# Patient Record
Sex: MALE | Race: WHITE | Employment: OTHER | URBAN - METROPOLITAN AREA
[De-identification: names, ages, dates, MRNs, and addresses within clinical notes are randomized per-mention and may not be internally consistent; named-entity substitution may affect disease eponyms.]

---

## 2019-09-04 ENCOUNTER — HOSPITAL ENCOUNTER (EMERGENCY)
Age: 64
Discharge: HOME | End: 2019-09-04
Payer: COMMERCIAL

## 2019-09-04 VITALS
HEIGHT: 70 IN | HEART RATE: 90 BPM | OXYGEN SATURATION: 97 % | SYSTOLIC BLOOD PRESSURE: 134 MMHG | RESPIRATION RATE: 19 BRPM | WEIGHT: 200 LBS | DIASTOLIC BLOOD PRESSURE: 75 MMHG | TEMPERATURE: 98 F | BODY MASS INDEX: 28.63 KG/M2
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
## 2019-09-04 DIAGNOSIS — S60.569A INSECT BITE HAND, UNSPECIFIED LATERALITY, INITIAL ENCOUNTER: Primary | ICD-10-CM

## 2019-09-04 PROCEDURE — 99282 EMERGENCY DEPT VISIT SF MDM: CPT

## 2019-09-04 PROCEDURE — 6370000000 HC RX 637 (ALT 250 FOR IP): Performed by: PHYSICIAN ASSISTANT

## 2019-09-04 RX ORDER — CEPHALEXIN 250 MG/1
500 CAPSULE ORAL ONCE
Status: COMPLETED | OUTPATIENT
Start: 2019-09-04 | End: 2019-09-04

## 2019-09-04 RX ORDER — CEPHALEXIN 500 MG/1
500 CAPSULE ORAL 4 TIMES DAILY
Qty: 40 CAPSULE | Refills: 0 | Status: SHIPPED | OUTPATIENT
Start: 2019-09-04 | End: 2019-09-14

## 2019-09-04 RX ADMIN — CEPHALEXIN 500 MG: 250 CAPSULE ORAL at 18:30

## 2019-09-04 ASSESSMENT — PAIN DESCRIPTION - LOCATION: LOCATION: HAND

## 2019-09-04 ASSESSMENT — PAIN DESCRIPTION - DESCRIPTORS: DESCRIPTORS: TIGHTNESS

## 2019-09-04 ASSESSMENT — PAIN DESCRIPTION - FREQUENCY: FREQUENCY: CONTINUOUS

## 2019-09-04 ASSESSMENT — PAIN SCALES - WONG BAKER: WONGBAKER_NUMERICALRESPONSE: 2

## 2019-09-04 ASSESSMENT — PAIN DESCRIPTION - PAIN TYPE: TYPE: ACUTE PAIN

## 2019-09-04 ASSESSMENT — PAIN DESCRIPTION - ORIENTATION: ORIENTATION: LEFT

## 2019-09-04 NOTE — ED PROVIDER NOTES
plan.      --------------------------------- IMPRESSION AND DISPOSITION ---------------------------------    IMPRESSION  1. Insect bite hand, unspecified laterality, initial encounter        DISPOSITION  Disposition: Discharge to home  Patient condition is good      NOTE: This report was transcribed using voice recognition software.  Every effort was made to ensure accuracy; however, inadvertent computerized transcription errors may be present     Mellisa Garay  09/04/19 9738

## 2020-03-23 ENCOUNTER — APPOINTMENT (OUTPATIENT)
Dept: GENERAL RADIOLOGY | Age: 65
End: 2020-03-23
Payer: MEDICAID

## 2020-03-23 ENCOUNTER — HOSPITAL ENCOUNTER (EMERGENCY)
Age: 65
Discharge: HOME | End: 2020-03-23
Payer: MEDICAID

## 2020-03-23 VITALS
WEIGHT: 220 LBS | OXYGEN SATURATION: 97 % | SYSTOLIC BLOOD PRESSURE: 140 MMHG | TEMPERATURE: 98.2 F | HEART RATE: 80 BPM | HEIGHT: 70 IN | RESPIRATION RATE: 16 BRPM | BODY MASS INDEX: 31.5 KG/M2 | DIASTOLIC BLOOD PRESSURE: 88 MMHG

## 2020-03-23 PROCEDURE — 99283 EMERGENCY DEPT VISIT LOW MDM: CPT

## 2020-03-23 PROCEDURE — 71046 X-RAY EXAM CHEST 2 VIEWS: CPT

## 2020-03-23 RX ORDER — BENZONATATE 100 MG/1
100 CAPSULE ORAL 3 TIMES DAILY PRN
Qty: 20 CAPSULE | Refills: 0 | Status: SHIPPED | OUTPATIENT
Start: 2020-03-23 | End: 2020-03-30

## 2020-03-23 RX ORDER — AZITHROMYCIN 250 MG/1
TABLET, FILM COATED ORAL
Qty: 1 PACKET | Refills: 0 | Status: SHIPPED | OUTPATIENT
Start: 2020-03-23 | End: 2020-03-27

## 2020-03-23 ASSESSMENT — PAIN SCALES - GENERAL: PAINLEVEL_OUTOF10: 7

## 2020-03-23 ASSESSMENT — PAIN DESCRIPTION - DESCRIPTORS: DESCRIPTORS: ACHING;THROBBING

## 2020-03-23 ASSESSMENT — PAIN DESCRIPTION - LOCATION: LOCATION: BACK;HEAD;NECK

## 2020-03-23 ASSESSMENT — PAIN DESCRIPTION - PAIN TYPE: TYPE: ACUTE PAIN

## 2020-03-23 NOTE — ED PROVIDER NOTES
[facility]    HPI:  3/23/20, Time: 7:59 PM EDT         Emely Almanza is a 60 y.o. male presenting to the ED for cough and congestion, beginning 3 days ago. The complaint has been persistent, moderate in severity, and worsened by nothing. Patient denies sick contacts or recent travel. Patient denies fever at home. He reports that his cough is been productive with green sputum. Patient denies all other symptoms at this time. Review of Systems:   Pertinent positives and negatives are stated within HPI, all other systems reviewed and are negative.          --------------------------------------------- PAST HISTORY ---------------------------------------------  Past Medical History:  has a past medical history of Diabetes mellitus ([facility] [address] Hyperlipidemia, and Hypertension. Past Surgical History:  has a past surgical history that includes hernia repair. Social History:  reports that he quit smoking about 34 years ago. His smoking use included cigarettes. He quit smokeless tobacco use about 34 years ago. He reports current alcohol use. He reports that he does not use drugs. Family History: family history is not on file. The patients home medications have been reviewed. Allergies: Patient has no known allergies. -------------------------------------------------- RESULTS -------------------------------------------------  All laboratory and radiology results have been personally reviewed by myself   LABS:  No results found for this visit on 03/23/20. RADIOLOGY:  Interpreted by Radiologist.  XR CHEST STANDARD (2 VW)   Final Result      No airspace opacities or pleural effusion.                ------------------------- NURSING NOTES AND VITALS REVIEWED ---------------------------   The nursing notes within the ED encounter and vital signs as below have been reviewed.    BP (!) 140/88   Pulse 80   Temp 98.2 °F (36.8 °C) (Temporal)   Resp 16   Ht 5' 10\" (1.778 m)   Wt 220 lb (99.8 kg)   SpO2 ---------------------------------    IMPRESSION  1. Acute bronchitis, unspecified organism        DISPOSITION  Disposition: Discharge to home  Patient condition is stable      NOTE: This report was transcribed using voice recognition software.  Every effort was made to ensure accuracy; however, inadvertent computerized transcription errors may be present        Irvin Cabrera [place]  03/23/20 2038

## 2020-04-03 ENCOUNTER — APPOINTMENT (OUTPATIENT)
Dept: CT IMAGING | Age: 65
End: 2020-04-03
Payer: MEDICAID

## 2020-04-03 ENCOUNTER — HOSPITAL ENCOUNTER (EMERGENCY)
Age: 65
Discharge: HOME | End: 2020-04-03
Attending: EMERGENCY MEDICINE
Payer: MEDICAID

## 2020-04-03 ENCOUNTER — APPOINTMENT (OUTPATIENT)
Dept: GENERAL RADIOLOGY | Age: 65
End: 2020-04-03
Payer: MEDICAID

## 2020-04-03 VITALS
HEART RATE: 67 BPM | OXYGEN SATURATION: 98 % | HEIGHT: 70 IN | WEIGHT: 205 LBS | BODY MASS INDEX: 29.35 KG/M2 | TEMPERATURE: 98 F | SYSTOLIC BLOOD PRESSURE: 129 MMHG | RESPIRATION RATE: 20 BRPM | DIASTOLIC BLOOD PRESSURE: 67 MMHG

## 2020-04-03 LAB
ALBUMIN SERPL-MCNC: 4.1 G/DL (ref 3.5–5.2)
ALP BLD-CCNC: 48 U/L (ref 40–129)
ALT SERPL-CCNC: 26 U/L (ref 0–40)
ANION GAP SERPL CALCULATED.3IONS-SCNC: 11 MMOL/L (ref 7–16)
AST SERPL-CCNC: 20 U/L (ref 0–39)
BASOPHILS ABSOLUTE: 0.05 E9/L (ref 0–0.2)
BASOPHILS RELATIVE PERCENT: 1.1 % (ref 0–2)
BILIRUB SERPL-MCNC: 0.3 MG/DL (ref 0–1.2)
BUN BLDV-MCNC: 26 MG/DL (ref 8–23)
CALCIUM SERPL-MCNC: 9.4 MG/DL (ref 8.6–10.2)
CHLORIDE BLD-SCNC: 104 MMOL/L (ref 98–107)
CO2: 24 MMOL/L (ref 22–29)
CREAT SERPL-MCNC: 1.4 MG/DL (ref 0.7–1.2)
D DIMER: <200 NG/ML DDU
EOSINOPHILS ABSOLUTE: 0.28 E9/L (ref 0.05–0.5)
EOSINOPHILS RELATIVE PERCENT: 6 % (ref 0–6)
GFR AFRICAN AMERICAN: >60
GFR NON-AFRICAN AMERICAN: 51 ML/MIN/1.73
GLUCOSE BLD-MCNC: 283 MG/DL (ref 74–99)
HCT VFR BLD CALC: 39.1 % (ref 37–54)
HEMOGLOBIN: 13.6 G/DL (ref 12.5–16.5)
IMMATURE GRANULOCYTES #: 0.04 E9/L
IMMATURE GRANULOCYTES %: 0.9 % (ref 0–5)
INFLUENZA A BY PCR: NOT DETECTED
INFLUENZA B BY PCR: NOT DETECTED
LACTIC ACID: 1.6 MMOL/L (ref 0.5–2.2)
LYMPHOCYTES ABSOLUTE: 1.44 E9/L (ref 1.5–4)
LYMPHOCYTES RELATIVE PERCENT: 30.7 % (ref 20–42)
MCH RBC QN AUTO: 30.4 PG (ref 26–35)
MCHC RBC AUTO-ENTMCNC: 34.8 % (ref 32–34.5)
MCV RBC AUTO: 87.5 FL (ref 80–99.9)
MONOCYTES ABSOLUTE: 0.42 E9/L (ref 0.1–0.95)
MONOCYTES RELATIVE PERCENT: 9 % (ref 2–12)
NEUTROPHILS ABSOLUTE: 2.46 E9/L (ref 1.8–7.3)
NEUTROPHILS RELATIVE PERCENT: 52.3 % (ref 43–80)
PDW BLD-RTO: 12.6 FL (ref 11.5–15)
PLATELET # BLD: 243 E9/L (ref 130–450)
PMV BLD AUTO: 9.7 FL (ref 7–12)
POTASSIUM SERPL-SCNC: 3.7 MMOL/L (ref 3.5–5)
PRO-BNP: 16 PG/ML (ref 0–125)
RBC # BLD: 4.47 E12/L (ref 3.8–5.8)
SODIUM BLD-SCNC: 139 MMOL/L (ref 132–146)
TOTAL PROTEIN: 6.8 G/DL (ref 6.4–8.3)
TROPONIN: <0.01 NG/ML (ref 0–0.03)
WBC # BLD: 4.7 E9/L (ref 4.5–11.5)

## 2020-04-03 PROCEDURE — 93005 ELECTROCARDIOGRAM TRACING: CPT | Performed by: EMERGENCY MEDICINE

## 2020-04-03 PROCEDURE — 85025 COMPLETE CBC W/AUTO DIFF WBC: CPT

## 2020-04-03 PROCEDURE — 87502 INFLUENZA DNA AMP PROBE: CPT

## 2020-04-03 PROCEDURE — 94760 N-INVAS EAR/PLS OXIMETRY 1: CPT

## 2020-04-03 PROCEDURE — 71045 X-RAY EXAM CHEST 1 VIEW: CPT

## 2020-04-03 PROCEDURE — 2580000003 HC RX 258: Performed by: RADIOLOGY

## 2020-04-03 PROCEDURE — 84484 ASSAY OF TROPONIN QUANT: CPT

## 2020-04-03 PROCEDURE — 80053 COMPREHEN METABOLIC PANEL: CPT

## 2020-04-03 PROCEDURE — 2580000003 HC RX 258: Performed by: EMERGENCY MEDICINE

## 2020-04-03 PROCEDURE — 83605 ASSAY OF LACTIC ACID: CPT

## 2020-04-03 PROCEDURE — 99285 EMERGENCY DEPT VISIT HI MDM: CPT

## 2020-04-03 PROCEDURE — 36415 COLL VENOUS BLD VENIPUNCTURE: CPT

## 2020-04-03 PROCEDURE — 85378 FIBRIN DEGRADE SEMIQUANT: CPT

## 2020-04-03 PROCEDURE — 6360000004 HC RX CONTRAST MEDICATION: Performed by: RADIOLOGY

## 2020-04-03 PROCEDURE — 83880 ASSAY OF NATRIURETIC PEPTIDE: CPT

## 2020-04-03 PROCEDURE — 71275 CT ANGIOGRAPHY CHEST: CPT

## 2020-04-03 RX ORDER — PREDNISONE 10 MG/1
TABLET ORAL
Qty: 21 TABLET | Refills: 0 | Status: SHIPPED | OUTPATIENT
Start: 2020-04-03 | End: 2020-06-01 | Stop reason: ALTCHOICE

## 2020-04-03 RX ORDER — ALBUTEROL SULFATE 90 UG/1
2 AEROSOL, METERED RESPIRATORY (INHALATION) EVERY 4 HOURS PRN
Qty: 1 INHALER | Refills: 3 | Status: SHIPPED | OUTPATIENT
Start: 2020-04-03 | End: 2020-06-01 | Stop reason: ALTCHOICE

## 2020-04-03 RX ORDER — 0.9 % SODIUM CHLORIDE 0.9 %
1000 INTRAVENOUS SOLUTION INTRAVENOUS ONCE
Status: COMPLETED | OUTPATIENT
Start: 2020-04-03 | End: 2020-04-03

## 2020-04-03 RX ORDER — SODIUM CHLORIDE 0.9 % (FLUSH) 0.9 %
10 SYRINGE (ML) INJECTION ONCE
Status: COMPLETED | OUTPATIENT
Start: 2020-04-03 | End: 2020-04-03

## 2020-04-03 RX ORDER — DOXYCYCLINE HYCLATE 100 MG
100 TABLET ORAL 2 TIMES DAILY
Qty: 20 TABLET | Refills: 0 | Status: SHIPPED | OUTPATIENT
Start: 2020-04-03 | End: 2020-04-13

## 2020-04-03 RX ADMIN — Medication 10 ML: at 18:09

## 2020-04-03 RX ADMIN — SODIUM CHLORIDE 1000 ML: 9 INJECTION, SOLUTION INTRAVENOUS at 16:53

## 2020-04-03 RX ADMIN — IOPAMIDOL 90 ML: 755 INJECTION, SOLUTION INTRAVENOUS at 18:09

## 2020-04-03 NOTE — ED PROVIDER NOTES
HPI:  4/3/20, Time: 4:00 PM EDT         Keith Estevez is a 60 y.o. male presenting to the ED for shortness of breath, beginning several days ago. Also c/o chills/sweats a couple nights ago. Has had dry cough for at least 2 weeks. Was seen here 10 days ago and had negative chest xray, given Rx for Zpack and states is no better. In fact, shortness of breath is worse. 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 Denies sick contacts/Covid contacts though. He denies over fever. He denies chest pain, edema. States is no a smoker. The complaint has been persistent, moderate in severity, and worsened by nothing. Patient denies fever, rash, sore throat, congestion, chest pain,  edema, headache, visual disturbances, focal paresthesias, focal weakness, abdominal pain, nausea, vomiting, diarrhea, constipation, dysuria, hematuria, trauma, neck or back pain or other complaints. ROS:   Pertinent positives and negatives are stated within HPI, all other systems reviewed and are negative.      --------------------------------------------- PAST HISTORY ---------------------------------------------  Past Medical History:  has a past medical history of Diabetes mellitus ([facility] [address] Hyperlipidemia, and Hypertension. Past Surgical History:  has a past surgical history that includes hernia repair. Social History:  reports that he quit smoking about 34 years ago. His smoking use included cigarettes. He quit smokeless tobacco use about 34 years ago. He reports current alcohol use. He reports that he does not use drugs. Family History: family history is not on file. The patients home medications have been reviewed. Allergies: Patient has no known allergies.         ---------------------------------------------------PHYSICAL EXAM--------------------------------------    Constitutional:  Well developed, well nourished, no acute distress, non-toxic appearance   Eyes:  PERRL, conjunctiva normal, EOMI  HENT:  Atraumatic, external ears normal, nose normal, oropharynx moist. Neck- normal range of motion, no tenderness, supple, no nuchal rigidity. TMs clear and intact bilaterally   Respiratory:  No respiratory distress, no rhonchi, no rales, no wheezing, diffusely diminished (worse on left)  Cardiovascular:  Normal rate, normal rhythm, no murmurs, no gallops, no rubs. Radial and DP pulses 2+ bilaterally. GI:  Soft, nondistended, normal bowel sounds, nontender, no organomegaly, no mass, no rebound, no guarding   :  No costovertebral angle tenderness   Musculoskeletal:  No edema, no tenderness, no deformities. Back- no tenderness  Integument:  Well hydrated, no rash. Adequate perfusion. Lymphatic:  No cervical lymphadenopathy noted   Neurologic:  Alert & oriented x 3, CN 2-12 normal, no focal deficits noted. Psychiatric:  Speech and behavior appropriate       -------------------------------------------------- RESULTS -------------------------------------------------  I have personally reviewed all laboratory and imaging results for this patient. Results are listed below.      LABS:  Results for orders placed or performed during the hospital encounter of 04/03/20   Rapid influenza A/B antigens   Result Value Ref Range    Influenza A by PCR Not Detected Not Detected    Influenza B by PCR Not Detected Not Detected   Troponin   Result Value Ref Range    Troponin <0.01 0.00 - 0.03 ng/mL   Brain Natriuretic Peptide   Result Value Ref Range    Pro-BNP 16 0 - 125 pg/mL   D-Dimer, Quantitative   Result Value Ref Range    D-Dimer, Quant <200 ng/mL DDU   CBC auto differential   Result Value Ref Range    WBC 4.7 4.5 - 11.5 E9/L    RBC 4.47 3.80 - 5.80 E12/L    Hemoglobin 13.6 12.5 - 16.5 g/dL    Hematocrit 39.1 37.0 - 54.0 %    MCV 87.5 80.0 - 99.9 fL    MCH 30.4 26.0 - 35.0 pg    MCHC 34.8 (H) 32.0 - 34.5 %    RDW 12.6 11.5 - 15.0 fL    Platelets 693 376 - 164 E9/L    MPV 9.7 7.0 - 12.0 fL    Neutrophils % 52.3 43.0 - 80.0 %    Immature change  Clinical Impression: non-specific EKG  Comparison to prior EKG: no previous EKG      ------------------------- NURSING NOTES AND VITALS REVIEWED ---------------------------   The nursing notes within the ED encounter and vital signs as below have been reviewed by myself. /67   Pulse 67   Temp 98 °F (36.7 °C)   Resp 20   Ht 5' 10\" (1.778 m)   Wt 205 lb (93 kg)   SpO2 98%   BMI 29.41 kg/m²   Oxygen Saturation Interpretation: Normal    The patients available past medical records and past encounters were reviewed. ------------------------------ ED COURSE/MEDICAL DECISION MAKING----------------------  Medications   0.9 % sodium chloride bolus (0 mLs Intravenous Stopped 4/3/20 1753)   iopamidol (ISOVUE-370) 76 % injection 90 mL (90 mLs Intravenous Given 4/3/20 1809)   sodium chloride flush 0.9 % injection 10 mL (10 mLs Intravenous Given 4/3/20 1809)           Procedures:  CTa of chest did not demonstrate PE or pneumonia      Medical Decision Making:    I went in and assessed pt - he had an intermittent congested cough but was otherwise comfortable. Exam was non-diagnostic and unremarkable. Mr. Radha Mejia reports relentless cough so will try an inhaler and steroids and a different antibiotic. This patient's ED course included: a personal history and physicial examination and a personal history and physicial eaxmination    This patient has remained hemodynamically stable during their ED course. Re-Evaluations:             Time: 2000  Re-evaluation. Patients symptoms are improving  Repeat physical examination is not changed        Consultations:                 Critical Care:         Counseling: The emergency provider has spoken with the patient and discussed todays results, in addition to providing specific details for the plan of care and counseling regarding the diagnosis and prognosis. Questions are answered at this time and they are agreeable with the plan.   He is to take off work, rest and return to ER if sx worsen. --------------------------------- IMPRESSION AND DISPOSITION ---------------------------------    IMPRESSION  1. Type 2 diabetes mellitus with hyperglycemia, without long-term current use of insulin ([facility]    2.  COPD exacerbation ([facility]        DISPOSITION  Disposition: Discharge to home  Patient condition is stable                 James Jones MD  04/04/20 2639

## 2020-04-04 ENCOUNTER — CARE COORDINATION (OUTPATIENT)
Dept: CARE COORDINATION | Age: 65
End: 2020-04-04

## 2020-04-04 LAB
EKG ATRIAL RATE: 64 BPM
EKG P AXIS: 61 DEGREES
EKG P-R INTERVAL: 200 MS
EKG Q-T INTERVAL: 418 MS
EKG QRS DURATION: 100 MS
EKG QTC CALCULATION (BAZETT): 431 MS
EKG R AXIS: 23 DEGREES
EKG T AXIS: 21 DEGREES
EKG VENTRICULAR RATE: 64 BPM

## 2020-04-04 PROCEDURE — 93010 ELECTROCARDIOGRAM REPORT: CPT | Performed by: INTERNAL MEDICINE

## 2020-04-04 NOTE — CARE COORDINATION
First attempt to reach patient for ED follow up and COVID risk screening. Message left on voicemail with call back number.

## 2020-04-15 ENCOUNTER — APPOINTMENT (OUTPATIENT)
Dept: GENERAL RADIOLOGY | Age: 65
End: 2020-04-15
Payer: MEDICAID

## 2020-04-15 ENCOUNTER — HOSPITAL ENCOUNTER (EMERGENCY)
Age: 65
Discharge: HOME | End: 2020-04-15
Attending: EMERGENCY MEDICINE
Payer: MEDICAID

## 2020-04-15 VITALS
HEIGHT: 71 IN | HEART RATE: 66 BPM | DIASTOLIC BLOOD PRESSURE: 76 MMHG | SYSTOLIC BLOOD PRESSURE: 128 MMHG | WEIGHT: 210 LBS | OXYGEN SATURATION: 96 % | BODY MASS INDEX: 29.4 KG/M2 | RESPIRATION RATE: 18 BRPM | TEMPERATURE: 98.5 F

## 2020-04-15 PROCEDURE — 71046 X-RAY EXAM CHEST 2 VIEWS: CPT

## 2020-04-15 PROCEDURE — 99283 EMERGENCY DEPT VISIT LOW MDM: CPT

## 2020-04-15 RX ORDER — LOSARTAN POTASSIUM 50 MG/1
50 TABLET ORAL DAILY
COMMUNITY
End: 2020-11-05 | Stop reason: SDUPTHER

## 2020-04-15 RX ORDER — HYDROCHLOROTHIAZIDE 12.5 MG/1
12.5 CAPSULE, GELATIN COATED ORAL DAILY
COMMUNITY
End: 2020-11-13 | Stop reason: SDUPTHER

## 2020-04-15 RX ORDER — BENZONATATE 100 MG/1
100 CAPSULE ORAL 3 TIMES DAILY PRN
Qty: 15 CAPSULE | Refills: 0 | Status: SHIPPED | OUTPATIENT
Start: 2020-04-15 | End: 2020-04-20

## 2020-04-15 NOTE — ED PROVIDER NOTES
HPI:  4/15/20,   Time: 11:12 AM EDT         Jeanette Barnes is a 60 y.o. male presenting to the ED for a persistent cough, beginning over 2 weeks ago. The complaint has been persistent, moderate in severity, and worsened by cough. The patient denies fever chills chest pain or shortness of breath. He was seen here 2 weeks ago and treated with prednisone and doxycycline which he finished taking but he is still coughing. He does not smoke    ROS:   Pertinent positives and negatives are stated within HPI, all other systems reviewed and are negative.  --------------------------------------------- PAST HISTORY ---------------------------------------------  Past Medical History:  has a past medical history of Diabetes mellitus ([facility] [address] Hyperlipidemia, and Hypertension. Past Surgical History:  has a past surgical history that includes hernia repair and Neck surgery. Social History:  reports that he quit smoking about 34 years ago. His smoking use included cigarettes. He quit smokeless tobacco use about 34 years ago. He reports current alcohol use. He reports that he does not use drugs. Family History: family history is not on file. The patients home medications have been reviewed. Allergies: Patient has no known allergies. -------------------------------------------------- RESULTS -------------------------------------------------  All laboratory and radiology results have been personally reviewed by myself   LABS:  No results found for this visit on 04/15/20. RADIOLOGY:  Interpreted by Radiologist.  XR CHEST STANDARD (2 VW)   Final Result   Normal chest                   ------------------------- NURSING NOTES AND VITALS REVIEWED ---------------------------   The nursing notes within the ED encounter and vital signs as below have been reviewed.    /76   Pulse 66   Temp 98.5 °F (36.9 °C) (Temporal)   Resp 18   Ht 5' 10.5\" (1.791 m)   Wt 210 lb (95.3 kg)   SpO2 96%   BMI 29.71 kg/m²

## 2020-04-16 ENCOUNTER — CARE COORDINATION (OUTPATIENT)
Dept: CARE COORDINATION | Age: 65
End: 2020-04-16

## 2020-04-16 NOTE — CARE COORDINATION
the road. He is agreeable. Discussed follow up and patient does not have a PCP. He used to follow with a provider in [place]. He would like assistance with finding a PCP near his home in [facility]. Preferred email: [email or web address]  Preferred phone: [phone]

## 2020-05-17 ENCOUNTER — APPOINTMENT (OUTPATIENT)
Dept: GENERAL RADIOLOGY | Age: 65
End: 2020-05-17
Payer: COMMERCIAL

## 2020-05-17 ENCOUNTER — HOSPITAL ENCOUNTER (OUTPATIENT)
Age: 65
Setting detail: OBSERVATION
Discharge: HOME | End: 2020-05-19
Attending: EMERGENCY MEDICINE | Admitting: SURGERY
Payer: COMMERCIAL

## 2020-05-17 ENCOUNTER — APPOINTMENT (OUTPATIENT)
Dept: ULTRASOUND IMAGING | Age: 65
End: 2020-05-17
Payer: COMMERCIAL

## 2020-05-17 LAB
ANION GAP SERPL CALCULATED.3IONS-SCNC: 12 MMOL/L (ref 7–16)
BASOPHILS ABSOLUTE: 0.05 E9/L (ref 0–0.2)
BASOPHILS RELATIVE PERCENT: 0.8 % (ref 0–2)
BETA-HYDROXYBUTYRATE: 0.07 MMOL/L (ref 0.02–0.27)
BUN BLDV-MCNC: 21 MG/DL (ref 8–23)
CALCIUM SERPL-MCNC: 9.8 MG/DL (ref 8.6–10.2)
CHLORIDE BLD-SCNC: 103 MMOL/L (ref 98–107)
CO2: 26 MMOL/L (ref 22–29)
CREAT SERPL-MCNC: 1.3 MG/DL (ref 0.7–1.2)
EOSINOPHILS ABSOLUTE: 0.21 E9/L (ref 0.05–0.5)
EOSINOPHILS RELATIVE PERCENT: 3.5 % (ref 0–6)
GFR AFRICAN AMERICAN: >60
GFR NON-AFRICAN AMERICAN: 56 ML/MIN/1.73
GLUCOSE BLD-MCNC: 231 MG/DL (ref 74–99)
HCT VFR BLD CALC: 38.5 % (ref 37–54)
HEMOGLOBIN: 13.4 G/DL (ref 12.5–16.5)
IMMATURE GRANULOCYTES #: 0.04 E9/L
IMMATURE GRANULOCYTES %: 0.7 % (ref 0–5)
INR BLD: 0.9
LIPASE: 62 U/L (ref 13–60)
LYMPHOCYTES ABSOLUTE: 1.86 E9/L (ref 1.5–4)
LYMPHOCYTES RELATIVE PERCENT: 31.1 % (ref 20–42)
MCH RBC QN AUTO: 30.9 PG (ref 26–35)
MCHC RBC AUTO-ENTMCNC: 34.8 % (ref 32–34.5)
MCV RBC AUTO: 88.9 FL (ref 80–99.9)
MONOCYTES ABSOLUTE: 0.58 E9/L (ref 0.1–0.95)
MONOCYTES RELATIVE PERCENT: 9.7 % (ref 2–12)
NEUTROPHILS ABSOLUTE: 3.25 E9/L (ref 1.8–7.3)
NEUTROPHILS RELATIVE PERCENT: 54.2 % (ref 43–80)
PDW BLD-RTO: 12.8 FL (ref 11.5–15)
PH VENOUS: 7.34 (ref 7.35–7.45)
PLATELET # BLD: 212 E9/L (ref 130–450)
PMV BLD AUTO: 9.4 FL (ref 7–12)
POTASSIUM REFLEX MAGNESIUM: 4.5 MMOL/L (ref 3.5–5)
PROTHROMBIN TIME: 10.7 SEC (ref 9.3–12.4)
RBC # BLD: 4.33 E12/L (ref 3.8–5.8)
SODIUM BLD-SCNC: 141 MMOL/L (ref 132–146)
TROPONIN: <0.01 NG/ML (ref 0–0.03)
WBC # BLD: 6 E9/L (ref 4.5–11.5)

## 2020-05-17 PROCEDURE — 71045 X-RAY EXAM CHEST 1 VIEW: CPT

## 2020-05-17 PROCEDURE — 80076 HEPATIC FUNCTION PANEL: CPT

## 2020-05-17 PROCEDURE — 76705 ECHO EXAM OF ABDOMEN: CPT

## 2020-05-17 PROCEDURE — 85610 PROTHROMBIN TIME: CPT

## 2020-05-17 PROCEDURE — 80048 BASIC METABOLIC PNL TOTAL CA: CPT

## 2020-05-17 PROCEDURE — 6360000002 HC RX W HCPCS: Performed by: EMERGENCY MEDICINE

## 2020-05-17 PROCEDURE — 93005 ELECTROCARDIOGRAM TRACING: CPT | Performed by: EMERGENCY MEDICINE

## 2020-05-17 PROCEDURE — 96375 TX/PRO/DX INJ NEW DRUG ADDON: CPT

## 2020-05-17 PROCEDURE — 83690 ASSAY OF LIPASE: CPT

## 2020-05-17 PROCEDURE — 84484 ASSAY OF TROPONIN QUANT: CPT

## 2020-05-17 PROCEDURE — 2580000003 HC RX 258: Performed by: EMERGENCY MEDICINE

## 2020-05-17 PROCEDURE — 96374 THER/PROPH/DIAG INJ IV PUSH: CPT

## 2020-05-17 PROCEDURE — 94760 N-INVAS EAR/PLS OXIMETRY 1: CPT

## 2020-05-17 PROCEDURE — 85025 COMPLETE CBC W/AUTO DIFF WBC: CPT

## 2020-05-17 PROCEDURE — 82800 BLOOD PH: CPT

## 2020-05-17 PROCEDURE — 99284 EMERGENCY DEPT VISIT MOD MDM: CPT

## 2020-05-17 PROCEDURE — 82010 KETONE BODYS QUAN: CPT

## 2020-05-17 RX ORDER — ONDANSETRON 2 MG/ML
4 INJECTION INTRAMUSCULAR; INTRAVENOUS ONCE
Status: COMPLETED | OUTPATIENT
Start: 2020-05-17 | End: 2020-05-17

## 2020-05-17 RX ORDER — FENTANYL CITRATE 50 UG/ML
50 INJECTION, SOLUTION INTRAMUSCULAR; INTRAVENOUS ONCE
Status: COMPLETED | OUTPATIENT
Start: 2020-05-17 | End: 2020-05-17

## 2020-05-17 RX ORDER — 0.9 % SODIUM CHLORIDE 0.9 %
1000 INTRAVENOUS SOLUTION INTRAVENOUS ONCE
Status: COMPLETED | OUTPATIENT
Start: 2020-05-17 | End: 2020-05-18

## 2020-05-17 RX ADMIN — FENTANYL CITRATE 50 MCG: 50 INJECTION, SOLUTION INTRAMUSCULAR; INTRAVENOUS at 23:31

## 2020-05-17 RX ADMIN — SODIUM CHLORIDE 1000 ML: 9 INJECTION, SOLUTION INTRAVENOUS at 23:31

## 2020-05-17 RX ADMIN — ONDANSETRON 4 MG: 2 INJECTION INTRAMUSCULAR; INTRAVENOUS at 23:32

## 2020-05-17 ASSESSMENT — PAIN SCALES - GENERAL
PAINLEVEL_OUTOF10: 10
PAINLEVEL_OUTOF10: 10

## 2020-05-17 ASSESSMENT — PAIN DESCRIPTION - LOCATION: LOCATION: ABDOMEN

## 2020-05-18 ENCOUNTER — ANESTHESIA (OUTPATIENT)
Dept: OPERATING ROOM | Age: 65
End: 2020-05-18
Payer: COMMERCIAL

## 2020-05-18 ENCOUNTER — APPOINTMENT (OUTPATIENT)
Dept: NUCLEAR MEDICINE | Age: 65
End: 2020-05-18
Payer: COMMERCIAL

## 2020-05-18 ENCOUNTER — APPOINTMENT (OUTPATIENT)
Dept: GENERAL RADIOLOGY | Age: 65
End: 2020-05-18
Payer: COMMERCIAL

## 2020-05-18 ENCOUNTER — ANESTHESIA EVENT (OUTPATIENT)
Dept: OPERATING ROOM | Age: 65
End: 2020-05-18
Payer: COMMERCIAL

## 2020-05-18 VITALS
SYSTOLIC BLOOD PRESSURE: 117 MMHG | OXYGEN SATURATION: 96 % | DIASTOLIC BLOOD PRESSURE: 69 MMHG | RESPIRATION RATE: 22 BRPM | TEMPERATURE: 97.7 F

## 2020-05-18 PROBLEM — K81.9 CHOLECYSTITIS: Status: ACTIVE | Noted: 2020-05-18

## 2020-05-18 PROBLEM — K81.0 ACUTE ACALCULOUS CHOLECYSTITIS: Status: ACTIVE | Noted: 2020-05-18

## 2020-05-18 LAB
ALBUMIN SERPL-MCNC: 4.3 G/DL (ref 3.5–5.2)
ALBUMIN SERPL-MCNC: 4.4 G/DL (ref 3.5–5.2)
ALP BLD-CCNC: 43 U/L (ref 40–129)
ALP BLD-CCNC: 44 U/L (ref 40–129)
ALT SERPL-CCNC: 24 U/L (ref 0–40)
ALT SERPL-CCNC: 27 U/L (ref 0–40)
ANION GAP SERPL CALCULATED.3IONS-SCNC: 13 MMOL/L (ref 7–16)
AST SERPL-CCNC: 17 U/L (ref 0–39)
AST SERPL-CCNC: 21 U/L (ref 0–39)
BACTERIA: ABNORMAL /HPF
BASOPHILS ABSOLUTE: 0.03 E9/L (ref 0–0.2)
BASOPHILS RELATIVE PERCENT: 0.4 % (ref 0–2)
BILIRUB SERPL-MCNC: 0.3 MG/DL (ref 0–1.2)
BILIRUB SERPL-MCNC: 0.4 MG/DL (ref 0–1.2)
BILIRUBIN DIRECT: <0.2 MG/DL (ref 0–0.3)
BILIRUBIN URINE: NEGATIVE
BILIRUBIN, INDIRECT: NORMAL MG/DL (ref 0–1)
BLOOD, URINE: NEGATIVE
BUN BLDV-MCNC: 21 MG/DL (ref 8–23)
CALCIUM SERPL-MCNC: 9.4 MG/DL (ref 8.6–10.2)
CHLORIDE BLD-SCNC: 105 MMOL/L (ref 98–107)
CLARITY: CLEAR
CO2: 23 MMOL/L (ref 22–29)
COLOR: YELLOW
CREAT SERPL-MCNC: 1.2 MG/DL (ref 0.7–1.2)
EOSINOPHILS ABSOLUTE: 0.07 E9/L (ref 0.05–0.5)
EOSINOPHILS RELATIVE PERCENT: 1 % (ref 0–6)
EPITHELIAL CELLS, UA: ABNORMAL /HPF
GFR AFRICAN AMERICAN: >60
GFR NON-AFRICAN AMERICAN: >60 ML/MIN/1.73
GLUCOSE BLD-MCNC: 196 MG/DL (ref 74–99)
GLUCOSE URINE: 500 MG/DL
HCT VFR BLD CALC: 37.9 % (ref 37–54)
HEMOGLOBIN: 13.1 G/DL (ref 12.5–16.5)
IMMATURE GRANULOCYTES #: 0.05 E9/L
IMMATURE GRANULOCYTES %: 0.7 % (ref 0–5)
KETONES, URINE: NEGATIVE MG/DL
LEUKOCYTE ESTERASE, URINE: NEGATIVE
LYMPHOCYTES ABSOLUTE: 1.2 E9/L (ref 1.5–4)
LYMPHOCYTES RELATIVE PERCENT: 17.1 % (ref 20–42)
MCH RBC QN AUTO: 30.8 PG (ref 26–35)
MCHC RBC AUTO-ENTMCNC: 34.6 % (ref 32–34.5)
MCV RBC AUTO: 89 FL (ref 80–99.9)
METER GLUCOSE: 140 MG/DL (ref 74–99)
METER GLUCOSE: 265 MG/DL (ref 74–99)
MONOCYTES ABSOLUTE: 0.53 E9/L (ref 0.1–0.95)
MONOCYTES RELATIVE PERCENT: 7.5 % (ref 2–12)
NEUTROPHILS ABSOLUTE: 5.15 E9/L (ref 1.8–7.3)
NEUTROPHILS RELATIVE PERCENT: 73.3 % (ref 43–80)
NITRITE, URINE: NEGATIVE
PDW BLD-RTO: 12.7 FL (ref 11.5–15)
PH UA: 6 (ref 5–9)
PLATELET # BLD: 193 E9/L (ref 130–450)
PMV BLD AUTO: 9.6 FL (ref 7–12)
POTASSIUM REFLEX MAGNESIUM: 4.4 MMOL/L (ref 3.5–5)
PROTEIN UA: NEGATIVE MG/DL
RBC # BLD: 4.26 E12/L (ref 3.8–5.8)
RBC UA: ABNORMAL /HPF (ref 0–2)
SODIUM BLD-SCNC: 141 MMOL/L (ref 132–146)
SPECIFIC GRAVITY UA: >=1.03 (ref 1–1.03)
TOTAL PROTEIN: 6.5 G/DL (ref 6.4–8.3)
TOTAL PROTEIN: 7.1 G/DL (ref 6.4–8.3)
UROBILINOGEN, URINE: 0.2 E.U./DL
WBC # BLD: 7 E9/L (ref 4.5–11.5)
WBC UA: ABNORMAL /HPF (ref 0–5)

## 2020-05-18 PROCEDURE — 2580000003 HC RX 258: Performed by: NURSE ANESTHETIST, CERTIFIED REGISTERED

## 2020-05-18 PROCEDURE — 2500000003 HC RX 250 WO HCPCS: Performed by: STUDENT IN AN ORGANIZED HEALTH CARE EDUCATION/TRAINING PROGRAM

## 2020-05-18 PROCEDURE — 6370000000 HC RX 637 (ALT 250 FOR IP): Performed by: SURGERY

## 2020-05-18 PROCEDURE — 3600000014 HC SURGERY LEVEL 4 ADDTL 15MIN: Performed by: SURGERY

## 2020-05-18 PROCEDURE — 3430000000 HC RX DIAGNOSTIC RADIOPHARMACEUTICAL: Performed by: RADIOLOGY

## 2020-05-18 PROCEDURE — 6360000002 HC RX W HCPCS: Performed by: EMERGENCY MEDICINE

## 2020-05-18 PROCEDURE — 7100000000 HC PACU RECOVERY - FIRST 15 MIN: Performed by: SURGERY

## 2020-05-18 PROCEDURE — 74300 X-RAY BILE DUCTS/PANCREAS: CPT

## 2020-05-18 PROCEDURE — 6370000000 HC RX 637 (ALT 250 FOR IP): Performed by: STUDENT IN AN ORGANIZED HEALTH CARE EDUCATION/TRAINING PROGRAM

## 2020-05-18 PROCEDURE — 82962 GLUCOSE BLOOD TEST: CPT

## 2020-05-18 PROCEDURE — 6360000002 HC RX W HCPCS: Performed by: INTERNAL MEDICINE

## 2020-05-18 PROCEDURE — 3700000001 HC ADD 15 MINUTES (ANESTHESIA): Performed by: SURGERY

## 2020-05-18 PROCEDURE — 80053 COMPREHEN METABOLIC PANEL: CPT

## 2020-05-18 PROCEDURE — 6360000002 HC RX W HCPCS: Performed by: NURSE ANESTHETIST, CERTIFIED REGISTERED

## 2020-05-18 PROCEDURE — C9113 INJ PANTOPRAZOLE SODIUM, VIA: HCPCS | Performed by: INTERNAL MEDICINE

## 2020-05-18 PROCEDURE — 3700000000 HC ANESTHESIA ATTENDED CARE: Performed by: SURGERY

## 2020-05-18 PROCEDURE — C1894 INTRO/SHEATH, NON-LASER: HCPCS | Performed by: SURGERY

## 2020-05-18 PROCEDURE — 7100000001 HC PACU RECOVERY - ADDTL 15 MIN: Performed by: SURGERY

## 2020-05-18 PROCEDURE — 2580000003 HC RX 258: Performed by: RADIOLOGY

## 2020-05-18 PROCEDURE — 96375 TX/PRO/DX INJ NEW DRUG ADDON: CPT

## 2020-05-18 PROCEDURE — A9537 TC99M MEBROFENIN: HCPCS | Performed by: RADIOLOGY

## 2020-05-18 PROCEDURE — C9113 INJ PANTOPRAZOLE SODIUM, VIA: HCPCS | Performed by: STUDENT IN AN ORGANIZED HEALTH CARE EDUCATION/TRAINING PROGRAM

## 2020-05-18 PROCEDURE — 47563 LAPARO CHOLECYSTECTOMY/GRAPH: CPT | Performed by: SURGERY

## 2020-05-18 PROCEDURE — 6360000002 HC RX W HCPCS: Performed by: STUDENT IN AN ORGANIZED HEALTH CARE EDUCATION/TRAINING PROGRAM

## 2020-05-18 PROCEDURE — 99244 OFF/OP CNSLTJ NEW/EST MOD 40: CPT | Performed by: SURGERY

## 2020-05-18 PROCEDURE — G0378 HOSPITAL OBSERVATION PER HR: HCPCS

## 2020-05-18 PROCEDURE — 81001 URINALYSIS AUTO W/SCOPE: CPT

## 2020-05-18 PROCEDURE — 2500000003 HC RX 250 WO HCPCS: Performed by: SURGERY

## 2020-05-18 PROCEDURE — 2709999900 HC NON-CHARGEABLE SUPPLY: Performed by: SURGERY

## 2020-05-18 PROCEDURE — 88304 TISSUE EXAM BY PATHOLOGIST: CPT

## 2020-05-18 PROCEDURE — 2500000003 HC RX 250 WO HCPCS: Performed by: NURSE ANESTHETIST, CERTIFIED REGISTERED

## 2020-05-18 PROCEDURE — 6370000000 HC RX 637 (ALT 250 FOR IP): Performed by: INTERNAL MEDICINE

## 2020-05-18 PROCEDURE — 6360000002 HC RX W HCPCS: Performed by: RADIOLOGY

## 2020-05-18 PROCEDURE — 6360000002 HC RX W HCPCS: Performed by: SURGERY

## 2020-05-18 PROCEDURE — 3600000004 HC SURGERY LEVEL 4 BASE: Performed by: SURGERY

## 2020-05-18 PROCEDURE — 78227 HEPATOBIL SYST IMAGE W/DRUG: CPT

## 2020-05-18 PROCEDURE — 85025 COMPLETE CBC W/AUTO DIFF WBC: CPT

## 2020-05-18 PROCEDURE — 36415 COLL VENOUS BLD VENIPUNCTURE: CPT

## 2020-05-18 PROCEDURE — 2580000003 HC RX 258: Performed by: EMERGENCY MEDICINE

## 2020-05-18 PROCEDURE — 96376 TX/PRO/DX INJ SAME DRUG ADON: CPT

## 2020-05-18 PROCEDURE — 2580000003 HC RX 258: Performed by: SURGERY

## 2020-05-18 RX ORDER — NEOSTIGMINE METHYLSULFATE 1 MG/ML
INJECTION, SOLUTION INTRAVENOUS PRN
Status: DISCONTINUED | OUTPATIENT
Start: 2020-05-18 | End: 2020-05-18 | Stop reason: SDUPTHER

## 2020-05-18 RX ORDER — LIDOCAINE HYDROCHLORIDE 20 MG/ML
INJECTION, SOLUTION INTRAVENOUS PRN
Status: DISCONTINUED | OUTPATIENT
Start: 2020-05-18 | End: 2020-05-18 | Stop reason: SDUPTHER

## 2020-05-18 RX ORDER — ROCURONIUM BROMIDE 10 MG/ML
INJECTION, SOLUTION INTRAVENOUS PRN
Status: DISCONTINUED | OUTPATIENT
Start: 2020-05-18 | End: 2020-05-18 | Stop reason: SDUPTHER

## 2020-05-18 RX ORDER — NICOTINE POLACRILEX 4 MG
15 LOZENGE BUCCAL PRN
Status: DISCONTINUED | OUTPATIENT
Start: 2020-05-18 | End: 2020-05-19 | Stop reason: HOSPADM

## 2020-05-18 RX ORDER — DEXTROSE MONOHYDRATE 50 MG/ML
100 INJECTION, SOLUTION INTRAVENOUS PRN
Status: DISCONTINUED | OUTPATIENT
Start: 2020-05-18 | End: 2020-05-19 | Stop reason: HOSPADM

## 2020-05-18 RX ORDER — ROPINIROLE 1 MG/1
1 TABLET, FILM COATED ORAL 3 TIMES DAILY
Status: DISCONTINUED | OUTPATIENT
Start: 2020-05-18 | End: 2020-05-19 | Stop reason: HOSPADM

## 2020-05-18 RX ORDER — FENTANYL CITRATE 50 UG/ML
INJECTION, SOLUTION INTRAMUSCULAR; INTRAVENOUS PRN
Status: DISCONTINUED | OUTPATIENT
Start: 2020-05-18 | End: 2020-05-18 | Stop reason: SDUPTHER

## 2020-05-18 RX ORDER — GLIPIZIDE 5 MG/1
5 TABLET ORAL 2 TIMES DAILY
Status: DISCONTINUED | OUTPATIENT
Start: 2020-05-18 | End: 2020-05-19 | Stop reason: HOSPADM

## 2020-05-18 RX ORDER — BUPIVACAINE HYDROCHLORIDE AND EPINEPHRINE 2.5; 5 MG/ML; UG/ML
INJECTION, SOLUTION EPIDURAL; INFILTRATION; INTRACAUDAL; PERINEURAL PRN
Status: DISCONTINUED | OUTPATIENT
Start: 2020-05-18 | End: 2020-05-18 | Stop reason: ALTCHOICE

## 2020-05-18 RX ORDER — KETOROLAC TROMETHAMINE 30 MG/ML
30 INJECTION, SOLUTION INTRAMUSCULAR; INTRAVENOUS ONCE
Status: COMPLETED | OUTPATIENT
Start: 2020-05-18 | End: 2020-05-18

## 2020-05-18 RX ORDER — ONDANSETRON 2 MG/ML
INJECTION INTRAMUSCULAR; INTRAVENOUS PRN
Status: DISCONTINUED | OUTPATIENT
Start: 2020-05-18 | End: 2020-05-18 | Stop reason: SDUPTHER

## 2020-05-18 RX ORDER — PANTOPRAZOLE SODIUM 40 MG/10ML
40 INJECTION, POWDER, LYOPHILIZED, FOR SOLUTION INTRAVENOUS 2 TIMES DAILY
Status: DISCONTINUED | OUTPATIENT
Start: 2020-05-18 | End: 2020-05-19

## 2020-05-18 RX ORDER — OXYCODONE HYDROCHLORIDE 5 MG/1
10 TABLET ORAL EVERY 4 HOURS PRN
Status: DISCONTINUED | OUTPATIENT
Start: 2020-05-18 | End: 2020-05-19 | Stop reason: HOSPADM

## 2020-05-18 RX ORDER — SODIUM CHLORIDE 9 MG/ML
INJECTION, SOLUTION INTRAVENOUS CONTINUOUS PRN
Status: DISCONTINUED | OUTPATIENT
Start: 2020-05-18 | End: 2020-05-18 | Stop reason: SDUPTHER

## 2020-05-18 RX ORDER — PROPOFOL 10 MG/ML
INJECTION, EMULSION INTRAVENOUS PRN
Status: DISCONTINUED | OUTPATIENT
Start: 2020-05-18 | End: 2020-05-18 | Stop reason: SDUPTHER

## 2020-05-18 RX ORDER — OXYCODONE HYDROCHLORIDE 5 MG/1
5 TABLET ORAL EVERY 4 HOURS PRN
Status: DISCONTINUED | OUTPATIENT
Start: 2020-05-18 | End: 2020-05-19 | Stop reason: HOSPADM

## 2020-05-18 RX ORDER — ROPINIROLE 1 MG/1
1 TABLET, FILM COATED ORAL 3 TIMES DAILY
COMMUNITY
End: 2020-11-05 | Stop reason: SDUPTHER

## 2020-05-18 RX ORDER — GLYCOPYRROLATE 1 MG/5 ML
SYRINGE (ML) INTRAVENOUS PRN
Status: DISCONTINUED | OUTPATIENT
Start: 2020-05-18 | End: 2020-05-18 | Stop reason: SDUPTHER

## 2020-05-18 RX ORDER — SODIUM CHLORIDE 0.9 % (FLUSH) 0.9 %
10 SYRINGE (ML) INJECTION EVERY 12 HOURS SCHEDULED
Status: DISCONTINUED | OUTPATIENT
Start: 2020-05-18 | End: 2020-05-19 | Stop reason: HOSPADM

## 2020-05-18 RX ORDER — ONDANSETRON 2 MG/ML
4 INJECTION INTRAMUSCULAR; INTRAVENOUS ONCE
Status: COMPLETED | OUTPATIENT
Start: 2020-05-18 | End: 2020-05-18

## 2020-05-18 RX ORDER — SODIUM CHLORIDE 9 MG/ML
10 INJECTION INTRAVENOUS DAILY
Status: DISCONTINUED | OUTPATIENT
Start: 2020-05-18 | End: 2020-05-18

## 2020-05-18 RX ORDER — ONDANSETRON 2 MG/ML
4 INJECTION INTRAMUSCULAR; INTRAVENOUS EVERY 6 HOURS PRN
Status: DISCONTINUED | OUTPATIENT
Start: 2020-05-18 | End: 2020-05-19 | Stop reason: HOSPADM

## 2020-05-18 RX ORDER — DEXTROSE MONOHYDRATE 25 G/50ML
12.5 INJECTION, SOLUTION INTRAVENOUS PRN
Status: DISCONTINUED | OUTPATIENT
Start: 2020-05-18 | End: 2020-05-19 | Stop reason: HOSPADM

## 2020-05-18 RX ORDER — LOSARTAN POTASSIUM 50 MG/1
50 TABLET ORAL DAILY
Status: DISCONTINUED | OUTPATIENT
Start: 2020-05-18 | End: 2020-05-19 | Stop reason: HOSPADM

## 2020-05-18 RX ORDER — CARVEDILOL 25 MG/1
25 TABLET ORAL 2 TIMES DAILY
Status: DISCONTINUED | OUTPATIENT
Start: 2020-05-18 | End: 2020-05-19 | Stop reason: HOSPADM

## 2020-05-18 RX ORDER — SODIUM CHLORIDE 0.9 % (FLUSH) 0.9 %
10 SYRINGE (ML) INJECTION PRN
Status: DISCONTINUED | OUTPATIENT
Start: 2020-05-18 | End: 2020-05-19 | Stop reason: HOSPADM

## 2020-05-18 RX ORDER — MORPHINE SULFATE 2 MG/ML
2 INJECTION, SOLUTION INTRAMUSCULAR; INTRAVENOUS
Status: DISCONTINUED | OUTPATIENT
Start: 2020-05-18 | End: 2020-05-19

## 2020-05-18 RX ORDER — SODIUM CHLORIDE 9 MG/ML
10 INJECTION INTRAVENOUS DAILY
Status: DISCONTINUED | OUTPATIENT
Start: 2020-05-18 | End: 2020-05-19

## 2020-05-18 RX ORDER — OXYCODONE HYDROCHLORIDE AND ACETAMINOPHEN 5; 325 MG/1; MG/1
1 TABLET ORAL EVERY 6 HOURS PRN
Qty: 18 TABLET | Refills: 0 | Status: SHIPPED | OUTPATIENT
Start: 2020-05-18 | End: 2020-05-23

## 2020-05-18 RX ORDER — PANTOPRAZOLE SODIUM 40 MG/10ML
40 INJECTION, POWDER, LYOPHILIZED, FOR SOLUTION INTRAVENOUS DAILY
Status: DISCONTINUED | OUTPATIENT
Start: 2020-05-18 | End: 2020-05-18

## 2020-05-18 RX ORDER — IBUPROFEN 800 MG/1
800 TABLET ORAL EVERY 6 HOURS PRN
Qty: 90 TABLET | Refills: 1 | Status: SHIPPED
Start: 2020-05-18 | End: 2020-05-19 | Stop reason: HOSPADM

## 2020-05-18 RX ORDER — DEXAMETHASONE SODIUM PHOSPHATE 10 MG/ML
INJECTION, SOLUTION INTRAMUSCULAR; INTRAVENOUS PRN
Status: DISCONTINUED | OUTPATIENT
Start: 2020-05-18 | End: 2020-05-18 | Stop reason: SDUPTHER

## 2020-05-18 RX ORDER — MORPHINE SULFATE 10 MG/ML
8 INJECTION, SOLUTION INTRAMUSCULAR; INTRAVENOUS ONCE
Status: COMPLETED | OUTPATIENT
Start: 2020-05-18 | End: 2020-05-18

## 2020-05-18 RX ORDER — SODIUM CHLORIDE 9 MG/ML
INJECTION, SOLUTION INTRAVENOUS CONTINUOUS
Status: DISCONTINUED | OUTPATIENT
Start: 2020-05-18 | End: 2020-05-18

## 2020-05-18 RX ORDER — HYDROCHLOROTHIAZIDE 12.5 MG/1
12.5 TABLET ORAL DAILY
Status: DISCONTINUED | OUTPATIENT
Start: 2020-05-18 | End: 2020-05-19 | Stop reason: HOSPADM

## 2020-05-18 RX ORDER — MORPHINE SULFATE 4 MG/ML
4 INJECTION, SOLUTION INTRAMUSCULAR; INTRAVENOUS
Status: DISCONTINUED | OUTPATIENT
Start: 2020-05-18 | End: 2020-05-19

## 2020-05-18 RX ORDER — MIDAZOLAM HYDROCHLORIDE 1 MG/ML
INJECTION INTRAMUSCULAR; INTRAVENOUS PRN
Status: DISCONTINUED | OUTPATIENT
Start: 2020-05-18 | End: 2020-05-18 | Stop reason: SDUPTHER

## 2020-05-18 RX ORDER — POTASSIUM CHLORIDE AND SODIUM CHLORIDE 900; 300 MG/100ML; MG/100ML
INJECTION, SOLUTION INTRAVENOUS CONTINUOUS
Status: DISCONTINUED | OUTPATIENT
Start: 2020-05-18 | End: 2020-05-19

## 2020-05-18 RX ADMIN — PANTOPRAZOLE SODIUM 40 MG: 40 INJECTION, POWDER, FOR SOLUTION INTRAVENOUS at 20:42

## 2020-05-18 RX ADMIN — PANTOPRAZOLE SODIUM 40 MG: 40 INJECTION, POWDER, FOR SOLUTION INTRAVENOUS at 13:00

## 2020-05-18 RX ADMIN — ROCURONIUM BROMIDE 5 MG: 10 INJECTION, SOLUTION INTRAVENOUS at 14:22

## 2020-05-18 RX ADMIN — ROCURONIUM BROMIDE 25 MG: 10 INJECTION, SOLUTION INTRAVENOUS at 14:23

## 2020-05-18 RX ADMIN — CARVEDILOL 25 MG: 25 TABLET, FILM COATED ORAL at 02:37

## 2020-05-18 RX ADMIN — METRONIDAZOLE 500 MG: 500 INJECTION, SOLUTION INTRAVENOUS at 13:11

## 2020-05-18 RX ADMIN — SODIUM CHLORIDE, PRESERVATIVE FREE 10 ML: 5 INJECTION INTRAVENOUS at 13:01

## 2020-05-18 RX ADMIN — LOSARTAN POTASSIUM 50 MG: 50 TABLET, FILM COATED ORAL at 12:58

## 2020-05-18 RX ADMIN — PIPERACILLIN AND TAZOBACTAM 4.5 G: 4; .5 INJECTION, POWDER, FOR SOLUTION INTRAVENOUS at 01:31

## 2020-05-18 RX ADMIN — OXYCODONE 10 MG: 5 TABLET ORAL at 18:29

## 2020-05-18 RX ADMIN — SODIUM CHLORIDE: 9 INJECTION, SOLUTION INTRAVENOUS at 02:38

## 2020-05-18 RX ADMIN — ENOXAPARIN SODIUM 40 MG: 40 INJECTION SUBCUTANEOUS at 18:34

## 2020-05-18 RX ADMIN — CARVEDILOL 25 MG: 25 TABLET, FILM COATED ORAL at 12:58

## 2020-05-18 RX ADMIN — SODIUM CHLORIDE 1.99 MCG: 9 INJECTION, SOLUTION INTRAVENOUS at 10:37

## 2020-05-18 RX ADMIN — SODIUM CHLORIDE: 9 INJECTION, SOLUTION INTRAVENOUS at 14:17

## 2020-05-18 RX ADMIN — ONDANSETRON 4 MG: 2 INJECTION INTRAMUSCULAR; INTRAVENOUS at 15:00

## 2020-05-18 RX ADMIN — CARVEDILOL 25 MG: 25 TABLET, FILM COATED ORAL at 20:51

## 2020-05-18 RX ADMIN — KETOROLAC TROMETHAMINE 30 MG: 30 INJECTION, SOLUTION INTRAMUSCULAR at 01:04

## 2020-05-18 RX ADMIN — LIDOCAINE HYDROCHLORIDE 50 MG: 20 INJECTION, SOLUTION INTRAVENOUS at 14:22

## 2020-05-18 RX ADMIN — FENTANYL CITRATE 50 MCG: 50 INJECTION, SOLUTION INTRAMUSCULAR; INTRAVENOUS at 14:37

## 2020-05-18 RX ADMIN — MORPHINE SULFATE 8 MG: 10 INJECTION INTRAVENOUS at 01:34

## 2020-05-18 RX ADMIN — GLIPIZIDE 5 MG: 5 TABLET ORAL at 18:32

## 2020-05-18 RX ADMIN — Medication 0.4 MG: at 15:06

## 2020-05-18 RX ADMIN — ROPINIROLE HYDROCHLORIDE 1 MG: 1 TABLET, FILM COATED ORAL at 20:56

## 2020-05-18 RX ADMIN — METRONIDAZOLE 500 MG: 500 INJECTION, SOLUTION INTRAVENOUS at 18:35

## 2020-05-18 RX ADMIN — DEXAMETHASONE SODIUM PHOSPHATE 10 MG: 10 INJECTION, SOLUTION INTRAMUSCULAR; INTRAVENOUS at 15:00

## 2020-05-18 RX ADMIN — POTASSIUM CHLORIDE AND SODIUM CHLORIDE: 900; 300 INJECTION, SOLUTION INTRAVENOUS at 16:24

## 2020-05-18 RX ADMIN — Medication 6 MILLICURIE: at 09:15

## 2020-05-18 RX ADMIN — HYDROCHLOROTHIAZIDE 12.5 MG: 12.5 TABLET ORAL at 12:58

## 2020-05-18 RX ADMIN — POTASSIUM CHLORIDE AND SODIUM CHLORIDE: 900; 300 INJECTION, SOLUTION INTRAVENOUS at 12:42

## 2020-05-18 RX ADMIN — Medication 2 MG: at 15:06

## 2020-05-18 RX ADMIN — PROPOFOL 120 MG: 10 INJECTION, EMULSION INTRAVENOUS at 14:22

## 2020-05-18 RX ADMIN — MIDAZOLAM 2 MG: 1 INJECTION INTRAMUSCULAR; INTRAVENOUS at 14:17

## 2020-05-18 RX ADMIN — METRONIDAZOLE 500 MG: 500 INJECTION, SOLUTION INTRAVENOUS at 02:40

## 2020-05-18 RX ADMIN — ONDANSETRON 4 MG: 2 INJECTION INTRAMUSCULAR; INTRAVENOUS at 01:27

## 2020-05-18 RX ADMIN — SODIUM CHLORIDE 1 G: 9 INJECTION INTRAMUSCULAR; INTRAVENOUS; SUBCUTANEOUS at 13:00

## 2020-05-18 RX ADMIN — SODIUM CHLORIDE, PRESERVATIVE FREE 10 ML: 5 INJECTION INTRAVENOUS at 02:39

## 2020-05-18 RX ADMIN — FENTANYL CITRATE 100 MCG: 50 INJECTION, SOLUTION INTRAMUSCULAR; INTRAVENOUS at 14:22

## 2020-05-18 ASSESSMENT — PULMONARY FUNCTION TESTS
PIF_VALUE: 19
PIF_VALUE: 24
PIF_VALUE: 19
PIF_VALUE: 26
PIF_VALUE: 17
PIF_VALUE: 17
PIF_VALUE: 33
PIF_VALUE: 0
PIF_VALUE: 24
PIF_VALUE: 26
PIF_VALUE: 19
PIF_VALUE: 27
PIF_VALUE: 25
PIF_VALUE: 24
PIF_VALUE: 31
PIF_VALUE: 0
PIF_VALUE: 26
PIF_VALUE: 19
PIF_VALUE: 25
PIF_VALUE: 24
PIF_VALUE: 20
PIF_VALUE: 26
PIF_VALUE: 23
PIF_VALUE: 17
PIF_VALUE: 19
PIF_VALUE: 26
PIF_VALUE: 17
PIF_VALUE: 26
PIF_VALUE: 4
PIF_VALUE: 16
PIF_VALUE: 17
PIF_VALUE: 26
PIF_VALUE: 1
PIF_VALUE: 1
PIF_VALUE: 26
PIF_VALUE: 27
PIF_VALUE: 17
PIF_VALUE: 17
PIF_VALUE: 19
PIF_VALUE: 25
PIF_VALUE: 23
PIF_VALUE: 26
PIF_VALUE: 1
PIF_VALUE: 4
PIF_VALUE: 26
PIF_VALUE: 0
PIF_VALUE: 26
PIF_VALUE: 20
PIF_VALUE: 4

## 2020-05-18 ASSESSMENT — ENCOUNTER SYMPTOMS
NAUSEA: 1
ABDOMINAL PAIN: 1
COLOR CHANGE: 0
CONSTIPATION: 0
SORE THROAT: 0
DIARRHEA: 0
VOMITING: 1
SHORTNESS OF BREATH: 0

## 2020-05-18 ASSESSMENT — PAIN DESCRIPTION - FREQUENCY
FREQUENCY: OTHER (COMMENT)
FREQUENCY: INTERMITTENT

## 2020-05-18 ASSESSMENT — PAIN SCALES - GENERAL
PAINLEVEL_OUTOF10: 0
PAINLEVEL_OUTOF10: 7
PAINLEVEL_OUTOF10: 4
PAINLEVEL_OUTOF10: 5
PAINLEVEL_OUTOF10: 4
PAINLEVEL_OUTOF10: 8
PAINLEVEL_OUTOF10: 10
PAINLEVEL_OUTOF10: 7
PAINLEVEL_OUTOF10: 0

## 2020-05-18 ASSESSMENT — PAIN - FUNCTIONAL ASSESSMENT
PAIN_FUNCTIONAL_ASSESSMENT: ACTIVITIES ARE NOT PREVENTED
PAIN_FUNCTIONAL_ASSESSMENT: ACTIVITIES ARE NOT PREVENTED

## 2020-05-18 ASSESSMENT — PAIN DESCRIPTION - ORIENTATION: ORIENTATION: RIGHT;LEFT;MID

## 2020-05-18 ASSESSMENT — PAIN DESCRIPTION - LOCATION: LOCATION: ABDOMEN

## 2020-05-18 ASSESSMENT — PAIN DESCRIPTION - PAIN TYPE: TYPE: ACUTE PAIN;SURGICAL PAIN

## 2020-05-18 ASSESSMENT — PAIN DESCRIPTION - PROGRESSION
CLINICAL_PROGRESSION: NOT CHANGED
CLINICAL_PROGRESSION: GRADUALLY IMPROVING

## 2020-05-18 ASSESSMENT — LIFESTYLE VARIABLES: SMOKING_STATUS: 0

## 2020-05-18 ASSESSMENT — PAIN DESCRIPTION - ONSET
ONSET: OTHER (COMMENT)
ONSET: GRADUAL

## 2020-05-18 ASSESSMENT — PAIN DESCRIPTION - DESCRIPTORS: DESCRIPTORS: ACHING;DISCOMFORT;SORE

## 2020-05-18 NOTE — CONSULTS
Department of Internal Medicine  Consultation    Admitting Physician: Dr. Laureen Singer  Consultants: Dr. Jeanette Vivar:  Abdominal pain    HISTORY OF PRESENT ILLNESS:    Phoenix is a very polite and pleasant 29-year-old gentleman who presented to [address] [place] [address] emergency department yesterday for the evaluation of sudden onset epigastric pain that occurred following the ingestion of dinner last evening. Retrospectively, he now can recall several incidents of abdominal pain following the ingestion of meals recently. Work-up in the emergency department revealed sonographic evidence of positive Leos sign with concern for a calculus cholecystitis. Upon my discussion today, he exhibits multiple symptoms that are more compatible with peptic ulcer disease. He takes Aleve on a regular basis and has had increasing nonsteroidal anti-inflammatory ingestion related to headaches associated with poor sleeping pattern since the passing of his mother-in-law. PAST MEDICAL Hx:  Past Medical History:   Diagnosis Date    Diabetes mellitus ([facility] [address]     Hyperlipidemia     Hypertension        PAST SURGICAL Hx:   Past Surgical History:   Procedure Laterality Date    HERNIA REPAIR      NECK SURGERY         FAMILY Hx:  History reviewed. No pertinent family history. HOME MEDICATIONS:  Prior to Admission medications    Medication Sig Start Date End Date Taking?  Authorizing Provider   losartan (COZAAR) 50 MG tablet Take 50 mg by mouth daily    Historical Provider, MD   hydrochlorothiazide (MICROZIDE) 12.5 MG capsule Take 12.5 mg by mouth daily    Historical Provider, MD   dextromethorphan-guaiFENesin (MUCINEX DM)  MG per extended release tablet Take 1 tablet by mouth every 12 hours as needed    Historical Provider, MD   predniSONE (DELTASONE) 10 MG tablet 6 tabs po day 1, then 5 tabs po day 2, then 4 tabs po day 3, then 3 tabs po day 4, then 2 tabs po day 5, then 1 tab po day 6 then d/c 4/3/20   Marisol Singh MD albuterol sulfate HFA (PROVENTIL HFA) 108 (90 Base) MCG/ACT inhaler Inhale 2 puffs into the lungs every 4 hours as needed for Wheezing 4/3/20   Adalid Alvarado MD   GLIPIZIDE PO Take 5 mg by mouth 2 times daily     Historical Provider, MD   CARVEDILOL PO Take 25 mg by mouth 2 times daily     Historical Provider, MD   ATORVASTATIN CALCIUM PO Take 40 mg by mouth nightly     Historical Provider, MD       ALLERGIES:  Patient has no known allergies.     SOCIAL Hx:  Social History     Socioeconomic History    Marital status: Legally      Spouse name: Not on file    Number of children: Not on file    Years of education: Not on file    Highest education level: Not on file   Occupational History    Not on file   Social Needs    Financial resource strain: Not on file    Food insecurity     Worry: Not on file     Inability: Not on file    Transportation needs     Medical: Not on file     Non-medical: Not on file   Tobacco Use    Smoking status: Former Smoker     Types: Cigarettes     Last attempt to quit: 1986     Years since quittin.2    Smokeless tobacco: Former User     Quit date: 1986   Substance and Sexual Activity    Alcohol use: Yes     Comment: beer    Drug use: No    Sexual activity: Not on file   Lifestyle    Physical activity     Days per week: Not on file     Minutes per session: Not on file    Stress: Not on file   Relationships    Social connections     Talks on phone: Not on file     Gets together: Not on file     Attends Yarsanism service: Not on file     Active member of club or organization: Not on file     Attends meetings of clubs or organizations: Not on file     Relationship status: Not on file    Intimate partner violence     Fear of current or ex partner: Not on file     Emotionally abused: Not on file     Physically abused: Not on file     Forced sexual activity: Not on file   Other Topics Concern    Not on file   Social History Narrative    Not on file ROS:  General:   Denies chills, fatigue, fever, malaise, night sweats or weight loss    Psychological:   Denies anxiety, disorientation or hallucinations    ENT:    Denies epistaxis, headaches, vertigo or visual changes    Cardiovascular:   Denies any chest pain, irregular heartbeats, or palpitations. No paroxysmal nocturnal dyspnea. Respiratory:   Denies shortness of breath, coughing, sputum production, hemoptysis, or wheezing. No orthopnea. Gastrointestinal:   Significant improvement in his presenting abdominal pain. He denies overt reflux. He denies diarrhea or constipation. Genito-Urinary:    Denies any urgency, frequency, hematuria. Voiding without difficulty. Musculoskeletal:   Denies joint pain, joint stiffness, joint swelling or muscle pain    Neurology:    Denies any headache or focal neurological deficits. No weakness or paresthesia. Derm:    Denies any rashes, ulcers, or excoriations. Denies bruising. Extremities:   Denies any lower extremity swelling or edema. PHYSICAL EXAM:  VITALS:  Vitals:    05/18/20 0600   BP: (!) 143/78   Pulse: 61   Resp: 20   Temp: 98.2 °F (36.8 °C)   SpO2: 95%         CONSTITUTIONAL:    Awake, alert, cooperative, no apparent distress, and appears stated age    EYES:    PERRL, EOMI, sclera clear, conjunctiva normal    ENT:    Normocephalic, atraumatic, sinuses nontender on palpation. External ears without lesions. Oral pharynx with moist mucus membranes. Tonsils without erythema or exudates. NECK:    Supple, symmetrical, trachea midline, no adenopathy, thyroid symmetric, not enlarged and no tenderness, skin normal, no bruits, no JVD    HEMATOLOGIC/LYMPHATICS:    No cervical lymphadenopathy and no supraclavicular lymphadenopathy    LUNGS:    Symmetric.  No increased work of breathing, good air exchange, clear to auscultation bilaterally, no wheezes, rhonchi, or rales,     CARDIOVASCULAR:    Normal apical impulse, regular rate and rhythm, normal S1 and S2, no S3 or S4, and no murmur noted    ABDOMEN:    No scars, normal bowel sounds, soft, non-distended, non-tender, no masses palpated, no hepatosplenomegaly, no rebound or guarding elicited on palpation     MUSCULOSKELETAL:    There is no redness, warmth, or swelling of the joints. Full range of motion noted. Motor strength is 5 out of 5 all extremities bilaterally. Tone is normal.    NEUROLOGIC:    Awake, alert, oriented to name, place and time. Cranial nerves II-XII are grossly intact. Motor is 5 out of 5 bilaterally. SKIN:    No bruising or bleeding. No redness, warmth, or swelling    EXTREMITIES:    Peripheral pulses present. No edema, cyanosis, or swelling. OSTEOPATHIC:    Examined in seated and supine positions. Normal thoracic kyphosis and lumbar lordosis. No acute somatic dysfunction. LABORATORY DATA:  CBC with Differential:    Lab Results   Component Value Date    WBC 7.0 05/18/2020    RBC 4.26 05/18/2020    HGB 13.1 05/18/2020    HCT 37.9 05/18/2020     05/18/2020    MCV 89.0 05/18/2020    MCH 30.8 05/18/2020    MCHC 34.6 05/18/2020    RDW 12.7 05/18/2020    LYMPHOPCT 17.1 05/18/2020    MONOPCT 7.5 05/18/2020    BASOPCT 0.4 05/18/2020    MONOSABS 0.53 05/18/2020    LYMPHSABS 1.20 05/18/2020    EOSABS 0.07 05/18/2020    BASOSABS 0.03 05/18/2020     CMP:    Lab Results   Component Value Date     05/18/2020    K 4.4 05/18/2020     05/18/2020    CO2 23 05/18/2020    BUN 21 05/18/2020    CREATININE 1.2 05/18/2020    GFRAA >60 05/18/2020    LABGLOM >60 05/18/2020    GLUCOSE 196 05/18/2020    PROT 6.5 05/18/2020    LABALBU 4.3 05/18/2020    LABALBU 4.3 02/24/2011    CALCIUM 9.4 05/18/2020    BILITOT 0.4 05/18/2020    ALKPHOS 43 05/18/2020    AST 21 05/18/2020    ALT 27 05/18/2020       ASSESSMENT:  1. Intractable abdominal pain with considerations for a calculus cholecystitis versus peptic ulcer disease  2. Essential hypertension   3.  Non-insulin-dependent

## 2020-05-18 NOTE — H&P
GENERAL SURGERY  H&P NOTE  5/18/2020    CC: Abdominal pain    HPI  Judah  is a 60 y.o. male who presents for evaluation of RUQ, epigastric pain for 24 hours. Pain started after eating Sloppy Richie' and potato salad at a birthday party . Reported nausea and vomiting. Pain has improved since admission. Denied pain in the past. Denied discomfort to fried or fatty foods. Hx of routine Alieve use for arthritis pain. No EGD hx. On ASA. Social EtOH use. Past Medical History:   Diagnosis Date    Diabetes mellitus ([facility] [address]     Hyperlipidemia     Hypertension        Past Surgical History:   Procedure Laterality Date    HERNIA REPAIR      NECK SURGERY         Medications Prior to Admission:    Prior to Admission medications    Medication Sig Start Date End Date Taking? Authorizing Provider   losartan (COZAAR) 50 MG tablet Take 50 mg by mouth daily    Historical Provider, MD   hydrochlorothiazide (MICROZIDE) 12.5 MG capsule Take 12.5 mg by mouth daily    Historical Provider, MD   dextromethorphan-guaiFENesin (MUCINEX DM)  MG per extended release tablet Take 1 tablet by mouth every 12 hours as needed    Historical Provider, MD   predniSONE (DELTASONE) 10 MG tablet 6 tabs po day 1, then 5 tabs po day 2, then 4 tabs po day 3, then 3 tabs po day 4, then 2 tabs po day 5, then 1 tab po day 6 then d/c 4/3/20   Adama Cazares MD   albuterol sulfate HFA (PROVENTIL HFA) 108 (90 Base) MCG/ACT inhaler Inhale 2 puffs into the lungs every 4 hours as needed for Wheezing 4/3/20   Adama Cazares MD   GLIPIZIDE PO Take 5 mg by mouth 2 times daily     Historical Provider, MD   CARVEDILOL PO Take 25 mg by mouth 2 times daily     Historical Provider, MD   ATORVASTATIN CALCIUM PO Take 40 mg by mouth nightly     Historical Provider, MD       No Known Allergies    History reviewed. No pertinent family history.     Social History     Tobacco Use    Smoking status: Former Smoker     Types: Cigarettes     Last attempt to quit: epigastric  Skin; warm and dry, no cyanosis  Gu: no cva tenderness  Extremities: atraumatic, no focal motor deficits, no open wounds  Psych: No tremor, visual hallucinations        Radiology: I reviewed relevant abdominal imaging from this admission and that available in the EMR including RUQ US from admission    Assessment/Plan:  Jessee Villarreal is a 60 y.o. male RUQ pain, nausea, vomiting, poss cholecystitis  Patient Active Problem List   Diagnosis    Tinea corporis    Cholecystitis    Acute acalculous cholecystitis       Admit  NPO  Check HIDA  If neg for obstruction will proceed with EGD  Lap jie if signs of cholecystitis on HIDA  Discussed risks of injury to liver, common bile duct, hepatic duct, surrounding vascular structures, small bowel, stomach. Risk for further surgery to correct complications. Plan for laparoscopic, possible open cholecystectomy with possible intraoperative cholangiogram. Patient agrees and all questions answered to their and family's satisfaction      I have personally participated in a face-to-face history and physical exam on the date of service. Reviewed chart, vitals, labs and radiologic studies. I also participated in medical decision making with the resident/NP on the date of service and I agree with all of the pertinent clinical information, assessment and treatment plan. I have reviewed and edited the note above based on my findings during my history, exam, and decision making.        Physician Signature: Electronically signed by Dr. Cindy Arzate  [phone] (p)  5/18/2020  12:12 PM

## 2020-05-18 NOTE — PLAN OF CARE
Problem: Pain:  Goal: Pain level will decrease  5/18/2020 1113 by Lei Jackson RN  Outcome: Met This Shift     Problem: Pain:  Goal: Control of acute pain  5/18/2020 1113 by Lei Jackson RN  Outcome: Met This Shift     Problem: Pain:  Goal: Control of chronic pain  Outcome: Met This Shift

## 2020-05-18 NOTE — OP NOTE
decompress the gallbladder. The bile was dark and thick. The gallbladder itself was thickened and chronically and acutely inflamed with edema and signs of infection. We then again retracted the gallbladder cephalad and exposed the infundibulum identifying the infundibulum. We dissected the peritoneum and omentum as well as transverse colon and duodenum off the infundibulum identifying the cystic duct junction. The cystic duct was skeletonized. The cystic artery was also identified ands skeletonized confirming it was leading directly into the gallbladder. The critical view was obtained. The 10mm titanium clip applier was used to place a single clip on the gallbladder side of the cystic duct. 2 clips proximally on the patient side and one distal on the gallbladder side of the cystic artery. A ductotomy was perfomed in the cystic duct and the Mixter catheter was inserted and clamped in place with the schaeffer clamp. The catheter was flushed with saline first. It flushed well. A cholangiogram was performed with full strength dye showing full arborization of the intrahepatic biliary tree, common hepatic duct and cystic duct confluence into the common bile duct. Delayed imaging showed spillage of contrast into the small bowel. Our perceived anatomy was confirmed with the cholangiogram. No obstruction was appreciated. The catheter was removed, two clips were place on the patient side of the cystic duct. Pause was taken again to identify the critical view. The cystic duct was transected with laparoscopic scissors. Two clips were placed on the patient side, 1 distal on the gallbladder side of the cystic duct and it was transected with laparoscopic scissors and electrocautery. Electrocautery was then used to dissect the gallbladder from the gallbladder fossa. Traction-countertraction technique was used to expose the medial and lateral aspects of the gallbladder.  Gallbladder was completely dissected off the gallbladder

## 2020-05-18 NOTE — ED PROVIDER NOTES
multiple episodes of nausea and vomiting here in the ED. Patient was given multiple pain medications and antiemetics for their symptoms with mild improvement. General surgery consulted. Patient requires continued workup and management of their symptoms and will be admitted to the hospital for further evaluation and treatment. Patient agrees with the plan and all questions were answered. ED Course as of May 19 0144   Mon May 18, 2020   0100 Patient reassessed. Patient reports pain is starting to come back. Patient aware of results today including concern for cholecystitis. Patient agrees with admission to the hospital for further evaluation. [MA]   36 Spoke with Dr. Del Mccracken, Gen Surg, will admit the patient.    [SO]      ED Course User Index  [MA] Shira Lobo, DO  [SO] Amita Pack, DO          ----------------------------------------------- PAST HISTORY --------------------------------------------  Past Medical History:  has a past medical history of Diabetes mellitus ([facility] [address] Hyperlipidemia, and Hypertension. Past Surgical History:  has a past surgical history that includes hernia repair and Neck surgery. Social History:  reports that he quit smoking about 34 years ago. His smoking use included cigarettes. He quit smokeless tobacco use about 34 years ago. He reports current alcohol use. He reports that he does not use drugs. Family History: family history is not on file. The patients home medications have been reviewed. Allergies: Patient has no known allergies.     ------------------------------------------------ RESULTS ---------------------------------------------------    LABS:  Results for orders placed or performed during the hospital encounter of 05/17/20   CBC Auto Differential   Result Value Ref Range    WBC 6.0 4.5 - 11.5 E9/L    RBC 4.33 3.80 - 5.80 E12/L    Hemoglobin 13.4 12.5 - 16.5 g/dL    Hematocrit 38.5 37.0 - 54.0 %    MCV 88.9 80.0 - 99.9 fL    MCH 30.9 26.0 - diffusely thickened wall. Positive sonographic Leos sign. Findings   suggest acute acalculous cholecystitis. Follow-up nuclear medicine   hepatobiliary imaging study may be helpful if clinically indicated. XR CHEST PORTABLE   Final Result   No acute cardiopulmonary disease. EKG:  This EKG is signed and interpreted by ED Physician. Time: 2341  Rate: 54  Rhythm: Sinus. Interpretation: Sinus bradycardia, normal axis, no ST elevation, no QTC prolongation  Comparison: stable as compared to patient's most recent EKG.    ---------------------------- NURSING NOTES AND VITALS REVIEWED -------------------------   The nursing notes within the ED encounter and vital signs as below have been reviewed. BP (!) 187/99   Pulse 61   Temp 97.9 °F (36.6 °C) (Oral)   Resp 15   Ht 5' 10\" (1.778 m)   Wt 215 lb (97.5 kg)   SpO2 96%   BMI 30.85 kg/m²   Oxygen Saturation Interpretation: Normal      ------------------------------------------PROGRESS NOTES -------------------------------------------    ED COURSE MEDICATIONS:                Medications   piperacillin-tazobactam (ZOSYN) 4.5 g in dextrose 5 % 100 mL IVPB (mini-bag) (has no administration in time range)   ondansetron (ZOFRAN) injection 4 mg (has no administration in time range)   0.9 % sodium chloride bolus (0 mLs Intravenous Stopped 5/18/20 0105)   fentaNYL (SUBLIMAZE) injection 50 mcg (50 mcg Intravenous Given 5/17/20 2331)   ondansetron (ZOFRAN) injection 4 mg (4 mg Intravenous Given 5/17/20 2332)   ketorolac (TORADOL) injection 30 mg (30 mg Intravenous Given 5/18/20 0104)       CONSULTATIONS:            General Surgery. PROCEDURES:            none.       COUNSELING:   I have spoken with the patient and discussed todays results, in addition to providing specific details for the plan of care and counseling regarding the diagnosis and prognosis.     --------------------------------------- IMPRESSION & DISPOSITION --------------------------------     IMPRESSION(s):  1. Acute acalculous cholecystitis        This patient's ED course included: a personal history and physicial examination, IV medications, cardiac monitoring and continuous pulse oximetry    This patient has improved and been closely monitored during their ED course. DISPOSITION:  Disposition: Admit to med/surg floor. Patient condition is stable. END OF PROVIDER NOTE. ATTENDING PROVIDER ATTESTATION:     Darryl Mcbride presented to the emergency department for evaluation of Abdominal Pain (sharp mid abdominal pain started about 3 hours ago, an hour after dinner)   and was initially evaluated by the Medical Resident. See Original ED Note for H&P and ED course above. I have reviewed and discussed the case, including pertinent history (medical, surgical, family and social) and exam findings with the Medical Resident assigned to Darryl Mcbride. I have personally performed and/or participated in the history, exam, medical decision making, and procedures and agree with all pertinent clinical information. I, Dr. Verónica Garcia, am the primary provider of record    Pt with abdominl pain and tenderness found to hve cholecystitis and will be admitted to [facility] by Gen Surg     I have reviewed my findings and recommendations with the assigned Medical Resident, Darryl Mcbride and members of family present at the time of disposition. My findings/plan: The primary encounter diagnosis was Acute acalculous cholecystitis. Diagnoses of Gallbladder disease and Postoperative pain were also pertinent to this visit. Current Discharge Medication List      START taking these medications    Details   ibuprofen (IBU) 800 MG tablet Take 1 tablet by mouth every 6 hours as needed for Pain  Qty: 90 tablet, Refills: 1      oxyCODONE-acetaminophen (PERCOCET) 5-325 MG per tablet Take 1 tablet by mouth every 6 hours as needed for Pain for up to 5 days.   Qty: 18 tablet, Refills: 0    Comments:

## 2020-05-19 VITALS
HEIGHT: 70 IN | HEART RATE: 60 BPM | OXYGEN SATURATION: 92 % | TEMPERATURE: 97.5 F | BODY MASS INDEX: 31.35 KG/M2 | DIASTOLIC BLOOD PRESSURE: 70 MMHG | RESPIRATION RATE: 16 BRPM | WEIGHT: 219 LBS | SYSTOLIC BLOOD PRESSURE: 158 MMHG

## 2020-05-19 LAB
ALBUMIN SERPL-MCNC: 3.9 G/DL (ref 3.5–5.2)
ALP BLD-CCNC: 44 U/L (ref 40–129)
ALT SERPL-CCNC: 59 U/L (ref 0–40)
ANION GAP SERPL CALCULATED.3IONS-SCNC: 12 MMOL/L (ref 7–16)
AST SERPL-CCNC: 46 U/L (ref 0–39)
BASOPHILS ABSOLUTE: 0.01 E9/L (ref 0–0.2)
BASOPHILS RELATIVE PERCENT: 0.1 % (ref 0–2)
BILIRUB SERPL-MCNC: 0.3 MG/DL (ref 0–1.2)
BUN BLDV-MCNC: 21 MG/DL (ref 8–23)
CALCIUM SERPL-MCNC: 8.8 MG/DL (ref 8.6–10.2)
CHLORIDE BLD-SCNC: 105 MMOL/L (ref 98–107)
CO2: 22 MMOL/L (ref 22–29)
CREAT SERPL-MCNC: 1.3 MG/DL (ref 0.7–1.2)
EKG ATRIAL RATE: 54 BPM
EKG P AXIS: 65 DEGREES
EKG P-R INTERVAL: 196 MS
EKG Q-T INTERVAL: 422 MS
EKG QRS DURATION: 102 MS
EKG QTC CALCULATION (BAZETT): 400 MS
EKG R AXIS: 43 DEGREES
EKG T AXIS: 51 DEGREES
EKG VENTRICULAR RATE: 54 BPM
EOSINOPHILS ABSOLUTE: 0 E9/L (ref 0.05–0.5)
EOSINOPHILS RELATIVE PERCENT: 0 % (ref 0–6)
GFR AFRICAN AMERICAN: >60
GFR NON-AFRICAN AMERICAN: 56 ML/MIN/1.73
GLUCOSE BLD-MCNC: 176 MG/DL (ref 74–99)
HCT VFR BLD CALC: 34.8 % (ref 37–54)
HEMOGLOBIN: 12.1 G/DL (ref 12.5–16.5)
IMMATURE GRANULOCYTES #: 0.05 E9/L
IMMATURE GRANULOCYTES %: 0.6 % (ref 0–5)
LYMPHOCYTES ABSOLUTE: 0.76 E9/L (ref 1.5–4)
LYMPHOCYTES RELATIVE PERCENT: 9.7 % (ref 20–42)
MCH RBC QN AUTO: 30.7 PG (ref 26–35)
MCHC RBC AUTO-ENTMCNC: 34.8 % (ref 32–34.5)
MCV RBC AUTO: 88.3 FL (ref 80–99.9)
METER GLUCOSE: 167 MG/DL (ref 74–99)
MONOCYTES ABSOLUTE: 0.54 E9/L (ref 0.1–0.95)
MONOCYTES RELATIVE PERCENT: 6.9 % (ref 2–12)
NEUTROPHILS ABSOLUTE: 6.5 E9/L (ref 1.8–7.3)
NEUTROPHILS RELATIVE PERCENT: 82.7 % (ref 43–80)
PDW BLD-RTO: 12.7 FL (ref 11.5–15)
PLATELET # BLD: 174 E9/L (ref 130–450)
PMV BLD AUTO: 9.4 FL (ref 7–12)
POTASSIUM REFLEX MAGNESIUM: 4.4 MMOL/L (ref 3.5–5)
RBC # BLD: 3.94 E12/L (ref 3.8–5.8)
SODIUM BLD-SCNC: 139 MMOL/L (ref 132–146)
TOTAL PROTEIN: 6.3 G/DL (ref 6.4–8.3)
WBC # BLD: 7.9 E9/L (ref 4.5–11.5)

## 2020-05-19 PROCEDURE — 6370000000 HC RX 637 (ALT 250 FOR IP): Performed by: STUDENT IN AN ORGANIZED HEALTH CARE EDUCATION/TRAINING PROGRAM

## 2020-05-19 PROCEDURE — 85025 COMPLETE CBC W/AUTO DIFF WBC: CPT

## 2020-05-19 PROCEDURE — 6360000002 HC RX W HCPCS: Performed by: STUDENT IN AN ORGANIZED HEALTH CARE EDUCATION/TRAINING PROGRAM

## 2020-05-19 PROCEDURE — 2500000003 HC RX 250 WO HCPCS: Performed by: STUDENT IN AN ORGANIZED HEALTH CARE EDUCATION/TRAINING PROGRAM

## 2020-05-19 PROCEDURE — 82962 GLUCOSE BLOOD TEST: CPT

## 2020-05-19 PROCEDURE — 36415 COLL VENOUS BLD VENIPUNCTURE: CPT

## 2020-05-19 PROCEDURE — 6370000000 HC RX 637 (ALT 250 FOR IP): Performed by: INTERNAL MEDICINE

## 2020-05-19 PROCEDURE — 80053 COMPREHEN METABOLIC PANEL: CPT

## 2020-05-19 PROCEDURE — 2580000003 HC RX 258: Performed by: STUDENT IN AN ORGANIZED HEALTH CARE EDUCATION/TRAINING PROGRAM

## 2020-05-19 PROCEDURE — G0378 HOSPITAL OBSERVATION PER HR: HCPCS

## 2020-05-19 RX ORDER — PANTOPRAZOLE SODIUM 40 MG/1
40 TABLET, DELAYED RELEASE ORAL
Qty: 30 TABLET | Refills: 3 | Status: ON HOLD | OUTPATIENT
Start: 2020-05-19 | End: 2020-07-15

## 2020-05-19 RX ORDER — DOCUSATE SODIUM 100 MG/1
100 CAPSULE, LIQUID FILLED ORAL DAILY PRN
Qty: 30 CAPSULE | Refills: 0 | Status: ON HOLD | OUTPATIENT
Start: 2020-05-19 | End: 2020-07-15

## 2020-05-19 RX ORDER — PANTOPRAZOLE SODIUM 40 MG/1
40 TABLET, DELAYED RELEASE ORAL
Status: DISCONTINUED | OUTPATIENT
Start: 2020-05-19 | End: 2020-05-19 | Stop reason: HOSPADM

## 2020-05-19 RX ADMIN — HYDROCHLOROTHIAZIDE 12.5 MG: 12.5 TABLET ORAL at 08:48

## 2020-05-19 RX ADMIN — CARVEDILOL 25 MG: 25 TABLET, FILM COATED ORAL at 08:48

## 2020-05-19 RX ADMIN — OXYCODONE 5 MG: 5 TABLET ORAL at 00:01

## 2020-05-19 RX ADMIN — ROPINIROLE HYDROCHLORIDE 1 MG: 1 TABLET, FILM COATED ORAL at 08:48

## 2020-05-19 RX ADMIN — LOSARTAN POTASSIUM 50 MG: 50 TABLET, FILM COATED ORAL at 08:48

## 2020-05-19 RX ADMIN — GLIPIZIDE 5 MG: 5 TABLET ORAL at 08:49

## 2020-05-19 RX ADMIN — SODIUM CHLORIDE, PRESERVATIVE FREE 10 ML: 5 INJECTION INTRAVENOUS at 08:50

## 2020-05-19 RX ADMIN — POTASSIUM CHLORIDE AND SODIUM CHLORIDE: 900; 300 INJECTION, SOLUTION INTRAVENOUS at 02:03

## 2020-05-19 RX ADMIN — ENOXAPARIN SODIUM 40 MG: 40 INJECTION SUBCUTANEOUS at 08:50

## 2020-05-19 RX ADMIN — PANTOPRAZOLE SODIUM 40 MG: 40 TABLET, DELAYED RELEASE ORAL at 08:48

## 2020-05-19 RX ADMIN — METRONIDAZOLE 500 MG: 500 INJECTION, SOLUTION INTRAVENOUS at 02:03

## 2020-05-19 ASSESSMENT — PAIN SCALES - GENERAL
PAINLEVEL_OUTOF10: 0
PAINLEVEL_OUTOF10: 4

## 2020-05-19 NOTE — DISCHARGE SUMMARY
Internal Medicine  Discharge Summary    NAME: Kasie Hansen  :  1955  MRN:  [id number]  PCP:No primary care provider on file. ADMITTED: 2020      DISCHARGED: 20    ADMITTING PHYSICIAN: Lamar Farmer MD    CONSULTANT(S):   IP CONSULT TO GENERAL SURGERY  IP CONSULT TO HOSPITALIST     ADMITTING DIAGNOSIS:   Cholecystitis [K81.9]     DISCHARGE DIAGNOSES:   1. Intractable abdominal pain secondary to acalculus cholecystitis status post laparoscopic cholecystectomy with normal intraoperative cholangiogram  2. Essential hypertension   3. Non-insulin-dependent diabetes mellitus type 2    BRIEF HISTORY OF PRESENT ILLNESS:   Risa French is a very polite and pleasant 76-year-old gentleman who presented to [address] [place] [address] emergency department yesterday for the evaluation of sudden onset epigastric pain that occurred following the ingestion of dinner last evening. Retrospectively, he now can recall several incidents of abdominal pain following the ingestion of meals recently. Work-up in the emergency department revealed sonographic evidence of positive Leos sign with concern for a calculus cholecystitis. Upon my discussion today, he exhibits multiple symptoms that are more compatible with peptic ulcer disease. He takes Aleve on a regular basis and has had increasing nonsteroidal anti-inflammatory ingestion related to headaches associated with poor sleeping pattern since the passing of his mother-in-law.     LABS[de-identified]  Lab Results   Component Value Date    WBC 7.9 2020    HGB 12.1 (L) 2020    HCT 34.8 (L) 2020     2020     2020    K 4.4 2020     2020    CREATININE 1.3 (H) 2020    BUN 21 2020    CO2 22 2020    GLUCOSE 176 (H) 2020    ALT 59 (H) 2020    AST 46 (H) 2020    INR 0.9 2020     Lab Results   Component Value Date    INR 0.9 2020    PROTIME 10.7 2020      No results found for: TSH  No results found for: TRIG  No results found for: HDL  No results found for: LDLCALC  No results found for: LABA1C    IMAGING:  Xr Chest Portable    Result Date: 5/17/2020  EXAMINATION: ONE XRAY VIEW OF THE CHEST 5/17/2020 11:18 pm COMPARISON: 04/15/2020 HISTORY: ORDERING SYSTEM PROVIDED HISTORY: Possible sepsis TECHNOLOGIST PROVIDED HISTORY: Reason for exam:->Possible sepsis FINDINGS: Heart size is upper limits of normal and the lungs are clear. No pneumothorax or pleural fluid. Thoracic aorta is slightly tortuous. No acute bone finding. No acute cardiopulmonary disease. Us Abdomen Limited    Result Date: 5/18/2020  EXAMINATION: RIGHT UPPER QUADRANT ULTRASOUND 5/17/2020 11:48 pm COMPARISON: None. HISTORY: ORDERING SYSTEM PROVIDED HISTORY: RUQ pain, r/o cholecystitis TECHNOLOGIST PROVIDED HISTORY: Reason for exam:->RUQ pain, r/o cholecystitis FINDINGS: LIVER:  The liver demonstrates normal echogenicity without evidence of intrahepatic biliary ductal dilatation. BILIARY SYSTEM:  Gallbladder is moderately dilated. The gallbladder wall is mildly diffusely thickened. There was a positive sonographic Leos sign. No evidence of cholelithiasis. .  Negative sonographic Leos's sign. Common bile duct is within normal limits measuring 4.3 mm. RIGHT KIDNEY: The right kidney is grossly unremarkable without evidence of hydronephrosis. PANCREAS:  Visualized portions of the pancreas are unremarkable. OTHER: No evidence of right upper quadrant ascites. No cholelithiasis. The gallbladder is moderately dilated with a mildly diffusely thickened wall. Positive sonographic Leos sign. Findings suggest acute acalculous cholecystitis. Follow-up nuclear medicine hepatobiliary imaging study may be helpful if clinically indicated. Fl Cholangiogram Or    Result Date: 5/18/2020  EXAMINATION: SPOT IMAGES FROM AN INTRAOPERATIVE CHOLANGIOGRAM 5/18/2020 3:10 pm COMPARISON: None.  HISTORY: ORDERING SYSTEM PROVIDED HISTORY: Gallbladder abdominal pain and was found to be suffering from a calculus cholecystitis. He underwent laparoscopic cholecystectomy without complication and with normal intraoperative cholangiogram.  We also placed the patient on PPI therapy in the setting of reflux symptomatology. Laboratory values and vital signs stabilized. He became ambulatory. He was ultimately deemed stable for discharge home. BRIEF PHYSICAL EXAMINATION AND LABORATORIES ON DAY OF DISCHARGE:  VITALS:  /60   Pulse 57   Temp 97.5 °F (36.4 °C) (Infrared)   Resp 16   Ht 5' 10\" (1.778 m)   Wt 219 lb (99.3 kg)   SpO2 92%   BMI 31.42 kg/m²     HEENT:  PERRLA. EOMI. Sclera clear. Buccal mucosa moist.    Neck:  Supple. Trachea midline. No thyromegaly. No JVD. No bruits. Heart:  Rhythm regular, rate controlled. No murmurs. Lungs:  Symmetrical. Clear to auscultation bilaterally. No wheezes. No rhonchi. No rales. Abdomen: Soft. Non-tender. Non-distended. Bowel sounds positive. No organomegaly or masses. No pain on palpation    Extremities:  Peripheral pulses present. No peripheral edema. No ulcers. Neurologic:  Alert x 3. No focal deficit. Cranial nerves grossly intact. Skin:  No petechia. No hemorrhage. No wounds. DISPOSITION:  The patient's condition is good. At this time the patient is without objective evidence of an acute process requiring continuing hospitalization or inpatient management. They are stable for discharge with outpatient follow-up. I have spoken with the patient and discussed the results of the current hospitalization, in addition to providing specific details for the plan of care and counseling regarding the diagnosis and prognosis. The plan has been discussed in detail and they are aware of the specific conditions for emergent return, as well as the importance of follow-up.   Their questions are answered at this time and they are agreeable with the plan for discharge to home    DISCHARGE

## 2020-05-19 NOTE — PROGRESS NOTES
GENERAL SURGERY  DAILY PROGRESS NOTE  5/19/2020    Subjective:  No acute events  Pain controlled  Tolerating diet  Denied n/v  +F/-BM    Objective:  /60   Pulse 57   Temp 97.5 °F (36.4 °C) (Infrared)   Resp 16   Ht 5' 10\" (1.778 m)   Wt 219 lb (99.3 kg)   SpO2 92%   BMI 31.42 kg/m²     General: NAD, awake and alert. Head: Normocephalic, atraumatic  Eyes: PERRLA, EOMI. Lungs: No increased work of breathing. Cardiovascular: RRR. Abdomen: Soft, ND, NT. Incisions c/d/i. No rebound, guarding or rigidity.   Extremities: Atraumatic, full range of motion  Skin: Warm, dry and intact    Assessment/Plan:  60 y.o. male with chronic cholecystitis s/p Lap Cholecystectomy POD#1    Pain and nausea control PRN  Diet  Discontinue IVF's  OOB/AAT/SMI  DVT ppx  Discharge today    Electronically signed by Cintia Winston MD on 5/19/2020 at 7:21 AM

## 2020-06-01 ENCOUNTER — VIRTUAL VISIT (OUTPATIENT)
Dept: SURGERY | Age: 65
End: 2020-06-01

## 2020-06-01 PROCEDURE — 99024 POSTOP FOLLOW-UP VISIT: CPT | Performed by: SURGERY

## 2020-06-01 RX ORDER — ATORVASTATIN CALCIUM 40 MG/1
TABLET, FILM COATED ORAL
COMMUNITY
Start: 2020-05-20 | End: 2020-11-13 | Stop reason: SDUPTHER

## 2020-06-01 RX ORDER — DOXEPIN HYDROCHLORIDE 10 MG/1
CAPSULE ORAL
COMMUNITY
Start: 2020-05-20 | End: 2020-11-05 | Stop reason: SDUPTHER

## 2020-06-01 RX ORDER — FENOFIBRATE 160 MG/1
TABLET ORAL
COMMUNITY
Start: 2020-05-20 | End: 2020-11-05 | Stop reason: SDUPTHER

## 2020-06-17 ENCOUNTER — OFFICE VISIT (OUTPATIENT)
Dept: ORTHOPEDIC SURGERY | Age: 65
End: 2020-06-17
Payer: COMMERCIAL

## 2020-06-17 VITALS — HEIGHT: 71 IN | BODY MASS INDEX: 30.1 KG/M2 | WEIGHT: 215 LBS

## 2020-06-17 PROCEDURE — 3017F COLORECTAL CA SCREEN DOC REV: CPT | Performed by: ORTHOPAEDIC SURGERY

## 2020-06-17 PROCEDURE — 99203 OFFICE O/P NEW LOW 30 MIN: CPT | Performed by: ORTHOPAEDIC SURGERY

## 2020-06-17 PROCEDURE — G8427 DOCREV CUR MEDS BY ELIG CLIN: HCPCS | Performed by: ORTHOPAEDIC SURGERY

## 2020-06-17 PROCEDURE — 1036F TOBACCO NON-USER: CPT | Performed by: ORTHOPAEDIC SURGERY

## 2020-06-17 PROCEDURE — G8417 CALC BMI ABV UP PARAM F/U: HCPCS | Performed by: ORTHOPAEDIC SURGERY

## 2020-06-17 NOTE — PROGRESS NOTES
Chief Complaint   Patient presents with    Knee Pain     Right knee injury two days ago from stepping down from his truck. HPI:    Patient is 60 y.o. male complaining chronic, atraumatic, insidious onset right knee pain for 2 days. He injured knee stepping down out of truck and the knee gave out on him. He admits to stiffness, deep, aching pain, swelling, difficulty with stairs and ambulating far distances. He admits to gross instability specifically in his right knee. Previous treatments include Knee immobilizer. ROS:    Skin: (-) rash,(-) psoriasis,(-) eczema, (-)skin cancer. Neurologic: (-)numbness, (-)tingling, (-)headaches, (-) LOC. Cardiovascular: (-) Chest pain, (-) swelling in legs/feet, (-) SOB, (-) cramping in legs/feet with walking.     All other review of systems negative except stated above or in HPI      Past Medical History:   Diagnosis Date    Diabetes mellitus ([facility] [address]     Hyperlipidemia     Hypertension      Past Surgical History:   Procedure Laterality Date    CHOLECYSTECTOMY, LAPAROSCOPIC N/A 5/18/2020    CHOLECYSTECTOMY LAPAROSCOPIC WITH IOC performed by Raza Johnson MD at [place] [address]         Current Outpatient Medications:     fenofibrate (TRIGLIDE) 160 MG tablet, TAKE 1 TABLET BY MOUTH ONCE DAILY, Disp: , Rfl:     doxepin (SINEQUAN) 10 MG capsule, TAKE UP TO 5 CAPSULES BY MOUTH AT BEDTIME AS DIRECTED FOR INSOMNIA, Disp: , Rfl:     atorvastatin (LIPITOR) 40 MG tablet, TAKE 1 TABLET BY MOUTH IN THE EVENING FOR CHOLESTEROL, Disp: , Rfl:     OXYCODONE ER PO, Take 5 mg by mouth 4 times daily PRN, Disp: , Rfl:     pantoprazole (PROTONIX) 40 MG tablet, Take 1 tablet by mouth every morning (before breakfast), Disp: 30 tablet, Rfl: 3    docusate sodium (COLACE) 100 MG capsule, Take 1 capsule by mouth daily as needed for Constipation, Disp: 30 capsule, Rfl: 0    rOPINIRole (REQUIP) 1 MG tablet, Take 1 mg by mouth 3 times daily, Disp: , Rfl:   losartan (COZAAR) 50 MG tablet, Take 50 mg by mouth daily, Disp: , Rfl:     hydrochlorothiazide (MICROZIDE) 12.5 MG capsule, Take 12.5 mg by mouth daily, Disp: , Rfl:     GLIPIZIDE PO, Take 5 mg by mouth 2 times daily , Disp: , Rfl:     CARVEDILOL PO, Take 25 mg by mouth 2 times daily , Disp: , Rfl:   No Known Allergies  Social History     Socioeconomic History    Marital status: Legally      Spouse name: Not on file    Number of children: Not on file    Years of education: Not on file    Highest education level: Not on file   Occupational History    Not on file   Social Needs    Financial resource strain: Not on file    Food insecurity     Worry: Not on file     Inability: Not on file    Transportation needs     Medical: Not on file     Non-medical: Not on file   Tobacco Use    Smoking status: Former Smoker     Types: Cigarettes     Last attempt to quit: 1986     Years since quittin.3    Smokeless tobacco: Former User     Quit date: 1986   Substance and Sexual Activity    Alcohol use: Yes     Comment: beer    Drug use: No    Sexual activity: Not on file   Lifestyle    Physical activity     Days per week: Not on file     Minutes per session: Not on file    Stress: Not on file   Relationships    Social connections     Talks on phone: Not on file     Gets together: Not on file     Attends [employer] service: Not on file     Active member of club or organization: Not on file     Attends meetings of clubs or organizations: Not on file     Relationship status: Not on file    Intimate partner violence     Fear of current or ex partner: Not on file     Emotionally abused: Not on file     Physically abused: Not on file     Forced sexual activity: Not on file   Other Topics Concern    Not on file   Social History Narrative    Not on file     No family history on file.         Physical Exam:    Ht 5' 10.5\" (1.791 m)   Wt 215 lb (97.5 kg)   BMI 30.41 kg/m²     GENERAL: alert,

## 2020-06-18 ENCOUNTER — TELEPHONE (OUTPATIENT)
Dept: ORTHOPEDIC SURGERY | Age: 65
End: 2020-06-18

## 2020-06-26 ENCOUNTER — HOSPITAL ENCOUNTER (OUTPATIENT)
Dept: MRI IMAGING | Age: 65
Discharge: HOME | End: 2020-06-28
Payer: COMMERCIAL

## 2020-06-26 PROCEDURE — 73721 MRI JNT OF LWR EXTRE W/O DYE: CPT

## 2020-06-30 ENCOUNTER — OFFICE VISIT (OUTPATIENT)
Dept: ORTHOPEDIC SURGERY | Age: 65
End: 2020-06-30
Payer: COMMERCIAL

## 2020-06-30 VITALS — HEIGHT: 71 IN | WEIGHT: 215 LBS | BODY MASS INDEX: 30.1 KG/M2

## 2020-06-30 PROCEDURE — 3017F COLORECTAL CA SCREEN DOC REV: CPT | Performed by: ORTHOPAEDIC SURGERY

## 2020-06-30 PROCEDURE — 1036F TOBACCO NON-USER: CPT | Performed by: ORTHOPAEDIC SURGERY

## 2020-06-30 PROCEDURE — G8427 DOCREV CUR MEDS BY ELIG CLIN: HCPCS | Performed by: ORTHOPAEDIC SURGERY

## 2020-06-30 PROCEDURE — 99214 OFFICE O/P EST MOD 30 MIN: CPT | Performed by: ORTHOPAEDIC SURGERY

## 2020-06-30 PROCEDURE — G8417 CALC BMI ABV UP PARAM F/U: HCPCS | Performed by: ORTHOPAEDIC SURGERY

## 2020-06-30 RX ORDER — IBUPROFEN 800 MG/1
800 TABLET ORAL EVERY 8 HOURS PRN
Qty: 90 TABLET | Refills: 3 | Status: ON HOLD
Start: 2020-06-30 | End: 2020-07-13 | Stop reason: HOSPADM

## 2020-06-30 NOTE — PROGRESS NOTES
file.        Physical Exam:    Ht 5' 10.5\" (1.791 m)   Wt 215 lb (97.5 kg)   BMI 30.41 kg/m²     GENERAL: alert, appears stated age, cooperative, no acute distress    HEENT: Head is normocephalic, atraumatic. PERRLA. SKIN: Clean, dry, intact. There is not any cellulitis or cutaneous lesions noted in the lower extremities except noted in MSK    PULMONARY: breathing is regular and unlabored, no acute distress    CV: The bilateral upper and lower extremities are warm and well-perfused with brisk capillary refill. 2+ pulses UE and LE bilateral.     PSYCHIATRY: Pleasant mood, appropriate behavior, follows commands    NEURO: Sensation is intact distally with light touch with no alteration. Motor exam of the lower extremities show quadriceps, hamstrings, foot dorsiflexion and plantarflexion grossly intact 5/5. LYMPH: No lymphedema present distally in upper or lower extremity. MUSCULOSKELETAL:    Right Knee Exam:    mild effusion noted. No erythema/induration/fluctuance. Posterior medial joint line TTP. Stable to varus and valgus at 0 and 30 degrees of flexion. Negative Lachman's and posterior drawer. Negative patellar grind test and J sign. Compartments soft and compressible throughout leg. Active range of motion 0-120 with pain. Positive Pauly's positive Apley's, gait is antalgic positive anterior Lachman test        Imaging:  X-rays taken from [facility] show no acute fracture dislocation or other acute osseous abnormality and no arthritis. Mri Knee Right Wo Contrast    Result Date: 6/26/2020  EXAMINATION: MRI OF THE RIGHT KNEE WITHOUT CONTRAST, 6/26/2020 2:13 pm TECHNIQUE: Multiplanar multisequence MRI of the right knee was performed without the administration of intravenous contrast. COMPARISON: None.  HISTORY: ORDERING SYSTEM PROVIDED HISTORY: Tear of meniscus of right knee, unspecified meniscus, unspecified tear type, unspecified whether old or current tear Initial evaluation of acute right knee pain after twisting injury 2 weeks ago stepping out of a truck. FINDINGS: MENISCI: Complex tear is present in the medial meniscus. Vertical longitudinal tear morphology involves the anterior horn and body with extension into the posterior horn. A radial component is suspected in the posterior horn near the meniscal root. There is a flipped fragment into the intercondylar notch. The lateral meniscus is intact. CRUCIATE LIGAMENTS: Acute ACL tear. PCL is intact. EXTENSOR MECHANISM:  Quadriceps and patellar tendons are intact. LATERAL COLLATERAL LIGAMENT COMPLEX: The popliteus tendon, biceps femoris tendon, fibular collateral ligament and iliotibial band are intact. MEDIAL COLLATERAL LIGAMENT COMPLEX: The superficial and deep components of the medial collateral ligament are intact. KNEE JOINT: Cartilage fissuring in the lateral femoral trochlea. Patellofemoral cartilage is otherwise maintained. Medial and lateral compartment articular cartilage is maintained. Moderate-sized joint effusion. Ruptured popliteal cyst with edema in the popliteal fossa and proximal calf. BONE MARROW: Mild bone contusions of the posteromedial tibial plateau and lateral tibial plateau. No fracture or marrow replacing lesion. 1. Acute ACL tear. 2. Extensive medial meniscus tear with flipped fragment into the intercondylar notch. 3. Mild patellofemoral osteoarthritis. 4. Moderate joint effusion with ruptured popliteal cyst. 5. Mild bone contusions of the posterior tibial plateau. Margaux Rudolph was seen today for knee pain. Diagnoses and all orders for this visit:    Rupture of anterior cruciate ligament of right knee, initial encounter  -     ibuprofen (ADVIL;MOTRIN) 800 MG tablet; Take 1 tablet by mouth every 8 hours as needed for Pain    Other tear of medial meniscus of right knee as current injury, initial encounter  -     ibuprofen (ADVIL;MOTRIN) 800 MG tablet;  Take 1 tablet by mouth every 8 hours as needed for

## 2020-06-30 NOTE — PATIENT INSTRUCTIONS
idea to know your test results and keep a list of the medicines you take. How do you prepare for surgery? Surgery can be stressful. This information will help you understand what you can expect. And it will help you safely prepare for surgery. Preparing for surgery  · You will have a chance to talk to your physical therapist. Physical rehabilitation is a big part of your recovery. Your therapist may teach you some exercises that will help prepare your knee for surgery. · Be sure you have someone to take you home. Anesthesia and pain medicine will make it unsafe for you to drive or get home on your own. · Understand exactly what surgery is planned, along with the risks, benefits, and other options. · Tell your doctor ALL the medicines, vitamins, supplements, and herbal remedies you take. Some may increase the risk of problems during your surgery. Your doctor will tell you if you should stop taking any of them before the surgery and how soon to do it. · If you take aspirin or some other blood thinner, ask your doctor if you should stop taking it before your surgery. Make sure that you understand exactly what your doctor wants you to do. These medicines increase the risk of bleeding. · Make sure your doctor and the hospital have a copy of your advance directive. If you don't have one, you may want to prepare one. It lets others know your health care wishes. It's a good thing to have before any type of surgery or procedure. What happens on the day of surgery? · Follow the instructions exactly about when to stop eating and drinking. If you don't, your surgery may be canceled. If your doctor told you to take your medicines on the day of surgery, take them with only a sip of water. · Take a bath or shower before you come in for your surgery. Do not apply lotions, perfumes, deodorants, or nail polish. · Do not shave the surgical site yourself. · Take off all jewelry and piercings.  And take out contact lenses, if you wear them. At the hospital or surgery center    · Bring a picture ID. · The area for surgery is often marked to make sure there are no errors. · You will be kept comfortable and safe by your anesthesia provider. The anesthesia may make you sleep. Or it may just numb the area being worked on. · The surgery will take about 1 to 3 hours. · Your leg may be in a leg brace to limit motion. · You may have a device that applies cold treatment to your knee. · You will have crutches for 1 to 2 weeks. It may help to have a backpack or another way to carry items. When should you call your doctor? · You have questions or concerns. · You don't understand how to prepare for your surgery. · You become ill before the surgery (such as fever, flu, or a cold). · You need to reschedule or have changed your mind about having the surgery. Where can you learn more? Go to https://[email or web address] org and sign in to your [employer] account. Enter K529 in the [employer] box to learn more about \"Anterior Cruciate Ligament Reconstruction: Before Your Surgery. \"     If you do not have an account, please click on the \"Sign Up Now\" link. Current as of: March 2, 2020               Content Version: 12.5  © 1421-3718 Healthwise, Incorporated. Care instructions adapted under license by [facility] ([facility]). If you have questions about a medical condition or this instruction, always ask your healthcare professional. [place] [address] any warranty or liability for your use of this information. Patient Education        Anterior Cruciate Ligament Reconstruction: What to Expect at Home  Your Recovery  Anterior cruciate ligament (ACL) surgery replaces the damaged ligament with a new ligament called a graft. In most cases, the graft is a tendon taken from your own knee or hamstring. In some cases, the graft comes from a donor. You will feel tired for several days. Your knee will be swollen. And you may have numbness around the cut (incision) on your knee. Your ankle and shin may be bruised or swollen. You can put ice on the area to reduce swelling. Most of this will go away in a few days. You should soon start seeing improvement in your knee. You may be able to return to most of your regular activities within a few weeks. But it will be several months before you have complete use of your knee. It may take as long as 6 months to a year before your knee is ready for hard physical work or certain sports. Surgery can help. But even after surgery, your knee may not be as strong as it was before the injury. You will need to build your strength and the motion of your joint with rehabilitation (rehab) exercises. How soon you can return to sports or exercise depends on how well you follow your rehab program and how well your knee heals. Your doctor or physical therapist will give you an idea of when you can return to these activities. Most people can jog in about 4 months and run or cycle in about 4 to 6 months. You may need to wear a knee brace when you play sports. This care sheet gives you a general idea about how long it will take for you to recover. But each person recovers at a different pace. Follow the steps below to get better as quickly as possible. How can you care for yourself at home? Activity  · Rest when you feel tired. Getting enough sleep will help you recover. Sleep with your knee raised, but not bent. Put a pillow under your foot. Keep your leg raised as much as you can for the first few days. · You can use a brace and crutches to move around the house to do daily tasks. Don't put weight on your leg without these until your doctor says it's okay. Your thigh muscles will be weak, so take your time and be safe. You will have the crutches for 1 to 2 weeks. · If you have a brace, leave it on except when you exercise your knee or you shower.  (Not all doctors use braces.) Be careful not to put the brace on too tight. You will probably need to use it for 2 to 6 weeks. · For about 12 weeks, do not do any strenuous activity. This includes not only sports, but also things like mowing lawns, raking leaves, and shoveling snow. · You may shower 24 to 48 hours after surgery, if your doctor okays it. When you shower, keep your bandage and incision dry by taping a sheet of plastic to cover them. It might be best to get a shower stool to sit on. If you have a brace, only take it off if your doctor says it's okay. · If your doctor does not want you to shower or remove your brace, you can take a sponge bath. · Do not take a bath, swim, use a hot tub, or soak your leg for 2 weeks or until your doctor says it's okay. · You can drive when you are no longer using crutches or a knee brace, are no longer taking prescription pain medicine, and have some control over your knee. For most people, this takes 1 to 2 weeks. · How soon you can go back to work depends on your job. If you sit at work, you may be able to go back in 1 to 2 weeks. But if you are on your feet at work, it may take 4 to 6 weeks. If you are very physically active in your job, it may take 4 to 6 months. Diet  · You can eat your normal diet. If your stomach is upset, try bland, low-fat foods like plain rice, broiled chicken, toast, and yogurt. Drink plenty of fluids. · You may notice that your bowel movements are not regular right after your surgery. This is common. Try to avoid constipation and straining with bowel movements. You may want to take a fiber supplement every day. If you have not had a bowel movement after a couple of days, ask your doctor about taking a mild laxative. Medicines  · Your doctor will tell you if and when you can restart your medicines. He or she will also give you instructions about taking any new medicines. · If you take aspirin or some other blood thinner, ask your doctor if and when to start taking it again.  Make sure that you understand exactly what your doctor wants you to do. · Take pain medicines exactly as directed. ? If the doctor gave you a prescription medicine for pain, take it as prescribed. ? If you are not taking a prescription pain medicine, ask your doctor if you can take an over-the-counter medicine. · If your doctor prescribed antibiotics, take them as directed. Do not stop taking them just because you feel better. You need to take the full course of antibiotics. · If you think your pain medicine is making you sick to your stomach:  ? Take your medicine after meals (unless your doctor has told you not to). ? Ask your doctor for a different pain medicine. Incision care  · If you have a bandage over your incision, keep the bandage clean and dry. Follow your doctor's instructions. Your doctor will probably want you to leave the bandage on until you come back to the office. If your doctor allows it, you may be able to remove the bandage 48 to 72 hours after your surgery. · If you have strips of tape on the incision, leave the tape on for a week or until it falls off. Keep the area clean and dry. Exercise  · Do your rehab exercises as instructed. Exercise in a rehab program is an important part of your treatment. It will help you improve your knee's range of motion and regain your muscle strength. · If you are given a continuous passive motion machine, use it as directed. This machine will do some of the exercises for you. You will use it for about 2 weeks. Ice and elevation  · To reduce swelling and pain, put ice or a cold pack on your knee for 10 to 20 minutes at a time. Do this every few hours. Put a thin cloth between the ice and your skin. · For 3 days after surgery, prop up the sore leg on a pillow when you ice it or anytime you sit or lie down. Try to keep it above the level of your heart. This will help reduce swelling.   · If your doctor gave you support stockings, wear them as long as you are told to. These help prevent blood clots. Follow-up care is a key part of your treatment and safety. Be sure to make and go to all appointments, and call your doctor if you are having problems. It's also a good idea to know your test results and keep a list of the medicines you take. When should you call for help? [id number] anytime you think you may need emergency care. For example, call if:  · You passed out (lost consciousness). · You have chest pain, are short of breath, or you cough up blood. Call your doctor now or seek immediate medical care if:  · You have pain that does not get better after you take pain medicine. · You have tingling, weakness, or numbness in your foot or toes. · Your cast or splint feels too tight. · Your foot is cool or pale or changes color. · You are sick to your stomach or cannot drink fluids. · You have loose stitches, or your incision comes open. · You have signs of a blood clot in your leg (called a deep vein thrombosis), such as:  ? Pain in your calf, back of the knee, thigh, or groin. ? Redness or swelling in your leg. · You have signs of infection, such as:  ? Increased pain, swelling, warmth, or redness. ? Red streaks leading from the incision. ? Pus draining from the incision. ? A fever. · You bleed through your bandage. Watch closely for any changes in your health, and be sure to contact your doctor if:  · You have a problem with your cast or splint. · You are not getting better as expected. Where can you learn more? Go to https://[employer]. org and sign in to your [employer] account. Enter Y225 in the [facility] box to learn more about \"Anterior Cruciate Ligament Reconstruction: What to Expect at Home. \"     If you do not have an account, please click on the \"Sign Up Now\" link. Current as of: March 2, 2020               Content Version: 12.5  © 0181-1387 Healthwise, Incorporated.    Care instructions adapted under license by [facility]. If you have questions about a medical condition or this instruction, always ask your healthcare professional. [place] [address] any warranty or liability for your use of this information.

## 2020-07-07 RX ORDER — SODIUM CHLORIDE 9 MG/ML
INJECTION, SOLUTION INTRAVENOUS CONTINUOUS
Status: CANCELLED | OUTPATIENT
Start: 2020-07-13

## 2020-07-08 ENCOUNTER — HOSPITAL ENCOUNTER (OUTPATIENT)
Age: 65
Discharge: HOME | End: 2020-07-10
Payer: COMMERCIAL

## 2020-07-08 ENCOUNTER — HOSPITAL ENCOUNTER (OUTPATIENT)
Dept: PREADMISSION TESTING | Age: 65
Discharge: HOME | End: 2020-07-08
Payer: COMMERCIAL

## 2020-07-08 VITALS
SYSTOLIC BLOOD PRESSURE: 135 MMHG | RESPIRATION RATE: 18 BRPM | HEIGHT: 70 IN | DIASTOLIC BLOOD PRESSURE: 60 MMHG | TEMPERATURE: 97.2 F | OXYGEN SATURATION: 96 % | BODY MASS INDEX: 31.21 KG/M2 | HEART RATE: 58 BPM | WEIGHT: 218 LBS

## 2020-07-08 LAB
ANION GAP SERPL CALCULATED.3IONS-SCNC: 13 MMOL/L (ref 7–16)
BUN BLDV-MCNC: 27 MG/DL (ref 8–23)
CALCIUM SERPL-MCNC: 9.4 MG/DL (ref 8.6–10.2)
CHLORIDE BLD-SCNC: 101 MMOL/L (ref 98–107)
CO2: 27 MMOL/L (ref 22–29)
CREAT SERPL-MCNC: 1.6 MG/DL (ref 0.7–1.2)
GFR AFRICAN AMERICAN: 53
GFR NON-AFRICAN AMERICAN: 44 ML/MIN/1.73
GLUCOSE BLD-MCNC: 161 MG/DL (ref 74–99)
HCT VFR BLD CALC: 39.9 % (ref 37–54)
HEMOGLOBIN: 13.4 G/DL (ref 12.5–16.5)
MCH RBC QN AUTO: 30.2 PG (ref 26–35)
MCHC RBC AUTO-ENTMCNC: 33.6 % (ref 32–34.5)
MCV RBC AUTO: 89.9 FL (ref 80–99.9)
PDW BLD-RTO: 12.7 FL (ref 11.5–15)
PLATELET # BLD: 231 E9/L (ref 130–450)
PMV BLD AUTO: 9.6 FL (ref 7–12)
POTASSIUM REFLEX MAGNESIUM: 4.1 MMOL/L (ref 3.5–5)
RBC # BLD: 4.44 E12/L (ref 3.8–5.8)
SODIUM BLD-SCNC: 141 MMOL/L (ref 132–146)
WBC # BLD: 6 E9/L (ref 4.5–11.5)

## 2020-07-08 PROCEDURE — 85027 COMPLETE CBC AUTOMATED: CPT

## 2020-07-08 PROCEDURE — U0003 INFECTIOUS AGENT DETECTION BY NUCLEIC ACID (DNA OR RNA); SEVERE ACUTE RESPIRATORY SYNDROME CORONAVIRUS 2 (SARS-COV-2) (CORONAVIRUS DISEASE [COVID-19]), AMPLIFIED PROBE TECHNIQUE, MAKING USE OF HIGH THROUGHPUT TECHNOLOGIES AS DESCRIBED BY CMS-2020-01-R: HCPCS

## 2020-07-08 PROCEDURE — 80048 BASIC METABOLIC PNL TOTAL CA: CPT

## 2020-07-08 PROCEDURE — 36415 COLL VENOUS BLD VENIPUNCTURE: CPT

## 2020-07-08 RX ORDER — MULTIVIT WITH MINERALS/LUTEIN
1000 TABLET ORAL DAILY
Status: ON HOLD | COMMUNITY
End: 2020-07-17 | Stop reason: HOSPADM

## 2020-07-08 NOTE — PROGRESS NOTES
[facility]        Date: 7/8/2020    Date of surgery: 7/13/20   Arrival Time: Hospital will call you between 5pm and 7pm with your final arrival time for surgery    1. Do not eat or drink anything after midnight prior to surgery. This includes no water, chewing gum, mints or ice chips. 2. Take the following medications with a small sip of water on the morning of Surgery: carvidilol, pantoprazole, may take pain medicine if needed     3. Diabetics may take evening dose of insulin but none after midnight. If you feel symptomatic or low blood sugar morning of surgery drink 1-2 ounces of apple juice only. 4. Aspirin, Ibuprofen, Advil, Naproxen, Vitamin E and other Anti-inflammatory products should be stopped  before surgery  as directed by your physician. Take Tylenol only unless instructed otherwise by your surgeon. 5. Check with your Doctor regarding stopping Plavix, Coumadin, Lovenox, Eliquis, Effient, or other blood thinners. 6. Do not smoke,use illicit drugs and do not drink any alcoholic beverages 24 hours prior to surgery. 7. You may brush your teeth the morning of surgery. DO NOT SWALLOW WATER    8. You MUST make arrangements for a responsible adult to take you home after your surgery. You will not be allowed to leave alone or drive yourself home. It is strongly suggested someone stay with you the first 24 hrs. Your surgery will be cancelled if you do not have a ride home. 9. PEDIATRIC PATIENTS ONLY:  A parent/legal guardian must accompany a child scheduled for surgery and plan to stay at the hospital until the child is discharged. Please do not bring other children with you.     10. Please wear simple, loose fitting clothing to the hospital.  Do not bring valuables (money, credit cards, checkbooks, etc.) Do not wear any makeup (including no eye makeup) or nail polish on your fingers or toes. 11. DO NOT wear any jewelry or piercings on day of surgery. All body piercing jewelry must be removed. 12. Shower the night before surgery with ___Antibacterial soap     13. If you have a Living Will and Durable Power of  for Healthcare, please bring in a copy. 14. If appropriate bring crutches, inspirex, WALKER, CANE etc...    13. Notify your Surgeon if you develop any illness between now and surgery time, cough, cold, fever, sore throat, nausea, vomiting, etc.  Please notify your surgeon if you experience dizziness, shortness of breath or blurred vision between now & the time of your surgery. 16. If you have ___dentures, they will be removed before going to the OR; we will provide you a container. If you wear ___contact lenses or ___glasses, they will be removed; please bring a case for them. 17. To provide excellent care visitors will be limited to 1 in the room at any given time. 18. During flu season no children under the age of 15 are permitted in the hospital for the safety of all patients. 19. Other                  Please call AMBULATORY CARE if you have any further questions.    [address]     [address]

## 2020-07-09 LAB
SARS-COV-2: NOT DETECTED
SOURCE: NORMAL

## 2020-07-13 ENCOUNTER — ANESTHESIA (OUTPATIENT)
Dept: OPERATING ROOM | Age: 65
End: 2020-07-13
Payer: COMMERCIAL

## 2020-07-13 ENCOUNTER — HOSPITAL ENCOUNTER (OUTPATIENT)
Age: 65
Setting detail: OUTPATIENT SURGERY
Discharge: HOME | End: 2020-07-13
Attending: ORTHOPAEDIC SURGERY | Admitting: ORTHOPAEDIC SURGERY
Payer: COMMERCIAL

## 2020-07-13 ENCOUNTER — ANESTHESIA EVENT (OUTPATIENT)
Dept: OPERATING ROOM | Age: 65
End: 2020-07-13
Payer: COMMERCIAL

## 2020-07-13 VITALS
OXYGEN SATURATION: 94 % | WEIGHT: 218 LBS | RESPIRATION RATE: 18 BRPM | HEART RATE: 60 BPM | DIASTOLIC BLOOD PRESSURE: 88 MMHG | HEIGHT: 70 IN | BODY MASS INDEX: 31.21 KG/M2 | SYSTOLIC BLOOD PRESSURE: 158 MMHG | TEMPERATURE: 97.4 F

## 2020-07-13 VITALS
DIASTOLIC BLOOD PRESSURE: 67 MMHG | OXYGEN SATURATION: 99 % | RESPIRATION RATE: 6 BRPM | TEMPERATURE: 95.4 F | SYSTOLIC BLOOD PRESSURE: 96 MMHG

## 2020-07-13 LAB — METER GLUCOSE: 164 MG/DL (ref 74–99)

## 2020-07-13 PROCEDURE — 6360000002 HC RX W HCPCS: Performed by: NURSE ANESTHETIST, CERTIFIED REGISTERED

## 2020-07-13 PROCEDURE — 6360000002 HC RX W HCPCS: Performed by: ANESTHESIOLOGY

## 2020-07-13 PROCEDURE — 82962 GLUCOSE BLOOD TEST: CPT

## 2020-07-13 PROCEDURE — 6360000002 HC RX W HCPCS: Performed by: ORTHOPAEDIC SURGERY

## 2020-07-13 PROCEDURE — 7100000011 HC PHASE II RECOVERY - ADDTL 15 MIN: Performed by: ORTHOPAEDIC SURGERY

## 2020-07-13 PROCEDURE — 3700000001 HC ADD 15 MINUTES (ANESTHESIA): Performed by: ORTHOPAEDIC SURGERY

## 2020-07-13 PROCEDURE — 2709999900 HC NON-CHARGEABLE SUPPLY: Performed by: ORTHOPAEDIC SURGERY

## 2020-07-13 PROCEDURE — 7100000001 HC PACU RECOVERY - ADDTL 15 MIN: Performed by: ORTHOPAEDIC SURGERY

## 2020-07-13 PROCEDURE — 2500000003 HC RX 250 WO HCPCS: Performed by: NURSE ANESTHETIST, CERTIFIED REGISTERED

## 2020-07-13 PROCEDURE — 3600000013 HC SURGERY LEVEL 3 ADDTL 15MIN: Performed by: ORTHOPAEDIC SURGERY

## 2020-07-13 PROCEDURE — 6370000000 HC RX 637 (ALT 250 FOR IP): Performed by: ORTHOPAEDIC SURGERY

## 2020-07-13 PROCEDURE — 2580000003 HC RX 258: Performed by: ANESTHESIOLOGY

## 2020-07-13 PROCEDURE — 6370000000 HC RX 637 (ALT 250 FOR IP): Performed by: ANESTHESIOLOGY

## 2020-07-13 PROCEDURE — C1776 JOINT DEVICE (IMPLANTABLE): HCPCS | Performed by: ORTHOPAEDIC SURGERY

## 2020-07-13 PROCEDURE — 7100000010 HC PHASE II RECOVERY - FIRST 15 MIN: Performed by: ORTHOPAEDIC SURGERY

## 2020-07-13 PROCEDURE — 2580000003 HC RX 258: Performed by: ORTHOPAEDIC SURGERY

## 2020-07-13 PROCEDURE — 29881 ARTHRS KNE SRG MNISECTMY M/L: CPT | Performed by: ORTHOPAEDIC SURGERY

## 2020-07-13 PROCEDURE — 29888 ARTHRS AID ACL RPR/AGMNTJ: CPT | Performed by: ORTHOPAEDIC SURGERY

## 2020-07-13 PROCEDURE — 6360000002 HC RX W HCPCS

## 2020-07-13 PROCEDURE — 64447 NJX AA&/STRD FEMORAL NRV IMG: CPT | Performed by: ANESTHESIOLOGY

## 2020-07-13 PROCEDURE — C1713 ANCHOR/SCREW BN/BN,TIS/BN: HCPCS | Performed by: ORTHOPAEDIC SURGERY

## 2020-07-13 PROCEDURE — 2580000003 HC RX 258: Performed by: NURSE ANESTHETIST, CERTIFIED REGISTERED

## 2020-07-13 PROCEDURE — 7100000000 HC PACU RECOVERY - FIRST 15 MIN: Performed by: ORTHOPAEDIC SURGERY

## 2020-07-13 PROCEDURE — 2720000010 HC SURG SUPPLY STERILE: Performed by: ORTHOPAEDIC SURGERY

## 2020-07-13 PROCEDURE — 3600000003 HC SURGERY LEVEL 3 BASE: Performed by: ORTHOPAEDIC SURGERY

## 2020-07-13 PROCEDURE — 3700000000 HC ANESTHESIA ATTENDED CARE: Performed by: ORTHOPAEDIC SURGERY

## 2020-07-13 DEVICE — GRAFT HUM TISS L60-80MM DIA7.5-10.5MM FRZN FLEXIGRFT: Type: IMPLANTABLE DEVICE | Site: KNEE | Status: FUNCTIONAL

## 2020-07-13 DEVICE — SYSTEM FIX BNE TEND BNE W/ 10MM FLIPCUTTER II TIGHTROPE: Type: IMPLANTABLE DEVICE | Site: KNEE | Status: FUNCTIONAL

## 2020-07-13 DEVICE — ANCHOR SFT TISS OPN ADJ CORT 4 PNT KNOTLESS FIX SYS: Type: IMPLANTABLE DEVICE | Site: KNEE | Status: FUNCTIONAL

## 2020-07-13 DEVICE — BUTTON FIX W8XL12MM TI ATTCH SYS ALLGRFT CONSTRUCT FOR: Type: IMPLANTABLE DEVICE | Site: KNEE | Status: FUNCTIONAL

## 2020-07-13 RX ORDER — OXYCODONE HYDROCHLORIDE AND ACETAMINOPHEN 5; 325 MG/1; MG/1
1 TABLET ORAL ONCE
Status: COMPLETED | OUTPATIENT
Start: 2020-07-13 | End: 2020-07-13

## 2020-07-13 RX ORDER — DOCUSATE SODIUM 100 MG/1
100 CAPSULE, LIQUID FILLED ORAL 2 TIMES DAILY PRN
Qty: 20 CAPSULE | Refills: 1 | Status: SHIPPED | OUTPATIENT
Start: 2020-07-13 | End: 2021-03-30

## 2020-07-13 RX ORDER — MORPHINE SULFATE 10 MG/ML
INJECTION, SOLUTION INTRAMUSCULAR; INTRAVENOUS PRN
Status: DISCONTINUED | OUTPATIENT
Start: 2020-07-13 | End: 2020-07-13 | Stop reason: SDUPTHER

## 2020-07-13 RX ORDER — ROPIVACAINE HYDROCHLORIDE 5 MG/ML
INJECTION, SOLUTION EPIDURAL; INFILTRATION; PERINEURAL
Status: COMPLETED
Start: 2020-07-13 | End: 2020-07-13

## 2020-07-13 RX ORDER — PROPOFOL 10 MG/ML
INJECTION, EMULSION INTRAVENOUS PRN
Status: DISCONTINUED | OUTPATIENT
Start: 2020-07-13 | End: 2020-07-13 | Stop reason: SDUPTHER

## 2020-07-13 RX ORDER — ROCURONIUM BROMIDE 10 MG/ML
INJECTION, SOLUTION INTRAVENOUS PRN
Status: DISCONTINUED | OUTPATIENT
Start: 2020-07-13 | End: 2020-07-13 | Stop reason: SDUPTHER

## 2020-07-13 RX ORDER — FENTANYL CITRATE 50 UG/ML
50 INJECTION, SOLUTION INTRAMUSCULAR; INTRAVENOUS PRN
Status: COMPLETED | OUTPATIENT
Start: 2020-07-13 | End: 2020-07-13

## 2020-07-13 RX ORDER — ONDANSETRON 2 MG/ML
INJECTION INTRAMUSCULAR; INTRAVENOUS PRN
Status: DISCONTINUED | OUTPATIENT
Start: 2020-07-13 | End: 2020-07-13 | Stop reason: SDUPTHER

## 2020-07-13 RX ORDER — LIDOCAINE HYDROCHLORIDE 20 MG/ML
INJECTION, SOLUTION INFILTRATION; PERINEURAL PRN
Status: DISCONTINUED | OUTPATIENT
Start: 2020-07-13 | End: 2020-07-13 | Stop reason: SDUPTHER

## 2020-07-13 RX ORDER — ROPIVACAINE HYDROCHLORIDE 5 MG/ML
INJECTION, SOLUTION EPIDURAL; INFILTRATION; PERINEURAL
Status: DISCONTINUED
Start: 2020-07-13 | End: 2020-07-13 | Stop reason: HOSPADM

## 2020-07-13 RX ORDER — ROPIVACAINE HYDROCHLORIDE 5 MG/ML
30 INJECTION, SOLUTION EPIDURAL; INFILTRATION; PERINEURAL ONCE
Status: COMPLETED | OUTPATIENT
Start: 2020-07-13 | End: 2020-07-13

## 2020-07-13 RX ORDER — MIDAZOLAM HYDROCHLORIDE 1 MG/ML
INJECTION INTRAMUSCULAR; INTRAVENOUS
Status: COMPLETED
Start: 2020-07-13 | End: 2020-07-13

## 2020-07-13 RX ORDER — ONDANSETRON 4 MG/1
4 TABLET, FILM COATED ORAL EVERY 8 HOURS PRN
Qty: 20 TABLET | Refills: 0 | Status: SHIPPED | OUTPATIENT
Start: 2020-07-13 | End: 2020-11-13 | Stop reason: ALTCHOICE

## 2020-07-13 RX ORDER — NEOSTIGMINE METHYLSULFATE 1 MG/ML
INJECTION, SOLUTION INTRAVENOUS PRN
Status: DISCONTINUED | OUTPATIENT
Start: 2020-07-13 | End: 2020-07-13 | Stop reason: SDUPTHER

## 2020-07-13 RX ORDER — KETOROLAC TROMETHAMINE 10 MG/1
10 TABLET, FILM COATED ORAL EVERY 6 HOURS PRN
Qty: 20 TABLET | Refills: 0 | Status: ON HOLD | OUTPATIENT
Start: 2020-07-13 | End: 2020-07-17 | Stop reason: HOSPADM

## 2020-07-13 RX ORDER — CEFAZOLIN SODIUM 2 G/50ML
2 SOLUTION INTRAVENOUS ONCE
Status: COMPLETED | OUTPATIENT
Start: 2020-07-13 | End: 2020-07-13

## 2020-07-13 RX ORDER — OXYCODONE HYDROCHLORIDE AND ACETAMINOPHEN 5; 325 MG/1; MG/1
1 TABLET ORAL EVERY 6 HOURS PRN
Qty: 28 TABLET | Refills: 0 | Status: SHIPPED | OUTPATIENT
Start: 2020-07-13 | End: 2020-07-20

## 2020-07-13 RX ORDER — FENTANYL CITRATE 50 UG/ML
INJECTION, SOLUTION INTRAMUSCULAR; INTRAVENOUS PRN
Status: DISCONTINUED | OUTPATIENT
Start: 2020-07-13 | End: 2020-07-13 | Stop reason: SDUPTHER

## 2020-07-13 RX ORDER — MIDAZOLAM HYDROCHLORIDE 1 MG/ML
2 INJECTION INTRAMUSCULAR; INTRAVENOUS ONCE
Status: COMPLETED | OUTPATIENT
Start: 2020-07-13 | End: 2020-07-13

## 2020-07-13 RX ORDER — BACITRACIN ZINC AND POLYMYXIN B SULFATE 500; 1000 [USP'U]/G; [USP'U]/G
OINTMENT TOPICAL PRN
Status: DISCONTINUED | OUTPATIENT
Start: 2020-07-13 | End: 2020-07-13 | Stop reason: ALTCHOICE

## 2020-07-13 RX ORDER — MEPERIDINE HYDROCHLORIDE 25 MG/ML
12.5 INJECTION INTRAMUSCULAR; INTRAVENOUS; SUBCUTANEOUS EVERY 5 MIN PRN
Status: DISCONTINUED | OUTPATIENT
Start: 2020-07-13 | End: 2020-07-13 | Stop reason: HOSPADM

## 2020-07-13 RX ORDER — KETOROLAC TROMETHAMINE 30 MG/ML
INJECTION, SOLUTION INTRAMUSCULAR; INTRAVENOUS PRN
Status: DISCONTINUED | OUTPATIENT
Start: 2020-07-13 | End: 2020-07-13 | Stop reason: SDUPTHER

## 2020-07-13 RX ORDER — SODIUM CHLORIDE 9 MG/ML
INJECTION, SOLUTION INTRAVENOUS CONTINUOUS
Status: DISCONTINUED | OUTPATIENT
Start: 2020-07-13 | End: 2020-07-13 | Stop reason: HOSPADM

## 2020-07-13 RX ORDER — ASPIRIN 325 MG
325 TABLET, DELAYED RELEASE (ENTERIC COATED) ORAL DAILY
Qty: 14 TABLET | Refills: 0 | Status: ON HOLD | OUTPATIENT
Start: 2020-07-13 | End: 2020-07-15

## 2020-07-13 RX ORDER — GLYCOPYRROLATE 1 MG/5 ML
SYRINGE (ML) INTRAVENOUS PRN
Status: DISCONTINUED | OUTPATIENT
Start: 2020-07-13 | End: 2020-07-13 | Stop reason: SDUPTHER

## 2020-07-13 RX ORDER — EPHEDRINE SULFATE/0.9% NACL/PF 50 MG/5 ML
SYRINGE (ML) INTRAVENOUS PRN
Status: DISCONTINUED | OUTPATIENT
Start: 2020-07-13 | End: 2020-07-13 | Stop reason: SDUPTHER

## 2020-07-13 RX ORDER — SODIUM CHLORIDE, SODIUM LACTATE, POTASSIUM CHLORIDE, CALCIUM CHLORIDE 600; 310; 30; 20 MG/100ML; MG/100ML; MG/100ML; MG/100ML
INJECTION, SOLUTION INTRAVENOUS CONTINUOUS PRN
Status: DISCONTINUED | OUTPATIENT
Start: 2020-07-13 | End: 2020-07-13 | Stop reason: SDUPTHER

## 2020-07-13 RX ORDER — ONDANSETRON 2 MG/ML
4 INJECTION INTRAMUSCULAR; INTRAVENOUS
Status: DISCONTINUED | OUTPATIENT
Start: 2020-07-13 | End: 2020-07-13 | Stop reason: HOSPADM

## 2020-07-13 RX ORDER — DEXAMETHASONE SODIUM PHOSPHATE 10 MG/ML
INJECTION, SOLUTION INTRAMUSCULAR; INTRAVENOUS PRN
Status: DISCONTINUED | OUTPATIENT
Start: 2020-07-13 | End: 2020-07-13 | Stop reason: SDUPTHER

## 2020-07-13 RX ORDER — FENTANYL CITRATE 50 UG/ML
INJECTION, SOLUTION INTRAMUSCULAR; INTRAVENOUS
Status: COMPLETED
Start: 2020-07-13 | End: 2020-07-13

## 2020-07-13 RX ORDER — BUPIVACAINE HYDROCHLORIDE AND EPINEPHRINE 2.5; 5 MG/ML; UG/ML
INJECTION, SOLUTION EPIDURAL; INFILTRATION; INTRACAUDAL; PERINEURAL PRN
Status: DISCONTINUED | OUTPATIENT
Start: 2020-07-13 | End: 2020-07-13 | Stop reason: ALTCHOICE

## 2020-07-13 RX ADMIN — FENTANYL CITRATE 50 MCG: 50 INJECTION, SOLUTION INTRAMUSCULAR; INTRAVENOUS at 09:30

## 2020-07-13 RX ADMIN — ROPIVACAINE HYDROCHLORIDE 30 ML: 5 INJECTION, SOLUTION EPIDURAL; INFILTRATION; PERINEURAL at 09:35

## 2020-07-13 RX ADMIN — MIDAZOLAM HYDROCHLORIDE 2 MG: 1 INJECTION, SOLUTION INTRAMUSCULAR; INTRAVENOUS at 09:30

## 2020-07-13 RX ADMIN — MORPHINE SULFATE 3 MG: 10 INJECTION, SOLUTION INTRAMUSCULAR; INTRAVENOUS at 12:41

## 2020-07-13 RX ADMIN — FENTANYL CITRATE 50 MCG: 50 INJECTION, SOLUTION INTRAMUSCULAR; INTRAVENOUS at 12:15

## 2020-07-13 RX ADMIN — LIDOCAINE HYDROCHLORIDE 25 MG: 20 INJECTION, SOLUTION INFILTRATION; PERINEURAL at 10:04

## 2020-07-13 RX ADMIN — FENTANYL CITRATE 100 MCG: 50 INJECTION, SOLUTION INTRAMUSCULAR; INTRAVENOUS at 11:09

## 2020-07-13 RX ADMIN — SODIUM CHLORIDE, POTASSIUM CHLORIDE, SODIUM LACTATE AND CALCIUM CHLORIDE: 600; 310; 30; 20 INJECTION, SOLUTION INTRAVENOUS at 10:28

## 2020-07-13 RX ADMIN — CEFAZOLIN SODIUM 2 G: 2 SOLUTION INTRAVENOUS at 10:12

## 2020-07-13 RX ADMIN — MORPHINE SULFATE 3 MG: 10 INJECTION, SOLUTION INTRAMUSCULAR; INTRAVENOUS at 12:35

## 2020-07-13 RX ADMIN — Medication 10 MG: at 12:02

## 2020-07-13 RX ADMIN — Medication 0.6 MG: at 13:10

## 2020-07-13 RX ADMIN — OXYCODONE HYDROCHLORIDE AND ACETAMINOPHEN 1 TABLET: 5; 325 TABLET ORAL at 15:11

## 2020-07-13 RX ADMIN — ROCURONIUM BROMIDE 50 MG: 10 INJECTION, SOLUTION INTRAVENOUS at 10:04

## 2020-07-13 RX ADMIN — Medication 10 MG: at 10:20

## 2020-07-13 RX ADMIN — FENTANYL CITRATE 150 MCG: 50 INJECTION, SOLUTION INTRAMUSCULAR; INTRAVENOUS at 10:04

## 2020-07-13 RX ADMIN — FENTANYL CITRATE 50 MCG: 50 INJECTION, SOLUTION INTRAMUSCULAR; INTRAVENOUS at 11:40

## 2020-07-13 RX ADMIN — SODIUM CHLORIDE, POTASSIUM CHLORIDE, SODIUM LACTATE AND CALCIUM CHLORIDE: 600; 310; 30; 20 INJECTION, SOLUTION INTRAVENOUS at 11:55

## 2020-07-13 RX ADMIN — SODIUM CHLORIDE: 9 INJECTION, SOLUTION INTRAVENOUS at 09:01

## 2020-07-13 RX ADMIN — ONDANSETRON 4 MG: 2 INJECTION INTRAMUSCULAR; INTRAVENOUS at 12:41

## 2020-07-13 RX ADMIN — DEXAMETHASONE SODIUM PHOSPHATE 10 MG: 10 INJECTION, SOLUTION INTRAMUSCULAR; INTRAVENOUS at 10:11

## 2020-07-13 RX ADMIN — MIDAZOLAM HYDROCHLORIDE 2 MG: 1 INJECTION INTRAMUSCULAR; INTRAVENOUS at 09:30

## 2020-07-13 RX ADMIN — MORPHINE SULFATE 4 MG: 10 INJECTION, SOLUTION INTRAMUSCULAR; INTRAVENOUS at 12:54

## 2020-07-13 RX ADMIN — KETOROLAC TROMETHAMINE 30 MG: 30 INJECTION, SOLUTION INTRAMUSCULAR at 12:58

## 2020-07-13 RX ADMIN — PROPOFOL 200 MG: 10 INJECTION, EMULSION INTRAVENOUS at 10:04

## 2020-07-13 RX ADMIN — Medication 3 MG: at 13:10

## 2020-07-13 RX ADMIN — FENTANYL CITRATE 50 MCG: 50 INJECTION, SOLUTION INTRAMUSCULAR; INTRAVENOUS at 09:32

## 2020-07-13 ASSESSMENT — PAIN DESCRIPTION - ORIENTATION
ORIENTATION: RIGHT

## 2020-07-13 ASSESSMENT — PULMONARY FUNCTION TESTS
PIF_VALUE: 14
PIF_VALUE: 13
PIF_VALUE: 16
PIF_VALUE: 18
PIF_VALUE: 13
PIF_VALUE: 12
PIF_VALUE: 12
PIF_VALUE: 13
PIF_VALUE: 13
PIF_VALUE: 18
PIF_VALUE: 5
PIF_VALUE: 14
PIF_VALUE: 13
PIF_VALUE: 14
PIF_VALUE: 12
PIF_VALUE: 13
PIF_VALUE: 12
PIF_VALUE: 17
PIF_VALUE: 15
PIF_VALUE: 12
PIF_VALUE: 16
PIF_VALUE: 12
PIF_VALUE: 13
PIF_VALUE: 12
PIF_VALUE: 15
PIF_VALUE: 15
PIF_VALUE: 13
PIF_VALUE: 12
PIF_VALUE: 12
PIF_VALUE: 13
PIF_VALUE: 18
PIF_VALUE: 15
PIF_VALUE: 12
PIF_VALUE: 12
PIF_VALUE: 15
PIF_VALUE: 14
PIF_VALUE: 13
PIF_VALUE: 13
PIF_VALUE: 12
PIF_VALUE: 13
PIF_VALUE: 12
PIF_VALUE: 14
PIF_VALUE: 12
PIF_VALUE: 12
PIF_VALUE: 18
PIF_VALUE: 16
PIF_VALUE: 14
PIF_VALUE: 13
PIF_VALUE: 17
PIF_VALUE: 16
PIF_VALUE: 13
PIF_VALUE: 19
PIF_VALUE: 18
PIF_VALUE: 5
PIF_VALUE: 14
PIF_VALUE: 13
PIF_VALUE: 12
PIF_VALUE: 12
PIF_VALUE: 13
PIF_VALUE: 12
PIF_VALUE: 16
PIF_VALUE: 13
PIF_VALUE: 14
PIF_VALUE: 14
PIF_VALUE: 12
PIF_VALUE: 15
PIF_VALUE: 13
PIF_VALUE: 16
PIF_VALUE: 13
PIF_VALUE: 17
PIF_VALUE: 14
PIF_VALUE: 12
PIF_VALUE: 11
PIF_VALUE: 12
PIF_VALUE: 19
PIF_VALUE: 13
PIF_VALUE: 13
PIF_VALUE: 15
PIF_VALUE: 13
PIF_VALUE: 13
PIF_VALUE: 12
PIF_VALUE: 13
PIF_VALUE: 15
PIF_VALUE: 13
PIF_VALUE: 15
PIF_VALUE: 11
PIF_VALUE: 5
PIF_VALUE: 19
PIF_VALUE: 13
PIF_VALUE: 6
PIF_VALUE: 12
PIF_VALUE: 12
PIF_VALUE: 15
PIF_VALUE: 12
PIF_VALUE: 14
PIF_VALUE: 12
PIF_VALUE: 12
PIF_VALUE: 13
PIF_VALUE: 12
PIF_VALUE: 12
PIF_VALUE: 13
PIF_VALUE: 15
PIF_VALUE: 12
PIF_VALUE: 12
PIF_VALUE: 23
PIF_VALUE: 12
PIF_VALUE: 27
PIF_VALUE: 16
PIF_VALUE: 18
PIF_VALUE: 18
PIF_VALUE: 12
PIF_VALUE: 17
PIF_VALUE: 13
PIF_VALUE: 13
PIF_VALUE: 12
PIF_VALUE: 12
PIF_VALUE: 15
PIF_VALUE: 13
PIF_VALUE: 13
PIF_VALUE: 21
PIF_VALUE: 12
PIF_VALUE: 15
PIF_VALUE: 17
PIF_VALUE: 15
PIF_VALUE: 15
PIF_VALUE: 13
PIF_VALUE: 14
PIF_VALUE: 12
PIF_VALUE: 13
PIF_VALUE: 12
PIF_VALUE: 15
PIF_VALUE: 12
PIF_VALUE: 13
PIF_VALUE: 5
PIF_VALUE: 15
PIF_VALUE: 14
PIF_VALUE: 20
PIF_VALUE: 13
PIF_VALUE: 15
PIF_VALUE: 16
PIF_VALUE: 12
PIF_VALUE: 12
PIF_VALUE: 13
PIF_VALUE: 12
PIF_VALUE: 21
PIF_VALUE: 13
PIF_VALUE: 13
PIF_VALUE: 2
PIF_VALUE: 13
PIF_VALUE: 13
PIF_VALUE: 12
PIF_VALUE: 13
PIF_VALUE: 15
PIF_VALUE: 12
PIF_VALUE: 13
PIF_VALUE: 11
PIF_VALUE: 12
PIF_VALUE: 13
PIF_VALUE: 12
PIF_VALUE: 17
PIF_VALUE: 16
PIF_VALUE: 15
PIF_VALUE: 12
PIF_VALUE: 15
PIF_VALUE: 13
PIF_VALUE: 13
PIF_VALUE: 4
PIF_VALUE: 13
PIF_VALUE: 12
PIF_VALUE: 13
PIF_VALUE: 5
PIF_VALUE: 12
PIF_VALUE: 0
PIF_VALUE: 13
PIF_VALUE: 3
PIF_VALUE: 12
PIF_VALUE: 14
PIF_VALUE: 15

## 2020-07-13 ASSESSMENT — PAIN DESCRIPTION - LOCATION
LOCATION: KNEE

## 2020-07-13 ASSESSMENT — PAIN SCALES - GENERAL
PAINLEVEL_OUTOF10: 2
PAINLEVEL_OUTOF10: 7
PAINLEVEL_OUTOF10: 7
PAINLEVEL_OUTOF10: 0
PAINLEVEL_OUTOF10: 8
PAINLEVEL_OUTOF10: 8
PAINLEVEL_OUTOF10: 0
PAINLEVEL_OUTOF10: 2
PAINLEVEL_OUTOF10: 0

## 2020-07-13 ASSESSMENT — PAIN DESCRIPTION - DESCRIPTORS
DESCRIPTORS: ACHING;DULL;DISCOMFORT
DESCRIPTORS: ACHING;DULL;DISCOMFORT
DESCRIPTORS: ACHING;NUMBNESS
DESCRIPTORS: ACHING;DISCOMFORT
DESCRIPTORS: SORE;NUMBNESS

## 2020-07-13 ASSESSMENT — PAIN DESCRIPTION - PAIN TYPE
TYPE: SURGICAL PAIN

## 2020-07-13 ASSESSMENT — ENCOUNTER SYMPTOMS: SHORTNESS OF BREATH: 0

## 2020-07-13 ASSESSMENT — LIFESTYLE VARIABLES: SMOKING_STATUS: 0

## 2020-07-13 ASSESSMENT — PAIN - FUNCTIONAL ASSESSMENT: PAIN_FUNCTIONAL_ASSESSMENT: 0-10

## 2020-07-13 NOTE — ANESTHESIA PRE PROCEDURE
Department of Anesthesiology  Preprocedure Note       Name:  Chris Duarte   Age:  60 y.o.  :  1955                                          MRN:  [id number]         Date:  2020      Surgeon: Jud Shields): Mi Oneill DO    Procedure: Procedure(s):  RIGHT KNEE ARTHROSCOPY, (ANTERIOR CRUCIATE LIGAMENT) ACL RECONSTRUCTION, ALLOGRAFT, MEDIAL MENISCECTOMY AND DEBRIDEMENT, (ARTHREX)-right    Medications prior to admission:   Prior to Admission medications    Medication Sig Start Date End Date Taking?  Authorizing Provider   Ascorbic Acid (VITAMIN C) 250 MG tablet Take 250 mg by mouth daily    Historical Provider, MD   Cyanocobalamin (VITAMIN B 12 PO) Take by mouth    Historical Provider, MD   Potassium 99 MG TABS Take by mouth daily    Historical Provider, MD   ibuprofen (ADVIL;MOTRIN) 800 MG tablet Take 1 tablet by mouth every 8 hours as needed for Pain 20  Mi Oneill DO   fenofibrate (TRIGLIDE) 160 MG tablet TAKE 1 TABLET BY MOUTH ONCE DAILY 20   Historical Provider, MD   doxepin (SINEQUAN) 10 MG capsule TAKE UP TO 5 CAPSULES BY MOUTH AT BEDTIME AS DIRECTED FOR INSOMNIA 20   Historical Provider, MD   atorvastatin (LIPITOR) 40 MG tablet TAKE 1 TABLET BY MOUTH IN THE EVENING FOR CHOLESTEROL 20   Historical Provider, MD   OXYCODONE ER PO Take 5 mg by mouth 4 times daily PRN 20   Historical Provider, MD   pantoprazole (PROTONIX) 40 MG tablet Take 1 tablet by mouth every morning (before breakfast) 20   Nasir Mane DO   docusate sodium (COLACE) 100 MG capsule Take 1 capsule by mouth daily as needed for Constipation 20   Geovanny Frausto MD   rOPINIRole (REQUIP) 1 MG tablet Take 1 mg by mouth 3 times daily    Historical Provider, MD   losartan (COZAAR) 50 MG tablet Take 50 mg by mouth daily    Historical Provider, MD   hydrochlorothiazide (MICROZIDE) 12.5 MG capsule Take 12.5 mg by mouth daily    Historical Provider, MD   GLIPIZIDE PO Take 5 mg by mouth 2 times daily Historical Provider, MD   CARVEDILOL PO Take 25 mg by mouth 2 times daily     Historical Provider, MD       Current medications:    No current facility-administered medications for this visit. No current outpatient medications on file. Facility-Administered Medications Ordered in Other Visits   Medication Dose Route Frequency Provider Last Rate Last Dose    fentaNYL (SUBLIMAZE) 100 MCG/2ML injection             midazolam (VERSED) 2 MG/2ML injection             ropivacaine (NAROPIN) 0.5% injection             ropivacaine (NAROPIN) 0.5% injection                Allergies:  No Known Allergies    Problem List:    Patient Active Problem List   Diagnosis Code    Tinea corporis B35.4    Cholecystitis K81.9    Acute acalculous cholecystitis K81.0       Past Medical History:        Diagnosis Date    Arthritis     Diabetes mellitus ([facility] [address]     Hyperlipidemia     Hypertension        Past Surgical History:        Procedure Laterality Date    CHOLECYSTECTOMY, LAPAROSCOPIC N/A 2020    CHOLECYSTECTOMY LAPAROSCOPIC WITH IOC performed by Cindy Chavira MD at [place] [address]         Social History:    Social History     Tobacco Use    Smoking status: Former Smoker     Types: Cigarettes     Last attempt to quit: 1986     Years since quittin.4    Smokeless tobacco: Former User     Quit date: 1986   Substance Use Topics    Alcohol use: Yes     Comment: beer                                Counseling given: Not Answered      Vital Signs (Current): There were no vitals filed for this visit.                                            BP Readings from Last 3 Encounters:   20 135/60   20 (!) 158/70   20 117/69       NPO Status:                                                                                 BMI:   Wt Readings from Last 3 Encounters:   20 218 lb (98.9 kg)   20 215 lb (97.5 kg)   20 215 lb (97.5 kg)     There is no height or weight on file to calculate BMI.    CBC:   Lab Results   Component Value Date    WBC 6.0 07/08/2020    RBC 4.44 07/08/2020    HGB 13.4 07/08/2020    HCT 39.9 07/08/2020    MCV 89.9 07/08/2020    RDW 12.7 07/08/2020     07/08/2020       CMP:   Lab Results   Component Value Date     07/08/2020    K 4.1 07/08/2020     07/08/2020    CO2 27 07/08/2020    BUN 27 07/08/2020    CREATININE 1.6 07/08/2020    GFRAA 53 07/08/2020    LABGLOM 44 07/08/2020    GLUCOSE 161 07/08/2020    PROT 6.3 05/19/2020    CALCIUM 9.4 07/08/2020    BILITOT 0.3 05/19/2020    ALKPHOS 44 05/19/2020    AST 46 05/19/2020    ALT 59 05/19/2020       POC Tests: No results for input(s): POCGLU, POCNA, POCK, POCCL, POCBUN, POCHEMO, POCHCT in the last 72 hours.     Coags:   Lab Results   Component Value Date    PROTIME 10.7 05/17/2020    INR 0.9 05/17/2020       HCG (If Applicable): No results found for: PREGTESTUR, PREGSERUM, HCG, HCGQUANT     ABGs: No results found for: PHART, PO2ART, GBD8CMN, WAR6ASZ, BEART, [id number]     Type & Screen (If Applicable):  No results found for: LABABO, LABRH    Drug/Infectious Status (If Applicable):  No results found for: HIV, HEPCAB    COVID-19 Screening (If Applicable):   Lab Results   Component Value Date    COVID19 Not Detected 07/08/2020         Anesthesia Evaluation  Patient summary reviewed no history of anesthetic complications:   Airway: Mallampati: II  TM distance: >3 FB   Neck ROM: full  Mouth opening: > = 3 FB Dental: normal exam         Pulmonary: breath sounds clear to auscultation      (-) COPD, shortness of breath and not a current smoker                          ROS comment: Former smoker   Cardiovascular:  Exercise tolerance: good (>4 METS),   (+) hypertension: moderate, hyperlipidemia      ECG reviewed  Rhythm: regular  Rate: normal                    Neuro/Psych:   Negative Neuro/Psych ROS              GI/Hepatic/Renal:        (-) liver disease and no renal disease Endo/Other:    (+) DiabetesType II DM, , : arthritis: OA., .                 Abdominal:         (-) obese     Vascular: negative vascular ROS. Anesthesia Plan      general     ASA 3     (Patient agreed to a right adductor canal block)  Induction: intravenous. BIS  MIPS: Postoperative opioids intended and Prophylactic antiemetics administered. Anesthetic plan and risks discussed with patient. Plan discussed with CRNA.                 Bharat Hayward MD   7/13/2020

## 2020-07-13 NOTE — OP NOTE
Patient: Tigre Singh  YOB: 1955  MRN: [id number]     Date of Procedure: 7/13/2020     Pre-Op Diagnosis:   1. RUPTURE OF ANTERIOR CRUCIATE LIGAMENT OF RIGHT KNEE  2.  Medial meniscus tear     Post-Op Diagnosis:   1. RUPTURE OF ANTERIOR CRUCIATE LIGAMENT OF RIGHT KNEE  2.  Medial meniscus tear       Procedure(s):  1. RIGHT KNEE ARTHROSCOPY, (ANTERIOR CRUCIATE LIGAMENT) ACL RECONSTRUCTION. ALLOGRAFT. 2. PARTIAL MEDIAL MENISCECTOMY AND DEBRIDEMENT     Surgeon(s): Dru Patton DO     Assistant:  Resident: Abelardo Romano DO; Hesham Wiggins DO     Anesthesia: General     Estimated Blood Loss (mL): Minimal     Complications: None     Specimens:   * No specimens in log *     Implants:  Implant Name Type Inv. Item Serial No.  Lot No. LRB No. Used Action   TIGHTROPE BTB Fastener TIGHTROPE BTB   [employer] [id number] Right 1 Implanted   IMPL KNEE TIGHTROPE ABS OPEN Fastener IMPL KNEE TIGHTROPE ABS OPEN   [employer] [id number] Right 1 Implanted   FLEXIGRAFT     [id number] [facility]   Right 1 Implanted   IMPL KNEE ACL TIGHTROPE BUTTN ABS 8X12MM Knee IMPL KNEE ACL TIGHTROPE BUTTN ABS 8X12MM   [employer] [id number] Right 1 Implanted         Drains: * No LDAs found *           Indications:  Patient is a 60 y.o. male who sustained an injury to his right knee ACL and medial meniscal tear while stepping off a truck. MRI demonstrated Right knee ACL rupture with the above findings. Physical exam also correlated to ACL rupture. Patient elects to undergo the above-stated procedure after understanding risks and benefits including but not limited to infection, neurovascular injury, postoperative stiffness, possible need for revision, possible rerupture, and persistent instability.      Description of Procedure:  Patient was taken to the operating room and placed supine on the operating table, underwent general anesthesia.  The Right knee demonstrated a 2B Lachman, positive pivot shift, anterior drawer, no laxity with valgus stress at 0 and 30°. The left leg was placed in a well-padded leg pappas. Tourniquet was placed on the upper thigh. The official timeout was called. Initiation of of IV antibiotics was given. The operative knee was prepped and draped in a sterile fashion. Incision was made and scope was inserted through the inferolateral portal, and instrumentation medially. Inspection of the patellofemoral joint demonstrated articular cartilage to be intact. Synovitis noted throughout all of the intercondylar notch, medial and lateral compartment, tricompartmental synovectomy was performed with ArthroCare and mechanical shaver. Synovial tissue was divided and removed. Medial compartment demonstrated a complex bucket handle tear of medial meniscus which was resected back to a stable base using shaving instruments. Lateral compartment demonstrated no tear. The intercondylar notch demonstrated a completely ruptured ACL. The PCL was intact. The allograft was prepped on the back table with 2 Ar[employer]rex tight ropes on either end. Length was 68 mm, diameter on the femoral side was 9.5 mm in diameter on the tibial side was 9.5 mm.      Attention was then turned return to the arthroscopy. Remaining fibers of the torn ACL were debrided and removed. The footprint of the ACL was identified on the femur and the tibia. A single bundle anatomic reonstruction of the ACL was performed. Utilizing a flipcutter, femoral side was drilled. Tibial side was drilled from inside out utilizing a 9.5 millimeter Ar[employer]rex flipcutter to a depth of 30 mm. Graft was pulled through the knee through a medial portal. The button was flipped on the femoral cortex with an optimized graft tunnel interference by toggling the sutures. Graft was then pulled into the knee through the medial portal at the end of the tibial socket. The graft was tensioned on the femoral side in flexion, tibial side in extension. There is no graft impingement.  There was full range of motion. Negation in Lachman test. Joint was thoroughly irrigated and suctioned. Incisions were closed with 3-0 nylon.  Dry sterile dressings were applied consisting of Xeroform, 4 x 4 gauze, ABDs, cast padding, Ace bandage, postoperative knee brace in extension and ice.       Disposition: The patient was awoken from anesthesia in the operating room, having tolerated the procedure well, was transported to the recovery room in stable condition

## 2020-07-13 NOTE — H&P
I have reviewed the history and physical and examined the patient and find no relevant changes. I have reviewed with the patient and/or family the risks, benefits, and alternatives to the procedure. The patient was counseled at length about the risks of kristina Covid-19 during their perioperative period and any recovery window from their procedure. The patient was made aware that kristina Covid-19  may worsen their prognosis for recovering from their procedure  and lend to a higher morbidity and/or mortality risk. All material risks, benefits, and reasonable alternatives including postponing the procedure were discussed. The patient does wish to proceed with the procedure at this time.         Angeline Samuels,   7/13/2020

## 2020-07-13 NOTE — BRIEF OP NOTE
Brief Postoperative Note      Patient: Smiley Foley  YOB: 1955  MRN: [id number]    Date of Procedure: 7/13/2020    Pre-Op Diagnosis: RUPTURE OF ANTERIOR CRUCIATE LIGAMENT OF RIGHT KNEE    Post-Op Diagnosis: Same       Procedure(s):  RIGHT KNEE ARTHROSCOPY, (ANTERIOR CRUCIATE LIGAMENT) ACL RECONSTRUCTION. ALLOGRAFT. MEDIAL MENISCECTOMY AND DEBRIDEMENT. ([address])    Surgeon(s): Randy Price DO    Assistant:  Resident: Lyssa Villarreal DO; Clarence Ryan DO    Anesthesia: General    Estimated Blood Loss (mL): Minimal    Complications: None    Specimens:   * No specimens in log *    Implants:  Implant Name Type Inv.  Item Serial No.  Lot No. LRB No. Used Action   TIGHTROPE BTB Fastener TIGHTROPE BTB  [employer] [id number] Right 1 Implanted   IMPL KNEE TIGHTROPE ABS OPEN Fastener IMPL KNEE TIGHTROPE ABS OPEN  [employer] [id number] Right 1 Implanted   FLEXIGRAFT   [id number] [employer]  Right 1 Implanted   IMPL KNEE ACL TIGHTROPE BUTTN ABS 8X12MM Knee IMPL KNEE ACL TIGHTROPE BUTTN ABS 8X12MM  [employer] [id number] Right 1 Implanted         Drains: * No LDAs found *    Findings: see report    Electronically signed by Randy Price DO on 7/13/2020 at 1:28 PM

## 2020-07-13 NOTE — H&P
CC: Right Knee Instability     HPI:      Patient is 60 y.o. male complaining chronic, atraumatic, insidious onset right knee pain for 12 days. He injured knee stepping down out of truck and the knee gave out on him. He admits to stiffness, deep, aching pain, swelling, difficulty with stairs and ambulating far distances. He admits to gross instability specifically in his right knee. Previous treatments include Knee immobilizer. ROS:    Skin: (-) rash,(-) psoriasis,(-) eczema, (-)skin cancer. Neurologic: (-)numbness, (-)tingling, (-)headaches, (-) LOC. Cardiovascular: (-) Chest pain, (-) swelling in legs/feet, (-) SOB, (-) cramping in legs/feet with walking. All other review of systems negative except stated above or in HPI      Past Medical History:   Diagnosis Date    Arthritis     Diabetes mellitus ([facility] [address]     Hyperlipidemia     Hypertension      Past Surgical History:   Procedure Laterality Date    CHOLECYSTECTOMY, LAPAROSCOPIC N/A 2020    CHOLECYSTECTOMY LAPAROSCOPIC WITH IOC performed by Ramakrishna Morales MD at [place] [address]       No current outpatient medications on file.   No Known Allergies  Social History     Socioeconomic History    Marital status: Legally      Spouse name: Not on file    Number of children: Not on file    Years of education: Not on file    Highest education level: Not on file   Occupational History    Not on file   Social Needs    Financial resource strain: Not on file    Food insecurity     Worry: Not on file     Inability: Not on file    Transportation needs     Medical: Not on file     Non-medical: Not on file   Tobacco Use    Smoking status: Former Smoker     Types: Cigarettes     Last attempt to quit: 1986     Years since quittin.4    Smokeless tobacco: Former User     Quit date: 1986   Substance and Sexual Activity    Alcohol use: Yes     Comment: beer    Drug use: No    Sexual activity: Not on file Lifestyle    Physical activity     Days per week: Not on file     Minutes per session: Not on file    Stress: Not on file   Relationships    Social connections     Talks on phone: Not on file     Gets together: Not on file     Attends Buddhist service: Not on file     Active member of club or organization: Not on file     Attends meetings of clubs or organizations: Not on file     Relationship status: Not on file    Intimate partner violence     Fear of current or ex partner: Not on file     Emotionally abused: Not on file     Physically abused: Not on file     Forced sexual activity: Not on file   Other Topics Concern    Not on file   Social History Narrative    Not on file     History reviewed. No pertinent family history. Physical Exam:    There were no vitals taken for this visit. GENERAL: alert, appears stated age, cooperative, no acute distress    HEENT: Head is normocephalic, atraumatic. PERRLA. SKIN: Clean, dry, intact. There is not any cellulitis or cutaneous lesions noted in the lower extremities except noted in MSK    PULMONARY: breathing is regular and unlabored, no acute distress    CV: The bilateral upper and lower extremities are warm and well-perfused with brisk capillary refill. 2+ pulses UE and LE bilateral.     PSYCHIATRY: Pleasant mood, appropriate behavior, follows commands    NEURO: Sensation is intact distally with light touch with no alteration. Motor exam of the lower extremities show quadriceps, hamstrings, foot dorsiflexion and plantarflexion grossly intact 5/5. LYMPH: No lymphedema present distally in upper or lower extremity. MUSCULOSKELETAL:    Right Knee Exam:    mild effusion noted. No erythema/induration/fluctuance. Posterior medial joint line TTP. Stable to varus and valgus at 0 and 30 degrees of flexion. Negative Lachman's and posterior drawer. Negative patellar grind test and J sign. Compartments soft and compressible throughout leg.   Active range of motion 0-120 with pain. Positive Pauly's positive Apley's, gait is antalgic positive anterior Lachman test        Imaging:  X-rays taken from [facility] show no acute fracture dislocation or other acute osseous abnormality and no arthritis. Mri Knee Right Wo Contrast    Result Date: 6/26/2020  EXAMINATION: MRI OF THE RIGHT KNEE WITHOUT CONTRAST, 6/26/2020 2:13 pm TECHNIQUE: Multiplanar multisequence MRI of the right knee was performed without the administration of intravenous contrast. COMPARISON: None. HISTORY: ORDERING SYSTEM PROVIDED HISTORY: Tear of meniscus of right knee, unspecified meniscus, unspecified tear type, unspecified whether old or current tear Initial evaluation of acute right knee pain after twisting injury 2 weeks ago stepping out of a truck. FINDINGS: MENISCI: Complex tear is present in the medial meniscus. Vertical longitudinal tear morphology involves the anterior horn and body with extension into the posterior horn. A radial component is suspected in the posterior horn near the meniscal root. There is a flipped fragment into the intercondylar notch. The lateral meniscus is intact. CRUCIATE LIGAMENTS: Acute ACL tear. PCL is intact. EXTENSOR MECHANISM:  Quadriceps and patellar tendons are intact. LATERAL COLLATERAL LIGAMENT COMPLEX: The popliteus tendon, biceps femoris tendon, fibular collateral ligament and iliotibial band are intact. MEDIAL COLLATERAL LIGAMENT COMPLEX: The superficial and deep components of the medial collateral ligament are intact. KNEE JOINT: Cartilage fissuring in the lateral femoral trochlea. Patellofemoral cartilage is otherwise maintained. Medial and lateral compartment articular cartilage is maintained. Moderate-sized joint effusion. Ruptured popliteal cyst with edema in the popliteal fossa and proximal calf. BONE MARROW: Mild bone contusions of the posteromedial tibial plateau and lateral tibial plateau. No fracture or marrow replacing lesion. 1. Acute ACL tear. 2. Extensive medial meniscus tear with flipped fragment into the intercondylar notch. 3. Mild patellofemoral osteoarthritis. 4. Moderate joint effusion with ruptured popliteal cyst. 5. Mild bone contusions of the posterior tibial plateau. Sahara Joseph was seen today for knee pain. Diagnoses and all orders for this visit:    Rupture of anterior cruciate ligament of right knee, initial encounter  -     ibuprofen (ADVIL;MOTRIN) 800 MG tablet; Take 1 tablet by mouth every 8 hours as needed for Pain    Other tear of medial meniscus of right knee as current injury, initial encounter  -     ibuprofen (ADVIL;MOTRIN) 800 MG tablet; Take 1 tablet by mouth every 8 hours as needed for Pain        Patient seen and examined. X-rays reviewed. Patient has little to no arthritis noted on x-rays today. However patient's main complaint is instability of symptomatic knee. Exam and history is consistent with possible meniscus tear. MRI recommended for further evaluation management. Patient seen and examined. MRI reviewed with patient in detail. Natural history and course discussed with patient in long discussion  Treatment options discussed with patient in detail including risks and benefits. The risks and benefits of the surgery were discussed with the patient. The risks are including but not limited to: infection, injuries to blood vessels and nerves, non relief of symptoms, arthrofibrosis of knee, need for further operative intervention, blood loss, PE/DVT, MI and death. The risks are understood by the patient and they wish to proceed with a Right knee arthroscopy, possible anterior cruciate ligament reconstruction, partial menisectomy with debridement.           Adonis Saals DO

## 2020-07-13 NOTE — ANESTHESIA PROCEDURE NOTES
Peripheral Block    Patient location during procedure: holding area  Start time: 7/13/2020 9:22 AM  End time: 7/13/2020 9:35 AM  Staffing  Anesthesiologist: Nabil Washburn MD  Other anesthesia staff: Stepan Anaya RN  Performed: anesthesiologist and other anesthesia staff   Preanesthetic Checklist  Completed: patient identified, site marked, surgical consent, pre-op evaluation, timeout performed, IV checked, risks and benefits discussed, monitors and equipment checked, anesthesia consent given, oxygen available and patient being monitored  Peripheral Block  Patient position: supine  Prep: ChloraPrep  Patient monitoring: cardiac monitor, continuous pulse ox, frequent blood pressure checks and IV access  Block type: Femoral  Laterality: right  Injection technique: single-shot  Procedures: ultrasound guided  Local infiltration: ropivacaine  Infiltration strength: 0.5 %  Dose: 30 mL  Adductor canal  Provider prep: mask and sterile gloves  Local infiltration: ropivacaine  Needle  Needle type: combined needle/nerve stimulator   Needle gauge: 20 G  Needle length: 10 cm  Needle localization: ultrasound guidance  Assessment  Injection assessment: negative aspiration for heme, no paresthesia on injection and local visualized surrounding nerve on ultrasound  Paresthesia pain: none  Slow fractionated injection: yes  Hemodynamics: stable  Reason for block: post-op pain management

## 2020-07-14 NOTE — ANESTHESIA POSTPROCEDURE EVALUATION
Department of Anesthesiology  Postprocedure Note    Patient: Kristine Purdy  MRN: [id number]  YOB: 1955  Date of evaluation: 7/14/2020  Time:  11:23 AM     Procedure Summary     Date:  07/13/20 Room / Location:  [address] [place] [address] / [address] [place] [address]    Anesthesia Start:  4892 Anesthesia Stop:  3315    Procedure:  RIGHT KNEE ARTHROSCOPY, (ANTERIOR CRUCIATE LIGAMENT) ACL RECONSTRUCTION. ALLOGRAFT. MEDIAL MENISCECTOMY AND DEBRIDEMENT. ([address]) (Right Knee) Diagnosis:  (RUPTURE OF ANTERIOR CRUCIATE LIGAMENT OF RIGHT KNEE)    Surgeon:  Josesito Luna DO Responsible Provider:  Billy Can MD    Anesthesia Type:  general ASA Status:  3          Anesthesia Type: general    Sri Phase I: Sri Score: 10    Sri Phase II: Sri Score: 10    Last vitals: Reviewed and per EMR flowsheets.        Anesthesia Post Evaluation    Patient location during evaluation: PACU  Patient participation: complete - patient participated  Level of consciousness: awake and alert  Airway patency: patent  Nausea & Vomiting: no vomiting and no nausea  Complications: no  Cardiovascular status: hemodynamically stable  Respiratory status: acceptable  Hydration status: stable

## 2020-07-15 ENCOUNTER — APPOINTMENT (OUTPATIENT)
Dept: GENERAL RADIOLOGY | Age: 65
End: 2020-07-15
Payer: COMMERCIAL

## 2020-07-15 ENCOUNTER — HOSPITAL ENCOUNTER (OUTPATIENT)
Age: 65
Setting detail: OBSERVATION
Discharge: HOME | End: 2020-07-17
Attending: EMERGENCY MEDICINE | Admitting: INTERNAL MEDICINE
Payer: COMMERCIAL

## 2020-07-15 ENCOUNTER — APPOINTMENT (OUTPATIENT)
Dept: CT IMAGING | Age: 65
End: 2020-07-15
Payer: COMMERCIAL

## 2020-07-15 ENCOUNTER — TELEPHONE (OUTPATIENT)
Dept: ORTHOPEDIC SURGERY | Age: 65
End: 2020-07-15

## 2020-07-15 PROBLEM — R06.02 SHORTNESS OF BREATH: Status: ACTIVE | Noted: 2020-07-15

## 2020-07-15 PROBLEM — R06.09 DOE (DYSPNEA ON EXERTION): Status: ACTIVE | Noted: 2020-07-15

## 2020-07-15 PROBLEM — J44.1 COPD WITH ACUTE EXACERBATION (HCC): Status: ACTIVE | Noted: 2020-07-15

## 2020-07-15 LAB
ANION GAP SERPL CALCULATED.3IONS-SCNC: 10 MMOL/L (ref 7–16)
BASOPHILS ABSOLUTE: 0.03 E9/L (ref 0–0.2)
BASOPHILS RELATIVE PERCENT: 0.5 % (ref 0–2)
BUN BLDV-MCNC: 23 MG/DL (ref 8–23)
CALCIUM SERPL-MCNC: 8.8 MG/DL (ref 8.6–10.2)
CHLORIDE BLD-SCNC: 108 MMOL/L (ref 98–107)
CO2: 26 MMOL/L (ref 22–29)
CREAT SERPL-MCNC: 1.3 MG/DL (ref 0.7–1.2)
D DIMER: 435 NG/ML DDU
EOSINOPHILS ABSOLUTE: 0.22 E9/L (ref 0.05–0.5)
EOSINOPHILS RELATIVE PERCENT: 3.8 % (ref 0–6)
GFR AFRICAN AMERICAN: >60
GFR NON-AFRICAN AMERICAN: 55 ML/MIN/1.73
GLUCOSE BLD-MCNC: 106 MG/DL (ref 74–99)
HCT VFR BLD CALC: 34.1 % (ref 37–54)
HEMOGLOBIN: 11.7 G/DL (ref 12.5–16.5)
IMMATURE GRANULOCYTES #: 0.02 E9/L
IMMATURE GRANULOCYTES %: 0.3 % (ref 0–5)
LYMPHOCYTES ABSOLUTE: 1.7 E9/L (ref 1.5–4)
LYMPHOCYTES RELATIVE PERCENT: 29.7 % (ref 20–42)
MAGNESIUM: 1.9 MG/DL (ref 1.6–2.6)
MCH RBC QN AUTO: 31 PG (ref 26–35)
MCHC RBC AUTO-ENTMCNC: 34.3 % (ref 32–34.5)
MCV RBC AUTO: 90.5 FL (ref 80–99.9)
METER GLUCOSE: 230 MG/DL (ref 74–99)
MONOCYTES ABSOLUTE: 0.45 E9/L (ref 0.1–0.95)
MONOCYTES RELATIVE PERCENT: 7.9 % (ref 2–12)
NEUTROPHILS ABSOLUTE: 3.31 E9/L (ref 1.8–7.3)
NEUTROPHILS RELATIVE PERCENT: 57.8 % (ref 43–80)
PDW BLD-RTO: 12.8 FL (ref 11.5–15)
PLATELET # BLD: 170 E9/L (ref 130–450)
PMV BLD AUTO: 9.4 FL (ref 7–12)
POTASSIUM REFLEX MAGNESIUM: 4.2 MMOL/L (ref 3.5–5)
PRO-BNP: 633 PG/ML (ref 0–125)
RBC # BLD: 3.77 E12/L (ref 3.8–5.8)
SODIUM BLD-SCNC: 144 MMOL/L (ref 132–146)
TROPONIN: <0.01 NG/ML (ref 0–0.03)
WBC # BLD: 5.7 E9/L (ref 4.5–11.5)

## 2020-07-15 PROCEDURE — 85378 FIBRIN DEGRADE SEMIQUANT: CPT

## 2020-07-15 PROCEDURE — G0378 HOSPITAL OBSERVATION PER HR: HCPCS

## 2020-07-15 PROCEDURE — 2580000003 HC RX 258: Performed by: INTERNAL MEDICINE

## 2020-07-15 PROCEDURE — 6370000000 HC RX 637 (ALT 250 FOR IP): Performed by: STUDENT IN AN ORGANIZED HEALTH CARE EDUCATION/TRAINING PROGRAM

## 2020-07-15 PROCEDURE — 96376 TX/PRO/DX INJ SAME DRUG ADON: CPT

## 2020-07-15 PROCEDURE — 83735 ASSAY OF MAGNESIUM: CPT

## 2020-07-15 PROCEDURE — 99220 PR INITIAL OBSERVATION CARE/DAY 70 MINUTES: CPT | Performed by: INTERNAL MEDICINE

## 2020-07-15 PROCEDURE — 96372 THER/PROPH/DIAG INJ SC/IM: CPT

## 2020-07-15 PROCEDURE — 93005 ELECTROCARDIOGRAM TRACING: CPT | Performed by: STUDENT IN AN ORGANIZED HEALTH CARE EDUCATION/TRAINING PROGRAM

## 2020-07-15 PROCEDURE — 6360000002 HC RX W HCPCS: Performed by: STUDENT IN AN ORGANIZED HEALTH CARE EDUCATION/TRAINING PROGRAM

## 2020-07-15 PROCEDURE — 36415 COLL VENOUS BLD VENIPUNCTURE: CPT

## 2020-07-15 PROCEDURE — 94640 AIRWAY INHALATION TREATMENT: CPT

## 2020-07-15 PROCEDURE — 99285 EMERGENCY DEPT VISIT HI MDM: CPT

## 2020-07-15 PROCEDURE — 71046 X-RAY EXAM CHEST 2 VIEWS: CPT

## 2020-07-15 PROCEDURE — 94761 N-INVAS EAR/PLS OXIMETRY MLT: CPT

## 2020-07-15 PROCEDURE — 85025 COMPLETE CBC W/AUTO DIFF WBC: CPT

## 2020-07-15 PROCEDURE — 82962 GLUCOSE BLOOD TEST: CPT

## 2020-07-15 PROCEDURE — 83880 ASSAY OF NATRIURETIC PEPTIDE: CPT

## 2020-07-15 PROCEDURE — 84484 ASSAY OF TROPONIN QUANT: CPT

## 2020-07-15 PROCEDURE — 80048 BASIC METABOLIC PNL TOTAL CA: CPT

## 2020-07-15 PROCEDURE — 6360000002 HC RX W HCPCS: Performed by: INTERNAL MEDICINE

## 2020-07-15 PROCEDURE — 6370000000 HC RX 637 (ALT 250 FOR IP): Performed by: INTERNAL MEDICINE

## 2020-07-15 PROCEDURE — 6360000004 HC RX CONTRAST MEDICATION: Performed by: RADIOLOGY

## 2020-07-15 PROCEDURE — 71275 CT ANGIOGRAPHY CHEST: CPT

## 2020-07-15 RX ORDER — ACETAMINOPHEN 325 MG/1
650 TABLET ORAL EVERY 6 HOURS PRN
Status: DISCONTINUED | OUTPATIENT
Start: 2020-07-15 | End: 2020-07-17 | Stop reason: HOSPADM

## 2020-07-15 RX ORDER — DEXAMETHASONE SODIUM PHOSPHATE 10 MG/ML
10 INJECTION INTRAMUSCULAR; INTRAVENOUS ONCE
Status: COMPLETED | OUTPATIENT
Start: 2020-07-15 | End: 2020-07-15

## 2020-07-15 RX ORDER — ROPINIROLE 1 MG/1
1 TABLET, FILM COATED ORAL 3 TIMES DAILY
Status: DISCONTINUED | OUTPATIENT
Start: 2020-07-15 | End: 2020-07-17 | Stop reason: HOSPADM

## 2020-07-15 RX ORDER — GLIPIZIDE 5 MG/1
5 TABLET ORAL 2 TIMES DAILY
Status: DISCONTINUED | OUTPATIENT
Start: 2020-07-15 | End: 2020-07-17 | Stop reason: HOSPADM

## 2020-07-15 RX ORDER — POTASSIUM CHLORIDE 20 MEQ/1
40 TABLET, EXTENDED RELEASE ORAL PRN
Status: DISCONTINUED | OUTPATIENT
Start: 2020-07-15 | End: 2020-07-17 | Stop reason: HOSPADM

## 2020-07-15 RX ORDER — MAGNESIUM SULFATE 1 G/100ML
1 INJECTION INTRAVENOUS PRN
Status: DISCONTINUED | OUTPATIENT
Start: 2020-07-15 | End: 2020-07-17 | Stop reason: HOSPADM

## 2020-07-15 RX ORDER — ASPIRIN 81 MG/1
81 TABLET ORAL 2 TIMES DAILY
Status: DISCONTINUED | OUTPATIENT
Start: 2020-07-15 | End: 2020-07-17 | Stop reason: HOSPADM

## 2020-07-15 RX ORDER — DOXEPIN HYDROCHLORIDE 10 MG/1
10 CAPSULE ORAL NIGHTLY PRN
Status: DISCONTINUED | OUTPATIENT
Start: 2020-07-15 | End: 2020-07-17 | Stop reason: HOSPADM

## 2020-07-15 RX ORDER — DEXTROSE MONOHYDRATE 25 G/50ML
12.5 INJECTION, SOLUTION INTRAVENOUS PRN
Status: DISCONTINUED | OUTPATIENT
Start: 2020-07-15 | End: 2020-07-17 | Stop reason: HOSPADM

## 2020-07-15 RX ORDER — SODIUM CHLORIDE 0.9 % (FLUSH) 0.9 %
10 SYRINGE (ML) INJECTION PRN
Status: DISCONTINUED | OUTPATIENT
Start: 2020-07-15 | End: 2020-07-17 | Stop reason: HOSPADM

## 2020-07-15 RX ORDER — ACETAMINOPHEN 325 MG/1
650 TABLET ORAL EVERY 4 HOURS PRN
Status: DISCONTINUED | OUTPATIENT
Start: 2020-07-15 | End: 2020-07-15 | Stop reason: SDUPTHER

## 2020-07-15 RX ORDER — FENOFIBRATE 160 MG/1
160 TABLET ORAL DAILY
Status: DISCONTINUED | OUTPATIENT
Start: 2020-07-16 | End: 2020-07-17 | Stop reason: HOSPADM

## 2020-07-15 RX ORDER — SODIUM CHLORIDE 0.9 % (FLUSH) 0.9 %
10 SYRINGE (ML) INJECTION EVERY 12 HOURS SCHEDULED
Status: DISCONTINUED | OUTPATIENT
Start: 2020-07-15 | End: 2020-07-15 | Stop reason: SDUPTHER

## 2020-07-15 RX ORDER — PROMETHAZINE HYDROCHLORIDE 25 MG/1
12.5 TABLET ORAL EVERY 6 HOURS PRN
Status: DISCONTINUED | OUTPATIENT
Start: 2020-07-15 | End: 2020-07-17 | Stop reason: HOSPADM

## 2020-07-15 RX ORDER — SODIUM CHLORIDE 0.9 % (FLUSH) 0.9 %
10 SYRINGE (ML) INJECTION EVERY 12 HOURS SCHEDULED
Status: DISCONTINUED | OUTPATIENT
Start: 2020-07-15 | End: 2020-07-17 | Stop reason: HOSPADM

## 2020-07-15 RX ORDER — POTASSIUM CHLORIDE 7.45 MG/ML
10 INJECTION INTRAVENOUS PRN
Status: DISCONTINUED | OUTPATIENT
Start: 2020-07-15 | End: 2020-07-17 | Stop reason: HOSPADM

## 2020-07-15 RX ORDER — HYDROCHLOROTHIAZIDE 12.5 MG/1
12.5 TABLET ORAL DAILY
Status: DISCONTINUED | OUTPATIENT
Start: 2020-07-15 | End: 2020-07-17 | Stop reason: HOSPADM

## 2020-07-15 RX ORDER — LOSARTAN POTASSIUM 50 MG/1
50 TABLET ORAL DAILY
Status: DISCONTINUED | OUTPATIENT
Start: 2020-07-15 | End: 2020-07-17 | Stop reason: HOSPADM

## 2020-07-15 RX ORDER — OXYCODONE HYDROCHLORIDE AND ACETAMINOPHEN 5; 325 MG/1; MG/1
1 TABLET ORAL EVERY 6 HOURS PRN
Status: DISCONTINUED | OUTPATIENT
Start: 2020-07-15 | End: 2020-07-17 | Stop reason: HOSPADM

## 2020-07-15 RX ORDER — ACETAMINOPHEN 650 MG/1
650 SUPPOSITORY RECTAL EVERY 6 HOURS PRN
Status: DISCONTINUED | OUTPATIENT
Start: 2020-07-15 | End: 2020-07-17 | Stop reason: HOSPADM

## 2020-07-15 RX ORDER — CARVEDILOL 25 MG/1
25 TABLET ORAL 2 TIMES DAILY
Status: DISCONTINUED | OUTPATIENT
Start: 2020-07-15 | End: 2020-07-17 | Stop reason: HOSPADM

## 2020-07-15 RX ORDER — SODIUM CHLORIDE 0.9 % (FLUSH) 0.9 %
10 SYRINGE (ML) INJECTION PRN
Status: DISCONTINUED | OUTPATIENT
Start: 2020-07-15 | End: 2020-07-15 | Stop reason: SDUPTHER

## 2020-07-15 RX ORDER — ACETAMINOPHEN 325 MG/1
650 TABLET ORAL ONCE
Status: COMPLETED | OUTPATIENT
Start: 2020-07-15 | End: 2020-07-15

## 2020-07-15 RX ORDER — ONDANSETRON 2 MG/ML
4 INJECTION INTRAMUSCULAR; INTRAVENOUS EVERY 6 HOURS PRN
Status: DISCONTINUED | OUTPATIENT
Start: 2020-07-15 | End: 2020-07-17 | Stop reason: HOSPADM

## 2020-07-15 RX ORDER — IPRATROPIUM BROMIDE AND ALBUTEROL SULFATE 2.5; .5 MG/3ML; MG/3ML
1 SOLUTION RESPIRATORY (INHALATION)
Status: DISCONTINUED | OUTPATIENT
Start: 2020-07-16 | End: 2020-07-17 | Stop reason: HOSPADM

## 2020-07-15 RX ORDER — METHYLPREDNISOLONE SODIUM SUCCINATE 40 MG/ML
40 INJECTION, POWDER, LYOPHILIZED, FOR SOLUTION INTRAMUSCULAR; INTRAVENOUS DAILY
Status: DISCONTINUED | OUTPATIENT
Start: 2020-07-16 | End: 2020-07-16

## 2020-07-15 RX ORDER — ATORVASTATIN CALCIUM 40 MG/1
40 TABLET, FILM COATED ORAL DAILY
Status: DISCONTINUED | OUTPATIENT
Start: 2020-07-15 | End: 2020-07-17 | Stop reason: HOSPADM

## 2020-07-15 RX ORDER — NICOTINE POLACRILEX 4 MG
15 LOZENGE BUCCAL PRN
Status: DISCONTINUED | OUTPATIENT
Start: 2020-07-15 | End: 2020-07-17 | Stop reason: HOSPADM

## 2020-07-15 RX ORDER — IPRATROPIUM BROMIDE AND ALBUTEROL SULFATE 2.5; .5 MG/3ML; MG/3ML
1 SOLUTION RESPIRATORY (INHALATION)
Status: DISCONTINUED | OUTPATIENT
Start: 2020-07-15 | End: 2020-07-15 | Stop reason: SDUPTHER

## 2020-07-15 RX ORDER — DEXTROSE MONOHYDRATE 50 MG/ML
100 INJECTION, SOLUTION INTRAVENOUS PRN
Status: DISCONTINUED | OUTPATIENT
Start: 2020-07-15 | End: 2020-07-17 | Stop reason: HOSPADM

## 2020-07-15 RX ADMIN — ATORVASTATIN CALCIUM 40 MG: 40 TABLET, FILM COATED ORAL at 20:57

## 2020-07-15 RX ADMIN — GLIPIZIDE 5 MG: 5 TABLET ORAL at 20:57

## 2020-07-15 RX ADMIN — IPRATROPIUM BROMIDE AND ALBUTEROL SULFATE 1 AMPULE: .5; 3 SOLUTION RESPIRATORY (INHALATION) at 17:41

## 2020-07-15 RX ADMIN — HYDROCHLOROTHIAZIDE 12.5 MG: 12.5 TABLET ORAL at 20:57

## 2020-07-15 RX ADMIN — ENOXAPARIN SODIUM 40 MG: 40 INJECTION SUBCUTANEOUS at 20:57

## 2020-07-15 RX ADMIN — DEXAMETHASONE SODIUM PHOSPHATE 10 MG: 10 INJECTION INTRAMUSCULAR; INTRAVENOUS at 17:50

## 2020-07-15 RX ADMIN — ACETAMINOPHEN 650 MG: 325 TABLET ORAL at 15:09

## 2020-07-15 RX ADMIN — ROPINIROLE 1 MG: 1 TABLET, FILM COATED ORAL at 20:57

## 2020-07-15 RX ADMIN — OXYCODONE HYDROCHLORIDE AND ACETAMINOPHEN 1 TABLET: 5; 325 TABLET ORAL at 23:42

## 2020-07-15 RX ADMIN — ASPIRIN 81 MG: 81 TABLET, COATED ORAL at 20:57

## 2020-07-15 RX ADMIN — CARVEDILOL 25 MG: 25 TABLET, FILM COATED ORAL at 20:57

## 2020-07-15 RX ADMIN — IOPAMIDOL 75 ML: 755 INJECTION, SOLUTION INTRAVENOUS at 15:52

## 2020-07-15 RX ADMIN — SODIUM CHLORIDE, PRESERVATIVE FREE 10 ML: 5 INJECTION INTRAVENOUS at 20:57

## 2020-07-15 ASSESSMENT — PAIN DESCRIPTION - ORIENTATION
ORIENTATION: RIGHT
ORIENTATION: RIGHT

## 2020-07-15 ASSESSMENT — ENCOUNTER SYMPTOMS
NAUSEA: 0
CONSTIPATION: 0
RHINORRHEA: 0
SHORTNESS OF BREATH: 1
SORE THROAT: 0
BACK PAIN: 0
ABDOMINAL PAIN: 0
DIARRHEA: 0
COUGH: 1
VOMITING: 0

## 2020-07-15 ASSESSMENT — PAIN DESCRIPTION - LOCATION
LOCATION: KNEE

## 2020-07-15 ASSESSMENT — PAIN DESCRIPTION - PAIN TYPE
TYPE: SURGICAL PAIN
TYPE: SURGICAL PAIN
TYPE: ACUTE PAIN

## 2020-07-15 ASSESSMENT — PAIN SCALES - GENERAL
PAINLEVEL_OUTOF10: 10
PAINLEVEL_OUTOF10: 5
PAINLEVEL_OUTOF10: 5
PAINLEVEL_OUTOF10: 7

## 2020-07-15 ASSESSMENT — PAIN DESCRIPTION - DESCRIPTORS: DESCRIPTORS: DISCOMFORT;ACHING;CONSTANT

## 2020-07-15 NOTE — ED NOTES
Dr Adrian Snyder reminded of patient's recent knee surgery in which patient has a right hinged knee brace and can only ambulate with crutches, which he did not bring with him to the hospital. So nursing was unable to ambulate patient with pulse ox at this time     General Ivania RN  07/15/20 3957

## 2020-07-15 NOTE — ED NOTES
Patient ambulated around nurses station with walker, patient c/o dyspnea with exertion with audible wheezing but patient's O2 stat never dropped below 94% on room air before, during of after ambulating, Dr Margi Simon aware.       Rancho Bernardo RN  07/15/20 [id number]

## 2020-07-15 NOTE — ED PROVIDER NOTES
Patient is 78-year-old male with past medical history of hypertension, diabetes who presents to the emergency department with a complaint of shortness of breath. Patient states that he has recent knee surgery (ACL and meniscus repair) on 7/13/2020 and was discharged home. Patient was to be on aspirin as DVT prophylaxis but he has not obtained it and has not been taking any DVT prophylaxis. Patient is only been taking analgesia pills per orthopedic surgery. Patient states he has been doing physical therapy,  his mobile approximately 1 hour/day. Patient does endorse pain to the right knee, denies pain to the right calf or left calf. Patient states that he developed a cough with productive green sputum 2 days ago and then shortness of breath, difficulty catching his breath yesterday. Patient denies any chest pain, fever, chills, nausea, vomiting, diarrhea. Patient denies any recent illness. Patient denies any urinary or GI symptoms. Patient denies any syncope, trauma, falls. Patient does endorse a headache today as well. Review of Systems   Constitutional: Negative for appetite change, chills and fever. HENT: Negative for congestion, rhinorrhea and sore throat. Respiratory: Positive for cough and shortness of breath. Cardiovascular: Negative for chest pain, palpitations and leg swelling. Gastrointestinal: Negative for abdominal pain, constipation, diarrhea, nausea and vomiting. Genitourinary: Negative for dysuria, frequency, hematuria and urgency. Musculoskeletal: Positive for arthralgias. Negative for back pain and myalgias. Neurological: Positive for headaches. Negative for dizziness, syncope, weakness and light-headedness. Physical Exam  Vitals signs and nursing note reviewed. Constitutional:       General: He is not in acute distress. Appearance: He is not ill-appearing or toxic-appearing. HENT:      Head: Normocephalic and atraumatic.       Nose: Nose normal. Mouth/Throat:      Mouth: Mucous membranes are moist.      Pharynx: Oropharynx is clear. Eyes:      Extraocular Movements: Extraocular movements intact. Pupils: Pupils are equal, round, and reactive to light. Neck:      Musculoskeletal: Normal range of motion and neck supple. Cardiovascular:      Rate and Rhythm: Normal rate and regular rhythm. Pulses: Normal pulses. Dorsalis pedis pulses are 2+ on the right side and 2+ on the left side. Heart sounds: Normal heart sounds. Pulmonary:      Effort: Pulmonary effort is normal.      Breath sounds: Normal breath sounds. Abdominal:      General: Bowel sounds are normal. There is no distension. Palpations: Abdomen is soft. Tenderness: There is no abdominal tenderness. Musculoskeletal:         General: Swelling and tenderness present. Comments: Right knee in immobilizer and wrapped in ace bandage. Tender and swelling noted. Not above baseline since surgery per patient. Neurological:      Mental Status: He is alert. MDM  Number of Diagnoses or Management Options  Shortness of breath at rest:   Diagnosis management comments: Patient is a 60-year-old male who presents to the emergency department 3 days status post arthroscopic knee surgery with ligamentous repair. Patient complains of shortness of breath at rest and on exertion. Patient states that he has been generally immobile with only 1 hour of walking per day with physical therapy. Patient has not been taking his aspirin as prescribed for DVT prophylaxis per orthopedic surgery. Patient denies any chest pain, nausea, vomiting, diarrhea. Patient does complain of significant right knee pain and swelling, denies calf pain to bilateral legs. Patient well score of 1.5 for immobility, low risk. Labs were obtained showing troponin negative, no leukocytosis, no other lab abnormalities. Chest x-ray was obtained showing no acute pathology.   Patient reevaluation shows continued shortness of breath at rest with conversational dyspnea. Cannot rule out pulmonary embolus and therefore CTA chest was obtained which is negative for pulmonary embolus, no other chest pathology was noted. D-dimer likely elevated second to postsurgical status. Patient was observed in the emergency department with continued conversational dyspnea. Patient was ambulated with noted no desaturation below 94% however patient with extreme dyspnea speaking in 1-2 word sentences at the end of a short walk. Patient was allowed to rest and return to baseline. Discussion with patient regarding discharge and home and follow-up care. Patient stating that he does not have a primary care physician for follow-up. Patient stating he would not feel comfortable going home with dyspnea at this time. Hospitalist was consulted and patient was discussed in detail. Hospitalist agreed to admit the patient for dyspnea at rest and weakness. All questions were answered with the patient and he is agreeable to plan. Patient was stable at time of admission. ED Course as of Jul 15 2349   Wed Jul 15, 2020   1509   ATTENDING PROVIDER ATTESTATION:     I have personally performed and/or participated in the history, exam, medical decision making, and procedures and agree with all pertinent clinical information unless otherwise noted. I have also reviewed and agree with the past medical, family and social history unless otherwise noted. I have discussed this patient in detail with the resident and provided the instruction and education regarding the evidence-based evaluation and treatment of [unfilled]  History: patient presents with complaint of shortness of breath that began today. He had pre-op COVID testing prior to his knee scope. He states he was coughing 2 days before the procedure. Sputum is slightly green. No fevers or chills. No chest pain. He does have edema of the leg that was scoped.   He is taking the recommended aspirin. No previous PE/DVT. My findings: Larry Welch is a 60 y.o. male whom is in no distress. Physical exam reveals well appealing. Afebrile. Heart RRR, lungs CTA, abdomen is soft and nontender. Minimal edema of the right foot. Distal pulses intact. My plan: Symptomatic and supportive care. Will evaluate with CXR and labs. Electronically signed by Kan Claire DO on 7/15/20 at 3:09 PM EDT          [JS]   1620 Patient ambulated without hypoxia however he was had increased tachypnea and audible expiratory wheezes. CT is negative. Perhaps there is inflammation of the bronchials following surgery. We will provide a breathing treatment and steroids. [JS]   1990 Following breathing treatment, there is minimal improvement in his symptoms. He states he still \"feel short-winded\". He doesn't think he can go home as he has been having difficulty ambulating to the bathroom secondary to his dyspnea. Will admit for observation. [JS]      ED Course User Index  [JS] Kan Claire DO       --------------------------------------------- PAST HISTORY ---------------------------------------------  Past Medical History:  has a past medical history of Arthritis, Diabetes mellitus ([address] Hyperlipidemia, and Hypertension. Past Surgical History:  has a past surgical history that includes hernia repair; Neck surgery; Cholecystectomy, laparoscopic (N/A, 5/18/2020); and Anterior cruciate ligament repair (Right, 7/13/2020). Social History:  reports that he quit smoking about 34 years ago. His smoking use included cigarettes. He quit smokeless tobacco use about 34 years ago. He reports current alcohol use. He reports that he does not use drugs. Family History: family history is not on file. The patients home medications have been reviewed. Allergies: Patient has no known allergies.     -------------------------------------------------- RESULTS -------------------------------------------------    LABS:  Results for orders placed or performed during the hospital encounter of 07/15/20   CBC Auto Differential   Result Value Ref Range    WBC 5.7 4.5 - 11.5 E9/L    RBC 3.77 (L) 3.80 - 5.80 E12/L    Hemoglobin 11.7 (L) 12.5 - 16.5 g/dL    Hematocrit 34.1 (L) 37.0 - 54.0 %    MCV 90.5 80.0 - 99.9 fL    MCH 31.0 26.0 - 35.0 pg    MCHC 34.3 32.0 - 34.5 %    RDW 12.8 11.5 - 15.0 fL    Platelets 007 135 - 161 E9/L    MPV 9.4 7.0 - 12.0 fL    Neutrophils % 57.8 43.0 - 80.0 %    Immature Granulocytes % 0.3 0.0 - 5.0 %    Lymphocytes % 29.7 20.0 - 42.0 %    Monocytes % 7.9 2.0 - 12.0 %    Eosinophils % 3.8 0.0 - 6.0 %    Basophils % 0.5 0.0 - 2.0 %    Neutrophils Absolute 3.31 1.80 - 7.30 E9/L    Immature Granulocytes # 0.02 E9/L    Lymphocytes Absolute 1.70 1.50 - 4.00 E9/L    Monocytes Absolute 0.45 0.10 - 0.95 E9/L    Eosinophils Absolute 0.22 0.05 - 0.50 E9/L    Basophils Absolute 0.03 0.00 - 0.20 B9/X   Basic Metabolic Panel w/ Reflex to MG   Result Value Ref Range    Sodium 144 132 - 146 mmol/L    Potassium reflex Magnesium 4.2 3.5 - 5.0 mmol/L    Chloride 108 (H) 98 - 107 mmol/L    CO2 26 22 - 29 mmol/L    Anion Gap 10 7 - 16 mmol/L    Glucose 106 (H) 74 - 99 mg/dL    BUN 23 8 - 23 mg/dL    CREATININE 1.3 (H) 0.7 - 1.2 mg/dL    GFR Non-African American 55 >=60 mL/min/1.73    GFR African American >60     Calcium 8.8 8.6 - 10.2 mg/dL   Troponin   Result Value Ref Range    Troponin <0.01 0.00 - 0.03 ng/mL   Brain Natriuretic Peptide   Result Value Ref Range    Pro- (H) 0 - 125 pg/mL   Magnesium   Result Value Ref Range    Magnesium 1.9 1.6 - 2.6 mg/dL   D-dimer, quantitative   Result Value Ref Range    D-Dimer, Quant 435 ng/mL DDU   POCT Glucose   Result Value Ref Range    Meter Glucose 230 (H) 74 - 99 mg/dL       RADIOLOGY:  CTA CHEST W CONTRAST   Final Result   No evidence of pulmonary embolism or acute pulmonary abnormality.          XR CHEST STANDARD (2 stable during their ED course. Diagnosis:  1. Shortness of breath at rest      Disposition:  Patient's disposition: Admit to telemetry  Patient's condition is fair. 7/15/20, 7:02 PM EDT.     This note is prepared by Nemesio Brandon MD -PGY-1           Nemesio Brandon MD  Resident  07/15/20 [id number]       Nemesio Brandon MD  Resident  07/15/20 5736

## 2020-07-16 LAB
ALBUMIN SERPL-MCNC: 4.1 G/DL (ref 3.5–5.2)
ALP BLD-CCNC: 40 U/L (ref 40–129)
ALT SERPL-CCNC: 18 U/L (ref 0–40)
ANION GAP SERPL CALCULATED.3IONS-SCNC: 13 MMOL/L (ref 7–16)
AST SERPL-CCNC: 17 U/L (ref 0–39)
BASOPHILS ABSOLUTE: 0.02 E9/L (ref 0–0.2)
BASOPHILS RELATIVE PERCENT: 0.3 % (ref 0–2)
BILIRUB SERPL-MCNC: 0.4 MG/DL (ref 0–1.2)
BILIRUBIN DIRECT: <0.2 MG/DL (ref 0–0.3)
BILIRUBIN, INDIRECT: NORMAL MG/DL (ref 0–1)
BUN BLDV-MCNC: 21 MG/DL (ref 8–23)
CALCIUM SERPL-MCNC: 9.3 MG/DL (ref 8.6–10.2)
CHLORIDE BLD-SCNC: 103 MMOL/L (ref 98–107)
CO2: 25 MMOL/L (ref 22–29)
CREAT SERPL-MCNC: 1.2 MG/DL (ref 0.7–1.2)
EOSINOPHILS ABSOLUTE: 0.01 E9/L (ref 0.05–0.5)
EOSINOPHILS RELATIVE PERCENT: 0.1 % (ref 0–6)
GFR AFRICAN AMERICAN: >60
GFR NON-AFRICAN AMERICAN: >60 ML/MIN/1.73
GLUCOSE BLD-MCNC: 171 MG/DL (ref 74–99)
HCT VFR BLD CALC: 35.4 % (ref 37–54)
HEMOGLOBIN: 11.8 G/DL (ref 12.5–16.5)
IMMATURE GRANULOCYTES #: 0.05 E9/L
IMMATURE GRANULOCYTES %: 0.7 % (ref 0–5)
LYMPHOCYTES ABSOLUTE: 0.86 E9/L (ref 1.5–4)
LYMPHOCYTES RELATIVE PERCENT: 11.6 % (ref 20–42)
MAGNESIUM: 2 MG/DL (ref 1.6–2.6)
MCH RBC QN AUTO: 29.6 PG (ref 26–35)
MCHC RBC AUTO-ENTMCNC: 33.3 % (ref 32–34.5)
MCV RBC AUTO: 88.9 FL (ref 80–99.9)
METER GLUCOSE: 251 MG/DL (ref 74–99)
MONOCYTES ABSOLUTE: 0.45 E9/L (ref 0.1–0.95)
MONOCYTES RELATIVE PERCENT: 6.1 % (ref 2–12)
NEUTROPHILS ABSOLUTE: 6.03 E9/L (ref 1.8–7.3)
NEUTROPHILS RELATIVE PERCENT: 81.2 % (ref 43–80)
PDW BLD-RTO: 12.4 FL (ref 11.5–15)
PLATELET # BLD: 183 E9/L (ref 130–450)
PMV BLD AUTO: 9.9 FL (ref 7–12)
POTASSIUM REFLEX MAGNESIUM: 4.3 MMOL/L (ref 3.5–5)
RBC # BLD: 3.98 E12/L (ref 3.8–5.8)
SODIUM BLD-SCNC: 141 MMOL/L (ref 132–146)
TOTAL PROTEIN: 6.8 G/DL (ref 6.4–8.3)
WBC # BLD: 7.4 E9/L (ref 4.5–11.5)

## 2020-07-16 PROCEDURE — 96376 TX/PRO/DX INJ SAME DRUG ADON: CPT

## 2020-07-16 PROCEDURE — 96372 THER/PROPH/DIAG INJ SC/IM: CPT

## 2020-07-16 PROCEDURE — 80076 HEPATIC FUNCTION PANEL: CPT

## 2020-07-16 PROCEDURE — 80048 BASIC METABOLIC PNL TOTAL CA: CPT

## 2020-07-16 PROCEDURE — 6370000000 HC RX 637 (ALT 250 FOR IP): Performed by: INTERNAL MEDICINE

## 2020-07-16 PROCEDURE — 83735 ASSAY OF MAGNESIUM: CPT

## 2020-07-16 PROCEDURE — 36415 COLL VENOUS BLD VENIPUNCTURE: CPT

## 2020-07-16 PROCEDURE — 99226 PR SBSQ OBSERVATION CARE/DAY 35 MINUTES: CPT | Performed by: INTERNAL MEDICINE

## 2020-07-16 PROCEDURE — G0378 HOSPITAL OBSERVATION PER HR: HCPCS

## 2020-07-16 PROCEDURE — 6360000002 HC RX W HCPCS: Performed by: INTERNAL MEDICINE

## 2020-07-16 PROCEDURE — 97161 PT EVAL LOW COMPLEX 20 MIN: CPT | Performed by: PHYSICAL THERAPIST

## 2020-07-16 PROCEDURE — 9900000074 HC THERAPEUTIC ACTIVITIES PER 15 MIN (SELF-PAY): Performed by: PHYSICAL THERAPIST

## 2020-07-16 PROCEDURE — 82962 GLUCOSE BLOOD TEST: CPT

## 2020-07-16 PROCEDURE — 96375 TX/PRO/DX INJ NEW DRUG ADDON: CPT

## 2020-07-16 PROCEDURE — 94640 AIRWAY INHALATION TREATMENT: CPT

## 2020-07-16 PROCEDURE — 2580000003 HC RX 258: Performed by: INTERNAL MEDICINE

## 2020-07-16 PROCEDURE — 85025 COMPLETE CBC W/AUTO DIFF WBC: CPT

## 2020-07-16 PROCEDURE — 96374 THER/PROPH/DIAG INJ IV PUSH: CPT

## 2020-07-16 RX ORDER — METHYLPREDNISOLONE SODIUM SUCCINATE 40 MG/ML
40 INJECTION, POWDER, LYOPHILIZED, FOR SOLUTION INTRAMUSCULAR; INTRAVENOUS 2 TIMES DAILY
Status: DISCONTINUED | OUTPATIENT
Start: 2020-07-16 | End: 2020-07-17 | Stop reason: HOSPADM

## 2020-07-16 RX ADMIN — METHYLPREDNISOLONE SODIUM SUCCINATE 40 MG: 40 INJECTION, POWDER, FOR SOLUTION INTRAMUSCULAR; INTRAVENOUS at 20:41

## 2020-07-16 RX ADMIN — ASPIRIN 81 MG: 81 TABLET, COATED ORAL at 08:58

## 2020-07-16 RX ADMIN — LOSARTAN POTASSIUM 50 MG: 50 TABLET, FILM COATED ORAL at 08:59

## 2020-07-16 RX ADMIN — CARVEDILOL 25 MG: 25 TABLET, FILM COATED ORAL at 08:58

## 2020-07-16 RX ADMIN — IPRATROPIUM BROMIDE AND ALBUTEROL SULFATE 1 AMPULE: .5; 3 SOLUTION RESPIRATORY (INHALATION) at 06:22

## 2020-07-16 RX ADMIN — ENOXAPARIN SODIUM 40 MG: 40 INJECTION SUBCUTANEOUS at 08:59

## 2020-07-16 RX ADMIN — HYDROCHLOROTHIAZIDE 12.5 MG: 12.5 TABLET ORAL at 08:58

## 2020-07-16 RX ADMIN — ROPINIROLE 1 MG: 1 TABLET, FILM COATED ORAL at 20:41

## 2020-07-16 RX ADMIN — SODIUM CHLORIDE, PRESERVATIVE FREE 10 ML: 5 INJECTION INTRAVENOUS at 20:47

## 2020-07-16 RX ADMIN — GLIPIZIDE 5 MG: 5 TABLET ORAL at 08:59

## 2020-07-16 RX ADMIN — ATORVASTATIN CALCIUM 40 MG: 40 TABLET, FILM COATED ORAL at 08:59

## 2020-07-16 RX ADMIN — OXYCODONE HYDROCHLORIDE AND ACETAMINOPHEN 1 TABLET: 5; 325 TABLET ORAL at 13:31

## 2020-07-16 RX ADMIN — IPRATROPIUM BROMIDE AND ALBUTEROL SULFATE 1 AMPULE: .5; 3 SOLUTION RESPIRATORY (INHALATION) at 09:41

## 2020-07-16 RX ADMIN — ROPINIROLE 1 MG: 1 TABLET, FILM COATED ORAL at 06:48

## 2020-07-16 RX ADMIN — CARVEDILOL 25 MG: 25 TABLET, FILM COATED ORAL at 20:42

## 2020-07-16 RX ADMIN — MAGNESIUM HYDROXIDE 30 ML: 400 SUSPENSION ORAL at 11:30

## 2020-07-16 RX ADMIN — ASPIRIN 81 MG: 81 TABLET, COATED ORAL at 20:41

## 2020-07-16 RX ADMIN — FENOFIBRATE 160 MG: 160 TABLET ORAL at 08:59

## 2020-07-16 RX ADMIN — GLIPIZIDE 5 MG: 5 TABLET ORAL at 20:42

## 2020-07-16 RX ADMIN — IPRATROPIUM BROMIDE AND ALBUTEROL SULFATE 1 AMPULE: .5; 3 SOLUTION RESPIRATORY (INHALATION) at 13:59

## 2020-07-16 RX ADMIN — ROPINIROLE 1 MG: 1 TABLET, FILM COATED ORAL at 13:33

## 2020-07-16 RX ADMIN — OXYCODONE HYDROCHLORIDE AND ACETAMINOPHEN 1 TABLET: 5; 325 TABLET ORAL at 18:48

## 2020-07-16 RX ADMIN — SODIUM CHLORIDE, PRESERVATIVE FREE 10 ML: 5 INJECTION INTRAVENOUS at 09:02

## 2020-07-16 RX ADMIN — IPRATROPIUM BROMIDE AND ALBUTEROL SULFATE 1 AMPULE: .5; 3 SOLUTION RESPIRATORY (INHALATION) at 18:26

## 2020-07-16 RX ADMIN — OXYCODONE HYDROCHLORIDE AND ACETAMINOPHEN 1 TABLET: 5; 325 TABLET ORAL at 06:40

## 2020-07-16 RX ADMIN — ACETAMINOPHEN 650 MG: 325 TABLET, FILM COATED ORAL at 11:30

## 2020-07-16 RX ADMIN — METHYLPREDNISOLONE SODIUM SUCCINATE 40 MG: 40 INJECTION, POWDER, FOR SOLUTION INTRAMUSCULAR; INTRAVENOUS at 09:00

## 2020-07-16 ASSESSMENT — PAIN SCALES - GENERAL
PAINLEVEL_OUTOF10: 3
PAINLEVEL_OUTOF10: 5
PAINLEVEL_OUTOF10: 0
PAINLEVEL_OUTOF10: 7
PAINLEVEL_OUTOF10: 3
PAINLEVEL_OUTOF10: 3
PAINLEVEL_OUTOF10: 0
PAINLEVEL_OUTOF10: 7

## 2020-07-16 NOTE — PLAN OF CARE
Problem: Pain:  Goal: Pain level will decrease  Description: Pain level will decrease  7/16/2020 0411 by Flash Lemus RN  Outcome: Met This Shift     Problem: Pain:  Goal: Control of acute pain  Description: Control of acute pain  7/16/2020 0411 by Flash Lemus RN  Outcome: Met This Shift     Problem: Pain:  Goal: Control of chronic pain  Description: Control of chronic pain  7/16/2020 0411 by Flash Lemus RN  Outcome: Met This Shift     Problem: Falls - Risk of:  Goal: Will remain free from falls  Description: Will remain free from falls  7/16/2020 0411 by Flash Lemus RN  Outcome: Met This Shift     Problem: Falls - Risk of:  Goal: Absence of physical injury  Description: Absence of physical injury  7/16/2020 0411 by Flash Lemus RN  Outcome: Met This Shift

## 2020-07-16 NOTE — PLAN OF CARE
Problem: Pain:  Goal: Control of acute pain  Description: Control of acute pain  7/16/2020 1209 by Mila Stapleton RN  Outcome: Met This Shift     Problem: Falls - Risk of:  Goal: Will remain free from falls  Description: Will remain free from falls  7/16/2020 1209 by Mila Stapleton RN  Outcome: Met This Shift

## 2020-07-16 NOTE — PROGRESS NOTES
Physical Therapy Initial Evaluation    Room #:  0022/2035-15  Patient Name: Darlene Salcedo  YOB: 1955  MRN: [id number]    Referring Provider: Dr. Makeda Ribera    Date of Service: 7/16/2020    Evaluating Physical Therapist:  Debbie Kenny PT, DPT   [id number]     Diagnosis:   CARDONA (dyspnea on exertion) [R06.09]  Shortness of breath [R06.02]      Patient Active Problem List   Diagnosis    Tinea corporis    Cholecystitis    Acute acalculous cholecystitis    CARDONA (dyspnea on exertion)    COPD [facility] exacerbation [facility]    Hypertension    Diabetes mellitus ([facility]    Hyperlipidemia    CKD (chronic kidney disease) stage 2, GFR 60-89 ml/min       Tentative placement recommendation: Home with [facility] PT  Equipment recommendation: [employer]     Prior Level of Function: Patient ambulated with no AD PTA. Rehab Potential: Good for baseline    Past medical history:   Past Medical History:   Diagnosis Date    Arthritis     Diabetes mellitus ([facility]     Hyperlipidemia     Hypertension      Past Surgical History:   Procedure Laterality Date    ANTERIOR CRUCIATE LIGAMENT REPAIR Right 7/13/2020    RIGHT KNEE ARTHROSCOPY, (ANTERIOR CRUCIATE LIGAMENT) ACL RECONSTRUCTION. ALLOGRAFT. MEDIAL MENISCECTOMY AND DEBRIDEMENT. ([address]) performed by Nacho Mchugh DO at [address], LAPAROSCOPIC N/A 5/18/2020    CHOLECYSTECTOMY LAPAROSCOPIC WITH IOC performed by Tito Caruso MD at [place] [address]         Precautions: WBAT on RLE, ACL Reconstruction on R knee 7/13/202, Hinged brace to be work during [employer] activities    SUBJECTIVE:    Social history: Patient lives with girlfriend and 4 grandchildren  in a 1 story house with 6 steps to enter with 1 hand rail(s).     Equipment owned: crutches, standard walker    [address] Mobility   17/24    AM-PAC Mobility Inpatient   How much difficulty turning over in bed?: A Little  How much difficulty sitting down on / standing up from a chair with arms?: A Little  How much difficulty moving from lying on back to sitting on side of bed?: A Little  How much help from another person moving to and from a bed to a chair?: A Little  How much help from another person needed to walk in hospital room?: A Little  How much help from another person for climbing 3-5 steps with a railing?: A Lot  AM-PAC Inpatient Mobility Raw Score : 17  AM-PAC Inpatient T-Scale Score : 42.13  Mobility Inpatient CMS 0-100% Score: 50.57  Mobility Inpatient CMS G-Code Modifier : CK    Nursing cleared patient for PT evaluation. The admitting diagnosis and active problem list as listed above have been reviewed prior to the initiation of this evaluation. OBJECTIVE:   Initial Evaluation  Date: 7/16/2020 Treatment Date:     Short Term/ Long Term   Goals   Was pt agreeable to Eval/treatment? Y  To be met in 1-3 days   Pain level   6/10 in  R knee     Bed Mobility  Rolling: Supervision    Supine to sit: Minimal assist of 1   Sit to supine: Minimal assist of 1   Scooting: Supervision    Rolling: IND  Supine to sit: IND  Sit to supine: IND  Scooting: IND   Transfers Sit to stand: Supervision    Sit to stand: IND   Ambulation   75 feet using  wheeled walker with Minimal assist of 1   >200' with [employer] with IND   Stair negotiation: ascended and descended Not assessed   6 stairs with 1 hand rail(s) with IND. ROM WFL except for R knee  Increase range of motion 10% of affected joints    Strength BUE: Refer to OT Eval  RLE:  4/5 (R ankle only)  LLE:  4+/5  Increase strength in affected mm groups by 1/3-1/2 grade   Balance Sitting EOB: Supervision  Dynamic Standing: Aramis  Sitting EOB: IND  Dynamic Standing: IND     Patient is Alert & Oriented x 4  Sensation:  Patient denies numbness and tingling to extremities.   Edema: R knee  Endurance: Fair+    Patient education  Pt educated on safety awareness, energy conservation, proper hand placement and sequencing for bed mobility and transfers, PLB throughout function to help regulate HR and O2 Saturation. Patient response to education:   Pt verbalized understanding Pt demonstrated skill Pt requires further education in this area   y y y     ASSESSMENT:    Patient exhibits decreased strength, balance, coordination impairing functional mobility. Educated pt on techniques to improve safety and independence with bed mobility, transfers, balance, functional transfers, and functional mobility. Pt sat EOB for 10 minutes with Supervision in order to improve static and dynamic sitting balance and activity tolerance. Pt ambulated with decreased esperanza and stride length 75' x 2 with WW with SBA-Bernardino. Pt demonstrated fair+ understanding of education/techniques requiring additional education/training as well as good safety awareness. At end of session, pt was left in bed with call light in reach and all needs met. Pt would benefit from continued skilled PT services to increase functional independence and overall quality of life. Treatment:    Pt given VC for hand placement and sequencing to address deficits for bed mobility, transfers, ambulation. Pt given VC for safety throughout session to reduce risk of falls. Pt was given instruction for sit >< stand transfers regarding weight shifting, sequencing, and proper foot/hand placement to achieve an effective stand. Pt was given VC to increase stride length and esperanza to normalize gait mechanics and improve stability as well as instruction regarding heel to toe step through gait to improve dynamic standing balance. Adaptive device was adjusted to proper height for the patient, and pt was given VC for [employer] approximation during ambulation. Pt educated regarding WB precaution and performed quad sets, AP, glute squeezes 30 reps x 1 set. Pt's/ family goals   To improve strength, endurance, balance, improve breathing, decrease pain. Patient and or family understand(s) diagnosis, prognosis, and plan of care.     PLAN:    PT care will be provided in accordance with the objectives noted above. Exercises and functional mobility practice will be used as well as appropriate assistive devices or modalities to obtain goals. Patient and family education will also be administered as needed. Frequency of treatments: 1-2x daily for 5-7x per week for 1-3 days.     Time in  1758  Time out  1824    (Evaluation time includes thorough review of current medical information, gathering information on past medical history/social history and prior level of function, completion of standardized testing/informal observation of tasks, assessment of data, and development of POC/Goals.)    CPT codes:   [x] Low Complexity PT evaluation 05543  [] Moderate Complexity PT evaluation 57135  [] High Complexity PT evaluation 60003  [] PT Re-evaluation 32686  [] Gait training 87424   [] Manual therapy 27613   [x] Therapeutic activities 33498       18 minutes  [] Therapeutic exercises 52571       [] Neuromuscular reeducation [address] [facility], PT, DPT   [id number]

## 2020-07-16 NOTE — PROGRESS NOTES
Occupational Therapy  OCCUPATIONAL THERAPY INITIAL EVALUATION      Date:2020  Patient Name: Garth Stafford  MRN: [id number]  : 1955  Room: [address] [place] [address]    Referring Provider:  Hannah Spence Md    Evaluating OT: Connie Mendoza OTR/L [id number]    AM-PAC Daily Activity Raw Score:     Comments:  Eval Only. Patient able to complete ADL activity. No skilled OT needs. Recommend return home with support      Diagnosis: CARDONA   Pertinent Medical History: receb Procedure:  RIGHT KNEE ARTHROSCOPY, (ANTERIOR CRUCIATE LIGAMENT) ACL RECONSTRUCTION. ALLOGRAFT. MEDIAL MENISCECTOMY AND DEBRIDEMENT.  (Right Knee)   2020  Precautions:  Falls, R knee hinged brace, patient reports he was not told of any weightbearing precautions after his surgery. Has been walking around at home putting some weight down using his crutches.    (Recommned to clarify with ortho  R LE wt bearing status- nurse notified )     Home Living: Pt lives with girlfriend (her daughter and 4 young children)    [place] with 6 steps/rail to enter  [facility] [address]    Equipment owned: crutches, walker   Prior Level of Function: Independent  with ADLs , assist as needed with IADLs; ambulated crutches   Driving: yes -prior to recent surgery   Occupation:      Pain Level: no pain this session ;   Cognition:  Ox3, alert and conversing     Judgement/safety:  Fair +               Memory WNL     Functional Assessment:   Initial Eval Status  Date: 20   Feeding Independent    Grooming Independent   Standing at sink brushing teeth, washing hands    UB Dressing Independent    LB Dressing Min A  Threading shorts over R LE , assist with donning brace    Bathing Min A  Sponge bathing    Toileting Independent    Bed Mobility  Independent   Supine <> sit    Functional Transfers Independent   Sit-stand from bed    Functional Mobility Supervision, w/walker  To/from bathroom   With minimal weight bearing on R LE - states its how  he has been doing it  at

## 2020-07-16 NOTE — PROGRESS NOTES
[address] [place], [place] [address]ist   Progress Note    Admitting Date and Time: 7/15/2020  2:27 PM  Admit Dx: CARDONA (dyspnea on exertion) [R06.09]  Shortness of breath [R06.02]    Subjective:    Pt feels better than he did when he came in. Last night had episode where he got SOB and diaphoretic when laying flat in bed.       Per RN: had 5 beat run of SVT earlier today       methylPREDNISolone  40 mg Intravenous BID    atorvastatin  40 mg Oral Daily    carvedilol  25 mg Oral BID    fenofibrate  160 mg Oral Daily    glipiZIDE  5 mg Oral BID    hydrochlorothiazide  12.5 mg Oral Daily    losartan  50 mg Oral Daily    rOPINIRole  1 mg Oral TID    aspirin EC  81 mg Oral BID    ipratropium-albuterol  1 ampule Inhalation Q4H WA    sodium chloride flush  10 mL Intravenous 2 times per day    enoxaparin  40 mg Subcutaneous Daily     perflutren lipid microspheres, 1.5 mL, ONCE PRN  doxepin, 10 mg, Nightly PRN  oxyCODONE-acetaminophen, 1 tablet, Q6H PRN  glucose, 15 g, PRN  dextrose, 12.5 g, PRN  glucagon (rDNA), 1 mg, PRN  dextrose, 100 mL/hr, PRN  sodium chloride flush, 10 mL, PRN  potassium chloride, 40 mEq, PRN    Or  potassium alternative oral replacement, 40 mEq, PRN    Or  potassium chloride, 10 mEq, PRN  magnesium sulfate, 1 g, PRN  acetaminophen, 650 mg, Q6H PRN    Or  acetaminophen, 650 mg, Q6H PRN  magnesium hydroxide, 30 mL, Daily PRN  promethazine, 12.5 mg, Q6H PRN    Or  ondansetron, 4 mg, Q6H PRN         Objective:    /80   Pulse 64   Temp 96.5 °F (35.8 °C) (Oral)   Resp 18   Ht 5' 10\" (1.778 m)   Wt 218 lb (98.9 kg)   SpO2 94%   BMI 31.28 kg/m²   General Appearance: alert and oriented to person, place and time and in no acute distress  Skin: warm and dry  Head: normocephalic and atraumatic  Eyes: pupils equal, round, and reactive to light, extraocular eye movements intact, conjunctivae normal  Neck: neck supple and non tender without mass   Pulmonary/Chest: mild expiratory wheezes. Cardiovascular: normal rate, normal S1 and S2 and no carotid bruits  Abdomen: soft, non-tender, non-distended, normal bowel sounds, no masses or organomegaly  Extremities: right knee with hinged-brace in place. Neurologic: no cranial nerve deficit and speech normal      Recent Labs     07/15/20  1444 07/16/20  0545    141   K 4.2 4.3   * 103   CO2 26 25   BUN 23 21   CREATININE 1.3* 1.2   GLUCOSE 106* 171*   CALCIUM 8.8 9.3       Recent Labs     07/16/20  0545   ALKPHOS 40   PROT 6.8   LABALBU 4.1   BILITOT 0.4   AST 17   ALT 18       Recent Labs     07/15/20  1444 07/16/20  0545   WBC 5.7 7.4   RBC 3.77* 3.98   HGB 11.7* 11.8*   HCT 34.1* 35.4*   MCV 90.5 88.9   MCH 31.0 29.6   MCHC 34.3 33.3   RDW 12.8 12.4    183   MPV 9.4 9.9        D-dimer, quantitative [[id number]]  Collected: 07/15/20 2037       Specimen: Blood  Updated: 07/15/20 2052        D-Dimer, Quant  435  ng/mL DDU        Brain Natriuretic Peptide [[id number]] (Abnormal)  Collected: 07/15/20 1444        Updated: 07/15/20 2045        Pro-BNP  633  pg/mL         Radiology:   CTA CHEST W CONTRAST   Final Result   No evidence of pulmonary embolism or acute pulmonary abnormality. XR CHEST STANDARD (2 VW)   Final Result   No acute process. Assessment:  Principal Problem:    COPD with acute exacerbation (HCC)  Active Problems:    CARDONA (dyspnea on exertion)    Hypertension    Diabetes mellitus (HCC)    Hyperlipidemia    CKD (chronic kidney disease) stage 2, GFR 60-89 ml/min  Resolved Problems:    * No resolved hospital problems. *      Plan:    1. COPD with acute exacerbation--continue Solu-medrol but increase frequency to bid. Hold off on antibiotics as present. Duonebs ordered as well. 2. CARDONA--may be due to above but may have underlying heart failure with BNP of 633. Will check Echo to evaluate LV function. D Dimer elevated (recent knee surgery) but not significantly for his age.  CTA of chest negative for PE. Of note he also had CTA in April which was negative for acute PE.  3. NSVT--check Mg at level came back wnl at 2.0. Will check Echo. If has recurrent episodes will have cardiology evaluate patient. 4. TROY on CKD--unclear baseline. He was 1.6 earlier this month but down to 1.2 today. 5. Recent R knee arthoscopy, ACL reconstruction and medial menisectomy on 7/13--POD#3. Aspirin prophylaxis resumed. Will have PT/OT evaluate as he was having difficulty ambulating at home. He has hinged-brace in place. NOTE: This report was transcribed using voice recognition software. Every effort was made to ensure accuracy; however, inadvertent computerized transcription errors may be present.      Electronically signed by Lm Dietz MD on 7/16/2020 at 10:59 AM

## 2020-07-16 NOTE — H&P
10 Isaias Eliu: had concerns including Post-op Problem (had R knee surgery monday SOB dr sent in for r/o DVT). History of Present Illness:    Informant(s) for H&P:Pt and chart   Capri Ferrer is a 60 y.o. male with PMH (recent R-knee injury s/p repair and arthroscopy, DM not on insulin, HTN) presented to the ER in [facility] with 1 day history of SOB and wheezing,   And dyspnea on exertion. He denied any orthopnea, patient was recently admitted after knee injury was found to have medial meniscus tear and ACL rupture, status post right knee arthroscopy ACL reconstruction, and medial meniscectomy and debridement. Was discharged on aspirin for DVT prophylaxis but he never picked up prescription because he cannot afford to pay for it.  Denied any fever, nausea, vomiting, diarrhea, abdominal pain, blood in stool or black stool.  Denied any chest pain, no recent lightheadedness or syncope     CTA pulmonary was done not remarkable     Pre-Op Diagnosis:   1. RUPTURE OF ANTERIOR CRUCIATE LIGAMENT OF RIGHT KNEE  2.  Medial meniscus tear     Post-Op Diagnosis:   1. RUPTURE OF ANTERIOR CRUCIATE LIGAMENT OF RIGHT KNEE  2.  Medial meniscus tear       Procedure(s):  RIGHT KNEE ARTHROSCOPY, (ANTERIOR CRUCIATE LIGAMENT) ACL RECONSTRUCTION. ALLOGRAFT.  MEDIAL MENISCECTOMY AND DEBRIDEMENT. ([address])     In ER:    Vitals BP (!) 159/74   Pulse 67   Temp 97.5 °F (36.4 °C) (Oral)   Resp 18   Ht 5' 10\" (1.778 m)   Wt 218 lb (98.9 kg)   SpO2 95%   BMI 31.28 kg/m²   WBC, neutrophil %, neutrophil absolute count   Lab Results   Component Value Date    WBC 5.7 07/15/2020    NEUTOPHILPCT 57.8 07/15/2020    NEUTROABS 3.31 07/15/2020     Lactic acid No results found for: LACTSEPSIS  Cr   Lab Results   Component Value Date    CREATININE 1.3 (H) 07/15/2020       REVIEW OF SYSTEMS:  A comprehensive review of systems was negative except for: what is in the HPI    PMH:  Past Medical History:   Diagnosis Date    Arthritis     Diabetes mellitus ([facility] [address]     Hyperlipidemia     Hypertension        Surgical History:  Past Surgical History:   Procedure Laterality Date    ANTERIOR CRUCIATE LIGAMENT REPAIR Right 7/13/2020    RIGHT KNEE ARTHROSCOPY, (ANTERIOR CRUCIATE LIGAMENT) ACL RECONSTRUCTION. ALLOGRAFT. MEDIAL MENISCECTOMY AND DEBRIDEMENT. ([address]) performed by Dru Patton DO at [address], LAPAROSCOPIC N/A 5/18/2020    CHOLECYSTECTOMY LAPAROSCOPIC WITH IOC performed by Donte Greene MD at [place] [address]         Medications Prior to Admission:    Prior to Admission medications    Medication Sig Start Date End Date Taking? Authorizing Provider   docusate sodium (COLACE) 100 MG capsule Take 1 capsule by mouth 2 times daily as needed for Constipation 7/13/20  Yes Dru Patton DO   ketorolac (TORADOL) 10 MG tablet Take 1 tablet by mouth every 6 hours as needed for Pain Patient received prior injection 7/13/20 7/18/20 Yes Dru Patton DO   oxyCODONE-acetaminophen (PERCOCET) 5-325 MG per tablet Take 1 tablet by mouth every 6 hours as needed for Pain for up to 7 days.  7/13/20 7/20/20 Yes Dru Patton DO   Ascorbic Acid (VITAMIN C) 250 MG tablet Take 1,000 mg by mouth daily    Yes Historical Provider, MD   Cyanocobalamin (VITAMIN B 12 PO) Take 5,000 mcg by mouth daily    Yes Historical Provider, MD   Potassium 99 MG TABS Take by mouth daily   Yes Historical Provider, MD   fenofibrate (TRIGLIDE) 160 MG tablet TAKE 1 TABLET BY MOUTH ONCE DAILY 5/20/20  Yes Historical Provider, MD   doxepin (SINEQUAN) 10 MG capsule TAKE UP TO 5 CAPSULES BY MOUTH AT BEDTIME AS DIRECTED FOR INSOMNIA 5/20/20  Yes Historical Provider, MD   atorvastatin (LIPITOR) 40 MG tablet TAKE 1 TABLET BY MOUTH IN THE EVENING FOR CHOLESTEROL 5/20/20  Yes Historical Provider, MD   rOPINIRole (REQUIP) 1 MG tablet Take 1 mg by mouth 3 times daily   Yes Historical Provider, MD   losartan (COZAAR) 50 MG tablet Take 50 mg by mouth daily   Yes Historical Provider, MD   hydrochlorothiazide (MICROZIDE) 12.5 MG capsule Take 12.5 mg by mouth daily   Yes Historical Provider, MD   GLIPIZIDE PO Take 5 mg by mouth 2 times daily    Yes Historical Provider, MD   CARVEDILOL PO Take 25 mg by mouth 2 times daily    Yes Historical Provider, MD   ondansetron (ZOFRAN) 4 MG tablet Take 1 tablet by mouth every 8 hours as needed for Nausea or Vomiting 7/13/20   Mary Daily, DO       Allergies:    Patient has no known allergies. Social History:    reports that he quit smoking about 34 years ago. His smoking use included cigarettes. He quit smokeless tobacco use about 34 years ago. He reports current alcohol use. He reports that he does not use drugs. Family History:   family history is not on file.      PHYSICAL EXAM:  Vitals:  BP (!) 159/74   Pulse 67   Temp 97.5 °F (36.4 °C) (Oral)   Resp 18   Ht 5' 10\" (1.778 m)   Wt 218 lb (98.9 kg)   SpO2 95%   BMI 31.28 kg/m²   General Appearance: AOx3 and NAD  Skin: Warm and dry  Head: Normocephalic and atraumatic, mucous membranes well hydrated   Eyes: Pupils equal, round, and reactive to light  Neck: Supple and non tender without mass   Pulmonary/Chest: scattered wheezes, no crackle, not in respiratory distress  Cardiovascular: Normal rate, normal S1 and S2 and no carotid bruits  Abdomen: Soft, non-tender, non-distended, no rebound, no rigidity, + bowel sounds  Extremities: No cyanosis, no clubbing and no edema, right knee is wrapped in a cast  Neurologic: No neurologic focal deficits, speech is normal, coherent     LABS:  CBC  Recent Labs     07/15/20  1444   WBC 5.7   HGB 11.7*   HCT 34.1*   MCV 90.5        BMP  Recent Labs     07/15/20  1444      K 4.2   *   CO2 26   BUN 23   CREATININE 1.3*   LABGLOM 55   GLUCOSE 106*   CALCIUM 8.8     No results found for: MG, PHOS  LFT  No results for input(s): ALT, AST, ALKPHOS, BILITOT, BILIDIR in the last 72 hours. Albumin  Lab Results   Component Value Date    LABALBU 3.9 05/19/2020    LABALBU 4.3 05/18/2020    LABALBU 4.4 05/17/2020     Lactic acid   No results for input(s): LACTSEPSIS in the last 72 hours. Cardiac  Troponin   Lab Results   Component Value Date    TROPONINI <0.01 07/15/2020    TROPONINI <0.01 05/17/2020    TROPONINI <0.01 04/03/2020     Pro-BNP   Lab Results   Component Value Date    PROBNP 16 04/03/2020     Iron studies  No results found for: FERRITIN, IRON, TIBC     ASSESSMENT and PLAN:      Problem   Copd With Acute Exacerbation (Hcc)   Mulligan (Dyspnea On Exertion)   Hypertension   Diabetes Mellitus (Hcc)   Hyperlipidemia   Ckd (Chronic Kidney Disease) Stage 2, Gfr 60-89 Ml/Min     COPD with acute exacerbation   · Patient is been sitting all the time at home he is at risk for DVT/PE,  CTA was negative for PE, he denied any symptoms of orthopnea, will check his proBNP and d-dimer  · He received dexamethasone 1 dose IV 10 mg in ER, and breathing treatment  · Continue with DuoNeb nebs  · Continue with Solu-Medrol 40 IV daily x4  · Follow-up on outpatient with pulmonary and PCP    Post knee surgery DVT prophylaxis  · Aspirin 81 mg twice a day    # Hypertension: Continue hydrochlorothiazide, losartan, Coreg with current home doses  # Diabetes: Continue glipizide  # Hyperlipidemia: Continue atorvastatin and fenofibrate  # Insomnia: Continue doxepin as needed  # Restless leg syndrome: Continue ropinirole   # CKD: Cr and GFR around baseline         Code Status: Full   DVT prophylaxis: Lovenox   Diet: Diabetic       PLEASE NOTE:    This report was transcribed using typing and voice recognition software. Every effort was made to ensure accuracy; however, inadvertent computerized transcription errors may be present.  More than 50 minutes have been spent in evaluating the patient, reviewing records and results, discussing with staff / family and other treating physicians.     Jessica Baxter MD, MSc   [facility] night Hospitalist

## 2020-07-16 NOTE — CARE COORDINATION
CM Note: 7/16/2020 at 11:50am: Previous negative COVID test done on 7/8/2020. Talked with pt for transition of care. 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 Since being here in [place], he has had to have his gallbladder out on 6/15 and then he had ACL/ meniscus surgery this past Monday and currently has a brace on his right leg. States he is living with his girlfriend and her children in a mobile home with 6 steps to enter. States he has no hx of home oxygen or nebulizer. DME: walker, crutches. No PCP in [place]. Was receptive to receiving [facility] Physician List to review as he states he is not returning to [place] for [employer]. Pharmacy: [facility]. States plan is to return to his girlfriends home and she will provide transportation. Noted PT / OT consulted - await recommendations.  Pedro Live RN

## 2020-07-17 VITALS
RESPIRATION RATE: 18 BRPM | WEIGHT: 218 LBS | DIASTOLIC BLOOD PRESSURE: 84 MMHG | HEART RATE: 56 BPM | SYSTOLIC BLOOD PRESSURE: 144 MMHG | TEMPERATURE: 97.8 F | OXYGEN SATURATION: 94 % | HEIGHT: 70 IN | BODY MASS INDEX: 31.21 KG/M2

## 2020-07-17 LAB
ALBUMIN SERPL-MCNC: 4.7 G/DL (ref 3.5–5.2)
ALP BLD-CCNC: 49 U/L (ref 40–129)
ALT SERPL-CCNC: 22 U/L (ref 0–40)
ANION GAP SERPL CALCULATED.3IONS-SCNC: 14 MMOL/L (ref 7–16)
AST SERPL-CCNC: 19 U/L (ref 0–39)
BASOPHILS ABSOLUTE: 0.02 E9/L (ref 0–0.2)
BASOPHILS RELATIVE PERCENT: 0.3 % (ref 0–2)
BILIRUB SERPL-MCNC: 0.5 MG/DL (ref 0–1.2)
BILIRUBIN DIRECT: <0.2 MG/DL (ref 0–0.3)
BILIRUBIN, INDIRECT: NORMAL MG/DL (ref 0–1)
BUN BLDV-MCNC: 22 MG/DL (ref 8–23)
CALCIUM SERPL-MCNC: 9.7 MG/DL (ref 8.6–10.2)
CHLORIDE BLD-SCNC: 100 MMOL/L (ref 98–107)
CO2: 25 MMOL/L (ref 22–29)
CREAT SERPL-MCNC: 1.1 MG/DL (ref 0.7–1.2)
EKG ATRIAL RATE: 61 BPM
EKG P AXIS: 60 DEGREES
EKG P-R INTERVAL: 166 MS
EKG Q-T INTERVAL: 410 MS
EKG QRS DURATION: 102 MS
EKG QTC CALCULATION (BAZETT): 412 MS
EKG R AXIS: 23 DEGREES
EKG T AXIS: 55 DEGREES
EKG VENTRICULAR RATE: 61 BPM
EOSINOPHILS ABSOLUTE: 0.01 E9/L (ref 0.05–0.5)
EOSINOPHILS RELATIVE PERCENT: 0.1 % (ref 0–6)
GFR AFRICAN AMERICAN: >60
GFR NON-AFRICAN AMERICAN: >60 ML/MIN/1.73
GLUCOSE BLD-MCNC: 217 MG/DL (ref 74–99)
HCT VFR BLD CALC: 37.3 % (ref 37–54)
HEMOGLOBIN: 13.2 G/DL (ref 12.5–16.5)
IMMATURE GRANULOCYTES #: 0.08 E9/L
IMMATURE GRANULOCYTES %: 1 % (ref 0–5)
LV EF: 63 %
LVEF MODALITY: NORMAL
LYMPHOCYTES ABSOLUTE: 0.92 E9/L (ref 1.5–4)
LYMPHOCYTES RELATIVE PERCENT: 11.5 % (ref 20–42)
MCH RBC QN AUTO: 30.7 PG (ref 26–35)
MCHC RBC AUTO-ENTMCNC: 35.4 % (ref 32–34.5)
MCV RBC AUTO: 86.7 FL (ref 80–99.9)
METER GLUCOSE: 221 MG/DL (ref 74–99)
MONOCYTES ABSOLUTE: 0.48 E9/L (ref 0.1–0.95)
MONOCYTES RELATIVE PERCENT: 6 % (ref 2–12)
NEUTROPHILS ABSOLUTE: 6.47 E9/L (ref 1.8–7.3)
NEUTROPHILS RELATIVE PERCENT: 81.1 % (ref 43–80)
PDW BLD-RTO: 12.4 FL (ref 11.5–15)
PLATELET # BLD: 230 E9/L (ref 130–450)
PMV BLD AUTO: 10 FL (ref 7–12)
POTASSIUM REFLEX MAGNESIUM: 4.5 MMOL/L (ref 3.5–5)
RBC # BLD: 4.3 E12/L (ref 3.8–5.8)
SODIUM BLD-SCNC: 139 MMOL/L (ref 132–146)
TOTAL PROTEIN: 7.6 G/DL (ref 6.4–8.3)
WBC # BLD: 8 E9/L (ref 4.5–11.5)

## 2020-07-17 PROCEDURE — 80076 HEPATIC FUNCTION PANEL: CPT

## 2020-07-17 PROCEDURE — 36415 COLL VENOUS BLD VENIPUNCTURE: CPT

## 2020-07-17 PROCEDURE — 2580000003 HC RX 258: Performed by: INTERNAL MEDICINE

## 2020-07-17 PROCEDURE — 96376 TX/PRO/DX INJ SAME DRUG ADON: CPT

## 2020-07-17 PROCEDURE — 99217 PR OBSERVATION CARE DISCHARGE MANAGEMENT: CPT | Performed by: INTERNAL MEDICINE

## 2020-07-17 PROCEDURE — 6370000000 HC RX 637 (ALT 250 FOR IP): Performed by: INTERNAL MEDICINE

## 2020-07-17 PROCEDURE — 85025 COMPLETE CBC W/AUTO DIFF WBC: CPT

## 2020-07-17 PROCEDURE — 82962 GLUCOSE BLOOD TEST: CPT

## 2020-07-17 PROCEDURE — 93306 TTE W/DOPPLER COMPLETE: CPT

## 2020-07-17 PROCEDURE — 80048 BASIC METABOLIC PNL TOTAL CA: CPT

## 2020-07-17 PROCEDURE — 96372 THER/PROPH/DIAG INJ SC/IM: CPT

## 2020-07-17 PROCEDURE — 94640 AIRWAY INHALATION TREATMENT: CPT

## 2020-07-17 PROCEDURE — 6360000002 HC RX W HCPCS: Performed by: INTERNAL MEDICINE

## 2020-07-17 PROCEDURE — G0378 HOSPITAL OBSERVATION PER HR: HCPCS

## 2020-07-17 RX ORDER — ALBUTEROL SULFATE 90 UG/1
2 AEROSOL, METERED RESPIRATORY (INHALATION) 4 TIMES DAILY PRN
Qty: 1 INHALER | Refills: 0 | Status: SHIPPED | OUTPATIENT
Start: 2020-07-17 | End: 2020-11-05 | Stop reason: SDUPTHER

## 2020-07-17 RX ORDER — ASPIRIN 325 MG
325 TABLET, DELAYED RELEASE (ENTERIC COATED) ORAL DAILY
Qty: 14 TABLET | Refills: 0 | Status: SHIPPED | OUTPATIENT
Start: 2020-07-17 | End: 2021-02-16 | Stop reason: ALTCHOICE

## 2020-07-17 RX ORDER — PREDNISONE 20 MG/1
20 TABLET ORAL 2 TIMES DAILY
Qty: 10 TABLET | Refills: 0 | Status: SHIPPED | OUTPATIENT
Start: 2020-07-17 | End: 2020-07-22

## 2020-07-17 RX ADMIN — FENOFIBRATE 160 MG: 160 TABLET ORAL at 09:15

## 2020-07-17 RX ADMIN — ASPIRIN 81 MG: 81 TABLET, COATED ORAL at 09:14

## 2020-07-17 RX ADMIN — LOSARTAN POTASSIUM 50 MG: 50 TABLET, FILM COATED ORAL at 09:14

## 2020-07-17 RX ADMIN — OXYCODONE HYDROCHLORIDE AND ACETAMINOPHEN 1 TABLET: 5; 325 TABLET ORAL at 14:48

## 2020-07-17 RX ADMIN — SODIUM CHLORIDE, PRESERVATIVE FREE 10 ML: 5 INJECTION INTRAVENOUS at 09:15

## 2020-07-17 RX ADMIN — IPRATROPIUM BROMIDE AND ALBUTEROL SULFATE 1 AMPULE: .5; 3 SOLUTION RESPIRATORY (INHALATION) at 10:53

## 2020-07-17 RX ADMIN — ROPINIROLE 1 MG: 1 TABLET, FILM COATED ORAL at 05:23

## 2020-07-17 RX ADMIN — ROPINIROLE 1 MG: 1 TABLET, FILM COATED ORAL at 14:46

## 2020-07-17 RX ADMIN — HYDROCHLOROTHIAZIDE 12.5 MG: 12.5 TABLET ORAL at 09:15

## 2020-07-17 RX ADMIN — IPRATROPIUM BROMIDE AND ALBUTEROL SULFATE 1 AMPULE: .5; 3 SOLUTION RESPIRATORY (INHALATION) at 07:26

## 2020-07-17 RX ADMIN — GLIPIZIDE 5 MG: 5 TABLET ORAL at 09:15

## 2020-07-17 RX ADMIN — OXYCODONE HYDROCHLORIDE AND ACETAMINOPHEN 1 TABLET: 5; 325 TABLET ORAL at 08:11

## 2020-07-17 RX ADMIN — METHYLPREDNISOLONE SODIUM SUCCINATE 40 MG: 40 INJECTION, POWDER, FOR SOLUTION INTRAMUSCULAR; INTRAVENOUS at 09:15

## 2020-07-17 RX ADMIN — ATORVASTATIN CALCIUM 40 MG: 40 TABLET, FILM COATED ORAL at 09:15

## 2020-07-17 RX ADMIN — ENOXAPARIN SODIUM 40 MG: 40 INJECTION SUBCUTANEOUS at 09:15

## 2020-07-17 RX ADMIN — MAGNESIUM HYDROXIDE 30 ML: 400 SUSPENSION ORAL at 00:48

## 2020-07-17 RX ADMIN — OXYCODONE HYDROCHLORIDE AND ACETAMINOPHEN 1 TABLET: 5; 325 TABLET ORAL at 00:48

## 2020-07-17 RX ADMIN — CARVEDILOL 25 MG: 25 TABLET, FILM COATED ORAL at 09:15

## 2020-07-17 ASSESSMENT — PAIN DESCRIPTION - DESCRIPTORS
DESCRIPTORS: ACHING;CONSTANT;DISCOMFORT
DESCRIPTORS: ACHING;DULL;DISCOMFORT;CONSTANT

## 2020-07-17 ASSESSMENT — PAIN SCALES - GENERAL
PAINLEVEL_OUTOF10: 5
PAINLEVEL_OUTOF10: 5
PAINLEVEL_OUTOF10: 0
PAINLEVEL_OUTOF10: 7
PAINLEVEL_OUTOF10: 4

## 2020-07-17 ASSESSMENT — PAIN DESCRIPTION - ORIENTATION
ORIENTATION: RIGHT
ORIENTATION: RIGHT

## 2020-07-17 ASSESSMENT — PAIN DESCRIPTION - ONSET: ONSET: PROGRESSIVE

## 2020-07-17 ASSESSMENT — PAIN DESCRIPTION - PROGRESSION: CLINICAL_PROGRESSION: GRADUALLY WORSENING

## 2020-07-17 ASSESSMENT — PAIN DESCRIPTION - PAIN TYPE
TYPE: SURGICAL PAIN
TYPE: SURGICAL PAIN

## 2020-07-17 ASSESSMENT — PAIN DESCRIPTION - LOCATION
LOCATION: KNEE
LOCATION: KNEE

## 2020-07-17 ASSESSMENT — PAIN DESCRIPTION - FREQUENCY: FREQUENCY: CONTINUOUS

## 2020-07-17 NOTE — PLAN OF CARE
Problem: Pain:  Goal: Pain level will decrease  Description: Pain level will decrease  7/17/2020 1013 by Lamar Wade RN  Outcome: Met This Shift     Problem: Pain:  Goal: Control of acute pain  Description: Control of acute pain  7/17/2020 1013 by Lamar Wade RN  Outcome: Met This Shift     Problem: Pain:  Goal: Control of chronic pain  Description: Control of chronic pain  7/17/2020 1013 by Lamar Wade RN  Outcome: Met This Shift     Problem: Falls - Risk of:  Goal: Will remain free from falls  Description: Will remain free from falls  7/17/2020 1013 by Lamar Wade RN  Outcome: Met This Shift     Problem: Falls - Risk of:  Goal: Absence of physical injury  Description: Absence of physical injury  7/17/2020 1013 by Lamar Wade RN  Outcome: Met This Shift

## 2020-07-17 NOTE — DISCHARGE SUMMARY
[facility] Physician Discharge Summary       Jodi Silva DO  1931 [address] [facility] [address] [phone]    Call in 1 week  Hospital follow up for right knee arthroscopy. Activity level: As tolerated     Diet: DIET GENERAL; Carb Control: 4 carb choices (60 gms)/meal    Labs:None    Condition at discharge: Stable    Dispo:Home    Patient ID:  Michelle Reynoso  [id number]  78 y.o.  1955    Admit date: 7/15/2020    Discharge date and time:  7/17/2020  1:51 PM    Admission Diagnoses: Principal Problem:    COPD with acute exacerbation ([facility] [address]  Active Problems:    CARDONA (dyspnea on exertion)    Hypertension    Diabetes mellitus (HCC)    Hyperlipidemia    CKD (chronic kidney disease) stage 2, GFR 60-89 ml/min  Resolved Problems:    * No resolved hospital problems. *      Discharge Diagnoses: Principal Problem:    COPD with acute exacerbation ([facility] [address]  Active Problems:    CARDONA (dyspnea on exertion)    Hypertension    Diabetes mellitus (HCC)    Hyperlipidemia    CKD (chronic kidney disease) stage 2, GFR 60-89 ml/min  Resolved Problems:    * No resolved hospital problems. *      Consults:  IP CONSULT TO ORTHOPEDIC SURGERY    Procedures: Echo 7/17    History of Present Illness:    Informant(s) for H&P:Pt and chart   Michelle Reynoso is a 60 y.o. male with PMH (recent R-knee injury s/p repair and arthroscopy, DM not on insulin, HTN) presented to the ER in [address] [place] [address] with 1 day history of SOB and wheezing,   And dyspnea on exertion. He denied any orthopnea, patient was recently admitted after knee injury was found to have medial meniscus tear and ACL rupture, status post right knee arthroscopy ACL reconstruction, and medial meniscectomy and debridement. Was discharged on aspirin for DVT prophylaxis but he never picked up prescription because he cannot afford to pay for it. · Denied any fever, nausea, vomiting, diarrhea, abdominal pain, blood in stool or black stool.    · Denied Contrast    Result Date: 7/15/2020  EXAMINATION: CTA OF THE CHEST 7/15/2020 3:51 pm TECHNIQUE: CTA of the chest was performed after the administration of intravenous contrast.  Multiplanar reformatted images are provided for review. MIP images are provided for review. Dose modulation, iterative reconstruction, and/or weight based adjustment of the mA/kV was utilized to reduce the radiation dose to as low as reasonably achievable. COMPARISON: 04/03/2020 HISTORY: ORDERING SYSTEM PROVIDED HISTORY: SOB, post surgery TECHNOLOGIST PROVIDED HISTORY: Reason for exam:->SOB, post surgery Initial exam FINDINGS: Pulmonary Arteries: Pulmonary arteries are adequately opacified for evaluation. No evidence of intraluminal filling defect to suggest pulmonary embolism. Main pulmonary artery is normal in caliber. Mediastinum: No evidence of mediastinal lymphadenopathy. The heart and pericardium demonstrate no acute abnormality. There is no acute abnormality of the thoracic aorta. Lungs/pleura: The lungs are without acute process. No focal consolidation or pulmonary edema. No evidence of pleural effusion or pneumothorax. Upper Abdomen: Limited images of the upper abdomen are unremarkable. Soft Tissues/Bones: No acute bone or soft tissue abnormality. No evidence of pulmonary embolism or acute pulmonary abnormality.      TTE procedure       Procedure Date   Date: 07/17/2020 Start: 12:05 PM     Study Location: Echo Lab   Technical Quality: Adequate visualization     Indications:LV function. Patient Status: Routine     Height: 70 inches Weight: 218 pounds BSA: 2.17 m^2 BMI: 31.28 kg/m^2     Rhythm: Within normal limits HR: 63 bpm BP: 162/85 mmHg      Findings        Left Ventricle    Normal left ventricular chamber size.    Normal left ventricular systolic function.    Visually estimated LVEF is 60-65 %.    No wall motion abnormalities.    Normal diastolic function.          Patient Instructions:   Current Discharge Medication List      START taking these medications    Details   albuterol sulfate HFA (VENTOLIN HFA) 108 (90 Base) MCG/ACT inhaler Inhale 2 puffs into the lungs 4 times daily as needed for Wheezing  Qty: 1 Inhaler, Refills: 0      predniSONE (DELTASONE) 20 MG tablet Take 1 tablet by mouth 2 times daily for 5 days  Qty: 10 tablet, Refills: 0         CONTINUE these medications which have CHANGED    Details   aspirin 325 MG EC tablet Take 1 tablet by mouth daily for 14 days  Qty: 14 tablet, Refills: 0         CONTINUE these medications which have NOT CHANGED    Details   docusate sodium (COLACE) 100 MG capsule Take 1 capsule by mouth 2 times daily as needed for Constipation  Qty: 20 capsule, Refills: 1      oxyCODONE-acetaminophen (PERCOCET) 5-325 MG per tablet Take 1 tablet by mouth every 6 hours as needed for Pain for up to 7 days.   Qty: 28 tablet, Refills: 0    Comments: Reduce doses taken as pain becomes manageable  Associated Diagnoses: Rupture of anterior cruciate ligament of right knee, initial encounter      Cyanocobalamin (VITAMIN B 12 PO) Take 5,000 mcg by mouth daily       Potassium 99 MG TABS Take by mouth daily      fenofibrate (TRIGLIDE) 160 MG tablet TAKE 1 TABLET BY MOUTH ONCE DAILY      doxepin (SINEQUAN) 10 MG capsule TAKE UP TO 5 CAPSULES BY MOUTH AT BEDTIME AS DIRECTED FOR INSOMNIA      atorvastatin (LIPITOR) 40 MG tablet TAKE 1 TABLET BY MOUTH IN THE EVENING FOR CHOLESTEROL      rOPINIRole (REQUIP) 1 MG tablet Take 1 mg by mouth 3 times daily      losartan (COZAAR) 50 MG tablet Take 50 mg by mouth daily      hydrochlorothiazide (MICROZIDE) 12.5 MG capsule Take 12.5 mg by mouth daily      GLIPIZIDE PO Take 5 mg by mouth 2 times daily       CARVEDILOL PO Take 25 mg by mouth 2 times daily       ondansetron (ZOFRAN) 4 MG tablet Take 1 tablet by mouth every 8 hours as needed for Nausea or Vomiting  Qty: 20 tablet, Refills: 0         STOP taking these medications       ketorolac (TORADOL) 10 MG tablet Comments:   Reason for Stopping:         Ascorbic Acid (VITAMIN C) 250 MG tablet Comments:   Reason for Stopping:         pantoprazole (PROTONIX) 40 MG tablet Comments:   Reason for Stopping:               Note that more than 30 minutes was spent in preparing discharge papers, discussing discharge with patient, medication review, etc.    NOTE: This report was transcribed using voice recognition software.  Every effort was made to ensure accuracy; however, inadvertent computerized transcription errors may be present.     Signed:  Electronically signed by Andreia Amado MD on 7/17/2020 at 1:51 PM

## 2020-07-17 NOTE — PROGRESS NOTES
[facility] Quality Flow/Interdisciplinary Rounds Progress Note        Quality Flow Rounds held on July 17, 2020    Disciplines Attending:  Bedside Nurse, ,  and Nursing Unit Leadership    Michelle Service was admitted on 7/15/2020  2:27 PM    Anticipated Discharge Date:  Expected Discharge Date: 07/17/20    Disposition:    Jose Score:  Jose Scale Score: 23    Readmission Risk              Risk of Unplanned Readmission:        0           Discussed patient goal for the day, patient clinical progression, and barriers to discharge.   The following Goal(s) of the Day/Commitment(s) have been identified:  Activity progression, PT/OT, Needs PCP setup before [address]  July 17, 2020

## 2020-07-29 ENCOUNTER — OFFICE VISIT (OUTPATIENT)
Dept: ORTHOPEDIC SURGERY | Age: 65
End: 2020-07-29

## 2020-07-29 VITALS — TEMPERATURE: 98 F | BODY MASS INDEX: 31.21 KG/M2 | HEIGHT: 70 IN | WEIGHT: 218 LBS

## 2020-07-29 PROCEDURE — 99024 POSTOP FOLLOW-UP VISIT: CPT | Performed by: ORTHOPAEDIC SURGERY

## 2020-07-29 NOTE — PROGRESS NOTES
Chief Complaint   Patient presents with    Knee Pain     Right Knee Arthroscopy DOS 7/13/2020          Patient here for follow up after right arthroscopy with ACL reconstruction. The patient is not having any pain. The patient denies  fever, wound drainage, increasing redness, pus, increasing pain, increasing swelling. Post op problems reported:  none He is ambulating mildly antalgic with crutch and brace since he has not started physical therapy since surgery. Knee exam - right range of motion 0 to 90 degrees, no pain on motion, no effusion, tenderness, masses, ligamentous instability or deformity noted. Homans sign: Negative  Calves are supple. Lachman's negative, Anterior Drawernegative, Posterior Drawer negative  Pauly's negative, Thallasy negative,   PF grind test negative, Apprehension test negative    Gavin Sidhu was seen today for knee pain. Diagnoses and all orders for this visit:    Rupture of anterior cruciate ligament of right knee, subsequent encounter  -     Amb External Referral To Physical Therapy          Sutures removed  The patient will continue home exercise program for quad strengthening and range of motion. The patient will also d/c Aspirin therapy of 325mg enteric coated aspirin.     Follow up 4 weeks

## 2020-07-31 ENCOUNTER — EVALUATION (OUTPATIENT)
Dept: PHYSICAL THERAPY | Age: 65
End: 2020-07-31
Payer: COMMERCIAL

## 2020-07-31 PROCEDURE — 97110 THERAPEUTIC EXERCISES: CPT | Performed by: PHYSICAL THERAPIST

## 2020-07-31 PROCEDURE — 97161 PT EVAL LOW COMPLEX 20 MIN: CPT | Performed by: PHYSICAL THERAPIST

## 2020-07-31 NOTE — PROGRESS NOTES
[facility] OUTPATIENT REHABILITATION  PHYSICAL THERAPY INITIAL EVALUATION         Date:  2020   Patient: Shelley Fairbanks  : 1955  MRN: [id number]  Referring Provider: Brandee Shearer DO   [address]     Medical Diagnosis:   S83.511D (ICD-10-CM) - Rupture of anterior cruciate ligament of right knee, subsequent encounter      SUBJECTIVE:     Onset date: 2020, 2020 ACL reconstruction allograft    Onset: Sudden onset    Mechanism of Injury: stepping out of truck    Previous PT: yes - helped 10 years ago when knee gave out    Chief complaint: pain and can't worlk    Behavior: condition is staying the same    Pain: intermittent  Current: 5/10     Best: 0/10     Worst:8/10    Symptom Type/Quality: aching  Location[de-identified] Knee: medial     Imaging results: Xr Chest Standard (2 Vw)    Result Date: 7/15/2020  EXAMINATION: TWO XRAY VIEWS OF THE CHEST 7/15/2020 3:15 pm COMPARISON: None. HISTORY: ORDERING SYSTEM PROVIDED HISTORY: SOB TECHNOLOGIST PROVIDED HISTORY: Reason for exam:->SOB FINDINGS: Suboptimal inspiration was obtained for the chest x-ray on the AP view. The lungs are without acute focal process. There is no effusion or pneumothorax. The cardiomediastinal silhouette is without acute process. The osseous structures are without acute process. No acute process. Cta Chest W Contrast    Result Date: 7/15/2020  EXAMINATION: CTA OF THE CHEST 7/15/2020 3:51 pm TECHNIQUE: CTA of the chest was performed after the administration of intravenous contrast.  Multiplanar reformatted images are provided for review. MIP images are provided for review. Dose modulation, iterative reconstruction, and/or weight based adjustment of the mA/kV was utilized to reduce the radiation dose to as low as reasonably achievable.  COMPARISON: 2020 HISTORY: ORDERING SYSTEM PROVIDED HISTORY: SOB, post surgery TECHNOLOGIST PROVIDED HISTORY: Reason for exam:->SOB, post surgery Initial exam FINDINGS: Pulmonary Arteries: Pulmonary arteries are adequately opacified for evaluation. No evidence of intraluminal filling defect to suggest pulmonary embolism. Main pulmonary artery is normal in caliber. Mediastinum: No evidence of mediastinal lymphadenopathy. The heart and pericardium demonstrate no acute abnormality. There is no acute abnormality of the thoracic aorta. Lungs/pleura: The lungs are without acute process. No focal consolidation or pulmonary edema. No evidence of pleural effusion or pneumothorax. Upper Abdomen: Limited images of the upper abdomen are unremarkable. Soft Tissues/Bones: No acute bone or soft tissue abnormality. No evidence of pulmonary embolism or acute pulmonary abnormality. Past Medical History:  Past Medical History:   Diagnosis Date    Arthritis     Diabetes mellitus ([address]     Hyperlipidemia     Hypertension      Past Surgical History:   Procedure Laterality Date    ANTERIOR CRUCIATE LIGAMENT REPAIR Right 7/13/2020    RIGHT KNEE ARTHROSCOPY, (ANTERIOR CRUCIATE LIGAMENT) ACL RECONSTRUCTION. ALLOGRAFT.  MEDIAL MENISCECTOMY AND DEBRIDEMENT. ([address]) performed by Jason Wu DO at [facility] , LAPAROSCOPIC N/A 5/18/2020    CHOLECYSTECTOMY LAPAROSCOPIC WITH IOC performed by Favian Dela Cruz MD at [place] [address]         Medications:   Current Outpatient Medications   Medication Sig Dispense Refill    aspirin 325 MG EC tablet Take 1 tablet by mouth daily for 14 days 14 tablet 0    albuterol sulfate HFA (VENTOLIN HFA) 108 (90 Base) MCG/ACT inhaler Inhale 2 puffs into the lungs 4 times daily as needed for Wheezing 1 Inhaler 0    docusate sodium (COLACE) 100 MG capsule Take 1 capsule by mouth 2 times daily as needed for Constipation 20 capsule 1    ondansetron (ZOFRAN) 4 MG tablet Take 1 tablet by mouth every 8 hours as needed for Nausea or Vomiting 20 tablet 0    Cyanocobalamin (VITAMIN B 12 PO) Take 5,000 mcg by mouth daily       Potassium 99 MG TABS Take by mouth daily      fenofibrate (TRIGLIDE) 160 MG tablet TAKE 1 TABLET BY MOUTH ONCE DAILY      doxepin (SINEQUAN) 10 MG capsule TAKE UP TO 5 CAPSULES BY MOUTH AT BEDTIME AS DIRECTED FOR INSOMNIA      atorvastatin (LIPITOR) 40 MG tablet TAKE 1 TABLET BY MOUTH IN THE EVENING FOR CHOLESTEROL      rOPINIRole (REQUIP) 1 MG tablet Take 1 mg by mouth 3 times daily      losartan (COZAAR) 50 MG tablet Take 50 mg by mouth daily      hydrochlorothiazide (MICROZIDE) 12.5 MG capsule Take 12.5 mg by mouth daily      GLIPIZIDE PO Take 5 mg by mouth 2 times daily       CARVEDILOL PO Take 25 mg by mouth 2 times daily        No current facility-administered medications for this visit. Occupation: flat bed . Physical demands include: lifting, carrying, pushing, pulling heavy weighted items. Status: full time.     Exercise regimen: none    Hobbies: woodworking    Patient Goals: return to work    Precautions/Contraindications: recent surgery    OBJECTIVE:     Observations: well nourished male    Inspection: genu varum    Edema: mild global    Gait: antalgic, ambulates with crutches    Joint/Motion:    Knee:  Right:    AROM:  90° Flexion,  -20° Extension  PROM:  96° Flexion,  -2° Extension  Left:   AROM: WNL  PROM: WNL    Strength:    Knee:   Right: Flexion 4-/5,  Extension 4-/5  Left: Flexion 5/5,  Extension 5/5    Palpation: No tenderness     Special Tests/Functional Screens:    [x] Lachman's []+ / [x] -    [] Anterior Drawer []+ / [] -   [] Valgus Stress []+ / [x] -  [] Thessaly Test []+ / [] -   [] Jaylan's Sign []+ / [] -   [] Other: []+ / [] - [] Bounce Home []+ / [] -   [] Pauly []+ / [] -   [] Pivot Shift []+ / [] -   [] Posterior Drawer []+ / [] -   [] Varus Stress []+ / [x] -        Special test comments:       ASSESSMENT     Outcome Measure:   Lower Extremity Functional Scale (LEFS) 79% impairment    Problems:    Pain reported 8/10   ROM decreased    Strength decreased    Decreased functional ability with walking, work    [x] There are no barriers affecting plan of care or recovery    [] Barriers to this patient's plan of care or recovery include. Domestic Concerns:  [x] No  [] Yes:    Short Term goals (4 weeks)   Decrease reported pain to 3/10   Increase ROM to WNL and symmetrical   Increase Strength to 4+/5    Able to perform/complete the following functions/tasks: walk w/o device no deficit   Lower Extremity Functional Scale (LEFS) 55% impairment    Long Term goals (10 weeks)   Decrease reported pain to 0/10   Increase Strength to 5/5    Able to perform/complete the following functions/tasks: return to work   LEFS 30% impairment    Independent with Home Exercise Programs    Rehab Potential: [x] Good  [] Fair  [] Poor    PLAN       Treatment Plan:   [x] Therapeutic Exercise  [x] Therapeutic Activity  [x] Neuromuscular Re-education   [] Gait Training  [] Balance Training  [] Aerobic conditioning  [] Manual Therapy  [] Massage/Fascial release   [x] Work/Sport specific activities    [] Pain Neuroscience [x] Cold/hotpack  [x] Vasocompression  [x] Electrical Stimulation  [] Lumbar/Cervical Traction  [] Ultrasound   [] Iontophoresis: 4 mg/mL Dexamethasone Sodium Phosphate 40-80 mAmin        [x] Instruction in HEP      []  Medication allergies reviewed for use of Dexamethasone Sodium Phosphate 4mg/ml  with iontophoresis treatments. Patient is not allergic. The following CPT codes are likely to be used in the care of this patient: 70436 Therapeutic Exercise , 58430 Neuromuscular Re-Education , 22956 Therapeutic Activities , 81296 Vasopneumatic Device       Suggested Professional Referral: [x] No  [] Yes:     Patient Education:  [x] Plans/Goals, Risks/Benefits discussed  [x] Home exercise program  Method of Education: [x] Verbal  [x] Demo  [x] Written  Comprehension of Education:  [x] Verbalizes understanding.   [x] Demonstrates understanding. [] Needs Review. [] Demonstrates/verbalizes understanding of HEP/Ed previously given. Frequency: 1-2 days per week for 10 weeks    Patient understands diagnosis/prognosis and consents to treatment, plan and goals: [x] Yes    [] No     Thank you for the opportunity to work with your patient. If you have questions or comments, please contact me at [phone]; fax: [phone]. Electronically signed by: Joelle Phan PT         Please sign Physician's Certification and return to: [address] [place] [address]  Dept: [phone]  Dept Fax: [phone] Certification / Comments     Frequency/Duration 1-2 days per week for 10 weeks. Certification period from 7/31/2020  to 10/25/2020. I have reviewed the Plan of Care established for skilled therapy services and certify that the services are required and that they will be provided while the patient is under my care.     Physician's Comments/Revisions:               Physician's Printed Name:                                           [de-identified] Signature:                                                               Date:

## 2020-07-31 NOTE — PROGRESS NOTES
Physical Therapy Daily Treatment Note    Date: 2020  Patient Name: Darlene Salcedo  : 1955   MRN: [id number]  DOInjury: 2020   DOSx:   Referring Provider: Nacho Mchugh DO  [address] [facility]     Medical Diagnosis:   C15.182B (ICD-10-CM) - Rupture of anterior cruciate ligament of right knee, subsequent encounter     Outcome Measure:  Lower Extremity Functional Scale (LEFS) 79% impairment    S: see eval  O:   Time 1430     Visit 1 Repeat outcome measure at mid point and end. Pain 4/10     ROM A/PROM 90/96 flex, -20/-2 ext     Modalities            Manual            Stretch            Heel prop 2 min     Exercise      Nustep      Heel slides 2 x 25     QS      SLR 2 x 10     SAQ      LAQ      Hamstring Curl       TG squats      TG calf raises      Step-ups - FWD      Step-ups - LAT      Step-ups - BWD        NMR To improve balance for safe community and home ambulation    Resisted walk      FWD      BKWD      lat      March      Side stepping      Retro walk      Heel to toe      A:  Tolerated well. Above added to written HEP.   P: Continue with rehab plan  Lyssa Reese, PT    Treatment Charges: Mins Units   Initial Evaluation 20 1   Re-Evaluation     Ther Exercise         TE 20 1   Manual Therapy     MT     Ther Activities        TA     Gait Training          GT     Neuro Re-education NR     Modalities     Non-Billable Service Time     Other     Total Time/Units 40 2

## 2020-08-03 ENCOUNTER — TREATMENT (OUTPATIENT)
Dept: PHYSICAL THERAPY | Age: 65
End: 2020-08-03
Payer: MEDICAID

## 2020-08-03 PROCEDURE — 97110 THERAPEUTIC EXERCISES: CPT

## 2020-08-03 NOTE — PROGRESS NOTES
Physical Therapy Daily Treatment Note    Date: 8/3/2020  Patient Name: Darlene Salcedo  : 1955   MRN: [id number]  DOInjury: 2020   DOSx:   Referring Provider:  Nacho Mchugh DO  [address] [facility]          Medical Diagnosis:   U84.281D (ICD-10-CM) - Rupture of anterior cruciate ligament of right knee, subsequent encounter     Outcome Measure:  Lower Extremity Functional Scale (LEFS) 79% impairment    S: pt reports doing good over all , very minimal soreness/pain. O:   Time [id number] am      Visit 2/  Repeat outcome measure at mid point and end. Pain 2 /10     ROM A/PROM 90/96 flex, -20/-2 ext  8/3/2020    Modalities            Manual            Stretch            Heel prop   X 5 min     Exercise      Nustep L 5 x 10 min            Heel slides 2 x 25 x    QS 2 x 25  x    SLR 2 x 10 x          Marching  2 x 20 x    Sidekicks  2 x 20  x          SAQ      LAQ      Hamstring Curl       TG squats  x    TG calf raises      Step-ups - FWD      Step-ups - LAT      Step-ups - BWD        NMR To improve balance for safe community and home ambulation    Resisted walk      FWD      BKWD      lat      March      Side stepping      Retro walk      Heel to toe      A:  Tolerated well. Above added to written HEP.   P: Continue with rehab plan  Kyler Emery PTA    Treatment Charges: Mins Units   Initial Evaluation     Re-Evaluation     Ther Exercise         TE 45 3   Manual Therapy     MT     Ther Activities        TA     Gait Training          GT     Neuro Re-education NR     Modalities     Non-Billable Service Time     Other     Total Time/Units 45 3

## 2020-08-05 ENCOUNTER — TREATMENT (OUTPATIENT)
Dept: PHYSICAL THERAPY | Age: 65
End: 2020-08-05
Payer: MEDICAID

## 2020-08-05 PROCEDURE — 97110 THERAPEUTIC EXERCISES: CPT

## 2020-08-11 ENCOUNTER — TREATMENT (OUTPATIENT)
Dept: PHYSICAL THERAPY | Age: 65
End: 2020-08-11
Payer: MEDICAID

## 2020-08-11 PROCEDURE — 97110 THERAPEUTIC EXERCISES: CPT

## 2020-08-11 NOTE — PROGRESS NOTES
Physical Therapy Daily Treatment Note    Date: 2020  Patient Name: Augusta Marroquin  : 1955   MRN: [id number]  DOInjury: 2020   DOSx:   Referring Provider:  Asya Ponce DO  [address] [facility]          Medical Diagnosis:   U97.885K (ICD-10-CM) - Rupture of anterior cruciate ligament of right knee, subsequent encounter     Outcome Measure:  Lower Extremity Functional Scale (LEFS) 79% impairment    S: pt reports compliance with HEP also a little sore today. O:   Time [id number] am      Visit 3 /  Repeat outcome measure at mid point and end. Pain 2 /10     ROM  110 flex -5 ext   2020    Modalities            Manual            Stretch            Heel prop   X 5 min  TE   Exercise      Nustep L 5 x 10 min     TE               Heel slides 2 x 25 x TE   QS 2 x 25  x TE   SLR 2 x 10 x TE         Marching  2 x 20 x YE   Sidekicks  2 x 20  x TE         SAQ      LAQ 2 x 20     Hamstring Curl       TG squats  x    TG calf raises      Step-ups - FWD 6\" 2 x 20 new      Step-ups - LAT 6\" 2 x 20 new      Step-ups - BWD        NMR To improve balance for safe community and home ambulation    Resisted walk      FWD      BKWD      lat      March      Side stepping      Retro walk      Heel to toe      A:  Tolerated well. Progression in AROM. Above added to written HEP.   P: Continue with rehab plan  Nieves Hummel PTA    Treatment Charges: Mins Units   Initial Evaluation     Re-Evaluation     Ther Exercise         TE 45 3   Manual Therapy     MT     Ther Activities        TA     Gait Training          GT     Neuro Re-education NR     Modalities     Non-Billable Service Time     Other     Total Time/Units 45 3

## 2020-08-13 ENCOUNTER — TREATMENT (OUTPATIENT)
Dept: PHYSICAL THERAPY | Age: 65
End: 2020-08-13
Payer: MEDICAID

## 2020-08-13 PROCEDURE — 97016 VASOPNEUMATIC DEVICE THERAPY: CPT

## 2020-08-13 PROCEDURE — 97110 THERAPEUTIC EXERCISES: CPT

## 2020-08-18 ENCOUNTER — TREATMENT (OUTPATIENT)
Dept: PHYSICAL THERAPY | Age: 65
End: 2020-08-18
Payer: MEDICAID

## 2020-08-18 PROCEDURE — 97016 VASOPNEUMATIC DEVICE THERAPY: CPT

## 2020-08-18 PROCEDURE — 97110 THERAPEUTIC EXERCISES: CPT

## 2020-08-18 NOTE — PROGRESS NOTES
Physical Therapy Daily Treatment Note    Date: 2020  Patient Name: Maximo Gaytan  : 1955   MRN: [id number]  DOInjury: 2020   DOSx:   Referring Provider:  Danica Ayoub DO  [address] [facility]          Medical Diagnosis:   G00.972S (ICD-10-CM) - Rupture of anterior cruciate ligament of right knee, subsequent encounter     Outcome Measure:  Lower Extremity Functional Scale (LEFS) 79% impairment    S: pt reports feeling good . O:   Time [id number]   am      Visit  Repeat outcome measure at mid point and end. Pain 2 /10     ROM  110 flex -5 ext   2020    Modalities      ND to R  Leg  X 15 min   post ex's     Manual            Stretch            Heel prop   X 5 min  TE   Exercise         TE         Recumbent bike  X 10 min level 5      x TE   x TE   x TE         Hamstring curls  2 x 20      Marching  2 x 20  x YE   Sidekicks  2 x 20    x TE   Calf raises  2 x 20            SAQ      LAQ 2 x 20     Hamstring Curl       TG squats L 17 3 x20 x    TG calf raises      Step-ups - FWD 6\" 2 x 20      Step-ups - LAT 6\" 2 x 20      Step-ups - BWD        NMR To improve balance for safe community and home ambulation    Resisted walk      FWD      BKWD      lat      March      Side stepping      Retro walk      Heel to toe      A:  Tolerated well. Above added to written HEP.   P: Continue with rehab plan  Elyse Hernandez PTA    Treatment Charges: Mins Units   Initial Evaluation     Re-Evaluation     Ther Exercise         TE 30 2   Manual Therapy     MT     Ther Activities        TA     Gait Training          GT     Neuro Re-education NR     Modalities ND x 15  1   Non-Billable Service Time     Other     Total Time/Units 45 3

## 2020-08-20 ENCOUNTER — TREATMENT (OUTPATIENT)
Dept: PHYSICAL THERAPY | Age: 65
End: 2020-08-20
Payer: MEDICAID

## 2020-08-20 PROCEDURE — 97110 THERAPEUTIC EXERCISES: CPT

## 2020-08-20 PROCEDURE — 97016 VASOPNEUMATIC DEVICE THERAPY: CPT

## 2020-08-20 NOTE — PROGRESS NOTES
Physical Therapy Daily Treatment Note    Date: 2020  Patient Name: Dae Diaz  : 1955   MRN: [id number]  DOInjury: 2020   DOSx:   Referring Provider:  Addy Thurston DO  [address] [facility]          Medical Diagnosis:   F25.884X (ICD-10-CM) - Rupture of anterior cruciate ligament of right knee, subsequent encounter     Outcome Measure:  Lower Extremity Functional Scale (LEFS) 79% impairment    S: pt reports feeling good . O:   Time [id number] am     Visit  Repeat outcome measure at mid point and end. Pain 2 /10     ROM  110 flex -5 ext   2020    Modalities      ND to R  Leg  X 15 min   post ex's     Manual            Stretch            Heel prop   X 5 min  TE   Exercise         TE         Recumbent bike  X 10 min level 5      x TE   x TE   x TE         Hamstring curls  2 x 20      Marching  2 x 20  x YE   Sidekicks  2 x 20    x TE   Calf raises  2 x 20            Knee ext machine  5# 2 x 15            SAQ      LAQ 2 x 20     Hamstring Curl       TG squats L 17 3 x20 x    TG calf raises L 17 2 x 15      Step-ups - FWD 6\" 2 x 20      Step-ups - LAT 6\" 2 x 20      Step-ups - BWD        NMR To improve balance for safe community and home ambulation    Resisted walk      FWD      BKWD      lat      March      Side stepping      Retro walk      Heel to toe      A:  Tolerated well. Above added to written HEP.   P: Continue with rehab plan  Juan Wiley PTA    Treatment Charges: Mins Units   Initial Evaluation     Re-Evaluation     Ther Exercise         TE 30 2   Manual Therapy     MT     Ther Activities        TA     Gait Training          GT     Neuro Re-education NR     Modalities ND x 15  1   Non-Billable Service Time     Other     Total Time/Units 45 3

## 2020-08-24 ENCOUNTER — TREATMENT (OUTPATIENT)
Dept: PHYSICAL THERAPY | Age: 65
End: 2020-08-24
Payer: MEDICAID

## 2020-08-24 PROCEDURE — 97110 THERAPEUTIC EXERCISES: CPT

## 2020-08-24 NOTE — PROGRESS NOTES
Physical Therapy Daily Treatment Note    Date: 2020  Patient Name: Kristine Purdy  : 1955   MRN: [id number]  DOInjury: 2020   DOSx:   Referring Provider:  Josesito Luna DO   [address] [facility]          Medical Diagnosis:   Q78.545K (ICD-10-CM) - Rupture of anterior cruciate ligament of right knee, subsequent encounter     Outcome Measure:  Lower Extremity Functional Scale (LEFS) 79% impairment    S: pt reports feeling good , along with being sore end of rx session . O:   Time 0955- 1040  am Pt arrived early 2020    Visit  Repeat outcome measure at mid point and end. Pain 2 /10     ROM  110 flex -5 ext   2020    Modalities      ND to R  Leg    post ex's     Manual Pt declined            Stretch            Heel prop   X 5 min  TE   Exercise         TE         Recumbent bike  X 10 min level 5      x TE   x TE   x TE         Hamstring curls  2 x 20      Marching  2 x 20  x YE   Sidekicks  2 x 20    x TE   Calf raises  2 x 20            Knee ext machine  5#  3 x 10             SAQ      LAQ 2 x 20     Hamstring Curl       TG squats L 17 3 x20 x    TG calf raises L 17 2 x 15      Step-ups - FWD  8\" 2 x 20      Step-ups - LAT 8\"  2 x 20      Step-ups - BWD        NMR To improve balance for safe community and home ambulation    Resisted walk      FWD      BKWD      lat      March      Side stepping      Retro walk      Heel to toe      A:  Tolerated well.progression 6\" to 8\" stepping ex's . Above added to written HEP.   P: Continue with rehab plan  Dhruv Santoro PTA    Treatment Charges: Mins Units   Initial Evaluation     Re-Evaluation     Ther Exercise         TE 45 3   Manual Therapy     MT     Ther Activities        TA     Gait Training          GT     Neuro Re-education NR     Modalities     Non-Billable Service Time     Other     Total Time/Units 45 3

## 2020-08-26 ENCOUNTER — OFFICE VISIT (OUTPATIENT)
Dept: ORTHOPEDIC SURGERY | Age: 65
End: 2020-08-26

## 2020-08-26 ENCOUNTER — TREATMENT (OUTPATIENT)
Dept: PHYSICAL THERAPY | Age: 65
End: 2020-08-26
Payer: MEDICAID

## 2020-08-26 VITALS — HEIGHT: 70 IN | BODY MASS INDEX: 31.21 KG/M2 | WEIGHT: 218 LBS

## 2020-08-26 PROCEDURE — 97016 VASOPNEUMATIC DEVICE THERAPY: CPT

## 2020-08-26 PROCEDURE — 97110 THERAPEUTIC EXERCISES: CPT

## 2020-08-26 PROCEDURE — 99024 POSTOP FOLLOW-UP VISIT: CPT | Performed by: ORTHOPAEDIC SURGERY

## 2020-08-26 NOTE — PROGRESS NOTES
Chief Complaint   Patient presents with    Knee Pain     Right knee ACL-R follow up. DOS 7/13/2020           Patient here for follow up after right arthroscopy with ACL reconstruction. The patient is not having any pain. The patient denies  fever, wound drainage, increasing redness, pus, increasing pain, increasing swelling. Post op problems reported:  none He is ambulating mildly antalgic with crutch and brace since he has not started physical therapy since surgery. Knee exam - right range of motion 0 to 120 degrees, no pain on motion, no effusion, tenderness, masses, ligamentous instability or deformity noted. Homans sign: Negative  Calves are supple. Lachman's negative, Anterior Drawernegative, Posterior Drawer negative  Pauly's negative, Thallasy negative,   PF grind test negative, Apprehension test negative    Tigist Grijalva was seen today for knee pain. Diagnoses and all orders for this visit:    Rupture of anterior cruciate ligament of right knee, subsequent encounter    Other tear of medial meniscus of right knee as current injury, initial encounter          Patient seen and examined. Natural history and course discussed with patient. Patient doing well at this time  The patient will continue home exercise program for quad strengthening and range of motion.        Follow up 8 weeks

## 2020-09-02 ENCOUNTER — TREATMENT (OUTPATIENT)
Dept: PHYSICAL THERAPY | Age: 65
End: 2020-09-02
Payer: MEDICAID

## 2020-09-02 ENCOUNTER — OFFICE VISIT (OUTPATIENT)
Dept: ORTHOPEDIC SURGERY | Age: 65
End: 2020-09-02

## 2020-09-02 VITALS — BODY MASS INDEX: 31.21 KG/M2 | HEIGHT: 70 IN | WEIGHT: 218 LBS

## 2020-09-02 PROCEDURE — 97016 VASOPNEUMATIC DEVICE THERAPY: CPT

## 2020-09-02 PROCEDURE — 99024 POSTOP FOLLOW-UP VISIT: CPT | Performed by: ORTHOPAEDIC SURGERY

## 2020-09-02 PROCEDURE — 97110 THERAPEUTIC EXERCISES: CPT

## 2020-09-02 NOTE — PROGRESS NOTES
Chief Complaint   Patient presents with    Knee Pain     Right knee follow up. Healing well. would like to go to back to work in a different capacity. Patient here for follow up after right arthroscopy with ACL reconstruction. The patient is not having any pain. The patient denies  fever, wound drainage, increasing redness, pus, increasing pain, increasing swelling. Post op problems reported:  none He is ambulating normally at this time is been working well with physical therapy. Patient would like to go back to work in a limited capacity so he can pay bills. Knee exam - right range of motion 0 to 120 degrees, no pain on motion, no effusion, tenderness, masses, ligamentous instability or deformity noted. Homans sign: Negative  Calves are supple. Lachman's negative, Anterior Drawernegative, Posterior Drawer negative  Pauly's negative, Thallasy negative,   PF grind test negative, Apprehension test negative    Keena Parkinson was seen today for knee pain. Diagnoses and all orders for this visit:    Rupture of anterior cruciate ligament of right knee, subsequent encounter    Other tear of medial meniscus of right knee as current injury, initial encounter        Patient seen and examined. Natural history and course discussed with patient. Patient doing well at this time  The patient will continue home exercise program for quad strengthening and range of motion. Patient was provided requisition for return to light duty only work. This includes no lifting over 10 pounds no bending twisting knee without unlocked brace. Patient verbalized understanding.   Follow up 8 weeks

## 2020-09-02 NOTE — PROGRESS NOTES
Physical Therapy Daily Treatment Note    Date: 2020  Patient Name: Maureen Saba  : 1955   MRN: [id number]  DOInjury: 2020   DOSx:   Referring Provider:  [facility]ecil [facility]ce DO   [address] [facility]          Medical Diagnosis:   U33.526S (ICD-10-CM) - Rupture of anterior cruciate ligament of right knee, subsequent encounter     Outcome Measure:  Lower Extremity Functional Scale (LEFS) 79% impairment   Mid point score 2020= 47%   S: pt reports very sore today . O:   Time 2005-0972 am     Visit  Repeat outcome measure at mid point and end. Pain 6 /10     ROM  110 flex -5 ext   2020    Modalities       ice , compression , elevation  to R  Leg  X 15 min  post ex's     Manual            Stretch            Heel prop  TE   Exercise         TE         Recumbent bike  X 10 min level 5      x TE   x TE   x TE         Hamstring curls  2 x 20      Marching  2 x 20  x YE   Sidekicks  2 x 20    x TE   Calf raises  2 x 20            Knee ext machine  Not perf  2020         SAQ      LAQ 2 x 20     Hamstring Curl       TG squats L 17 3 x 15 x    TG calf raises L 17 2 x 15      Step-ups - FWD   8\" 2 x  15     Step-ups - LAT   8\"  2 x 15      Step-ups - BWD        NMR To improve balance for safe community and home ambulation    Resisted walk      FWD      BKWD      lat      March      Side stepping      Retro walk      Heel to toe      A:  Tolerated well . Above added to written HEP.   P: Continue with rehab plan  Sai Mc PTA    Treatment Charges: Mins Units   Initial Evaluation     Re-Evaluation     Ther Exercise         TE 30 2   Manual Therapy     MT     Ther Activities        TA     Gait Training          GT     Neuro Re-education NR     Modalities VD x 15  1   Non-Billable Service Time     Other     Total Time/Units 45 3

## 2020-09-25 ENCOUNTER — HOSPITAL ENCOUNTER (EMERGENCY)
Age: 65
Discharge: HOME | End: 2020-09-25
Attending: EMERGENCY MEDICINE
Payer: MEDICAID

## 2020-09-25 VITALS
OXYGEN SATURATION: 98 % | BODY MASS INDEX: 28.7 KG/M2 | SYSTOLIC BLOOD PRESSURE: 130 MMHG | RESPIRATION RATE: 16 BRPM | TEMPERATURE: 98.1 F | HEART RATE: 55 BPM | WEIGHT: 200 LBS | DIASTOLIC BLOOD PRESSURE: 82 MMHG

## 2020-09-25 PROCEDURE — G0381 LEV 2 HOSP TYPE B ED VISIT: HCPCS

## 2020-09-25 RX ORDER — KETOCONAZOLE 20 MG/G
CREAM TOPICAL
Qty: 30 G | Refills: 1 | Status: SHIPPED | OUTPATIENT
Start: 2020-09-25 | End: 2020-11-13 | Stop reason: SDUPTHER

## 2020-09-25 NOTE — ED PROVIDER NOTES
HPI:  9/25/20,   Time: 9:24 AM EDT         Lennox Shires is a 60 y.o. male presenting to the ED for multiple circular rashes all over his body, beginning 1 year ago. The complaint has been constant, moderate in severity, and worsened by nothing. ROS:   Pertinent positives and negatives are stated within HPI, all other systems reviewed and are negative.  --------------------------------------------- PAST HISTORY ---------------------------------------------  Past Medical History:  has a past medical history of Arthritis, Diabetes mellitus ([address] Hyperlipidemia, and Hypertension. Past Surgical History:  has a past surgical history that includes hernia repair; Neck surgery; Cholecystectomy, laparoscopic (N/A, 5/18/2020); and Anterior cruciate ligament repair (Right, 7/13/2020). Social History:  reports that he quit smoking about 34 years ago. His smoking use included cigarettes. He quit smokeless tobacco use about 34 years ago. He reports current alcohol use. He reports that he does not use drugs. Family History: family history is not on file. The patients home medications have been reviewed. Allergies: Patient has no known allergies. -------------------------------------------------- RESULTS -------------------------------------------------  All laboratory and radiology results have been personally reviewed by myself   LABS:  No results found for this visit on 09/25/20. RADIOLOGY:  Interpreted by Radiologist.  No orders to display       ------------------------- NURSING NOTES AND VITALS REVIEWED ---------------------------   The nursing notes within the ED encounter and vital signs as below have been reviewed.    /82   Pulse 55   Temp 98.1 °F (36.7 °C) (Skin)   Resp 16   Wt 200 lb (90.7 kg)   SpO2 98%   BMI 28.70 kg/m²   Oxygen Saturation Interpretation: Normal      ---------------------------------------------------PHYSICAL Take 1 tablet by mouth every 8 hours as needed for Nausea or Vomiting    POTASSIUM 99 MG TABS    Take by mouth daily    ROPINIROLE (REQUIP) 1 MG TABLET    Take 1 mg by mouth 3 times daily   Modified Medications    No medications on file   Discontinued Medications    No medications on file         Counseling: The emergency provider has spoken with the patient and discussed todays results, in addition to providing specific details for the plan of care and counseling regarding the diagnosis and prognosis. Questions are answered at this time and they are agreeable with the plan.      --------------------------------- IMPRESSION AND DISPOSITION ---------------------------------    IMPRESSION  1.  Tinea corporis        DISPOSITION  Disposition: Discharge to home  Patient condition is good                  Felipa Gudino MD  09/25/20 [id number]

## 2020-10-03 ENCOUNTER — HOSPITAL ENCOUNTER (EMERGENCY)
Age: 65
Discharge: HOME | End: 2020-10-03
Attending: EMERGENCY MEDICINE
Payer: MEDICAID

## 2020-10-03 VITALS
TEMPERATURE: 98.1 F | HEART RATE: 64 BPM | BODY MASS INDEX: 31.42 KG/M2 | SYSTOLIC BLOOD PRESSURE: 157 MMHG | OXYGEN SATURATION: 97 % | RESPIRATION RATE: 20 BRPM | DIASTOLIC BLOOD PRESSURE: 80 MMHG | WEIGHT: 219 LBS

## 2020-10-03 PROCEDURE — G0381 LEV 2 HOSP TYPE B ED VISIT: HCPCS

## 2020-10-03 RX ORDER — AMOXICILLIN AND CLAVULANATE POTASSIUM 875; 125 MG/1; MG/1
1 TABLET, FILM COATED ORAL 2 TIMES DAILY
Qty: 20 TABLET | Refills: 0 | Status: SHIPPED | OUTPATIENT
Start: 2020-10-03 | End: 2020-10-13

## 2020-10-03 RX ORDER — BROMPHENIRAMINE MALEATE, PSEUDOEPHEDRINE HYDROCHLORIDE, AND DEXTROMETHORPHAN HYDROBROMIDE 2; 30; 10 MG/5ML; MG/5ML; MG/5ML
5 SYRUP ORAL 4 TIMES DAILY PRN
Qty: 120 ML | Refills: 0 | Status: SHIPPED | OUTPATIENT
Start: 2020-10-03 | End: 2020-11-13 | Stop reason: ALTCHOICE

## 2020-10-03 ASSESSMENT — ENCOUNTER SYMPTOMS
RHINORRHEA: 1
DIARRHEA: 0
NAUSEA: 0
WHEEZING: 0
EYE DISCHARGE: 0
SORE THROAT: 0
VOMITING: 0
EYE REDNESS: 0
EYE PAIN: 0
ABDOMINAL PAIN: 0
COUGH: 1
BACK PAIN: 0
SINUS PRESSURE: 0
SHORTNESS OF BREATH: 0

## 2020-10-03 NOTE — ED PROVIDER NOTES
The history is provided by the patient. URI   Presenting symptoms: congestion, cough, ear pain, facial pain and rhinorrhea    Presenting symptoms: no fatigue, no fever and no sore throat    Severity:  Moderate  Onset quality:  Gradual  Duration:  4 days  Chronicity:  New  Associated symptoms: no arthralgias, no headaches and no wheezing         Review of Systems   Constitutional: Negative for chills, fatigue and fever. HENT: Positive for congestion, ear pain and rhinorrhea. Negative for sinus pressure and sore throat. Eyes: Negative for pain, discharge and redness. Respiratory: Positive for cough. Negative for shortness of breath and wheezing. Cardiovascular: Negative for chest pain. Gastrointestinal: Negative for abdominal pain, diarrhea, nausea and vomiting. Genitourinary: Negative for dysuria and frequency. Musculoskeletal: Negative for arthralgias and back pain. Skin: Negative for rash and wound. Neurological: Negative for weakness and headaches. Hematological: Negative for adenopathy. All other systems reviewed and are negative. Physical Exam  Vitals signs and nursing note reviewed. Constitutional:       Appearance: He is well-developed. HENT:      Head: Normocephalic and atraumatic. Jaw: No trismus. Right Ear: Hearing, ear canal and external ear normal. Tympanic membrane is retracted. Left Ear: Hearing, ear canal and external ear normal. Tympanic membrane is retracted. Nose: Mucosal edema, congestion and rhinorrhea present. Right Sinus: No maxillary sinus tenderness or frontal sinus tenderness. Left Sinus: No maxillary sinus tenderness or frontal sinus tenderness. Mouth/Throat:      Pharynx: Uvula midline. No uvula swelling. Eyes:      General: Lids are normal.      Conjunctiva/sclera: Conjunctivae normal.      Pupils: Pupils are equal, round, and reactive to light. Neck:      Musculoskeletal: Normal range of motion and neck supple. Cardiovascular:      Rate and Rhythm: Normal rate and regular rhythm. Heart sounds: Normal heart sounds. No murmur. Pulmonary:      Effort: Pulmonary effort is normal. No respiratory distress. Breath sounds: Normal breath sounds. No wheezing or rales. Abdominal:      General: Bowel sounds are normal.      Palpations: Abdomen is soft. Abdomen is not rigid. Tenderness: There is no abdominal tenderness. There is no guarding or rebound. Skin:     General: Skin is warm and dry. Findings: No abrasion or rash. Neurological:      Mental Status: He is alert and oriented to person, place, and time. GCS: GCS eye subscore is 4. GCS verbal subscore is 5. GCS motor subscore is 6. Cranial Nerves: No cranial nerve deficit. Sensory: No sensory deficit. Coordination: Coordination normal.      Gait: Gait normal.          Procedures     MDM          --------------------------------------------- PAST HISTORY ---------------------------------------------  Past Medical History:  has a past medical history of Arthritis, Diabetes mellitus ([facility] [address] Hyperlipidemia, and Hypertension. Past Surgical History:  has a past surgical history that includes hernia repair; Neck surgery; Cholecystectomy, laparoscopic (N/A, 5/18/2020); and Anterior cruciate ligament repair (Right, 7/13/2020). Social History:  reports that he quit smoking about 34 years ago. His smoking use included cigarettes. He quit smokeless tobacco use about 34 years ago. He reports current alcohol use. He reports that he does not use drugs. Family History: family history is not on file. The patients home medications have been reviewed. Allergies: Patient has no known allergies. -------------------------------------------------- RESULTS -------------------------------------------------  Labs:  No results found for this visit on 10/03/20.     Radiology:  No orders to display       ------------------------- NURSING NOTES

## 2020-11-05 ENCOUNTER — HOSPITAL ENCOUNTER (EMERGENCY)
Age: 65
Discharge: HOME | End: 2020-11-05
Attending: EMERGENCY MEDICINE
Payer: MEDICAID

## 2020-11-05 VITALS
OXYGEN SATURATION: 96 % | TEMPERATURE: 97.3 F | HEIGHT: 70 IN | HEART RATE: 72 BPM | BODY MASS INDEX: 30.92 KG/M2 | SYSTOLIC BLOOD PRESSURE: 155 MMHG | WEIGHT: 216 LBS | RESPIRATION RATE: 18 BRPM | DIASTOLIC BLOOD PRESSURE: 86 MMHG

## 2020-11-05 PROCEDURE — G0380 LEV 1 HOSP TYPE B ED VISIT: HCPCS

## 2020-11-05 RX ORDER — DOXEPIN HYDROCHLORIDE 10 MG/1
CAPSULE ORAL
Qty: 30 CAPSULE | Refills: 1 | Status: SHIPPED | OUTPATIENT
Start: 2020-11-05 | End: 2020-12-24 | Stop reason: SDUPTHER

## 2020-11-05 RX ORDER — ROPINIROLE 1 MG/1
1 TABLET, FILM COATED ORAL 3 TIMES DAILY
Qty: 60 TABLET | Refills: 0 | Status: SHIPPED | OUTPATIENT
Start: 2020-11-05 | End: 2020-11-13 | Stop reason: SDUPTHER

## 2020-11-05 RX ORDER — LOSARTAN POTASSIUM 50 MG/1
50 TABLET ORAL DAILY
Qty: 30 TABLET | Refills: 0 | Status: SHIPPED | OUTPATIENT
Start: 2020-11-05 | End: 2020-11-13 | Stop reason: SDUPTHER

## 2020-11-05 RX ORDER — ALBUTEROL SULFATE 90 UG/1
2 AEROSOL, METERED RESPIRATORY (INHALATION) 4 TIMES DAILY PRN
Qty: 1 INHALER | Refills: 0 | Status: SHIPPED | OUTPATIENT
Start: 2020-11-05 | End: 2021-04-12 | Stop reason: SDUPTHER

## 2020-11-05 RX ORDER — FENOFIBRATE 160 MG/1
TABLET ORAL
Qty: 30 TABLET | Refills: 0 | Status: SHIPPED | OUTPATIENT
Start: 2020-11-05 | End: 2020-11-13 | Stop reason: SDUPTHER

## 2020-11-05 ASSESSMENT — ENCOUNTER SYMPTOMS
EYE PAIN: 0
COUGH: 0
WHEEZING: 0
SHORTNESS OF BREATH: 0
DIARRHEA: 0
SORE THROAT: 0
EYE DISCHARGE: 0
VOMITING: 0
EYE REDNESS: 0
ABDOMINAL PAIN: 0
SINUS PRESSURE: 0
NAUSEA: 0
BACK PAIN: 0

## 2020-11-05 NOTE — ED PROVIDER NOTES
Moved to this area from [facility]. Native of [place], [place]. Switched PCP to [place]. [facility] appointment 11/26/2020. NO acute symptoms. Requesting refills on most of his meds. The history is provided by the patient. Hypertension   Severity:  Mild  Chronicity:  Chronic  Associated symptoms: no abdominal pain, no chest pain, no ear pain, no fever, no headaches, no nausea, no shortness of breath, not vomiting and no weakness         Review of Systems   Constitutional: Negative for chills and fever. HENT: Negative for ear pain, sinus pressure and sore throat. Eyes: Negative for pain, discharge and redness. Respiratory: Negative for cough, shortness of breath and wheezing. Cardiovascular: Negative for chest pain. Gastrointestinal: Negative for abdominal pain, diarrhea, nausea and vomiting. Genitourinary: Negative for dysuria and frequency. Musculoskeletal: Negative for arthralgias and back pain. Skin: Negative for rash and wound. Neurological: Negative for weakness and headaches. Hematological: Negative for adenopathy. Psychiatric/Behavioral: Negative. All other systems reviewed and are negative. Physical Exam  Vitals signs and nursing note reviewed. Constitutional:       Appearance: He is well-developed. HENT:      Head: Normocephalic and atraumatic. Eyes:      Pupils: Pupils are equal, round, and reactive to light. Neck:      Musculoskeletal: Normal range of motion and neck supple. Cardiovascular:      Rate and Rhythm: Normal rate and regular rhythm. Heart sounds: Normal heart sounds. No murmur. Pulmonary:      Effort: Pulmonary effort is normal.      Breath sounds: Normal breath sounds. Abdominal:      General: Bowel sounds are normal.      Palpations: Abdomen is soft. Tenderness: There is no abdominal tenderness. There is no guarding or rebound. Skin:     General: Skin is warm and dry.    Neurological:      Mental Status: He is alert and oriented to person, place, and time. Psychiatric:         Behavior: Behavior normal.         Thought Content: Thought content normal.         Judgment: Judgment normal.        --------------------------------------------- PAST HISTORY ---------------------------------------------  Past Medical History:  has a past medical history of Arthritis, Diabetes mellitus ([facility] [address] Hyperlipidemia, and Hypertension. Past Surgical History:  has a past surgical history that includes hernia repair; Neck surgery; Cholecystectomy, laparoscopic (N/A, 5/18/2020); and Anterior cruciate ligament repair (Right, 7/13/2020). Social History:  reports that he quit smoking about 34 years ago. His smoking use included cigarettes. He quit smokeless tobacco use about 34 years ago. He reports current alcohol use. He reports that he does not use drugs. Family History: family history is not on file. The patients home medications have been reviewed. Allergies: Patient has no known allergies. -------------------------------------------------- RESULTS -------------------------------------------------  No results found for this visit on 11/05/20. No orders to display       ------------------------- NURSING NOTES AND VITALS REVIEWED ---------------------------   The nursing notes within the ED encounter and vital signs as below have been reviewed. BP (!) 155/86   Pulse 72   Temp 97.3 °F (36.3 °C)   Resp 18   Ht 5' 10\" (1.778 m)   Wt 216 lb (98 kg)   SpO2 96%   BMI 30.99 kg/m²   Oxygen Saturation Interpretation: Normal      ------------------------------------------ PROGRESS NOTES ------------------------------------------   I have spoken with the patient and discussed todays results, in addition to providing specific details for the plan of care and counseling regarding the diagnosis and prognosis.   Their questions are answered at this time and they are agreeable with the plan.      --------------------------------- ADDITIONAL PROVIDER NOTES ---------------------------------        This patient is stable for discharge. I have shared the specific conditions for return, as well as the importance of follow-up. IMPRESSION:     1. Encounter for medication refill      Current Discharge Medication List      CONTINUE these medications which have CHANGED    Details   albuterol sulfate HFA (VENTOLIN HFA) 108 (90 Base) MCG/ACT inhaler Inhale 2 puffs into the lungs 4 times daily as needed for Wheezing  Qty: 1 Inhaler, Refills: 0      losartan (COZAAR) 50 MG tablet Take 1 tablet by mouth daily  Qty: 30 tablet, Refills: 0      fenofibrate (TRIGLIDE) 160 MG tablet TAKE 1 TABLET BY MOUTH ONCE DAILY  Qty: 30 tablet, Refills: 0      rOPINIRole (REQUIP) 1 MG tablet Take 1 tablet by mouth 3 times daily for 60 doses  Qty: 60 tablet, Refills: 0      doxepin (SINEQUAN) 10 MG capsule TAKE UP TO 3 CAPSULES BY MOUTH AT BEDTIME AS DIRECTED FOR INSOMNIA  Qty: 30 capsule, Refills: 1         CONTINUE these medications which have NOT CHANGED    Details   brompheniramine-pseudoephedrine-DM 2-30-10 MG/5ML syrup Take 5 mLs by mouth 4 times daily as needed for Congestion or Cough  Qty: 120 mL, Refills: 0      ketoconazole (NIZORAL) 2 % cream Apply topically daily.   Qty: 30 g, Refills: 1      aspirin 325 MG EC tablet Take 1 tablet by mouth daily for 14 days  Qty: 14 tablet, Refills: 0      docusate sodium (COLACE) 100 MG capsule Take 1 capsule by mouth 2 times daily as needed for Constipation  Qty: 20 capsule, Refills: 1      ondansetron (ZOFRAN) 4 MG tablet Take 1 tablet by mouth every 8 hours as needed for Nausea or Vomiting  Qty: 20 tablet, Refills: 0      Cyanocobalamin (VITAMIN B 12 PO) Take 5,000 mcg by mouth daily       Potassium 99 MG TABS Take by mouth daily      atorvastatin (LIPITOR) 40 MG tablet TAKE 1 TABLET BY MOUTH IN THE EVENING FOR CHOLESTEROL      hydrochlorothiazide (MICROZIDE) 12.5 MG capsule Take 12.5 mg by mouth daily      GLIPIZIDE PO Take 5 mg by mouth 2 times daily       CARVEDILOL PO Take 25 mg by mouth 2 times daily                Procedures     ANGELICA Baumann,   11/05/20 1007

## 2020-11-12 ENCOUNTER — OFFICE VISIT (OUTPATIENT)
Dept: ORTHOPEDIC SURGERY | Age: 65
End: 2020-11-12
Payer: MEDICAID

## 2020-11-12 VITALS — BODY MASS INDEX: 30.92 KG/M2 | WEIGHT: 216 LBS | HEIGHT: 70 IN

## 2020-11-12 PROCEDURE — 99213 OFFICE O/P EST LOW 20 MIN: CPT | Performed by: ORTHOPAEDIC SURGERY

## 2020-11-12 PROCEDURE — G8484 FLU IMMUNIZE NO ADMIN: HCPCS | Performed by: ORTHOPAEDIC SURGERY

## 2020-11-12 PROCEDURE — G8427 DOCREV CUR MEDS BY ELIG CLIN: HCPCS | Performed by: ORTHOPAEDIC SURGERY

## 2020-11-12 PROCEDURE — G8417 CALC BMI ABV UP PARAM F/U: HCPCS | Performed by: ORTHOPAEDIC SURGERY

## 2020-11-12 PROCEDURE — 1036F TOBACCO NON-USER: CPT | Performed by: ORTHOPAEDIC SURGERY

## 2020-11-12 PROCEDURE — 3017F COLORECTAL CA SCREEN DOC REV: CPT | Performed by: ORTHOPAEDIC SURGERY

## 2020-11-12 NOTE — PROGRESS NOTES
Chief Complaint   Patient presents with    Knee Pain     Right knee follow up. Patient doing well and recovered nicely from ACL-R. DOS 7/13/2020         HPI:      Patient is 60 y.o. male here for 4-month follow-up of right knee ACL and meniscectomy. Patient has been back to work for approximately 1 month at this point with no new issues. Patient denies any new instability or injury. Patient has no pain as well. Patient has not been wearing any type of brace which is recommended at last visit. ROS:    Skin: (-) rash,(-) psoriasis,(-) eczema, (-)skin cancer. Neurologic: (-)numbness, (-)tingling, (-)headaches, (-) LOC. Cardiovascular: (-) Chest pain, (-) swelling in legs/feet, (-) SOB, (-) cramping in legs/feet with walking. All other review of systems negative except stated above or in HPI      Past Medical History:   Diagnosis Date    Arthritis     Diabetes mellitus ([facility] [address]     Hyperlipidemia     Hypertension      Past Surgical History:   Procedure Laterality Date    ANTERIOR CRUCIATE LIGAMENT REPAIR Right 7/13/2020    RIGHT KNEE ARTHROSCOPY, (ANTERIOR CRUCIATE LIGAMENT) ACL RECONSTRUCTION. ALLOGRAFT.  MEDIAL MENISCECTOMY AND DEBRIDEMENT. ([address]) performed by Elvia Higgins DO at [address], LAPAROSCOPIC N/A 5/18/2020    CHOLECYSTECTOMY LAPAROSCOPIC WITH IOC performed by Angelina Bruno MD at [place] [address]         Current Outpatient Medications:     Elastic Bandages & Supports (ACE KNEE BRACE HINGED) MISC, 1 Units by Does not apply route once for 1 dose, Disp: 1 each, Rfl: 0    albuterol sulfate HFA (VENTOLIN HFA) 108 (90 Base) MCG/ACT inhaler, Inhale 2 puffs into the lungs 4 times daily as needed for Wheezing, Disp: 1 Inhaler, Rfl: 0    losartan (COZAAR) 50 MG tablet, Take 1 tablet by mouth daily, Disp: 30 tablet, Rfl: 0    fenofibrate (TRIGLIDE) 160 MG tablet, TAKE 1 TABLET BY MOUTH ONCE DAILY, Disp: 30 tablet, Rfl: 0    rOPINIRole (REQUIP) 1 MG tablet, Take 1 tablet by mouth 3 times daily for 60 doses, Disp: 60 tablet, Rfl: 0    doxepin (SINEQUAN) 10 MG capsule, TAKE UP TO 3 CAPSULES BY MOUTH AT BEDTIME AS DIRECTED FOR INSOMNIA, Disp: 30 capsule, Rfl: 1    brompheniramine-pseudoephedrine-DM 2-30-10 MG/5ML syrup, Take 5 mLs by mouth 4 times daily as needed for Congestion or Cough, Disp: 120 mL, Rfl: 0    ketoconazole (NIZORAL) 2 % cream, Apply topically daily. , Disp: 30 g, Rfl: 1    aspirin 325 MG EC tablet, Take 1 tablet by mouth daily for 14 days, Disp: 14 tablet, Rfl: 0    docusate sodium (COLACE) 100 MG capsule, Take 1 capsule by mouth 2 times daily as needed for Constipation, Disp: 20 capsule, Rfl: 1    ondansetron (ZOFRAN) 4 MG tablet, Take 1 tablet by mouth every 8 hours as needed for Nausea or Vomiting, Disp: 20 tablet, Rfl: 0    Cyanocobalamin (VITAMIN B 12 PO), Take 5,000 mcg by mouth daily , Disp: , Rfl:     Potassium 99 MG TABS, Take by mouth daily, Disp: , Rfl:     atorvastatin (LIPITOR) 40 MG tablet, TAKE 1 TABLET BY MOUTH IN THE EVENING FOR CHOLESTEROL, Disp: , Rfl:     hydrochlorothiazide (MICROZIDE) 12.5 MG capsule, Take 12.5 mg by mouth daily, Disp: , Rfl:     GLIPIZIDE PO, Take 5 mg by mouth 2 times daily , Disp: , Rfl:     CARVEDILOL PO, Take 25 mg by mouth 2 times daily , Disp: , Rfl:   No Known Allergies  Social History     Socioeconomic History    Marital status: Legally      Spouse name: Not on file    Number of children: Not on file    Years of education: Not on file    Highest education level: Not on file   Occupational History    Not on file   Social Needs    Financial resource strain: Not on file    Food insecurity     Worry: Not on file     Inability: Not on file    Transportation needs     Medical: Not on file     Non-medical: Not on file   Tobacco Use    Smoking status: Former Smoker     Types: Cigarettes     Last attempt to quit: 1986     Years since quittin.7    Smokeless tobacco: Former User     Quit date: 2/24/1986   Substance and Sexual Activity    Alcohol use: Yes     Comment: beer    Drug use: No    Sexual activity: Not on file   Lifestyle    Physical activity     Days per week: Not on file     Minutes per session: Not on file    Stress: Not on file   Relationships    Social connections     Talks on phone: Not on file     Gets together: Not on file     Attends Taoist service: Not on file     Active member of club or organization: Not on file     Attends meetings of clubs or organizations: Not on file     Relationship status: Not on file    Intimate partner violence     Fear of current or ex partner: Not on file     Emotionally abused: Not on file     Physically abused: Not on file     Forced sexual activity: Not on file   Other Topics Concern    Not on file   Social History Narrative    Not on file     No family history on file. Physical Exam:    Ht 5' 10\" (1.778 m)   Wt 216 lb (98 kg)   BMI 30.99 kg/m²     GENERAL: alert, appears stated age, cooperative, no acute distress    HEENT: Head is normocephalic, atraumatic. PERRLA. SKIN: Clean, dry, intact. There is not any cellulitis or cutaneous lesions noted in the lower extremities except noted in MSK    PULMONARY: breathing is regular and unlabored, no acute distress    CV: The bilateral upper and lower extremities are warm and well-perfused with brisk capillary refill. 2+ pulses UE and LE bilateral.     PSYCHIATRY: Pleasant mood, appropriate behavior, follows commands    NEURO: Sensation is intact distally with light touch with no alteration. Motor exam of the lower extremities show quadriceps, hamstrings, foot dorsiflexion and plantarflexion grossly intact 5/5. LYMPH: No lymphedema present distally in upper or lower extremity. MUSCULOSKELETAL:    Right Knee Exam:    No effusion noted. No erythema/induration/fluctuance. No posterior medial joint line TTP.   Stable to varus and valgus at 0 and 30 degrees of flexion. Negative Lachman's and posterior drawer. Negative patellar grind test and J sign. Compartments soft and compressible throughout leg. Active range of motion 0-120 with no pain. Negative Pauly's normal Apley's, gait is normal negative anterior negative Lachman test        Imaging:  Surgical pictures reviewed with patient showing meniscectomy and ACL reconstruction. Mri Knee Right Wo Contrast    Result Date: 6/26/2020  EXAMINATION: MRI OF THE RIGHT KNEE WITHOUT CONTRAST, 6/26/2020 2:13 pm TECHNIQUE: Multiplanar multisequence MRI of the right knee was performed without the administration of intravenous contrast. COMPARISON: None. HISTORY: ORDERING SYSTEM PROVIDED HISTORY: Tear of meniscus of right knee, unspecified meniscus, unspecified tear type, unspecified whether old or current tear Initial evaluation of acute right knee pain after twisting injury 2 weeks ago stepping out of a truck. FINDINGS: MENISCI: Complex tear is present in the medial meniscus. Vertical longitudinal tear morphology involves the anterior horn and body with extension into the posterior horn. A radial component is suspected in the posterior horn near the meniscal root. There is a flipped fragment into the intercondylar notch. The lateral meniscus is intact. CRUCIATE LIGAMENTS: Acute ACL tear. PCL is intact. EXTENSOR MECHANISM:  Quadriceps and patellar tendons are intact. LATERAL COLLATERAL LIGAMENT COMPLEX: The popliteus tendon, biceps femoris tendon, fibular collateral ligament and iliotibial band are intact. MEDIAL COLLATERAL LIGAMENT COMPLEX: The superficial and deep components of the medial collateral ligament are intact. KNEE JOINT: Cartilage fissuring in the lateral femoral trochlea. Patellofemoral cartilage is otherwise maintained. Medial and lateral compartment articular cartilage is maintained. Moderate-sized joint effusion. Ruptured popliteal cyst with edema in the popliteal fossa and proximal calf.  BONE MARROW: Mild bone contusions of the posteromedial tibial plateau and lateral tibial plateau. No fracture or marrow replacing lesion. 1. Acute ACL tear. 2. Extensive medial meniscus tear with flipped fragment into the intercondylar notch. 3. Mild patellofemoral osteoarthritis. 4. Moderate joint effusion with ruptured popliteal cyst. 5. Mild bone contusions of the posterior tibial plateau. Dionte Boyce was seen today for knee pain. Diagnoses and all orders for this visit:    Rupture of anterior cruciate ligament of right knee, subsequent encounter  -     Elastic Bandages & Supports (ACE KNEE BRACE HINGED) MISC; 1 Units by Does not apply route once for 1 dose        Patient seen and examined. X-rays reviewed. Natural history and course discussed with patient. Treatment options discussed with patient in detail including risks and benefits. Patient should do well with conservative management at this time. Patient is 3 to 4 months since surgery and doing exceptionally well at this time. Patient was cautioned about long-term history and bone growth of ligament and to protect the graft. Patient will be provided a small knee brace since he continues to work as a . Follow-up in 6 to 8 weeks for recheck.       Mireille Buchanan, DO

## 2020-11-13 ENCOUNTER — OFFICE VISIT (OUTPATIENT)
Dept: FAMILY MEDICINE CLINIC | Age: 65
End: 2020-11-13
Payer: MEDICAID

## 2020-11-13 VITALS
DIASTOLIC BLOOD PRESSURE: 70 MMHG | HEART RATE: 60 BPM | TEMPERATURE: 97.7 F | WEIGHT: 223 LBS | HEIGHT: 70 IN | OXYGEN SATURATION: 96 % | SYSTOLIC BLOOD PRESSURE: 110 MMHG | RESPIRATION RATE: 18 BRPM | BODY MASS INDEX: 31.92 KG/M2

## 2020-11-13 DIAGNOSIS — E78.01 FAMILIAL HYPERCHOLESTEROLEMIA: ICD-10-CM

## 2020-11-13 DIAGNOSIS — E11.9 TYPE 2 DIABETES MELLITUS WITHOUT COMPLICATION, WITHOUT LONG-TERM CURRENT USE OF INSULIN (HCC): Chronic | ICD-10-CM

## 2020-11-13 DIAGNOSIS — I10 ESSENTIAL HYPERTENSION: ICD-10-CM

## 2020-11-13 DIAGNOSIS — Z12.5 SCREENING FOR PROSTATE CANCER: ICD-10-CM

## 2020-11-13 PROBLEM — K81.0 ACUTE ACALCULOUS CHOLECYSTITIS: Status: RESOLVED | Noted: 2020-05-18 | Resolved: 2020-11-13

## 2020-11-13 PROBLEM — K81.9 CHOLECYSTITIS: Status: RESOLVED | Noted: 2020-05-18 | Resolved: 2020-11-13

## 2020-11-13 PROBLEM — R06.09 DOE (DYSPNEA ON EXERTION): Status: RESOLVED | Noted: 2020-07-15 | Resolved: 2020-11-13

## 2020-11-13 LAB
ALBUMIN SERPL-MCNC: 4.5 G/DL (ref 3.5–5.2)
ALP BLD-CCNC: 62 U/L (ref 40–129)
ALT SERPL-CCNC: 28 U/L (ref 0–40)
ANION GAP SERPL CALCULATED.3IONS-SCNC: 14 MMOL/L (ref 7–16)
AST SERPL-CCNC: 21 U/L (ref 0–39)
BASOPHILS ABSOLUTE: 0.04 E9/L (ref 0–0.2)
BASOPHILS RELATIVE PERCENT: 0.8 % (ref 0–2)
BILIRUB SERPL-MCNC: 0.7 MG/DL (ref 0–1.2)
BUN BLDV-MCNC: 19 MG/DL (ref 8–23)
CALCIUM SERPL-MCNC: 9.7 MG/DL (ref 8.6–10.2)
CHLORIDE BLD-SCNC: 103 MMOL/L (ref 98–107)
CHOLESTEROL, TOTAL: 181 MG/DL (ref 0–199)
CO2: 24 MMOL/L (ref 22–29)
CREAT SERPL-MCNC: 1.2 MG/DL (ref 0.7–1.2)
EOSINOPHILS ABSOLUTE: 0.27 E9/L (ref 0.05–0.5)
EOSINOPHILS RELATIVE PERCENT: 5.4 % (ref 0–6)
GFR AFRICAN AMERICAN: >60
GFR NON-AFRICAN AMERICAN: >60 ML/MIN/1.73
GLUCOSE BLD-MCNC: 198 MG/DL (ref 74–99)
HBA1C MFR BLD: 7.8 %
HCT VFR BLD CALC: 42.2 % (ref 37–54)
HDLC SERPL-MCNC: 35 MG/DL
HEMOGLOBIN: 14.4 G/DL (ref 12.5–16.5)
IMMATURE GRANULOCYTES #: 0.02 E9/L
IMMATURE GRANULOCYTES %: 0.4 % (ref 0–5)
LDL CHOLESTEROL CALCULATED: 96 MG/DL (ref 0–99)
LYMPHOCYTES ABSOLUTE: 1.59 E9/L (ref 1.5–4)
LYMPHOCYTES RELATIVE PERCENT: 31.6 % (ref 20–42)
MCH RBC QN AUTO: 30.4 PG (ref 26–35)
MCHC RBC AUTO-ENTMCNC: 34.1 % (ref 32–34.5)
MCV RBC AUTO: 89 FL (ref 80–99.9)
MONOCYTES ABSOLUTE: 0.5 E9/L (ref 0.1–0.95)
MONOCYTES RELATIVE PERCENT: 9.9 % (ref 2–12)
NEUTROPHILS ABSOLUTE: 2.61 E9/L (ref 1.8–7.3)
NEUTROPHILS RELATIVE PERCENT: 51.9 % (ref 43–80)
PDW BLD-RTO: 13 FL (ref 11.5–15)
PLATELET # BLD: 228 E9/L (ref 130–450)
PMV BLD AUTO: 10.5 FL (ref 7–12)
POTASSIUM SERPL-SCNC: 4.1 MMOL/L (ref 3.5–5)
PROSTATE SPECIFIC ANTIGEN: 2.16 NG/ML (ref 0–4)
RBC # BLD: 4.74 E12/L (ref 3.8–5.8)
SODIUM BLD-SCNC: 141 MMOL/L (ref 132–146)
TOTAL PROTEIN: 7.2 G/DL (ref 6.4–8.3)
TRIGL SERPL-MCNC: 248 MG/DL (ref 0–149)
VITAMIN D 25-HYDROXY: 26 NG/ML (ref 30–100)
VLDLC SERPL CALC-MCNC: 50 MG/DL
WBC # BLD: 5 E9/L (ref 4.5–11.5)

## 2020-11-13 PROCEDURE — 83036 HEMOGLOBIN GLYCOSYLATED A1C: CPT | Performed by: FAMILY MEDICINE

## 2020-11-13 PROCEDURE — 90694 VACC AIIV4 NO PRSRV 0.5ML IM: CPT | Performed by: FAMILY MEDICINE

## 2020-11-13 PROCEDURE — 3017F COLORECTAL CA SCREEN DOC REV: CPT | Performed by: FAMILY MEDICINE

## 2020-11-13 PROCEDURE — 3051F HG A1C>EQUAL 7.0%<8.0%: CPT | Performed by: FAMILY MEDICINE

## 2020-11-13 PROCEDURE — G8417 CALC BMI ABV UP PARAM F/U: HCPCS | Performed by: FAMILY MEDICINE

## 2020-11-13 PROCEDURE — G8427 DOCREV CUR MEDS BY ELIG CLIN: HCPCS | Performed by: FAMILY MEDICINE

## 2020-11-13 PROCEDURE — 99204 OFFICE O/P NEW MOD 45 MIN: CPT | Performed by: FAMILY MEDICINE

## 2020-11-13 PROCEDURE — G8484 FLU IMMUNIZE NO ADMIN: HCPCS | Performed by: FAMILY MEDICINE

## 2020-11-13 PROCEDURE — 2022F DILAT RTA XM EVC RTNOPTHY: CPT | Performed by: FAMILY MEDICINE

## 2020-11-13 PROCEDURE — 90471 IMMUNIZATION ADMIN: CPT | Performed by: FAMILY MEDICINE

## 2020-11-13 PROCEDURE — 1036F TOBACCO NON-USER: CPT | Performed by: FAMILY MEDICINE

## 2020-11-13 RX ORDER — FENOFIBRATE 160 MG/1
TABLET ORAL
Qty: 30 TABLET | Refills: 0 | Status: SHIPPED
Start: 2020-11-13 | End: 2020-12-10 | Stop reason: SDUPTHER

## 2020-11-13 RX ORDER — IBUPROFEN 800 MG/1
TABLET ORAL
Status: ON HOLD | COMMUNITY
Start: 2020-11-05 | End: 2021-06-01 | Stop reason: HOSPADM

## 2020-11-13 RX ORDER — ROPINIROLE 1 MG/1
1 TABLET, FILM COATED ORAL 3 TIMES DAILY
Qty: 60 TABLET | Refills: 0 | Status: SHIPPED
Start: 2020-11-13 | End: 2020-12-01 | Stop reason: SDUPTHER

## 2020-11-13 RX ORDER — DOXEPIN HYDROCHLORIDE 10 MG/1
CAPSULE ORAL
Qty: 30 CAPSULE | Refills: 1 | Status: CANCELLED | OUTPATIENT
Start: 2020-11-13

## 2020-11-13 RX ORDER — CANAGLIFLOZIN 300 MG/1
300 TABLET, FILM COATED ORAL
Qty: 90 TABLET | Refills: 1 | Status: SHIPPED
Start: 2020-11-13 | End: 2021-02-16 | Stop reason: CLARIF

## 2020-11-13 RX ORDER — HYDROCHLOROTHIAZIDE 12.5 MG/1
12.5 CAPSULE, GELATIN COATED ORAL DAILY
Qty: 30 CAPSULE | Refills: 3 | Status: SHIPPED | OUTPATIENT
Start: 2020-11-13 | End: 2020-12-24 | Stop reason: SDUPTHER

## 2020-11-13 RX ORDER — LOSARTAN POTASSIUM 50 MG/1
50 TABLET ORAL DAILY
Qty: 30 TABLET | Refills: 0 | Status: SHIPPED
Start: 2020-11-13 | End: 2021-01-18 | Stop reason: SDUPTHER

## 2020-11-13 RX ORDER — ATORVASTATIN CALCIUM 40 MG/1
TABLET, FILM COATED ORAL
Qty: 30 TABLET | Refills: 3 | Status: SHIPPED
Start: 2020-11-13 | End: 2021-01-18 | Stop reason: SDUPTHER

## 2020-11-13 RX ORDER — KETOCONAZOLE 20 MG/G
CREAM TOPICAL
Qty: 30 G | Refills: 1 | Status: SHIPPED
Start: 2020-11-13 | End: 2021-02-16 | Stop reason: ALTCHOICE

## 2020-11-13 ASSESSMENT — PATIENT HEALTH QUESTIONNAIRE - PHQ9
SUM OF ALL RESPONSES TO PHQ9 QUESTIONS 1 & 2: 0
1. LITTLE INTEREST OR PLEASURE IN DOING THINGS: 0
SUM OF ALL RESPONSES TO PHQ QUESTIONS 1-9: 0
2. FEELING DOWN, DEPRESSED OR HOPELESS: 0
SUM OF ALL RESPONSES TO PHQ QUESTIONS 1-9: 0
SUM OF ALL RESPONSES TO PHQ QUESTIONS 1-9: 0

## 2020-11-13 NOTE — PROGRESS NOTES
OFFICE PROGRESS NOTE      SUBJECTIVE:        Patient ID:   Rose Marie Flood is a 60 y.o. male who presents for   Chief Complaint   Patient presents with    Established New Doctor     Previous pcp Dr Annika Campbell and patient is fasting     Diabetes     need glucometer    Health Maintenance     a1c and shingle and flu           HPI:     FEELS GOOD. HAS BAD FUNGAL INFECTION OF TOE NAILS. REQUEST FOOT DOCTOR REFERRAL. CANCERS RUN IN THE FAMILY. REQUEST CHECK UP.  WATCHING DIET BUT NOT GOOD. NO  EXERCISE. TAKING MEDICATIONS REGULARLY. Prior to Admission medications    Medication Sig Start Date End Date Taking? Authorizing Provider   losartan (COZAAR) 50 MG tablet Take 1 tablet by mouth daily 11/13/20  Yes Carissa Diallo MD   fenofibrate (TRIGLIDE) 160 MG tablet TAKE 1 TABLET BY MOUTH ONCE DAILY 11/13/20  Yes Carissa Diallo MD   rOPINIRole (REQUIP) 1 MG tablet Take 1 tablet by mouth 3 times daily for 60 doses 11/13/20 12/3/20 Yes Carissa Diallo MD   atorvastatin (LIPITOR) 40 MG tablet TAKE 1 TABLET BY MOUTH IN THE EVENING FOR CHOLESTEROL 11/13/20  Yes Carissa Diallo MD   hydroCHLOROthiazide (MICROZIDE) 12.5 MG capsule Take 1 capsule by mouth daily 11/13/20  Yes Carissa Diallo MD   ketoconazole (NIZORAL) 2 % cream Apply topically daily.  11/13/20  Yes Carissa Diallo MD   canagliflozin (INVOKANA) 300 MG TABS tablet Take 1 tablet by mouth every morning (before breakfast) 11/13/20  Yes Carissa Diallo MD   Elastic Bandages & Supports (ACE KNEE BRACE HINGED) [address] [facility] 1 Units by Does not apply route once for 1 dose 11/12/20 11/13/20 Yes Jese Oliveira, DO   albuterol sulfate HFA (VENTOLIN HFA) 108 (90 Base) MCG/ACT inhaler Inhale 2 puffs into the lungs 4 times daily as needed for Wheezing 11/5/20  Yes Kamla Mora, DO   doxepin (SINEQUAN) 10 MG capsule TAKE UP TO 3 CAPSULES BY MOUTH AT BEDTIME AS DIRECTED FOR INSOMNIA 11/5/20  Yes Dinorah Mora, DO   aspirin 325 MG EC tablet Take 1 tablet by mouth daily for 14 days 20 Yes Jameson Aly MD   Potassium 99 MG TABS Take by mouth daily   Yes Historical Provider, MD   CARVEDILOL PO Take 25 mg by mouth 2 times daily    Yes Historical Provider, MD   ibuprofen (ADVIL;MOTRIN) 800 MG tablet take 1 tablet by mouth every 8 hours if needed for pain 20   Historical Provider, MD   docusate sodium (COLACE) 100 MG capsule Take 1 capsule by mouth 2 times daily as needed for Constipation  Patient not taking: Reported on 2020   Ana Fleming DO     Social History     Socioeconomic History    Marital status: Legally      Spouse name: None    Number of children: None    Years of education: None    Highest education level: None   Occupational History    None   Social Needs    Financial resource strain: None    Food insecurity     Worry: None     Inability: None    Transportation needs     Medical: None     Non-medical: None   Tobacco Use    Smoking status: Former Smoker     Packs/day: 0.50     Years: 15.00     Pack years: 7.50     Types: Cigarettes     Start date: 1965     Last attempt to quit: 1986     Years since quittin.7    Smokeless tobacco: Former User     Quit date: 1986   Substance and Sexual Activity    Alcohol use: Yes     Comment: beer    Drug use: No    Sexual activity: None   Lifestyle    Physical activity     Days per week: None     Minutes per session: None    Stress: None   Relationships    Social connections     Talks on phone: None     Gets together: None     Attends Synagogue service: None     Active member of club or organization: None     Attends meetings of clubs or organizations: None     Relationship status: None    Intimate partner violence     Fear of current or ex partner: None     Emotionally abused: None     Physically abused: None     Forced sexual activity: None   Other Topics Concern    None   Social History Narrative    None       I have reviewed Jeffery's allergies, medications, problem list, medical, social and family history and have updated as needed in the electronic medical record  Review Of Systems:    Skin: no abnormal pigmentation, rash, scaling, itching, masses, hair or nail changes  Eyes: no blurring, diplopia, or eye pain  Ears/Nose/Throat: no hearing loss, tinnitus, vertigo, nosebleed, nasal congestion, rhinorrhea, sore throat  Respiratory: no cough, pleuritic chest pain, dyspnea, or wheezing  Cardiovascular: no chest pain, angina, dyspnea on exertion, orthopnea, PND, palpitations, or claudication  Gastrointestinal: no nausea, vomiting, heartburn, diarrhea, constipation, bloating,  abdominal pain, or blood per rectum. Appetite is good  Genitourinary: no urinary urgency, frequency, dysuria, nocturia, hesitancy, or incontinence  Musculoskeletal: joint pains off and on. Morning stiffness. Ambulating well  Neurologic: no paralysis, paresis, paresthesia, seizures, tremors, or headaches  Hematologic/Lymphatic/Immunologic: no anemia, abnormal bleeding/bruising, fever, chills, night sweats, swollen glands, or unexplained weight loss  Endocrine: no heat or cold intolerance and no polyphagia, polydipsia, or polyuria        OBJECTIVE:     VS:  Wt Readings from Last 3 Encounters:   11/13/20 223 lb (101.2 kg)   11/12/20 216 lb (98 kg)   11/05/20 216 lb (98 kg)     Temp Readings from Last 3 Encounters:   11/13/20 97.7 °F (36.5 °C)   11/05/20 97.3 °F (36.3 °C)   10/03/20 98.1 °F (36.7 °C) (Oral)     BP Readings from Last 3 Encounters:   11/13/20 110/70   11/05/20 (!) 155/86   10/03/20 (!) 157/80        General appearance: Alert, Awake, Oriented times 3, no distress  Skin: Warm and dry  Head: Normocephalic. No masses, lesions or tenderness noted  Eyes: Conjunctivae appear normal. PERLE  Ears: External ears normal  Nose/Sinuses: Nares normal. Septum midline.  Mucosa normal. No drainage  Oropharynx: Oropharynx clear with no exudate seen  Neck: Neck supple. No jugular venous distension, lymphadenopathy or thyromegaly Trachea midline  Chest:  Normal. Movements are Normal and Equal.  Lungs: Lungs clear to auscultation bilaterally. No ronchi, crackles or wheezes  Heart: S1 S2  Regular rate and rhythm. No rub, murmur or gallop  Abdomen: Abdomen soft, non-tender. BS normal. No masses, organomegaly. Back: Grossly Normal and Equal. DTR are Normal. SLR is Normal.  Extremities: Arthritic changes are noted. Movements are limited. Pedal pulses are normal.  Musculoskeletal: Muscular strength appears intact. No joint effusion, tenderness, swelling or warmth  Neuro:  No focal motor or sensory deficits        ASSESSMENT     Patient Active Problem List    Diagnosis Date Noted    Rupture of anterior cruciate ligament of right knee     COPD with acute exacerbation ([facility] [address] 07/15/2020    Hypertension     Diabetes mellitus (HCC)     Hyperlipidemia     CKD (chronic kidney disease) stage 2, GFR 60-89 ml/min     Tinea corporis 02/24/2011        Diagnosis:     ICD-10-CM    1. Type 2 diabetes mellitus without complication, without long-term current use of insulin (HCC)  E11.9 POCT glycosylated hemoglobin (Hb A1C)     Vitamin D 25 Hydroxy     [facility], [facility], [place], [facility], [place]   2. Familial hypercholesterolemia  E78.01 Comprehensive Metabolic Panel     Lipid Panel   3. Essential hypertension  I10 CBC Auto Differential   4. CKD (chronic kidney disease) stage 2, GFR 60-89 ml/min  N18.2    5. Screening for prostate cancer  Z12.5 Psa screening   6. Screening for colon cancer  Z12.11 Cologuard   7. Onychomycosis of toenail  B35.1    8. Flu vaccine need  Z23        PLAN:           Patient Instructions   LOW SALT,LOW CARB. AND LOW FAT DIET. CONTINUE CURRENT MEDICATIONS TAKING REGULARLY. STOP TAKING GLIPIZIDE. TAKE INVOKANA 300 MG. 1 TAB. DAILY. REGULAR WALKING ADVISED. ADVISED WEIGHT REDUCTION. SEE PODIATRIST AS SCHEDULED.  BLOOD DRAW  NOW FOR LAB. TESTING. INJECTION FLU VACCINE GIVEN TODAY. CALL FOR  ANY ADVERSE REACTION. NEXT APPOINTMENT IN 3 MONTHS. Return in about 1 month (around 12/13/2020) for FOLLOW UP VISIT. I have reviewed my findings and recommendations with Val Pratt.     Electronically signed by Ross Crawford MD on 11/13/20 at 9:29 AM EST

## 2020-11-16 RX ORDER — CARVEDILOL 25 MG/1
25 TABLET ORAL 2 TIMES DAILY
Qty: 60 TABLET | Refills: 3 | Status: SHIPPED | OUTPATIENT
Start: 2020-11-16 | End: 2020-12-24 | Stop reason: SDUPTHER

## 2020-11-16 NOTE — TELEPHONE ENCOUNTER
Pt. Called insurance wont pay for invokana is there something else     Last seen 11/13/2020  Next appt Visit date not found    [facility] [address]

## 2020-11-17 ENCOUNTER — TELEPHONE (OUTPATIENT)
Dept: FAMILY MEDICINE CLINIC | Age: 65
End: 2020-11-17

## 2020-11-17 NOTE — TELEPHONE ENCOUNTER
----- Message from Kenn Joya MD sent at 11/16/2020  8:40 AM EST -----  VITAMIN D LEVEL LOW. TAKE VITAMIN D-3 2000 UNITS DAILY. DISCUSS NEXT VISIT. TRIGLYCERIDE AND BLOOD SUGARS ARE HIGH. LOW SALT,LOW CARB. AND LOW FAT DIET. CONTINUE CURRENT MEDICATIONS TAKING REGULARLY. REGULAR WALKING ADVISED. ADVISED WEIGHT REDUCTION.

## 2020-11-17 NOTE — TELEPHONE ENCOUNTER
VITAMIN D LEVEL LOW. TAKE VITAMIN D-3 2000 UNITS DAILY. DISCUSS NEXT VISIT. TRIGLYCERIDE AND BLOOD SUGARS ARE HIGH. LOW SALT,LOW CARB.  AND LOW FAT DIET. CONTINUE CURRENT MEDICATIONS TAKING REGULARLY. REGULAR WALKING ADVISED. ADVISED WEIGHT REDUCTION.

## 2020-11-19 RX ORDER — GLUCOSAMINE HCL/CHONDROITIN SU 500-400 MG
1 CAPSULE ORAL 3 TIMES DAILY
Qty: 100 STRIP | Refills: 3 | Status: SHIPPED | OUTPATIENT
Start: 2020-11-19 | End: 2021-03-23 | Stop reason: SDUPTHER

## 2020-11-19 RX ORDER — DIPHENHYDRAMINE HYDROCHLORIDE 25 MG/1
1 CAPSULE, LIQUID FILLED ORAL 3 TIMES DAILY
Qty: 1 KIT | Refills: 0 | Status: SHIPPED | OUTPATIENT
Start: 2020-11-19

## 2020-11-19 RX ORDER — GLUCOSAMINE HCL/CHONDROITIN SU 500-400 MG
1 CAPSULE ORAL 3 TIMES DAILY
COMMUNITY
End: 2020-11-19 | Stop reason: SDUPTHER

## 2020-11-19 RX ORDER — DIPHENHYDRAMINE HYDROCHLORIDE 25 MG/1
1 CAPSULE, LIQUID FILLED ORAL 3 TIMES DAILY
COMMUNITY
End: 2020-11-19 | Stop reason: SDUPTHER

## 2020-11-20 ENCOUNTER — TELEPHONE (OUTPATIENT)
Dept: FAMILY MEDICINE CLINIC | Age: 65
End: 2020-11-20

## 2020-11-20 RX ORDER — GLIPIZIDE 5 MG/1
5 TABLET ORAL 2 TIMES DAILY
Qty: 60 TABLET | Refills: 0 | OUTPATIENT
Start: 2020-11-20 | End: 2020-11-30 | Stop reason: DRUGHIGH

## 2020-11-20 NOTE — TELEPHONE ENCOUNTER
Pt called upset because his insurance isn't covering any of the diabetes meds that have been ordered. Pt said he just picked up his glucometer and his sugar is 508. Pt reports only eating cereal this am because he's unemployed and can't afford it. Pt denies having any sx today and says he feels fine. Called and spoke with the doctor; per Dr JOAN Hernadez's order, pt is to restart Glipizide 5 mg bid, keep track of his sugar readings and call in Monday morning to report them. If pt has any problems over the weekend to go to the ED. Verbal order called to [facility] (Matthew Bee). Pt informed, agreeable and verbalized understanding.

## 2020-11-25 NOTE — TELEPHONE ENCOUNTER
Pt called in with blood glucose readings. AM  PM  11/21  360  402   11/22  280  260   11/23  220  220   11/24  240  260   11/25  197    Pt says he's been taking his Glipizide 10 mg bid instead of 5 mg bid to try to bring his numbers down. Please advise if any changes.

## 2020-11-30 ENCOUNTER — TELEPHONE (OUTPATIENT)
Dept: ADMINISTRATIVE | Age: 65
End: 2020-11-30

## 2020-11-30 RX ORDER — GLIPIZIDE 10 MG/1
10 TABLET ORAL 2 TIMES DAILY
Qty: 60 TABLET | Refills: 2 | Status: SHIPPED | OUTPATIENT
Start: 2020-11-30 | End: 2020-12-24 | Stop reason: SDUPTHER

## 2020-11-30 NOTE — TELEPHONE ENCOUNTER
Called and informed patient. Pt verbalized understanding and was agreeable. Pt requesting refills.     Last seen 11/13/2020  Next appt 12/4/2020  [facility] ([place])

## 2020-12-01 RX ORDER — ROPINIROLE 1 MG/1
1 TABLET, FILM COATED ORAL 3 TIMES DAILY
Qty: 60 TABLET | Refills: 0 | Status: SHIPPED
Start: 2020-12-01 | End: 2021-01-18 | Stop reason: SDUPTHER

## 2020-12-10 RX ORDER — FENOFIBRATE 160 MG/1
TABLET ORAL
Qty: 30 TABLET | Refills: 0 | Status: SHIPPED
Start: 2020-12-10 | End: 2021-01-18 | Stop reason: SDUPTHER

## 2020-12-11 NOTE — TELEPHONE ENCOUNTER
Patient called to inform his RX was sent to wrong pharmacy. Patient requested [facility] and it was [facility] to [facility].   [facility]

## 2020-12-14 ENCOUNTER — OFFICE VISIT (OUTPATIENT)
Dept: FAMILY MEDICINE CLINIC | Age: 65
End: 2020-12-14
Payer: MEDICAID

## 2020-12-14 VITALS
WEIGHT: 220 LBS | RESPIRATION RATE: 16 BRPM | SYSTOLIC BLOOD PRESSURE: 138 MMHG | OXYGEN SATURATION: 98 % | HEIGHT: 70 IN | DIASTOLIC BLOOD PRESSURE: 72 MMHG | TEMPERATURE: 97.2 F | BODY MASS INDEX: 31.5 KG/M2 | HEART RATE: 56 BPM

## 2020-12-14 PROBLEM — N52.39 POST-PROCEDURAL ERECTILE DYSFUNCTION: Status: RESOLVED | Noted: 2020-12-14 | Resolved: 2020-12-14

## 2020-12-14 PROBLEM — N52.39 POST-PROCEDURAL ERECTILE DYSFUNCTION: Status: ACTIVE | Noted: 2020-12-14

## 2020-12-14 PROCEDURE — 2022F DILAT RTA XM EVC RTNOPTHY: CPT | Performed by: FAMILY MEDICINE

## 2020-12-14 PROCEDURE — G8484 FLU IMMUNIZE NO ADMIN: HCPCS | Performed by: FAMILY MEDICINE

## 2020-12-14 PROCEDURE — 1036F TOBACCO NON-USER: CPT | Performed by: FAMILY MEDICINE

## 2020-12-14 PROCEDURE — 3017F COLORECTAL CA SCREEN DOC REV: CPT | Performed by: FAMILY MEDICINE

## 2020-12-14 PROCEDURE — 99214 OFFICE O/P EST MOD 30 MIN: CPT | Performed by: FAMILY MEDICINE

## 2020-12-14 PROCEDURE — G8427 DOCREV CUR MEDS BY ELIG CLIN: HCPCS | Performed by: FAMILY MEDICINE

## 2020-12-14 PROCEDURE — G8417 CALC BMI ABV UP PARAM F/U: HCPCS | Performed by: FAMILY MEDICINE

## 2020-12-14 PROCEDURE — 3051F HG A1C>EQUAL 7.0%<8.0%: CPT | Performed by: FAMILY MEDICINE

## 2020-12-14 RX ORDER — SILDENAFIL CITRATE 20 MG/1
20 TABLET ORAL DAILY PRN
Qty: 5 TABLET | Refills: 0 | Status: SHIPPED | OUTPATIENT
Start: 2020-12-14 | End: 2021-04-12 | Stop reason: SDUPTHER

## 2020-12-14 NOTE — PROGRESS NOTES
OFFICE PROGRESS NOTE      SUBJECTIVE:        Patient ID:   Misty Carreno is a 60 y.o. male who presents for   Chief Complaint   Patient presents with   [address] [facility] Maintenance     due for shingle shot and dm eye exam           HPI:     FEELS GOOD. WATCHING DIET BUT NOT GOOD. WALKING SOME IN HOUSE FOR EXERCISE. TAKING MEDICATIONS REGULARLY. WILL GO FOR EYE EXAM SOON. HAS PROBLEM MAINTAINING ERECTION. REQUEST MEDICATION TO HELP WITH THE PROBLEM. Prior to Admission medications    Medication Sig Start Date End Date Taking? Authorizing Provider   sildenafil (REVATIO) 20 MG tablet Take 1 tablet by mouth daily as needed (FOR SEXUAL DYSFUNCTION) 12/14/20  Yes Emerson Rice MD   fenofibrate (TRIGLIDE) 160 MG tablet TAKE 1 TABLET BY MOUTH ONCE DAILY 12/10/20  Yes Emerson Rice MD   rOPINIRole (REQUIP) 1 MG tablet Take 1 tablet by mouth 3 times daily for 60 doses 12/1/20 12/21/20 Yes Emerson Rice MD   glipiZIDE (GLUCOTROL) 10 MG tablet Take 1 tablet by mouth 2 times daily 11/30/20  Yes Emerson Rice MD   Lancets 30G MISC 1 each by Does not apply route 3 times daily Dispense what ever brand is covered by insurance 11/19/20  Yes Emerson Rice MD   blood glucose monitor strips 1 strip by Other route 3 times daily Test  Three times a day & as needed for symptoms of irregular blood glucose. Dispense sufficient amount for indicated testing frequency plus additional to accommodate PRN testing needs.     Please dispense what ever brand is covered 11/19/20  Yes Emerson Rice MD   Blood Glucose Monitoring Suppl (BLOOD GLUCOSE MONITOR SYSTEM) w/Device KIT 1 each by Does not apply route 3 times daily Dispense what ever brand is covered by insurance 11/19/20  Yes Emerson Rice MD   carvedilol (COREG) 25 MG tablet Take 1 tablet by mouth 2 times daily 11/16/20  Yes Emerson Rice MD   sAXagliptin (ONGLYZA) 5 MG TABS tablet Take 1 tablet by mouth daily 11/16/20  Yes Antonio Osullivan MD   ibuprofen (ADVIL;MOTRIN) 800 MG tablet take 1 tablet by mouth every 8 hours if needed for pain 11/5/20  Yes Historical Provider, MD   losartan (COZAAR) 50 MG tablet Take 1 tablet by mouth daily 11/13/20  Yes Antonio Osullivan MD   atorvastatin (LIPITOR) 40 MG tablet TAKE 1 TABLET BY MOUTH IN THE EVENING FOR CHOLESTEROL 11/13/20  Yes Antonio Osullivan MD   hydroCHLOROthiazide (MICROZIDE) 12.5 MG capsule Take 1 capsule by mouth daily 11/13/20  Yes Antonio Osullivan MD   ketoconazole (NIZORAL) 2 % cream Apply topically daily.  11/13/20  Yes Antonio Osullivan MD   canagliflozin (INVOKANA) 300 MG TABS tablet Take 1 tablet by mouth every morning (before breakfast) 11/13/20  Yes Antonio Osullivan MD   Elastic Bandages & Supports (ACE KNEE BRACE HINGED) [address] [facility] 1 Units by Does not apply route once for 1 dose 11/12/20 12/14/20 Yes Adam Mijares, DO   albuterol sulfate HFA (VENTOLIN HFA) 108 (90 Base) MCG/ACT inhaler Inhale 2 puffs into the lungs 4 times daily as needed for Wheezing 11/5/20  Yes Daniel Mora, DO   doxepin (SINEQUAN) 10 MG capsule TAKE UP TO 3 CAPSULES BY MOUTH AT BEDTIME AS DIRECTED FOR INSOMNIA 11/5/20  Yes Chantelle Mora, DO   aspirin 325 MG EC tablet Take 1 tablet by mouth daily for 14 days 7/17/20 12/14/20 Yes Usha Casper MD   docusate sodium (COLACE) 100 MG capsule Take 1 capsule by mouth 2 times daily as needed for Constipation 7/13/20  Yes Adam Mijares, DO   Potassium 99 MG TABS Take by mouth daily   Yes Historical Provider, MD     Social History     Socioeconomic History    Marital status: Legally      Spouse name: Not on file    Number of children: Not on file    Years of education: Not on file    Highest education level: Not on file   Occupational History    Not on file   Social Needs    Financial resource strain: Not on file    Food insecurity     Worry: Not on file     Inability: Not on file    Transportation needs     Medical: Not on file     Non-medical: Not on file Tobacco Use    Smoking status: Former Smoker     Packs/day: 0.50     Years: 15.00     Pack years: 7.50     Types: Cigarettes     Start date: 1965     Quit date: 1986     Years since quittin.8    Smokeless tobacco: Former User     Quit date: 1986   Substance and Sexual Activity    Alcohol use: Yes     Comment: beer    Drug use: No    Sexual activity: Not on file   Lifestyle    Physical activity     Days per week: Not on file     Minutes per session: Not on file    Stress: Not on file   Relationships    Social connections     Talks on phone: Not on file     Gets together: Not on file     Attends [employer] service: Not on file     Active member of club or organization: Not on file     Attends meetings of clubs or organizations: Not on file     Relationship status: Not on file    Intimate partner violence     Fear of current or ex partner: Not on file     Emotionally abused: Not on file     Physically abused: Not on file     Forced sexual activity: Not on file   Other Topics Concern    Not on file   Social History Narrative    Not on file       I have reviewed Jeffery's allergies, medications, problem list, medical, social and family history and have updated as needed in the electronic medical record  Review Of Systems:    Skin: no abnormal pigmentation, rash, scaling, itching, masses, hair or nail changes  Eyes: no blurring, diplopia, or eye pain  Ears/Nose/Throat: no hearing loss, tinnitus, vertigo, nosebleed, nasal congestion, rhinorrhea, sore throat  Respiratory: no cough, pleuritic chest pain, dyspnea, or wheezing  Cardiovascular: no chest pain, angina, dyspnea on exertion, orthopnea, PND, palpitations, or claudication  Gastrointestinal: no nausea, vomiting, heartburn, diarrhea, constipation, bloating,  abdominal pain, or blood per rectum. Appetite is good  Genitourinary: no urinary urgency, frequency, dysuria, nocturia, hesitancy, or incontinence  Musculoskeletal: joint pains off and on. Morning stiffness. Ambulating well  Neurologic: no paralysis, paresis, paresthesia, seizures, tremors, or headaches  Hematologic/Lymphatic/Immunologic: no anemia, abnormal bleeding/bruising, fever, chills, night sweats, swollen glands, or unexplained weight loss  Endocrine: no heat or cold intolerance and no polyphagia, polydipsia, or polyuria        OBJECTIVE:     VS:  Wt Readings from Last 3 Encounters:   12/14/20 220 lb (99.8 kg)   11/13/20 223 lb (101.2 kg)   11/12/20 216 lb (98 kg)     Temp Readings from Last 3 Encounters:   12/14/20 97.2 °F (36.2 °C)   11/13/20 97.7 °F (36.5 °C)   11/05/20 97.3 °F (36.3 °C)     BP Readings from Last 3 Encounters:   12/14/20 138/72   11/13/20 110/70   11/05/20 (!) 155/86        General appearance: Alert, Awake, Oriented times 3, no distress  Skin: Warm and dry  Head: Normocephalic. No masses, lesions or tenderness noted  Eyes: Conjunctivae appear normal. PERLE  Ears: External ears normal  Nose/Sinuses: Nares normal. Septum midline. Mucosa normal. No drainage  Oropharynx: Oropharynx clear with no exudate seen  Neck: Neck supple. No jugular venous distension, lymphadenopathy or thyromegaly Trachea midline  Chest:  Normal. Movements are Normal and Equal.  Lungs: Lungs clear to auscultation bilaterally. No ronchi, crackles or wheezes  Heart: S1 S2  Regular rate and rhythm. No rub, murmur or gallop  Abdomen: Abdomen soft, non-tender. BS normal. No masses, organomegaly. Back: Grossly Normal and Equal. DTR are Normal. SLR is Normal.  Extremities: Arthritic changes are noted. Movements are limited. Pedal pulses are normal.  Musculoskeletal: Muscular strength appears intact.  No joint effusion, tenderness, swelling or warmth  Neuro:  No focal motor or sensory deficits        ASSESSMENT     Patient Active Problem List    Diagnosis Date Noted    Rupture of anterior cruciate ligament of right knee     COPD with acute exacerbation ([facility] [address] 07/15/2020    Hypertension     Diabetes mellitus ([facility] [address]     Hyperlipidemia     CKD (chronic kidney disease) stage 2, GFR 60-89 ml/min     Tinea corporis 02/24/2011        Diagnosis:     ICD-10-CM    1. Type 2 diabetes mellitus without complication, without long-term current use of insulin (HCC)  E11.9     FAIR CONTROL   2. Familial hypercholesterolemia  E78.01     FAIR CONTROL   3. Essential hypertension  I10     CONTROLLED   4. CKD (chronic kidney disease) stage 2, GFR 60-89 ml/min  N18.2     STABLE   5. Erectile dysfunction, unspecified erectile dysfunction type  N52.9     NEW ONSET       PLAN:           Patient Instructions   LOW SALT,LOW CARB. AND LOW FAT DIET. CONTINUE CURRENT MEDICATIONS TAKING REGULARLY. TAKE REVATIO AS NEEDED FOR ERECTION PROBLEM. REGULAR WALKING ADVISED. ADVISED WEIGHT REDUCTION. NEXT APPOINTMENT IN 3 MONTHS. Return in about 3 months (around 3/14/2021) for FOLLOW UP VISIT. I have reviewed my findings and recommendations with Raudel Kerr.     Electronically signed by Huong Turner MD on 12/14/20 at 9:09 AM EST

## 2020-12-14 NOTE — PATIENT INSTRUCTIONS
LOW SALT,LOW CARB. AND LOW FAT DIET. CONTINUE CURRENT MEDICATIONS TAKING REGULARLY. TAKE REVATIO AS NEEDED FOR ERECTION PROBLEM. REGULAR WALKING ADVISED. ADVISED WEIGHT REDUCTION. NEXT APPOINTMENT IN 3 MONTHS.

## 2020-12-23 ENCOUNTER — TELEPHONE (OUTPATIENT)
Dept: FAMILY MEDICINE CLINIC | Age: 65
End: 2020-12-23

## 2020-12-23 NOTE — TELEPHONE ENCOUNTER
[facility], spoke with Lesia LAWSON and she is filling script for Fenofibrate 160 MG, used Good XR discount and med will be 15.20. Called pt back and informed he can  in an hour.

## 2020-12-24 RX ORDER — HYDROCHLOROTHIAZIDE 12.5 MG/1
12.5 CAPSULE, GELATIN COATED ORAL DAILY
Qty: 30 CAPSULE | Refills: 3 | Status: SHIPPED
Start: 2020-12-24 | End: 2021-01-18 | Stop reason: SDUPTHER

## 2020-12-24 RX ORDER — GLIPIZIDE 10 MG/1
10 TABLET ORAL 2 TIMES DAILY
Qty: 60 TABLET | Refills: 2 | Status: SHIPPED
Start: 2020-12-24 | End: 2021-01-18 | Stop reason: SDUPTHER

## 2020-12-24 RX ORDER — DOXEPIN HYDROCHLORIDE 10 MG/1
CAPSULE ORAL
Qty: 30 CAPSULE | Refills: 1 | Status: SHIPPED
Start: 2020-12-24 | End: 2021-01-18 | Stop reason: SDUPTHER

## 2020-12-24 RX ORDER — CARVEDILOL 25 MG/1
25 TABLET ORAL 2 TIMES DAILY
Qty: 60 TABLET | Refills: 3 | Status: SHIPPED
Start: 2020-12-24 | End: 2021-01-18 | Stop reason: SDUPTHER

## 2021-01-08 ENCOUNTER — OFFICE VISIT (OUTPATIENT)
Dept: ORTHOPEDIC SURGERY | Age: 66
End: 2021-01-08
Payer: MEDICAID

## 2021-01-08 VITALS — HEIGHT: 70 IN | WEIGHT: 220 LBS | BODY MASS INDEX: 31.5 KG/M2

## 2021-01-08 DIAGNOSIS — M25.511 BILATERAL SHOULDER PAIN, UNSPECIFIED CHRONICITY: Primary | ICD-10-CM

## 2021-01-08 DIAGNOSIS — S83.511D RUPTURE OF ANTERIOR CRUCIATE LIGAMENT OF RIGHT KNEE, SUBSEQUENT ENCOUNTER: ICD-10-CM

## 2021-01-08 DIAGNOSIS — Z71.82 EXERCISE COUNSELING: ICD-10-CM

## 2021-01-08 DIAGNOSIS — M25.512 BILATERAL SHOULDER PAIN, UNSPECIFIED CHRONICITY: Primary | ICD-10-CM

## 2021-01-08 PROCEDURE — 4040F PNEUMOC VAC/ADMIN/RCVD: CPT | Performed by: ORTHOPAEDIC SURGERY

## 2021-01-08 PROCEDURE — 99213 OFFICE O/P EST LOW 20 MIN: CPT | Performed by: ORTHOPAEDIC SURGERY

## 2021-01-08 PROCEDURE — G8484 FLU IMMUNIZE NO ADMIN: HCPCS | Performed by: ORTHOPAEDIC SURGERY

## 2021-01-08 PROCEDURE — G8427 DOCREV CUR MEDS BY ELIG CLIN: HCPCS | Performed by: ORTHOPAEDIC SURGERY

## 2021-01-08 PROCEDURE — 1123F ACP DISCUSS/DSCN MKR DOCD: CPT | Performed by: ORTHOPAEDIC SURGERY

## 2021-01-08 PROCEDURE — 1036F TOBACCO NON-USER: CPT | Performed by: ORTHOPAEDIC SURGERY

## 2021-01-08 PROCEDURE — 3017F COLORECTAL CA SCREEN DOC REV: CPT | Performed by: ORTHOPAEDIC SURGERY

## 2021-01-08 PROCEDURE — G8417 CALC BMI ABV UP PARAM F/U: HCPCS | Performed by: ORTHOPAEDIC SURGERY

## 2021-01-08 NOTE — PROGRESS NOTES
Chief Complaint   Patient presents with    Knee Pain     Right knee ACL-R follow up. DOS 7/13/2020. Patient doing well. HPI:      Patient is 60 y.o. male here for 4-month follow-up of right knee ACL and meniscectomy. Patient has been back to work for approximately 2 month at this point with no new issues. Patient denies any new instability or injury. Patient has no pain as well. Patient has not been wearing any type of brace which is recommended at last visit. ROS:    Skin: (-) rash,(-) psoriasis,(-) eczema, (-)skin cancer. Neurologic: (-)numbness, (-)tingling, (-)headaches, (-) LOC. Cardiovascular: (-) Chest pain, (-) swelling in legs/feet, (-) SOB, (-) cramping in legs/feet with walking. All other review of systems negative except stated above or in HPI      Past Medical History:   Diagnosis Date    Arthritis     Diabetes mellitus ([facility]     Fungal infection of foot     Hyperlipidemia     Hypertension     Type 2 diabetes mellitus without complication ([facility] [address] [facility]      Past Surgical History:   Procedure Laterality Date    ANTERIOR CRUCIATE LIGAMENT REPAIR Right 7/13/2020    RIGHT KNEE ARTHROSCOPY, (ANTERIOR CRUCIATE LIGAMENT) ACL RECONSTRUCTION. ALLOGRAFT.  MEDIAL MENISCECTOMY AND DEBRIDEMENT. ([address]) performed by Becky Alberts DO at [address] [place] [address], LAPAROSCOPIC N/A 5/18/2020    CHOLECYSTECTOMY LAPAROSCOPIC WITH IOC performed by Octaviano Ormond, MD at [place] [address]         Current Outpatient Medications:     glipiZIDE (GLUCOTROL) 10 MG tablet, Take 1 tablet by mouth 2 times daily, Disp: 60 tablet, Rfl: 2    doxepin (SINEQUAN) 10 MG capsule, TAKE UP TO 3 CAPSULES BY MOUTH AT BEDTIME AS DIRECTED FOR INSOMNIA, Disp: 30 capsule, Rfl: 1    carvedilol (COREG) 25 MG tablet, Take 1 tablet by mouth 2 times daily, Disp: 60 tablet, Rfl: 3    hydroCHLOROthiazide (MICROZIDE) 12.5 MG capsule, Take 1 capsule by mouth daily, Disp: 30 capsule, Rfl: 3   tablet, Take 1 tablet by mouth daily for 14 days, Disp: 14 tablet, Rfl: 0    docusate sodium (COLACE) 100 MG capsule, Take 1 capsule by mouth 2 times daily as needed for Constipation, Disp: 20 capsule, Rfl: 1    Potassium 99 MG TABS, Take by mouth daily, Disp: , Rfl:   No Known Allergies  Social History     Socioeconomic History    Marital status: Legally      Spouse name: Not on file    Number of children: Not on file    Years of education: Not on file    Highest education level: Not on file   Occupational History    Not on file   Social Needs    Financial resource strain: Not on file    Food insecurity     Worry: Not on file     Inability: Not on file    Transportation needs     Medical: Not on file     Non-medical: Not on file   Tobacco Use    Smoking status: Former Smoker     Packs/day: 0.50     Years: 15.00     Pack years: 7.50     Types: Cigarettes     Start date: 1965     Quit date: 1986     Years since quittin.8    Smokeless tobacco: Former User     Quit date: 1986   Substance and Sexual Activity    Alcohol use: Yes     Comment: beer    Drug use: No    Sexual activity: Not on file   Lifestyle    Physical activity     Days per week: Not on file     Minutes per session: Not on file    Stress: Not on file   Relationships    Social connections     Talks on phone: Not on file     Gets together: Not on file     Attends Shinto service: Not on file     Active member of club or organization: Not on file     Attends meetings of clubs or organizations: Not on file     Relationship status: Not on file    Intimate partner violence     Fear of current or ex partner: Not on file     Emotionally abused: Not on file     Physically abused: Not on file     Forced sexual activity: Not on file   Other Topics Concern    Not on file   Social History Narrative    Not on file     Family History   Problem Relation Age of Onset    Hypertension Mother     Heart Attack Mother    [facility] Diabetes Father     Diabetes Maternal Grandmother     Hypertension Paternal Grandmother            Physical Exam:    Ht 5' 10\" (1.778 m)   Wt 220 lb (99.8 kg)   BMI 31.57 kg/m²     GENERAL: alert, appears stated age, cooperative, no acute distress    HEENT: Head is normocephalic, atraumatic. PERRLA. SKIN: Clean, dry, intact. There is not any cellulitis or cutaneous lesions noted in the lower extremities except noted in MSK    PULMONARY: breathing is regular and unlabored, no acute distress    CV: The bilateral upper and lower extremities are warm and well-perfused with brisk capillary refill. 2+ pulses UE and LE bilateral.     PSYCHIATRY: Pleasant mood, appropriate behavior, follows commands    NEURO: Sensation is intact distally with light touch with no alteration. Motor exam of the lower extremities show quadriceps, hamstrings, foot dorsiflexion and plantarflexion grossly intact 5/5. LYMPH: No lymphedema present distally in upper or lower extremity. MUSCULOSKELETAL:    Right Knee Exam:    No effusion noted. No erythema/induration/fluctuance. No posterior medial joint line TTP. Stable to varus and valgus at 0 and 30 degrees of flexion. Negative Lachman's and posterior drawer. Negative patellar grind test and J sign. Compartments soft and compressible throughout leg. Active range of motion 0-120 with no pain. Negative Pauly's normal Apley's, gait is normal negative anterior negative Lachman test        Imaging:  Surgical pictures reviewed with patient showing meniscectomy and ACL reconstruction. Mri Knee Right Wo Contrast    Result Date: 6/26/2020  EXAMINATION: MRI OF THE RIGHT KNEE WITHOUT CONTRAST, 6/26/2020 2:13 pm TECHNIQUE: Multiplanar multisequence MRI of the right knee was performed without the administration of intravenous contrast. COMPARISON: None.  HISTORY: ORDERING SYSTEM PROVIDED HISTORY: Tear of meniscus of right knee, unspecified meniscus, unspecified tear type, unspecified whether old or current tear Initial evaluation of acute right knee pain after twisting injury 2 weeks ago stepping out of a truck. FINDINGS: MENISCI: Complex tear is present in the medial meniscus. Vertical longitudinal tear morphology involves the anterior horn and body with extension into the posterior horn. A radial component is suspected in the posterior horn near the meniscal root. There is a flipped fragment into the intercondylar notch. The lateral meniscus is intact. CRUCIATE LIGAMENTS: Acute ACL tear. PCL is intact. EXTENSOR MECHANISM:  Quadriceps and patellar tendons are intact. LATERAL COLLATERAL LIGAMENT COMPLEX: The popliteus tendon, biceps femoris tendon, fibular collateral ligament and iliotibial band are intact. MEDIAL COLLATERAL LIGAMENT COMPLEX: The superficial and deep components of the medial collateral ligament are intact. KNEE JOINT: Cartilage fissuring in the lateral femoral trochlea. Patellofemoral cartilage is otherwise maintained. Medial and lateral compartment articular cartilage is maintained. Moderate-sized joint effusion. Ruptured popliteal cyst with edema in the popliteal fossa and proximal calf. BONE MARROW: Mild bone contusions of the posteromedial tibial plateau and lateral tibial plateau. No fracture or marrow replacing lesion. 1. Acute ACL tear. 2. Extensive medial meniscus tear with flipped fragment into the intercondylar notch. 3. Mild patellofemoral osteoarthritis. 4. Moderate joint effusion with ruptured popliteal cyst. 5. Mild bone contusions of the posterior tibial plateau. Yoel Vargas was seen today for knee pain. Diagnoses and all orders for this visit:    Bilateral shoulder pain, unspecified chronicity  -     XR SHOULDER LEFT (MIN 2 VIEWS); Future  -     XR SHOULDER RIGHT (MIN 2 VIEWS); Future    Rupture of anterior cruciate ligament of right knee, subsequent encounter    Exercise counseling        Patient seen and examined.   X-rays reviewed. Natural history and course discussed with patient. Treatment options discussed with patient in detail including risks and benefits. Patient should do well with conservative management at this time. Patient is 3 to 4 months since surgery and doing exceptionally well at this time. Patient was cautioned about long-term history and bone growth of ligament and to protect the graft. Patient will be provided a small knee brace since he continues to work as a . Follow-up in 6 to 8 weeks for recheck.       Teresita Fry, DO

## 2021-01-18 RX ORDER — LOSARTAN POTASSIUM 50 MG/1
50 TABLET ORAL DAILY
Qty: 30 TABLET | Refills: 0 | Status: SHIPPED
Start: 2021-01-18 | End: 2021-02-16 | Stop reason: SDUPTHER

## 2021-01-18 RX ORDER — FENOFIBRATE 160 MG/1
TABLET ORAL
Qty: 30 TABLET | Refills: 0 | Status: SHIPPED
Start: 2021-01-18 | End: 2021-02-16 | Stop reason: CLARIF

## 2021-01-18 RX ORDER — FENOFIBRATE 160 MG/1
TABLET ORAL
Qty: 30 TABLET | Refills: 0 | Status: SHIPPED
Start: 2021-01-18 | End: 2021-01-18 | Stop reason: SDUPTHER

## 2021-01-18 RX ORDER — CARVEDILOL 25 MG/1
25 TABLET ORAL 2 TIMES DAILY
Qty: 60 TABLET | Refills: 3 | Status: SHIPPED | OUTPATIENT
Start: 2021-01-18 | End: 2021-04-12 | Stop reason: SDUPTHER

## 2021-01-18 RX ORDER — DOXEPIN HYDROCHLORIDE 10 MG/1
CAPSULE ORAL
Qty: 30 CAPSULE | Refills: 1 | Status: SHIPPED
Start: 2021-01-18 | End: 2021-02-16 | Stop reason: SDUPTHER

## 2021-01-18 RX ORDER — ATORVASTATIN CALCIUM 40 MG/1
TABLET, FILM COATED ORAL
Qty: 30 TABLET | Refills: 3 | Status: SHIPPED
Start: 2021-01-18 | End: 2021-02-16 | Stop reason: SDUPTHER

## 2021-01-18 RX ORDER — GLIPIZIDE 10 MG/1
10 TABLET ORAL 2 TIMES DAILY
Qty: 60 TABLET | Refills: 2 | Status: SHIPPED
Start: 2021-01-18 | End: 2021-02-16 | Stop reason: SDUPTHER

## 2021-01-18 RX ORDER — ROPINIROLE 1 MG/1
1 TABLET, FILM COATED ORAL 3 TIMES DAILY
Qty: 60 TABLET | Refills: 0 | Status: SHIPPED
Start: 2021-01-18 | End: 2021-02-16 | Stop reason: SDUPTHER

## 2021-01-18 RX ORDER — HYDROCHLOROTHIAZIDE 12.5 MG/1
12.5 CAPSULE, GELATIN COATED ORAL DAILY
Qty: 30 CAPSULE | Refills: 3 | Status: SHIPPED | OUTPATIENT
Start: 2021-01-18 | End: 2021-04-12 | Stop reason: SDUPTHER

## 2021-02-04 ENCOUNTER — OFFICE VISIT (OUTPATIENT)
Dept: ORTHOPEDIC SURGERY | Age: 66
End: 2021-02-04
Payer: MEDICARE

## 2021-02-04 VITALS — TEMPERATURE: 98 F | BODY MASS INDEX: 31.5 KG/M2 | HEIGHT: 70 IN | WEIGHT: 220 LBS

## 2021-02-04 DIAGNOSIS — M75.102 NONTRAUMATIC TEAR OF LEFT ROTATOR CUFF, UNSPECIFIED TEAR EXTENT: Primary | ICD-10-CM

## 2021-02-04 DIAGNOSIS — M75.101 TEAR OF RIGHT ROTATOR CUFF, UNSPECIFIED TEAR EXTENT, UNSPECIFIED WHETHER TRAUMATIC: ICD-10-CM

## 2021-02-04 PROCEDURE — 99213 OFFICE O/P EST LOW 20 MIN: CPT | Performed by: ORTHOPAEDIC SURGERY
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
## 2021-02-12 DIAGNOSIS — M75.102 NONTRAUMATIC TEAR OF LEFT ROTATOR CUFF, UNSPECIFIED TEAR EXTENT: Primary | ICD-10-CM

## 2021-02-12 RX ORDER — DIAZEPAM 5 MG/1
5 TABLET ORAL PRN
Qty: 1 TABLET | Refills: 0 | Status: SHIPPED | OUTPATIENT
Start: 2021-02-12 | End: 2021-02-15

## 2021-02-15 ENCOUNTER — OFFICE VISIT (OUTPATIENT)
Dept: PODIATRY | Age: 66
End: 2021-02-15
Payer: MEDICARE

## 2021-02-15 ENCOUNTER — HOSPITAL ENCOUNTER (OUTPATIENT)
Dept: MRI IMAGING | Age: 66
Discharge: HOME | End: 2021-02-17
Payer: MEDICARE

## 2021-02-15 VITALS — WEIGHT: 215 LBS | BODY MASS INDEX: 30.78 KG/M2 | HEIGHT: 70 IN

## 2021-02-15 DIAGNOSIS — M79.675 PAIN OF TOE OF LEFT FOOT: ICD-10-CM

## 2021-02-15 DIAGNOSIS — R26.2 DIFFICULTY WALKING: ICD-10-CM

## 2021-02-15 DIAGNOSIS — B35.3 TINEA PEDIS OF BOTH FEET: ICD-10-CM

## 2021-02-15 DIAGNOSIS — M75.101 TEAR OF RIGHT ROTATOR CUFF, UNSPECIFIED TEAR EXTENT, UNSPECIFIED WHETHER TRAUMATIC: ICD-10-CM

## 2021-02-15 DIAGNOSIS — E11.51 TYPE II DIABETES MELLITUS WITH PERIPHERAL CIRCULATORY DISORDER (HCC): ICD-10-CM

## 2021-02-15 DIAGNOSIS — E11.9 TYPE 2 DIABETES MELLITUS WITHOUT COMPLICATION, WITHOUT LONG-TERM CURRENT USE OF INSULIN (HCC): Primary | Chronic | ICD-10-CM

## 2021-02-15 DIAGNOSIS — B35.1 ONYCHOMYCOSIS: ICD-10-CM

## 2021-02-15 DIAGNOSIS — M75.102 NONTRAUMATIC TEAR OF LEFT ROTATOR CUFF, UNSPECIFIED TEAR EXTENT: ICD-10-CM

## 2021-02-15 DIAGNOSIS — M79.674 PAIN OF TOE OF RIGHT FOOT: ICD-10-CM

## 2021-02-15 PROCEDURE — G8484 FLU IMMUNIZE NO ADMIN: HCPCS | Performed by: PODIATRIST

## 2021-02-15 PROCEDURE — 4040F PNEUMOC VAC/ADMIN/RCVD: CPT | Performed by: PODIATRIST

## 2021-02-15 PROCEDURE — G8417 CALC BMI ABV UP PARAM F/U: HCPCS | Performed by: PODIATRIST

## 2021-02-15 PROCEDURE — 73221 MRI JOINT UPR EXTREM W/O DYE: CPT

## 2021-02-15 PROCEDURE — 1036F TOBACCO NON-USER: CPT | Performed by: PODIATRIST

## 2021-02-15 PROCEDURE — G8427 DOCREV CUR MEDS BY ELIG CLIN: HCPCS | Performed by: PODIATRIST

## 2021-02-15 PROCEDURE — 99203 OFFICE O/P NEW LOW 30 MIN: CPT | Performed by: PODIATRIST

## 2021-02-15 PROCEDURE — 3046F HEMOGLOBIN A1C LEVEL >9.0%: CPT | Performed by: PODIATRIST

## 2021-02-15 PROCEDURE — 1123F ACP DISCUSS/DSCN MKR DOCD: CPT | Performed by: PODIATRIST

## 2021-02-15 PROCEDURE — 3017F COLORECTAL CA SCREEN DOC REV: CPT | Performed by: PODIATRIST

## 2021-02-15 PROCEDURE — 2022F DILAT RTA XM EVC RTNOPTHY: CPT | Performed by: PODIATRIST

## 2021-02-15 PROCEDURE — 11721 DEBRIDE NAIL 6 OR MORE: CPT | Performed by: PODIATRIST

## 2021-02-15 RX ORDER — CLOTRIMAZOLE AND BETAMETHASONE DIPROPIONATE 10; .64 MG/G; MG/G
CREAM TOPICAL
Qty: 45 G | Refills: 3 | Status: SHIPPED | OUTPATIENT
Start: 2021-02-15 | End: 2021-04-12 | Stop reason: SDUPTHER

## 2021-02-15 NOTE — TELEPHONE ENCOUNTER
Pt would like to talk to medical staff about certain medications not able to be filled. Please contact pt at your first convenience. Thank you.

## 2021-02-15 NOTE — LETTER
[address] [facility]    Phone: [phone]  Fax: [phone]    Teresa Vaughn  [id number]   1955  2/15/2021      Dear Dr. Margaret Young,    I would like to thank you for the kind referral of Obi Rea. He presented to the office today for evaluation regarding diabetic foot evaluation and mycotic nail care. Debridement of nails was performed on today's visit to patient tolerance, we also did start him on a topical course of care due to chronic tinea pedis issues. We did discuss patient getting fitted for diabetic shoes and insoles as well which would help prevent any underlying issues from occurring. We will have continued diabetic foot care evaluation in the future. If you should have any questions concerning his visit today, please do not hesitate to contact me.     Sincerely,    Paul De La Rosa DPM

## 2021-02-15 NOTE — PROGRESS NOTES
New patient here to establish routine DM type 2 and nail care. Patient states and has pain in his toes and concerns about fungus and athletes foot.      Last ov with Gladis Morfin MD 12/2020    Electronically signed by Gee Griffin LPN on 5/80/4768 at 93:94 AM

## 2021-02-15 NOTE — PROGRESS NOTES
2/15/21     Indira Barajas    : 1955 Sex: male   Age: 73 y.o. Patient was referred by: Srikanth Veras MD  Patient's PCP/Provider is:  Wendy Steven MD    Subjective:    Patient seen today for evaluation regarding diabetic foot care. Chief Complaint   Patient presents with    Diabetes     foot exam    Nail Problem       HPI: Patient has had issues with chronic ingrown nails due to dystrophy present to multiple digital nails bilateral foot. Patient is unable to debride his nails appropriately at this time. He is also concerned about some chronic dryness and skin fissuring to both lower extremities. Patient does try to wear appropriate shoe gear and is still working his job without any issues. He denies any additional issues at this time. ROS:  Const: Positives and pertinent negatives as per HPI. Musculo: Denies symptoms other than stated above. Neuro: Denies symptoms other than stated above. Skin: Denies symptoms other than stated above. Current Medications:    Current Outpatient Medications:     clotrimazole-betamethasone (LOTRISONE) 1-0.05 % cream, Apply topically 2 times daily. , Disp: 45 g, Rfl: 3    diazePAM (VALIUM) 5 MG tablet, Take 1 tablet by mouth as needed for Anxiety (take medicine 30 min prior to procedure) for up to 1 dose., Disp: 1 tablet, Rfl: 0    carvedilol (COREG) 25 MG tablet, Take 1 tablet by mouth 2 times daily, Disp: 60 tablet, Rfl: 3    losartan (COZAAR) 50 MG tablet, Take 1 tablet by mouth daily, Disp: 30 tablet, Rfl: 0    hydroCHLOROthiazide (MICROZIDE) 12.5 MG capsule, Take 1 capsule by mouth daily, Disp: 30 capsule, Rfl: 3    glipiZIDE (GLUCOTROL) 10 MG tablet, Take 1 tablet by mouth 2 times daily, Disp: 60 tablet, Rfl: 2    atorvastatin (LIPITOR) 40 MG tablet, TAKE 1 TABLET BY MOUTH IN THE EVENING FOR CHOLESTEROL, Disp: 30 tablet, Rfl: 3    doxepin (SINEQUAN) 10 MG capsule, TAKE UP TO 3 CAPSULES BY MOUTH AT BEDTIME AS DIRECTED FOR INSOMNIA, Disp: 30 capsule, Rfl: 1    fenofibrate (TRIGLIDE) 160 MG tablet, TAKE 1 TABLET BY MOUTH ONCE DAILY, Disp: 30 tablet, Rfl: 0    sildenafil (REVATIO) 20 MG tablet, Take 1 tablet by mouth daily as needed (FOR SEXUAL DYSFUNCTION), Disp: 5 tablet, Rfl: 0    Lancets 30G MISC, 1 each by Does not apply route 3 times daily Dispense what ever brand is covered by insurance, Disp: 100 each, Rfl: 3    blood glucose monitor strips, 1 strip by Other route 3 times daily Test  Three times a day & as needed for symptoms of irregular blood glucose. Dispense sufficient amount for indicated testing frequency plus additional to accommodate PRN testing needs. Please dispense what ever brand is covered, Disp: 100 strip, Rfl: 3    Blood Glucose Monitoring Suppl (BLOOD GLUCOSE MONITOR SYSTEM) w/Device KIT, 1 each by Does not apply route 3 times daily Dispense what ever brand is covered by insurance, Disp: 1 kit, Rfl: 0    sAXagliptin (ONGLYZA) 5 MG TABS tablet, Take 1 tablet by mouth daily, Disp: 90 tablet, Rfl: 1    ibuprofen (ADVIL;MOTRIN) 800 MG tablet, take 1 tablet by mouth every 8 hours if needed for pain, Disp: , Rfl:     ketoconazole (NIZORAL) 2 % cream, Apply topically daily. , Disp: 30 g, Rfl: 1    canagliflozin (INVOKANA) 300 MG TABS tablet, Take 1 tablet by mouth every morning (before breakfast), Disp: 90 tablet, Rfl: 1    albuterol sulfate HFA (VENTOLIN HFA) 108 (90 Base) MCG/ACT inhaler, Inhale 2 puffs into the lungs 4 times daily as needed for Wheezing, Disp: 1 Inhaler, Rfl: 0    docusate sodium (COLACE) 100 MG capsule, Take 1 capsule by mouth 2 times daily as needed for Constipation, Disp: 20 capsule, Rfl: 1    Potassium 99 MG TABS, Take by mouth daily, Disp: , Rfl:     rOPINIRole (REQUIP) 1 MG tablet, Take 1 tablet by mouth 3 times daily for 60 doses, Disp: 60 tablet, Rfl: 0    Elastic Bandages & Supports (ACE KNEE BRACE HINGED) MISC, 1 Units by Does not apply route once for 1 dose, Disp: 1 each, Rfl: 0    aspirin 325 right-sided shoulder pain FINDINGS: ROTATOR CUFF: Small amount of fluid in the subacromial subdeltoid bursa. Low-grade partial-thickness articular surface and interstitial tearing of posterior supraspinatus and anterior superior infraspinatus between critical zone and footplate. Retrograde ganglion formation at the anterior superior infraspinatus musculotendinous junction on image 8, series 3. Low-grade partial-thickness interstitial and articular surface tearing of the insertional fibers of subscapularis. Teres minor muscle/tendon appears grossly intact without evidence of tearing. No significant atrophy or fatty degeneration of the visualized rotator cuff musculature. BICEPS TENDON: Interstitial tearing and moderate tendinosis of the intra-articular long head of the biceps tendon. LABRUM: Tearing of the superior labrum. Mild diffuse underlying labral degeneration. GLENOHUMERAL JOINT: No sizable glenohumeral joint effusion. Inferior glenohumeral ligament appears intact. Mild glenohumeral chondromalacia. AC JOINT AND ACROMIOCLAVICULAR ARCH: Mild degenerative change of the right AC joint. Type 2 acromion. BONE MARROW: Subcortical cystic changes at the anterior superior humeral head. Bone marrow signal intensity within the visualized osseous structures is within normal limits. No acute fracture or dislocation involving the osseous components of the shoulder. OUTLET SPACES: Suprascapular notch and quadrilateral space grossly unremarkable in appearance. No right axillary lymphadenopathy. 1. Low-grade partial-thickness articular-surface and interstitial tearing of posterior supraspinatus and anterior superior infraspinatus between critical zone and footplate. Retrograde ganglion formation at the anterior superior infraspinatus musculotendinous junction. 2. Low-grade partial-thickness interstitial and articular-surface tearing of the insertional fibers of subscapularis.  3. Interstitial tearing and moderate humeral head. Bone marrow signal intensity within the remaining visualized osseous structures otherwise within normal limits. No acute fracture or dislocation involving the osseous components of the shoulder. OUTLET SPACES: Suprascapular notch and quadrilateral space grossly unremarkable in appearance. No left axillary lymphadenopathy. 1. Low-grade partial-thickness interstitial and articular-surface tearing of supraspinatus and infraspinatus between critical zone and footplate. Mild underlying supraspinatus and infraspinatus tendinopathy. 2. Low-grade partial-thickness interstitial tearing of the insertional fibers of subscapularis. Mild insertional subscapularis tendinopathy. 3. Interstitial tearing and moderate tendinosis of the intra-articular long head of biceps tendon. Loose bodies measuring up to 9 mm in the bicipital groove. 4. Paralabral cyst formation and underlying tearing of the superior labrum. Degenerative tearing of the posterior labrum. 5. Mild-to-moderate posterior glenohumeral chondromalacia. 6. Mild degenerative change of the left AC joint. Procedures:    Debridement of nails 1, 2, 5 right foot and nails 1, 2, 5 left foot was performed with both manual and electrical debridement to prevent infection and/or ulceration. Patient tolerated the debridement well, without any complaints. Patient had no issues with debridement performed on today's visit. Exam:  VASCULAR: Pedal pulses palpable but diminished to palpation bilaterally. Capillary fill time delayed digits 1 through 5 bilateral foot. Diminished hair growth noted to both lower extremities. NEUROLOGICAL: Epicritic sensations intact and symmetrical  DERMATOLOGICAL: Digital nails 1, 2, 5 bilaterally are noted to be thickened, dystrophic, discolored, with subungual debris present and tenderness noted to palpation. No maceration webspaces noted bilateral foot.   Dry, scaly skin noted in a moccasin type distribution to both lower extremities. No heel fissuring noted bilateral foot or any signs of an bacterial infection. MUSCULOSKELETAL: Adequate range of motion ankle, subtalar joint, and MTPJ regions bilateral lower extremities. Plan Per Assessment  [facility] was seen today for diabetes and nail problem. Diagnoses and all orders for this visit:    Type 2 diabetes mellitus without complication, without long-term current use of insulin ([facility])  -      DIABETES FOOT EXAM  -     Amb External Referral For Orthotics    Onychomycosis    Tinea pedis of both feet  -     clotrimazole-betamethasone (LOTRISONE) 1-0.05 % cream; Apply topically 2 times daily. Pain of toe of right foot    Pain of toe of left foot    Type II diabetes mellitus with peripheral circulatory disorder ([facility])  -     [facility] External Referral For Orthotics    Difficulty walking  -     Amb External Referral For Orthotics        1. New patient evaluation and management  2. Mycotic nail debridement was performed on today's visit to patient tolerance. Prescription medication Lotrisone cream given with exact instructions on usage. I discussed the potential side effects that the patient may experience with the medication and the need to call if they experience any. It was discussed in detail with the patient proper hygiene for the diabetic foot. They are to get in the habit of looking at their feet or have someone look at them. If they are unable to do daily, they are to look for any signs of redness, blistering, cracking, swelling, drainage, open lesions, etc.  They are to dry in between the toes after each bath or shower gently. The water should be tested with the elbow to prevent burns. The patient is to refrain from soaking their feet unless instructed by myself to do otherwise. They are to refrain from going barefoot. Shoe gear should be inspected for any foreign objects. Shoes should have a deep wide toe box.   With any type of shoe, the feet should be inspected for any signs of pressure, i.e. redness, blistering, or open sores. Further instructional guidelines were dispensed to the patient. Patient will be followed up at a later date for continued diabetic foot evaluation and management. Seen By:    Alex Ruiz DPM    Electronically signed by Alex Ruiz DPM on 2/15/2021 at 3:16 PM      This note was created using voice recognition software. The note was reviewed however may contain grammatical errors.

## 2021-02-15 NOTE — TELEPHONE ENCOUNTER
I called patient who stated he needs refill of Requip. Patient stated if he can have appt to come in tomorrow to discuss medications, he would like that.   Appt made 2/16/21 at 9:00 am.

## 2021-02-16 ENCOUNTER — OFFICE VISIT (OUTPATIENT)
Dept: ORTHOPEDIC SURGERY | Age: 66
End: 2021-02-16
Payer: MEDICARE

## 2021-02-16 ENCOUNTER — OFFICE VISIT (OUTPATIENT)
Dept: FAMILY MEDICINE CLINIC | Age: 66
End: 2021-02-16
Payer: MEDICARE

## 2021-02-16 VITALS — BODY MASS INDEX: 31.5 KG/M2 | WEIGHT: 220 LBS | HEIGHT: 70 IN

## 2021-02-16 VITALS
HEIGHT: 70 IN | BODY MASS INDEX: 31.5 KG/M2 | TEMPERATURE: 98.6 F | HEART RATE: 65 BPM | OXYGEN SATURATION: 97 % | RESPIRATION RATE: 16 BRPM | DIASTOLIC BLOOD PRESSURE: 70 MMHG | WEIGHT: 220 LBS | SYSTOLIC BLOOD PRESSURE: 110 MMHG

## 2021-02-16 DIAGNOSIS — N18.2 CKD (CHRONIC KIDNEY DISEASE) STAGE 2, GFR 60-89 ML/MIN: ICD-10-CM

## 2021-02-16 DIAGNOSIS — I10 ESSENTIAL HYPERTENSION: ICD-10-CM

## 2021-02-16 DIAGNOSIS — M75.101 TEAR OF RIGHT ROTATOR CUFF, UNSPECIFIED TEAR EXTENT, UNSPECIFIED WHETHER TRAUMATIC: ICD-10-CM

## 2021-02-16 DIAGNOSIS — Z11.59 NEED FOR HEPATITIS C SCREENING TEST: ICD-10-CM

## 2021-02-16 DIAGNOSIS — Z11.4 ENCOUNTER FOR SCREENING FOR HIV: ICD-10-CM

## 2021-02-16 DIAGNOSIS — M75.102 NONTRAUMATIC TEAR OF LEFT ROTATOR CUFF, UNSPECIFIED TEAR EXTENT: ICD-10-CM

## 2021-02-16 DIAGNOSIS — M54.12 CERVICAL RADICULOPATHY: Primary | ICD-10-CM

## 2021-02-16 DIAGNOSIS — E11.9 TYPE 2 DIABETES MELLITUS WITHOUT COMPLICATION, WITHOUT LONG-TERM CURRENT USE OF INSULIN (HCC): Primary | ICD-10-CM

## 2021-02-16 DIAGNOSIS — E78.01 FAMILIAL HYPERCHOLESTEROLEMIA: ICD-10-CM

## 2021-02-16 DIAGNOSIS — N52.9 ERECTILE DYSFUNCTION, UNSPECIFIED ERECTILE DYSFUNCTION TYPE: ICD-10-CM

## 2021-02-16 LAB — HBA1C MFR BLD: 7.3 %

## 2021-02-16 PROCEDURE — G8417 CALC BMI ABV UP PARAM F/U: HCPCS | Performed by: FAMILY MEDICINE

## 2021-02-16 PROCEDURE — 4040F PNEUMOC VAC/ADMIN/RCVD: CPT | Performed by: FAMILY MEDICINE

## 2021-02-16 PROCEDURE — 1036F TOBACCO NON-USER: CPT | Performed by: FAMILY MEDICINE

## 2021-02-16 PROCEDURE — 1036F TOBACCO NON-USER: CPT | Performed by: ORTHOPAEDIC SURGERY

## 2021-02-16 PROCEDURE — 99214 OFFICE O/P EST MOD 30 MIN: CPT | Performed by: FAMILY MEDICINE

## 2021-02-16 PROCEDURE — 2022F DILAT RTA XM EVC RTNOPTHY: CPT | Performed by: FAMILY MEDICINE

## 2021-02-16 PROCEDURE — 1123F ACP DISCUSS/DSCN MKR DOCD: CPT | Performed by: ORTHOPAEDIC SURGERY

## 2021-02-16 PROCEDURE — 4040F PNEUMOC VAC/ADMIN/RCVD: CPT | Performed by: ORTHOPAEDIC SURGERY

## 2021-02-16 PROCEDURE — G8484 FLU IMMUNIZE NO ADMIN: HCPCS | Performed by: FAMILY MEDICINE

## 2021-02-16 PROCEDURE — 3051F HG A1C>EQUAL 7.0%<8.0%: CPT | Performed by: FAMILY MEDICINE

## 2021-02-16 PROCEDURE — 99214 OFFICE O/P EST MOD 30 MIN: CPT | Performed by: ORTHOPAEDIC SURGERY

## 2021-02-16 PROCEDURE — 83036 HEMOGLOBIN GLYCOSYLATED A1C: CPT | Performed by: FAMILY MEDICINE

## 2021-02-16 PROCEDURE — 1123F ACP DISCUSS/DSCN MKR DOCD: CPT | Performed by: FAMILY MEDICINE

## 2021-02-16 PROCEDURE — G8427 DOCREV CUR MEDS BY ELIG CLIN: HCPCS | Performed by: ORTHOPAEDIC SURGERY

## 2021-02-16 PROCEDURE — G8417 CALC BMI ABV UP PARAM F/U: HCPCS | Performed by: ORTHOPAEDIC SURGERY

## 2021-02-16 PROCEDURE — G8427 DOCREV CUR MEDS BY ELIG CLIN: HCPCS | Performed by: FAMILY MEDICINE

## 2021-02-16 PROCEDURE — G8484 FLU IMMUNIZE NO ADMIN: HCPCS | Performed by: ORTHOPAEDIC SURGERY

## 2021-02-16 PROCEDURE — 3017F COLORECTAL CA SCREEN DOC REV: CPT | Performed by: FAMILY MEDICINE

## 2021-02-16 PROCEDURE — 3017F COLORECTAL CA SCREEN DOC REV: CPT | Performed by: ORTHOPAEDIC SURGERY

## 2021-02-16 RX ORDER — GLIPIZIDE 10 MG/1
10 TABLET ORAL 2 TIMES DAILY
Qty: 60 TABLET | Refills: 3 | Status: SHIPPED
Start: 2021-02-16 | End: 2021-05-17 | Stop reason: SDUPTHER

## 2021-02-16 RX ORDER — FENOFIBRATE 160 MG/1
TABLET ORAL
Qty: 30 TABLET | Refills: 2 | Status: CANCELLED | OUTPATIENT
Start: 2021-02-16

## 2021-02-16 RX ORDER — ROPINIROLE 1 MG/1
1 TABLET, FILM COATED ORAL 3 TIMES DAILY
Qty: 60 TABLET | Refills: 3 | Status: SHIPPED
Start: 2021-02-16 | End: 2021-03-30

## 2021-02-16 RX ORDER — LANOLIN ALCOHOL/MO/W.PET/CERES
1000 CREAM (GRAM) TOPICAL DAILY
COMMUNITY

## 2021-02-16 RX ORDER — CHOLECALCIFEROL (VITAMIN D3) 1250 MCG
CAPSULE ORAL
COMMUNITY
End: 2021-05-25 | Stop reason: ALTCHOICE

## 2021-02-16 RX ORDER — DOXEPIN HYDROCHLORIDE 10 MG/1
CAPSULE ORAL
Qty: 30 CAPSULE | Refills: 1 | Status: SHIPPED
Start: 2021-02-16 | End: 2021-04-12 | Stop reason: SDUPTHER

## 2021-02-16 RX ORDER — ATORVASTATIN CALCIUM 40 MG/1
TABLET, FILM COATED ORAL
Qty: 90 TABLET | Refills: 1 | Status: SHIPPED
Start: 2021-02-16 | End: 2021-07-26 | Stop reason: SDUPTHER

## 2021-02-16 RX ORDER — LOSARTAN POTASSIUM 50 MG/1
50 TABLET ORAL DAILY
Qty: 30 TABLET | Refills: 2 | Status: SHIPPED | OUTPATIENT
Start: 2021-02-16 | End: 2021-04-12 | Stop reason: SDUPTHER

## 2021-02-16 ASSESSMENT — PATIENT HEALTH QUESTIONNAIRE - PHQ9
1. LITTLE INTEREST OR PLEASURE IN DOING THINGS: 0
SUM OF ALL RESPONSES TO PHQ9 QUESTIONS 1 & 2: 0
SUM OF ALL RESPONSES TO PHQ QUESTIONS 1-9: 0

## 2021-02-16 NOTE — PROGRESS NOTES
OFFICE PROGRESS NOTE      SUBJECTIVE:        Patient ID:   Timmy Chinchilla is a 73 y.o. male who presents for   Chief Complaint   Patient presents with    Diabetes     2 month check up    Medication Problem     Patient needs altertive for fenofibrate due to each month needing prior [facility] Maintenance     due for awv           HPI:     FEELS GOOD. WATCHING DIET GOOD. WALKING FOR EXERCISE. TAKING MEDICATIONS REGULARLY. ALL MEDICATION PRESCRIBED FOR BLOOD SUGAR CONTROL HAS BEEN REJECTED BY THE INSURANCE EXCEPT GLUCOTROL AND METFORMIN. UNABLE TO TOLERATE AS IT GIVES HIM DIARRHEA. FENOFIBRATE ALSO NOT COVERED. Prior to Admission medications    Medication Sig Start Date End Date Taking? Authorizing Provider   Cholecalciferol (VITAMIN D3) 1.25 MG (17439 UT) CAPS Take by mouth   Yes Historical Provider, MD   vitamin B-12 (CYANOCOBALAMIN) 1000 MCG tablet Take 1,000 mcg by mouth daily   Yes Historical Provider, MD   losartan (COZAAR) 50 MG tablet Take 1 tablet by mouth daily 2/16/21  Yes Jose De Jesus Tsang MD   rOPINIRole (REQUIP) 1 MG tablet Take 1 tablet by mouth 3 times daily for 240 doses 2/16/21 5/7/21 Yes Jose De Jesus Tsang MD   glipiZIDE (GLUCOTROL) 10 MG tablet Take 1 tablet by mouth 2 times daily 2/16/21  Yes Jose De Jesus Tsang MD   doxepin (SINEQUAN) 10 MG capsule TAKE UP TO 3 CAPSULES BY MOUTH AT BEDTIME AS DIRECTED FOR INSOMNIA 2/16/21  Yes Jsoe De Jesus Tsang MD   clotrimazole-betamethasone (LOTRISONE) 1-0.05 % cream Apply topically 2 times daily.  2/15/21  Yes Kathy Rosado DPM   carvedilol (COREG) 25 MG tablet Take 1 tablet by mouth 2 times daily 1/18/21  Yes Jose De Jesus Tsang MD   hydroCHLOROthiazide (MICROZIDE) 12.5 MG capsule Take 1 capsule by mouth daily 1/18/21  Yes Jose De Jesus Tsang MD   atorvastatin (LIPITOR) 40 MG tablet TAKE 1 TABLET BY MOUTH IN THE EVENING FOR CHOLESTEROL 1/18/21  Yes Jose De Jesus Tsang MD   sildenafil (REVATIO) 20 MG tablet Take 1 tablet by mouth daily as needed (FOR SEXUAL DYSFUNCTION) 12/14/20  Yes Pollo Guillaume MD   Lancets 30G MISC 1 each by Does not apply route 3 times daily Dispense what ever brand is covered by insurance 11/19/20  Yes Pollo Guillaume MD   blood glucose monitor strips 1 strip by Other route 3 times daily Test  Three times a day & as needed for symptoms of irregular blood glucose. Dispense sufficient amount for indicated testing frequency plus additional to accommodate PRN testing needs. Please dispense what ever brand is covered 11/19/20  Yes Pollo Guillaume MD   Blood Glucose Monitoring Suppl (BLOOD GLUCOSE MONITOR SYSTEM) w/Device KIT 1 each by Does not apply route 3 times daily Dispense what ever brand is covered by insurance 11/19/20  Yes Pollo Guillaume MD   ibuprofen (ADVIL;MOTRIN) 800 MG tablet take 1 tablet by mouth every 8 hours if needed for pain 11/5/20  Yes Historical Provider, MD   ketoconazole (NIZORAL) 2 % cream Apply topically daily.  11/13/20  Yes Pollo Guillaume MD   Elastic Bandages & Supports (ACE KNEE BRACE HINGED) MISC 1 Units by Does not apply route once for 1 dose 11/12/20 2/16/21 Yes Arbutus Nickel, DO   albuterol sulfate HFA (VENTOLIN HFA) 108 (90 Base) MCG/ACT inhaler Inhale 2 puffs into the lungs 4 times daily as needed for Wheezing 11/5/20  Yes Merril Pacer, DO   docusate sodium (COLACE) 100 MG capsule Take 1 capsule by mouth 2 times daily as needed for Constipation 7/13/20  Yes Arbutus Nickel, DO   Potassium 99 MG TABS Take by mouth daily    Historical Provider, MD     Social History     Socioeconomic History    Marital status: Legally      Spouse name: None    Number of children: None    Years of education: None    Highest education level: None   Occupational History    None   Social Needs    Financial resource strain: None    Food insecurity     Worry: None     Inability: None    Transportation needs     Medical: None     Non-medical: None   Tobacco Use    Smoking status: Former Smoker     Packs/day: 0.50     Years: 15.00     Pack years: 7.50     Types: Cigarettes     Start date: 1965     Quit date: 1986     Years since quittin.0    Smokeless tobacco: Former User     Quit date: 1986   Substance and Sexual Activity    Alcohol use: Yes     Comment: beer    Drug use: No    Sexual activity: None   Lifestyle    Physical activity     Days per week: None     Minutes per session: None    Stress: None   Relationships    Social connections     Talks on phone: None     Gets together: None     Attends [employer] service: None     Active member of club or organization: None     Attends meetings of clubs or organizations: None     Relationship status: None    Intimate partner violence     Fear of current or ex partner: None     Emotionally abused: None     Physically abused: None     Forced sexual activity: None   Other Topics Concern    None   Social History Narrative    None       I have reviewed Jeffery's allergies, medications, problem list, medical, social and family history and have updated as needed in the electronic medical record  Review Of Systems:    Skin: no abnormal pigmentation, rash, scaling, itching, masses, hair or nail changes  Eyes: no blurring, diplopia, or eye pain  Ears/Nose/Throat: no hearing loss, tinnitus, vertigo, nosebleed, nasal congestion, rhinorrhea, sore throat  Respiratory: no cough, pleuritic chest pain, dyspnea, or wheezing  Cardiovascular: no chest pain, angina, dyspnea on exertion, orthopnea, PND, palpitations, or claudication  Gastrointestinal: no nausea, vomiting, heartburn, diarrhea, constipation, bloating,  abdominal pain, or blood per rectum. Appetite is good  Genitourinary: no urinary urgency, frequency, dysuria, nocturia, hesitancy, or incontinence  Musculoskeletal: joint pains off and on. Morning stiffness.  Ambulating well  Neurologic: no paralysis, paresis, paresthesia, seizures, tremors, or headaches  Hematologic/Lymphatic/Immunologic: no anemia, abnormal bleeding/bruising, fever, chills, night sweats, swollen glands, or unexplained weight loss  Endocrine: no heat or cold intolerance and no polyphagia, polydipsia, or polyuria        OBJECTIVE:     VS:  Wt Readings from Last 3 Encounters:   02/16/21 220 lb (99.8 kg)   02/15/21 215 lb (97.5 kg)   02/04/21 220 lb (99.8 kg)     Temp Readings from Last 3 Encounters:   02/16/21 98.6 °F (37 °C)   02/04/21 98 °F (36.7 °C)   12/14/20 97.2 °F (36.2 °C)     BP Readings from Last 3 Encounters:   02/16/21 110/70   12/14/20 138/72   11/13/20 110/70        General appearance: Alert, Awake, Oriented times 3, no distress  Skin: Warm and dry  Head: Normocephalic. No masses, lesions or tenderness noted  Eyes: Conjunctivae appear normal. PERLE  Ears: External ears normal  Nose/Sinuses: Nares normal. Septum midline. Mucosa normal. No drainage  Oropharynx: Oropharynx clear with no exudate seen  Neck: Neck supple. No jugular venous distension, lymphadenopathy or thyromegaly Trachea midline  Chest:  Normal. Movements are Normal and Equal.  Lungs: Lungs clear to auscultation bilaterally. No ronchi, crackles or wheezes  Heart: S1 S2  Regular rate and rhythm. No rub, murmur or gallop  Abdomen: Abdomen soft, non-tender. BS normal. No masses, organomegaly. Back: Grossly Normal and Equal. DTR are Normal. SLR is Normal.  Extremities: Arthritic changes are noted. Movements are limited. Pedal pulses are normal.  Musculoskeletal: Muscular strength appears intact.  No joint effusion, tenderness, swelling or warmth  Neuro:  No focal motor or sensory deficits        ASSESSMENT     Patient Active Problem List    Diagnosis Date Noted    Onychomycosis 02/15/2021    Tinea pedis of both feet 02/15/2021    Rupture of anterior cruciate ligament of right knee     COPD with acute exacerbation ([address] 07/15/2020    Hypertension     Diabetes mellitus ([address]     Hyperlipidemia     CKD (chronic

## 2021-02-16 NOTE — PATIENT INSTRUCTIONS
LOW SALT,LOW CARB. AND LOW FAT DIET. CONTINUE CURRENT MEDICATIONS TAKING REGULARLY. TAKE  GLUCOTROL 10 MG. 2 TIMES A DAY. STOP TAKING FENOFIBRATE. REGULAR WALKING ADVISED. ADVISED WEIGHT REDUCTION. FASTING FOR LAB WORK PRIOR TO NEXT VISIT. NEXT APPOINTMENT IN 2 MONTHS.
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
## 2021-03-15 ENCOUNTER — OFFICE VISIT (OUTPATIENT)
Dept: PHYSICAL MEDICINE AND REHAB | Age: 66
End: 2021-03-15
Payer: MEDICARE

## 2021-03-15 VITALS
DIASTOLIC BLOOD PRESSURE: 77 MMHG | HEIGHT: 70 IN | WEIGHT: 228 LBS | SYSTOLIC BLOOD PRESSURE: 140 MMHG | BODY MASS INDEX: 32.64 KG/M2 | HEART RATE: 55 BPM

## 2021-03-15 DIAGNOSIS — M54.2 CHRONIC NECK PAIN: Primary | ICD-10-CM

## 2021-03-15 DIAGNOSIS — M47.812 SPONDYLOSIS, CERVICAL: ICD-10-CM

## 2021-03-15 DIAGNOSIS — M48.02 CERVICAL STENOSIS OF SPINAL CANAL: ICD-10-CM

## 2021-03-15 DIAGNOSIS — G89.29 CHRONIC NECK PAIN: Primary | ICD-10-CM

## 2021-03-15 PROCEDURE — G8484 FLU IMMUNIZE NO ADMIN: HCPCS | Performed by: PHYSICAL MEDICINE & REHABILITATION

## 2021-03-15 PROCEDURE — 99214 OFFICE O/P EST MOD 30 MIN: CPT | Performed by: PHYSICAL MEDICINE & REHABILITATION

## 2021-03-15 PROCEDURE — 1123F ACP DISCUSS/DSCN MKR DOCD: CPT | Performed by: PHYSICAL MEDICINE & REHABILITATION

## 2021-03-15 PROCEDURE — G8427 DOCREV CUR MEDS BY ELIG CLIN: HCPCS | Performed by: PHYSICAL MEDICINE & REHABILITATION

## 2021-03-15 PROCEDURE — G8417 CALC BMI ABV UP PARAM F/U: HCPCS | Performed by: PHYSICAL MEDICINE & REHABILITATION

## 2021-03-15 PROCEDURE — 3017F COLORECTAL CA SCREEN DOC REV: CPT | Performed by: PHYSICAL MEDICINE & REHABILITATION

## 2021-03-15 PROCEDURE — 4040F PNEUMOC VAC/ADMIN/RCVD: CPT | Performed by: PHYSICAL MEDICINE & REHABILITATION

## 2021-03-15 PROCEDURE — 1036F TOBACCO NON-USER: CPT | Performed by: PHYSICAL MEDICINE & REHABILITATION

## 2021-03-15 RX ORDER — DULOXETIN HYDROCHLORIDE 20 MG/1
20 CAPSULE, DELAYED RELEASE ORAL DAILY
Qty: 30 CAPSULE | Refills: 2 | Status: SHIPPED
Start: 2021-03-15 | End: 2021-03-30

## 2021-03-15 RX ORDER — DULOXETIN HYDROCHLORIDE 60 MG/1
60 CAPSULE, DELAYED RELEASE ORAL DAILY
Qty: 30 CAPSULE | Refills: 0 | Status: SHIPPED
Start: 2021-03-15 | End: 2021-04-09

## 2021-03-15 NOTE — PATIENT INSTRUCTIONS
Patient Education        Neck Arthritis: Exercises  Introduction  Here are some examples of exercises for you to try. The exercises may be suggested for a condition or for rehabilitation. Start each exercise slowly. Ease off the exercises if you start to have pain. You will be told when to start these exercises and which ones will work best for you. How to do the exercises  Neck stretches to the side   1. This stretch works best if you keep your shoulder down as you lean away from it. To help you remember to do this, start by relaxing your shoulders and lightly holding on to your thighs or your chair. 2. Tilt your head toward your shoulder and hold for 15 to 30 seconds. Let the weight of your head stretch your muscles. 3. Repeat 2 to 4 times toward each shoulder. Chin tuck   1. Lie on the floor with a rolled-up towel under your neck. Your head should be touching the floor. 2. Slowly bring your chin toward your chest.  3. Hold for a count of 6, and then relax for up to 10 seconds. 4. Repeat 8 to 12 times. Active cervical rotation   1. Sit in a firm chair, or stand up straight. 2. Keeping your chin level, turn your head to the right, and hold for 15 to 30 seconds. 3. Turn your head to the left and hold for 15 to 30 seconds. 4. Repeat 2 to 4 times to each side. Shoulder blade squeeze   1. While standing, squeeze your shoulder blades together. 2. Do not raise your shoulders up as you are squeezing. 3. Hold for 6 seconds. 4. Repeat 8 to 12 times. Shoulder rolls   1. Sit comfortably with your feet shoulder-width apart. You can also do this exercise standing up. 2. Roll your shoulders up, then back, and then down in a smooth, circular motion. 3. Repeat 2 to 4 times. Follow-up care is a key part of your treatment and safety. Be sure to make and go to all appointments, and call your doctor if you are having problems.  It's also a good idea to know your test results and keep a list of the medicines you take. Where can you learn more? Go to https://[email or web address].[email or web address] org and sign in to your [employer] account. Enter T662 in the [facility] box to learn more about \"Neck Arthritis: Exercises. \"     If you do not have an account, please click on the \"Sign Up Now\" link. Current as of: November 16, 2020               Content Version: 12.8  © 2006-2021 [employer]. Care instructions adapted under license by [facility] ([facility]). If you have questions about a medical condition or this instruction, always ask your healthcare professional. [place] [address] any warranty or liability for your use of this information. Patient Education        Neck Strain or Sprain: Rehab Exercises  Introduction  Here are some examples of exercises for you to try. The exercises may be suggested for a condition or for rehabilitation. Start each exercise slowly. Ease off the exercises if you start to have pain. You will be told when to start these exercises and which ones will work best for you. How to do the exercises  Neck rotation   1. Sit in a firm chair, or stand up straight. 2. Keeping your chin level, turn your head to the right, and hold for 15 to 30 seconds. 3. Turn your head to the left and hold for 15 to 30 seconds. 4. Repeat 2 to 4 times to each side. Neck stretches   1. Look straight ahead, and tip your right ear to your right shoulder. Do not let your left shoulder rise up as you tip your head to the right. 2. Hold for 15 to 30 seconds. 3. Tilt your head to the left. Do not let your right shoulder rise up as you tip your head to the left. 4. Hold for 15 to 30 seconds. 5. Repeat 2 to 4 times to each side. Forward neck flexion   1. Sit in a firm chair, or stand up straight. 2. Bend your head forward. 3. Hold for 15 to 30 seconds. 4. Repeat 2 to 4 times. Lateral (side) bend strengthening   1.  With your right hand, place your first two fingers on your right temple. 2. Start to bend your head to the side while using gentle pressure from your fingers to keep your head from bending. 3. Hold for about 6 seconds. 4. Repeat 8 to 12 times. 5. Switch hands and repeat the same exercise on your left side. Forward bend strengthening   1. Place your first two fingers of either hand on your forehead. 2. Start to bend your head forward while using gentle pressure from your fingers to keep your head from bending. 3. Hold for about 6 seconds. 4. Repeat 8 to 12 times. Neutral position strengthening   1. Using one hand, place your fingertips on the back of your head at the top of your neck. 2. Start to bend your head backward while using gentle pressure from your fingers to keep your head from bending. 3. Hold for about 6 seconds. 4. Repeat 8 to 12 times. Chin tuck   1. Lie on the floor with a rolled-up towel under your neck. Your head should be touching the floor. 2. Slowly bring your chin toward your chest.  3. Hold for a count of 6, and then relax for up to 10 seconds. 4. Repeat 8 to 12 times. Follow-up care is a key part of your treatment and safety. Be sure to make and go to all appointments, and call your doctor if you are having problems. It's also a good idea to know your test results and keep a list of the medicines you take. Where can you learn more? Go to https://Storybirdperowenaeweb.[employer]. org and sign in to your [employer] account. Enter M679 in the [facility] box to learn more about \"Neck Strain or Sprain: Rehab Exercises. \"     If you do not have an account, please click on the \"Sign Up Now\" link. Current as of: November 16, 2020               Content Version: 12.8  © 2128-5941 [employer]. Care instructions adapted under license by [facility] ([facility]).  If you have questions about a medical condition or this instruction, always ask your healthcare professional. Barbi Villareal disclaims any warranty or liability for your use of this information.
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
## 2021-03-16 ENCOUNTER — TELEPHONE (OUTPATIENT)
Dept: PHYSICAL MEDICINE AND REHAB | Age: 66
End: 2021-03-16

## 2021-03-16 NOTE — TELEPHONE ENCOUNTER
----- Message from Carole Palencia DO sent at 3/15/2021  5:19 PM EDT -----  Reviewed test abnormal, inform patient that it showed degenerative changes.

## 2021-03-17 ENCOUNTER — TELEPHONE (OUTPATIENT)
Dept: PHYSICAL MEDICINE AND REHAB | Age: 66
End: 2021-03-17

## 2021-03-23 DIAGNOSIS — E11.9 TYPE 2 DIABETES MELLITUS WITHOUT COMPLICATION, WITHOUT LONG-TERM CURRENT USE OF INSULIN (HCC): Primary | ICD-10-CM

## 2021-03-23 RX ORDER — GLUCOSAMINE HCL/CHONDROITIN SU 500-400 MG
1 CAPSULE ORAL 3 TIMES DAILY
Qty: 100 STRIP | Refills: 3 | Status: SHIPPED | OUTPATIENT
Start: 2021-03-23 | End: 2021-03-24 | Stop reason: SDUPTHER

## 2021-03-24 ENCOUNTER — OFFICE VISIT (OUTPATIENT)
Dept: PHYSICAL MEDICINE AND REHAB | Age: 66
End: 2021-03-24
Payer: MEDICARE

## 2021-03-24 VITALS — BODY MASS INDEX: 30.06 KG/M2 | WEIGHT: 210 LBS | HEIGHT: 70 IN

## 2021-03-24 DIAGNOSIS — G56.03 BILATERAL CARPAL TUNNEL SYNDROME: Primary | ICD-10-CM

## 2021-03-24 DIAGNOSIS — G56.23 CUBITAL TUNNEL SYNDROME OF BOTH UPPER EXTREMITIES: ICD-10-CM

## 2021-03-24 PROCEDURE — 95912 NRV CNDJ TEST 11-12 STUDIES: CPT | Performed by: PHYSICAL MEDICINE & REHABILITATION

## 2021-03-24 PROCEDURE — 95886 MUSC TEST DONE W/N TEST COMP: CPT | Performed by: PHYSICAL MEDICINE & REHABILITATION

## 2021-03-24 RX ORDER — GLUCOSAMINE HCL/CHONDROITIN SU 500-400 MG
CAPSULE ORAL
Qty: 100 STRIP | Refills: 3 | Status: SHIPPED
Start: 2021-03-24 | End: 2022-09-02 | Stop reason: SDUPTHER

## 2021-03-24 NOTE — PROGRESS NOTES
Patient here for EMG. Tolerating Cymbalta well and starts 60 mg dose today. Based on EMG results and Xray results he was given bilateral carpal tunnel splints for hs and cervical, elbow and hand home exercises. No evidence of radiculopathy on EMG. He will follow up one month to evaluate effects of Cymbalta. In meantime, he will follow up with Dr. Sravanthi Simons regarding next steps for his shoulder treatment. Ya Gregory D.O., P.T.   Board Certified Physical Medicine and Rehabilitation  Board Certified Electrodiagnostic Medicine

## 2021-03-24 NOTE — PATIENT INSTRUCTIONS
Electrodiagnotic Laboratory  Accredited by the [facility] with Exemplary status  NO Araujo D.O. [facility]  [address]  Phone: [phone]  Fax: [phone]        Today you had an electrodiagnostic exam which included nerve conduction studies (NCS) and electromyography (EMG). This test evaluated the electrical activity of your nerves and muscles to help determine if you have a nerve or muscle disease. This test can help determine the location and type of a nerve or muscle problem. This will help your referring doctor diagnose your condition and determine the appropriate next step in your treatment plan. After your test:    1. There are no long lasting side effects of the test.     2. You may resume your normal activities without restrictions. 3.  Resume any medications that were stopped for the test.     4  If you have sore areas or bruising in your muscles where the needle was placed, apply a cold pack to the sore area for 15-20 minutes three to four times a day as needed for pain. The soreness should go away in about 1-2 days. 5. Your results were provided  Briefly at the end of your test and the final detailed report will be provided to your referring physician, and/or primary care physician and any other parties you requested within 1-2 days of the examination. You may wish to contact your referring provider after a few days to determine what they would like you to do next. 6.  Please call [phone] with any questions or concerns and if you develop increased body temperature/fever, swelling, tenderness, increased pain and/or drainage from the sites where the needle was placed. Thank you for choosing us for your health care needs. Patient Education        Carpal Tunnel Syndrome: Exercises  Introduction  Here are some examples of exercises for you to try.  The exercises may be suggested for a condition or account, please click on the \"Sign Up Now\" link. Current as of: November 16, 2020               Content Version: 12.8  © 2006-2021 [employer]. Care instructions adapted under license by [employer] ([facility]). If you have questions about a medical condition or this instruction, always ask your healthcare professional. [address] any warranty or liability for your use of this information. Patient Education         Carpal Tunnel Syndrome: A Few Tips for Preventing It (02:51)  Your health professional recommends that you watch this short online health video. Learn what movements may help cause carpal tunnel syndrome. Get tips to help prevent it and manage symptoms. How to watch the video    Scan the QR code   OR Visit the website    [email or web address]   Current as of: November 16, 2020               Content Version: 12.8  © 2006-2021 [employer]. Care instructions adapted under license by [employer] ([facility]). If you have questions about a medical condition or this instruction, always ask your healthcare professional. [address] any warranty or liability for your use of this information. Patient Education         Carpal Tunnel Syndrome: Stretches (02:43)  Your health professional recommends that you watch this short online health video. Learn stretches that can help you prevent carpal tunnel syndrome and manage your symptoms. How to watch the video    Scan the QR code   OR Visit the website    [email or web address]   Current as of: November 16, 2020               Content Version: 12.8  © 2006-2021 [employer]. Care instructions adapted under license by [employer] ([facility]). If you have questions about a medical condition or this instruction, always ask your healthcare professional. [address] any warranty or liability for your use of this information.        Patient Education Carpal Tunnel Syndrome: Care Instructions  Overview     Carpal tunnel syndrome is numbness, tingling, weakness, and pain in your hand, wrist, and sometimes forearm. It is caused by pressure on the median nerve. This nerve and several tough tissues called tendons run through a space in the wrist. This space is called the carpal tunnel. The repeated hand motions used in work and some hobbies and sports can put pressure on the median nerve. Pregnancy can cause carpal tunnel syndrome. Several conditions, such as diabetes, arthritis, and an underactive thyroid, can also cause it. You may be able to limit an activity or change the way you do it to reduce your symptoms. You also can take other steps to feel better. If your symptoms are mild, 1 to 2 weeks of home treatment are likely to ease your pain. Surgery is needed only if other treatments do not work. Follow-up care is a key part of your treatment and safety. Be sure to make and go to all appointments, and call your doctor if you are having problems. It's also a good idea to know your test results and keep a list of the medicines you take. How can you care for yourself at home? · If possible, stop or reduce the activity that causes your symptoms. If you cannot stop the activity, take frequent breaks to rest and stretch or change hand positions to do a task. Try switching hands, such as when using a computer mouse. · Try to avoid bending or twisting your wrists. · Ask your doctor if you can take an over-the-counter pain medicine, such as acetaminophen (Tylenol), ibuprofen (Advil, Motrin), or naproxen (Aleve). Be safe with medicines. Read and follow all instructions on the label. · If your doctor prescribes corticosteroid medicine to help reduce pain and swelling, take it exactly as prescribed. Call your doctor if you think you are having a problem with your medicine. · Put ice or a cold pack on your wrist for 10 to 20 minutes at a time to ease pain.  Put a thin cloth between the ice and your skin. · If your doctor or your physical or occupational therapist tells you to wear a wrist splint, wear it as directed to keep your wrist in a neutral position. This also eases pressure on your median nerve. · Ask your doctor whether you should have physical or occupational therapy to learn how to do tasks differently. · Try a yoga class to stretch your muscles and build strength in your hands and wrists. Yoga has been shown to ease carpal tunnel symptoms. To prevent carpal tunnel  · When working at a computer, keep your hands and wrists in line with your forearms. Hold your elbows close to your sides. Take a break every 10 to 15 minutes. · Try these exercises:  ? Warm up: Rotate your wrist up, down, and from side to side. Repeat this 4 times. Stretch your fingers far apart, relax them, then stretch them again. Repeat 4 times. Stretch your thumb by pulling it back gently, holding it, and then releasing it. Repeat 4 times. ? Prayer stretch: Start with your palms together in front of your chest just below your chin. Slowly lower your hands toward your waistline while keeping your hands close to your stomach and your palms together until you feel a mild to moderate stretch under your forearms. Hold for 10 to 20 seconds. Repeat 4 times. ? Wrist flexor stretch: Hold your arm in front of you with your palm up. Bend your wrist, pointing your hand toward the floor. With your other hand, gently bend your wrist further until you feel a mild to moderate stretch in your forearm. Hold for 10 to 20 seconds. Repeat 4 times. ? Wrist extensor stretch: Repeat the steps for the wrist flexor stretch, but begin with your extended hand palm down. · Squeeze a rubber exercise ball several times a day to keep your hands and fingers strong. · Avoid holding objects (such as a book) in one position for a long time. When possible, use your whole hand to grasp an object.  Using just the thumb and bend your wrist farther until you feel a mild to moderate stretch in your forearm. 4. Hold for at least 15 to 30 seconds. Repeat 2 to 4 times. Wrist extensor stretch   1. Repeat steps 1 to 4 of the stretch above but begin with your extended hand palm down. Ball or sock squeeze   1. Hold a tennis ball (or a rolled-up sock) in your hand. 2. Make a fist around the ball (or sock) and squeeze. 3. Hold for about 6 seconds, and then relax for up to 10 seconds. 4. Repeat 8 to 12 times. 5. Switch the ball (or sock) to your other hand and do 8 to 12 times. Wrist deviation   1. Sit so that your arm is supported but your hand hangs off the edge of a flat surface, such as a table. 2. Hold your hand out like you are shaking hands with someone. 3. Move your hand up and down. 4. Repeat this motion 8 to 12 times. 5. Switch arms. 6. Try to do this exercise twice with each hand. Wrist curls   1. Place your forearm on a table with your hand hanging over the edge of the table, palm up. 2. Place a 1- to 2-pound weight in your hand. This may be a dumbbell, a can of food, or a filled water bottle. 3. Slowly raise and lower the weight while keeping your forearm on the table and your palm facing up. 4. Repeat this motion 8 to 12 times. 5. Switch arms, and do steps 1 through 4.  6. Repeat with your hand facing down toward the floor. Switch arms. Biceps curls   1. Sit leaning forward with your legs slightly spread and your left hand on your left thigh. 2. Place your right elbow on your right thigh, and hold the weight with your forearm horizontal.  3. Slowly curl the weight up and toward your chest.  4. Repeat this motion 8 to 12 times. 5. Switch arms, and do steps 1 through 4. Follow-up care is a key part of your treatment and safety. Be sure to make and go to all appointments, and call your doctor if you are having problems.  It's also a good idea to know your test results and keep a list of the medicines you take.  Where can you learn more? Go to [email or web address].[email or web address] org and sign in to your CallApp account. Enter M279 in the [employer] box to learn more about \"Elbow: Exercises. \"     If you do not have an account, please click on the \"Sign Up Now\" link. Current as of: November 16, 2020               Content Version: 12.8  © 2006-2021 [employer]. Care instructions adapted under license by [facility] ([facility]). If you have questions about a medical condition or this instruction, always ask your healthcare professional. [place] [address] any warranty or liability for your use of this information. Patient Education        Neck Arthritis: Exercises  Introduction  Here are some examples of exercises for you to try. The exercises may be suggested for a condition or for rehabilitation. Start each exercise slowly. Ease off the exercises if you start to have pain. You will be told when to start these exercises and which ones will work best for you. How to do the exercises  Neck stretches to the side   1. This stretch works best if you keep your shoulder down as you lean away from it. To help you remember to do this, start by relaxing your shoulders and lightly holding on to your thighs or your chair. 2. Tilt your head toward your shoulder and hold for 15 to 30 seconds. Let the weight of your head stretch your muscles. 3. Repeat 2 to 4 times toward each shoulder. Chin tuck   1. Lie on the floor with a rolled-up towel under your neck. Your head should be touching the floor. 2. Slowly bring your chin toward your chest.  3. Hold for a count of 6, and then relax for up to 10 seconds. 4. Repeat 8 to 12 times. Active cervical rotation   1. Sit in a firm chair, or stand up straight. 2. Keeping your chin level, turn your head to the right, and hold for 15 to 30 seconds. 3. Turn your head to the left and hold for 15 to 30 seconds.   4. Repeat 2 to 4 times to each side.    Shoulder blade squeeze   1. While standing, squeeze your shoulder blades together. 2. Do not raise your shoulders up as you are squeezing. 3. Hold for 6 seconds. 4. Repeat 8 to 12 times. Shoulder rolls   1. Sit comfortably with your feet shoulder-width apart. You can also do this exercise standing up. 2. Roll your shoulders up, then back, and then down in a smooth, circular motion. 3. Repeat 2 to 4 times. Follow-up care is a key part of your treatment and safety. Be sure to make and go to all appointments, and call your doctor if you are having problems. It's also a good idea to know your test results and keep a list of the medicines you take. Where can you learn more? Go to https://Asure Software.Exco [email or web address] org and sign in to your [employer] account. Enter O911 in the [employer] box to learn more about \"Neck Arthritis: Exercises. \"     If you do not have an account, please click on the \"Sign Up Now\" link. Current as of: November 16, 2020               Content Version: 12.8  © 9279-4782 [employer]. Care instructions adapted under license by [facility] ([facility]). If you have questions about a medical condition or this instruction, always ask your healthcare professional. [address] any warranty or liability for your use of this information.

## 2021-03-24 NOTE — PROGRESS NOTES
[address] [facility]  Electrodiagnostic Laboratory  *Accredited by the [address] [place] [address] with exemplary status  [address]  Phone: [phone]  Fax: [phone]    Referring Provider: Yarely Abernathy DO  Primary Care Physician: Onel Liu MD  Patient Name: Macario Wallace  Patient YOB: 1955  Gender: male  BMI: Body mass index is 30.13 kg/m². Height 5' 10\" (1.778 m), weight 210 lb (95.3 kg). 3/24/2021    Description of clinical problem:   Chief Complaint   Patient presents with    Extremity Pain     pain from elbow to hand. 10/10 pain. comes and goes in both. sometimes goes to shoulder pain. 3-4years. no acute injury. left is worse.  Numbness     numbness/tingling in shoulder to fingers. all will go numb but thumb to middle are more constant.  Extremity Weakness     no weakness     Sensory NCS      Nerve / Sites Rec. Site Peak Lat PP Amp Segments Distance Velocity Temp.      ms µV  cm m/s °C   L Median - Digit II (Antidromic)      Palm Dig II 2.50 23.7 Palm - Dig II 7 40 32.5      Wrist Dig II 5.99* 13.0 Wrist - Dig II 14 30 32.5   R Median - Digit II (Antidromic)      Palm Dig II 2.24 18.3 Palm - Dig II 7 52 32.1      Wrist Dig II 5.42* 12.1 Wrist - Dig II 14 30 32.1   L Ulnar - Digit V (Antidromic)      Wrist Dig V 3.65 18.8 Wrist - Dig V 14 47 32.5   R Ulnar - Digit V (Antidromic)      Wrist Dig V 3.70 13.7 Wrist - Dig V 14 47 32.4   L Radial - Anatomical snuff box (Forearm)      Forearm Wrist 2.34 20.5 Forearm - Wrist 10 53 32.6   R Radial - Anatomical snuff box (Forearm)      Forearm Wrist 2.34 26.4 Forearm - Wrist 10 53 32.2   L Dorsal ulnar cutaneous - Hand dorsum (Forearm)      Forearm Hand dorsum 2.24 7.8 Forearm - Hand dorsum 8 53 31.9       Motor NCS      Nerve / Sites Muscle Onset Amplitude Segments Distance Velocity Temp.     ms mV  cm m/s °C   L Median - APB      Palm APB 1.98 12.0 Palm - APB   32      Wrist APB 7.50* 10.3 Wrist - Palm 8 studies:   · The following nerve conduction studies were abnormal:   · The bilateral median sensory latency at the wrist is prolonged. · The bilateral median motor studies were focally slow across the wrist in conduction velocity with prolonged distal latency at the wrist.  Bilateral median minimal F waves were prolonged. · The bilateral ulnar motor studies were focally slow across the elbow. The right ulnar minimal F wave was prolonged. · All other nerve conduction studies listed in the table above were normal in latency, amplitude and conduction velocity. Needle EMG:   · Needle EMG was performed using a concentric needle. The following abnormalities were seen on needle EMG: positive sharp waves, fibrillation potentials and decreased motor unit recruitment was present in the left first dorsal interossei   All other muscles tested, as listed in the table above demonstrated normal amplitude, duration, phases and recruitment and no active denervation signs were seen. Diagnostic Interpretation: This study was complexly abnormal.     Electrodiagnosis: There is electrodiagnostic evidence of a median mononeuropathy. · Location: bilateral at the wrist.   · Nature: [  ] Axonal   [ X ] Demyelinating  [  ] Mixed axonal and demyelinating     [  ] Sensory [  ] Motor               Lolly.[email or web address]  ] Mixed sensorimotor     [  ] with active denervation       [ X ] without active denervation  · Duration: Acute  · Severity: moderate  · Prognosis: Good. The prognosis for recovery of demyelinating lesions is good if the cause is alleviated. Electrodiagnosis: There is electrodiagnostic evidence of a ulnar nerve lesion. · Location: bilateral across the elbows.    · Nature: [  ] Axonal   [ X ] Demyelinating on right  Lolly.Shields  ] Mixed axonal and demyelinating on left     [  ] Sensory [  ] Motor               [ X ] Mixed sensorimotor     [ X ] with active denervation on left     [ X ] without active denervation on

## 2021-03-26 ENCOUNTER — OFFICE VISIT (OUTPATIENT)
Dept: ORTHOPEDIC SURGERY | Age: 66
End: 2021-03-26
Payer: MEDICARE

## 2021-03-26 ENCOUNTER — TELEPHONE (OUTPATIENT)
Dept: ORTHOPEDIC SURGERY | Age: 66
End: 2021-03-26

## 2021-03-26 ENCOUNTER — TELEPHONE (OUTPATIENT)
Dept: ADMINISTRATIVE | Age: 66
End: 2021-03-26

## 2021-03-26 VITALS — HEIGHT: 70 IN | WEIGHT: 210 LBS | BODY MASS INDEX: 30.06 KG/M2

## 2021-03-26 DIAGNOSIS — G56.23 CUBITAL TUNNEL SYNDROME, BILATERAL: ICD-10-CM

## 2021-03-26 DIAGNOSIS — G56.03 BILATERAL CARPAL TUNNEL SYNDROME: Primary | ICD-10-CM

## 2021-03-26 PROCEDURE — G8417 CALC BMI ABV UP PARAM F/U: HCPCS | Performed by: ORTHOPAEDIC SURGERY

## 2021-03-26 PROCEDURE — G8427 DOCREV CUR MEDS BY ELIG CLIN: HCPCS | Performed by: ORTHOPAEDIC SURGERY

## 2021-03-26 PROCEDURE — 99213 OFFICE O/P EST LOW 20 MIN: CPT | Performed by: ORTHOPAEDIC SURGERY

## 2021-03-26 PROCEDURE — 4040F PNEUMOC VAC/ADMIN/RCVD: CPT | Performed by: ORTHOPAEDIC SURGERY

## 2021-03-26 PROCEDURE — 3017F COLORECTAL CA SCREEN DOC REV: CPT | Performed by: ORTHOPAEDIC SURGERY

## 2021-03-26 PROCEDURE — G8484 FLU IMMUNIZE NO ADMIN: HCPCS | Performed by: ORTHOPAEDIC SURGERY

## 2021-03-26 PROCEDURE — 1123F ACP DISCUSS/DSCN MKR DOCD: CPT | Performed by: ORTHOPAEDIC SURGERY

## 2021-03-26 PROCEDURE — 1036F TOBACCO NON-USER: CPT | Performed by: ORTHOPAEDIC SURGERY

## 2021-03-26 NOTE — TELEPHONE ENCOUNTER
Patient states he was on hold nearly 30 mins on the refill line concerning his diabetic test strips. Please contact and advise at [phone]. Thank you in advance.
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
## 2021-03-26 NOTE — TELEPHONE ENCOUNTER
Patient is requesting to reschedule his appointment with Dr. Charles Boateng. He is a  and will not be in town for 3/29 appointment Please follow up with patient to schedule.

## 2021-03-30 ENCOUNTER — HOSPITAL ENCOUNTER (EMERGENCY)
Age: 66
Discharge: HOME | End: 2021-03-30
Attending: EMERGENCY MEDICINE
Payer: MEDICARE

## 2021-03-30 VITALS
OXYGEN SATURATION: 96 % | HEART RATE: 2 BPM | SYSTOLIC BLOOD PRESSURE: 136 MMHG | DIASTOLIC BLOOD PRESSURE: 87 MMHG | TEMPERATURE: 96.8 F | RESPIRATION RATE: 16 BRPM

## 2021-03-30 DIAGNOSIS — H69.81 DYSFUNCTION OF RIGHT EUSTACHIAN TUBE: ICD-10-CM

## 2021-03-30 DIAGNOSIS — J01.00 ACUTE NON-RECURRENT MAXILLARY SINUSITIS: Primary | ICD-10-CM

## 2021-03-30 PROCEDURE — G0381 LEV 2 HOSP TYPE B ED VISIT: HCPCS

## 2021-03-30 PROCEDURE — 99282 EMERGENCY DEPT VISIT SF MDM: CPT

## 2021-03-30 RX ORDER — DEXTROMETHORPHAN HYDROBROMIDE AND PROMETHAZINE HYDROCHLORIDE 15; 6.25 MG/5ML; MG/5ML
5 SYRUP ORAL 4 TIMES DAILY PRN
Qty: 180 ML | Refills: 0 | Status: SHIPPED | OUTPATIENT
Start: 2021-03-30 | End: 2021-04-06

## 2021-03-30 RX ORDER — AZITHROMYCIN 250 MG/1
TABLET, FILM COATED ORAL
Qty: 1 PACKET | Refills: 0 | Status: SHIPPED | OUTPATIENT
Start: 2021-03-30 | End: 2021-04-09

## 2021-03-30 ASSESSMENT — ENCOUNTER SYMPTOMS
VOMITING: 0
RHINORRHEA: 1
EYE REDNESS: 0
SORE THROAT: 1
NAUSEA: 0
BACK PAIN: 0
COUGH: 1
DIARRHEA: 0
SINUS PRESSURE: 1
ABDOMINAL PAIN: 0
EYE DISCHARGE: 0
EYE PAIN: 0
WHEEZING: 0
SHORTNESS OF BREATH: 0

## 2021-03-30 ASSESSMENT — PAIN DESCRIPTION - ORIENTATION: ORIENTATION: RIGHT

## 2021-03-30 ASSESSMENT — PAIN DESCRIPTION - PROGRESSION: CLINICAL_PROGRESSION: GRADUALLY WORSENING

## 2021-03-30 ASSESSMENT — PAIN DESCRIPTION - PAIN TYPE: TYPE: ACUTE PAIN

## 2021-03-30 NOTE — ED PROVIDER NOTES
The history is provided by the patient. Illness   The current episode started more than 1 week ago. The onset was gradual. The problem occurs occasionally. The problem has been unchanged. The problem is mild. Nothing relieves the symptoms. Nothing aggravates the symptoms. Associated symptoms include congestion, ear pain, rhinorrhea, sore throat, cough and URI. Pertinent negatives include no fever, no abdominal pain, no diarrhea, no nausea, no vomiting, no headaches, no wheezing, no rash, no eye discharge, no eye pain and no eye redness. Review of Systems   Constitutional: Negative for chills and fever. HENT: Positive for congestion, ear pain, rhinorrhea, sinus pressure and sore throat. Eyes: Negative for pain, discharge and redness. Respiratory: Positive for cough. Negative for shortness of breath and wheezing. Cardiovascular: Negative for chest pain. Gastrointestinal: Negative for abdominal pain, diarrhea, nausea and vomiting. Genitourinary: Negative for dysuria and frequency. Musculoskeletal: Negative for arthralgias and back pain. Skin: Negative for rash and wound. Neurological: Negative for weakness and headaches. Hematological: Negative for adenopathy. Psychiatric/Behavioral: Negative. All other systems reviewed and are negative. Physical Exam  Vitals signs and nursing note reviewed. Constitutional:       Appearance: He is well-developed. HENT:      Head: Normocephalic and atraumatic. Right Ear: A middle ear effusion is present. Left Ear: A middle ear effusion is present. Nose: Congestion and rhinorrhea present. Mouth/Throat:      Pharynx: Posterior oropharyngeal erythema present. Eyes:      Pupils: Pupils are equal, round, and reactive to light. Neck:      Musculoskeletal: Normal range of motion and neck supple. Cardiovascular:      Rate and Rhythm: Normal rate and regular rhythm. Heart sounds: Normal heart sounds. No murmur. and vital signs as below have been reviewed. /87   Pulse (!) 2   Temp 96.8 °F (36 °C) (Infrared)   Resp 16   SpO2 96%   Oxygen Saturation Interpretation: Normal      ------------------------------------------ PROGRESS NOTES ------------------------------------------   I have spoken with the patient and discussed todays results, in addition to providing specific details for the plan of care and counseling regarding the diagnosis and prognosis. Their questions are answered at this time and they are agreeable with the plan.      --------------------------------- ADDITIONAL PROVIDER NOTES ---------------------------------        This patient is stable for discharge. I have shared the specific conditions for return, as well as the importance of follow-up. IMPRESSION:     1. Acute non-recurrent maxillary sinusitis    2. Dysfunction of right eustachian tube      Patient's Medications   New Prescriptions    AZITHROMYCIN (ZITHROMAX Z-CHELA) 250 MG TABLET    TAKE 500MG PO DAY ONE. ..  250MG PO DAY TWO THROUGH FIVE DISPENSE 6 TABS NO REFILLS    PROMETHAZINE-DEXTROMETHORPHAN (PROMETHAZINE-DM) 6.25-15 MG/5ML SYRUP    Take 5 mLs by mouth 4 times daily as needed for Cough   Previous Medications    ALBUTEROL SULFATE HFA (VENTOLIN HFA) 108 (90 BASE) MCG/ACT INHALER    Inhale 2 puffs into the lungs 4 times daily as needed for Wheezing    ATORVASTATIN (LIPITOR) 40 MG TABLET    TAKE 1 TABLET BY MOUTH IN THE EVENING FOR CHOLESTEROL    BLOOD GLUCOSE MONITOR STRIPS    Test 3 times a day & as needed for symptoms of irregular blood glucose    BLOOD GLUCOSE MONITORING SUPPL (BLOOD GLUCOSE MONITOR SYSTEM) W/DEVICE KIT    1 each by Does not apply route 3 times daily Dispense what ever brand is covered by insurance    CARVEDILOL (COREG) 25 MG TABLET    Take 1 tablet by mouth 2 times daily    CHOLECALCIFEROL (VITAMIN D3) 1.25 MG (99943 UT) CAPS    Take by mouth    CLOTRIMAZOLE-BETAMETHASONE (LOTRISONE) 1-0.05 % CREAM    Apply topically 2 times daily.     DOXEPIN (SINEQUAN) 10 MG CAPSULE    TAKE UP TO 3 CAPSULES BY MOUTH AT BEDTIME AS DIRECTED FOR INSOMNIA    DULOXETINE (CYMBALTA) 60 MG EXTENDED RELEASE CAPSULE    Take 1 capsule by mouth daily Start after titration with 20 mg complete    ELASTIC BANDAGES & SUPPORTS (ACE KNEE BRACE HINGED) MISC    1 Units by Does not apply route once for 1 dose    GLIPIZIDE (GLUCOTROL) 10 MG TABLET    Take 1 tablet by mouth 2 times daily    HYDROCHLOROTHIAZIDE (MICROZIDE) 12.5 MG CAPSULE    Take 1 capsule by mouth daily    IBUPROFEN (ADVIL;MOTRIN) 800 MG TABLET    take 1 tablet by mouth every 8 hours if needed for pain    LANCETS 30G MISC    1 each by Does not apply route 3 times daily Dispense what ever brand is covered by insurance    LOSARTAN (COZAAR) 50 MG TABLET    Take 1 tablet by mouth daily    SILDENAFIL (REVATIO) 20 MG TABLET    Take 1 tablet by mouth daily as needed (FOR SEXUAL DYSFUNCTION)    VITAMIN B-12 (CYANOCOBALAMIN) 1000 MCG TABLET    Take 1,000 mcg by mouth daily   Modified Medications    No medications on file   Discontinued Medications    DOCUSATE SODIUM (COLACE) 100 MG CAPSULE    Take 1 capsule by mouth 2 times daily as needed for Constipation    DULOXETINE (CYMBALTA) 20 MG EXTENDED RELEASE CAPSULE    Take 1 capsule by mouth daily    ROPINIROLE (REQUIP) 1 MG TABLET    Take 1 tablet by mouth 3 times daily for 240 doses         Jonatan Rodney,   03/30/21 [id number]

## 2021-04-09 RX ORDER — DULOXETIN HYDROCHLORIDE 60 MG/1
60 CAPSULE, DELAYED RELEASE ORAL DAILY
Qty: 16 CAPSULE | Refills: 0 | Status: SHIPPED
Start: 2021-04-09 | End: 2021-04-26 | Stop reason: SDUPTHER

## 2021-04-12 ENCOUNTER — TELEPHONE (OUTPATIENT)
Dept: FAMILY MEDICINE CLINIC | Age: 66
End: 2021-04-12

## 2021-04-12 ENCOUNTER — OFFICE VISIT (OUTPATIENT)
Dept: ORTHOPEDIC SURGERY | Age: 66
End: 2021-04-12
Payer: MEDICARE

## 2021-04-12 ENCOUNTER — OFFICE VISIT (OUTPATIENT)
Dept: FAMILY MEDICINE CLINIC | Age: 66
End: 2021-04-12
Payer: MEDICARE

## 2021-04-12 VITALS
RESPIRATION RATE: 16 BRPM | SYSTOLIC BLOOD PRESSURE: 112 MMHG | OXYGEN SATURATION: 98 % | WEIGHT: 221 LBS | HEART RATE: 60 BPM | HEIGHT: 70 IN | DIASTOLIC BLOOD PRESSURE: 70 MMHG | BODY MASS INDEX: 31.64 KG/M2 | TEMPERATURE: 97 F

## 2021-04-12 VITALS — BODY MASS INDEX: 31.64 KG/M2 | WEIGHT: 221 LBS | HEIGHT: 70 IN

## 2021-04-12 DIAGNOSIS — Z11.4 ENCOUNTER FOR SCREENING FOR HIV: ICD-10-CM

## 2021-04-12 DIAGNOSIS — E78.01 FAMILIAL HYPERCHOLESTEROLEMIA: ICD-10-CM

## 2021-04-12 DIAGNOSIS — G56.23 CUBITAL TUNNEL SYNDROME, BILATERAL: ICD-10-CM

## 2021-04-12 DIAGNOSIS — E11.9 TYPE 2 DIABETES MELLITUS WITHOUT COMPLICATION, WITHOUT LONG-TERM CURRENT USE OF INSULIN (HCC): Primary | ICD-10-CM

## 2021-04-12 DIAGNOSIS — E11.65 TYPE 2 DIABETES MELLITUS WITH HYPERGLYCEMIA, WITHOUT LONG-TERM CURRENT USE OF INSULIN (HCC): ICD-10-CM

## 2021-04-12 DIAGNOSIS — I10 ESSENTIAL HYPERTENSION: ICD-10-CM

## 2021-04-12 DIAGNOSIS — Z11.59 NEED FOR HEPATITIS C SCREENING TEST: ICD-10-CM

## 2021-04-12 DIAGNOSIS — G56.03 BILATERAL CARPAL TUNNEL SYNDROME: Primary | ICD-10-CM

## 2021-04-12 DIAGNOSIS — K52.9 CHRONIC DIARRHEA: ICD-10-CM

## 2021-04-12 DIAGNOSIS — B96.89 ACUTE BACTERIAL SINUSITIS: ICD-10-CM

## 2021-04-12 DIAGNOSIS — J01.90 ACUTE BACTERIAL SINUSITIS: ICD-10-CM

## 2021-04-12 DIAGNOSIS — J44.1 CHRONIC OBSTRUCTIVE PULMONARY DISEASE WITH (ACUTE) EXACERBATION (HCC): ICD-10-CM

## 2021-04-12 DIAGNOSIS — B35.3 TINEA PEDIS OF BOTH FEET: ICD-10-CM

## 2021-04-12 LAB
ALBUMIN SERPL-MCNC: 4.5 G/DL (ref 3.5–5.2)
ALP BLD-CCNC: 61 U/L (ref 40–129)
ALT SERPL-CCNC: 32 U/L (ref 0–40)
ANION GAP SERPL CALCULATED.3IONS-SCNC: 14 MMOL/L (ref 7–16)
AST SERPL-CCNC: 38 U/L (ref 0–39)
BILIRUB SERPL-MCNC: 0.4 MG/DL (ref 0–1.2)
BUN BLDV-MCNC: 16 MG/DL (ref 8–23)
CALCIUM SERPL-MCNC: 9.9 MG/DL (ref 8.6–10.2)
CHLORIDE BLD-SCNC: 104 MMOL/L (ref 98–107)
CHOLESTEROL, TOTAL: 198 MG/DL (ref 0–199)
CO2: 23 MMOL/L (ref 22–29)
CREAT SERPL-MCNC: 1.2 MG/DL (ref 0.7–1.2)
GFR AFRICAN AMERICAN: >60
GFR NON-AFRICAN AMERICAN: >60 ML/MIN/1.73
GLUCOSE BLD-MCNC: 159 MG/DL (ref 74–99)
HDLC SERPL-MCNC: 26 MG/DL
LDL CHOLESTEROL CALCULATED: ABNORMAL MG/DL (ref 0–99)
POTASSIUM SERPL-SCNC: 4.3 MMOL/L (ref 3.5–5)
SODIUM BLD-SCNC: 141 MMOL/L (ref 132–146)
TOTAL PROTEIN: 6.8 G/DL (ref 6.4–8.3)
TRIGL SERPL-MCNC: 675 MG/DL (ref 0–149)
VLDLC SERPL CALC-MCNC: ABNORMAL MG/DL

## 2021-04-12 PROCEDURE — 99214 OFFICE O/P EST MOD 30 MIN: CPT | Performed by: ORTHOPAEDIC SURGERY

## 2021-04-12 PROCEDURE — G8427 DOCREV CUR MEDS BY ELIG CLIN: HCPCS | Performed by: ORTHOPAEDIC SURGERY

## 2021-04-12 PROCEDURE — 3051F HG A1C>EQUAL 7.0%<8.0%: CPT | Performed by: FAMILY MEDICINE

## 2021-04-12 PROCEDURE — 2022F DILAT RTA XM EVC RTNOPTHY: CPT | Performed by: FAMILY MEDICINE

## 2021-04-12 PROCEDURE — 1123F ACP DISCUSS/DSCN MKR DOCD: CPT | Performed by: ORTHOPAEDIC SURGERY

## 2021-04-12 PROCEDURE — G8417 CALC BMI ABV UP PARAM F/U: HCPCS | Performed by: ORTHOPAEDIC SURGERY

## 2021-04-12 PROCEDURE — 1036F TOBACCO NON-USER: CPT | Performed by: ORTHOPAEDIC SURGERY

## 2021-04-12 PROCEDURE — 3023F SPIROM DOC REV: CPT | Performed by: FAMILY MEDICINE

## 2021-04-12 PROCEDURE — 99214 OFFICE O/P EST MOD 30 MIN: CPT | Performed by: FAMILY MEDICINE

## 2021-04-12 PROCEDURE — 1036F TOBACCO NON-USER: CPT | Performed by: FAMILY MEDICINE

## 2021-04-12 PROCEDURE — G8417 CALC BMI ABV UP PARAM F/U: HCPCS | Performed by: FAMILY MEDICINE

## 2021-04-12 PROCEDURE — 4040F PNEUMOC VAC/ADMIN/RCVD: CPT | Performed by: FAMILY MEDICINE

## 2021-04-12 PROCEDURE — G8926 SPIRO NO PERF OR DOC: HCPCS | Performed by: FAMILY MEDICINE

## 2021-04-12 PROCEDURE — 4040F PNEUMOC VAC/ADMIN/RCVD: CPT | Performed by: ORTHOPAEDIC SURGERY

## 2021-04-12 PROCEDURE — 3017F COLORECTAL CA SCREEN DOC REV: CPT | Performed by: FAMILY MEDICINE

## 2021-04-12 PROCEDURE — 1123F ACP DISCUSS/DSCN MKR DOCD: CPT | Performed by: FAMILY MEDICINE

## 2021-04-12 PROCEDURE — G8427 DOCREV CUR MEDS BY ELIG CLIN: HCPCS | Performed by: FAMILY MEDICINE

## 2021-04-12 PROCEDURE — 3017F COLORECTAL CA SCREEN DOC REV: CPT | Performed by: ORTHOPAEDIC SURGERY

## 2021-04-12 RX ORDER — ROPINIROLE 1 MG/1
1 TABLET, FILM COATED ORAL 3 TIMES DAILY
Qty: 180 TABLET | Refills: 1 | Status: SHIPPED
Start: 2021-04-12 | End: 2021-05-24 | Stop reason: SDUPTHER

## 2021-04-12 RX ORDER — ALBUTEROL SULFATE 90 UG/1
2 AEROSOL, METERED RESPIRATORY (INHALATION) 4 TIMES DAILY PRN
Qty: 1 INHALER | Refills: 0 | Status: SHIPPED
Start: 2021-04-12 | End: 2021-10-21

## 2021-04-12 RX ORDER — CARVEDILOL 25 MG/1
25 TABLET ORAL 2 TIMES DAILY
Qty: 60 TABLET | Refills: 3 | Status: SHIPPED
Start: 2021-04-12 | End: 2021-07-26 | Stop reason: SDUPTHER

## 2021-04-12 RX ORDER — CLOTRIMAZOLE AND BETAMETHASONE DIPROPIONATE 10; .64 MG/G; MG/G
CREAM TOPICAL
Qty: 45 G | Refills: 3 | Status: SHIPPED
Start: 2021-04-12 | End: 2021-08-30 | Stop reason: ALTCHOICE

## 2021-04-12 RX ORDER — HYDROCHLOROTHIAZIDE 12.5 MG/1
12.5 CAPSULE, GELATIN COATED ORAL DAILY
Qty: 30 CAPSULE | Refills: 3 | Status: SHIPPED
Start: 2021-04-12 | End: 2021-07-26 | Stop reason: SDUPTHER

## 2021-04-12 RX ORDER — LOSARTAN POTASSIUM 50 MG/1
50 TABLET ORAL DAILY
Qty: 30 TABLET | Refills: 2 | Status: SHIPPED
Start: 2021-04-12 | End: 2021-07-26 | Stop reason: SDUPTHER

## 2021-04-12 RX ORDER — ROPINIROLE 1 MG/1
1 TABLET, FILM COATED ORAL 3 TIMES DAILY
Qty: 60 TABLET | Refills: 0 | Status: SHIPPED
Start: 2021-04-12 | End: 2021-04-12 | Stop reason: SDUPTHER

## 2021-04-12 RX ORDER — DOXEPIN HYDROCHLORIDE 10 MG/1
CAPSULE ORAL
Qty: 30 CAPSULE | Refills: 1 | Status: SHIPPED
Start: 2021-04-12 | End: 2021-05-17 | Stop reason: SDUPTHER

## 2021-04-12 RX ORDER — SILDENAFIL CITRATE 20 MG/1
20 TABLET ORAL DAILY PRN
Qty: 5 TABLET | Refills: 0 | Status: SHIPPED | OUTPATIENT
Start: 2021-04-12

## 2021-04-12 RX ORDER — AMOXICILLIN 500 MG/1
500 CAPSULE ORAL 3 TIMES DAILY
Qty: 30 CAPSULE | Refills: 0 | Status: SHIPPED
Start: 2021-04-12 | End: 2021-04-19 | Stop reason: ALTCHOICE

## 2021-04-12 NOTE — TELEPHONE ENCOUNTER
Patient called he was seen in the office today. He was checking to see if the  [place]  1 mg could be increased, he is taking it 3 times daily  Now, not really helping    Last seen 4/12/2021  Next appt 5/17/2021     rite [address] [facility] [place].

## 2021-04-12 NOTE — PROGRESS NOTES
25 MG tablet Take 1 tablet by mouth 2 times daily 4/12/21  Yes Jose Brush MD   clotrimazole-betamethasone (LOTRISONE) 1-0.05 % cream Apply topically 2 times daily.  4/12/21  Yes Jose Brush MD   losartan (COZAAR) 50 MG tablet Take 1 tablet by mouth daily 4/12/21  Yes Jose Brush MD   sildenafil (REVATIO) 20 MG tablet Take 1 tablet by mouth daily as needed (FOR SEXUAL DYSFUNCTION) 4/12/21  Yes Jose Brush MD   amoxicillin (AMOXIL) 500 MG capsule Take 1 capsule by mouth 3 times daily for 10 days 4/12/21 4/22/21 Yes Jose Brush MD   DULoxetine (CYMBALTA) 60 MG extended release capsule Take 1 capsule by mouth daily for 16 days 4/9/21 4/25/21 Yes Allison Reed,    blood glucose monitor strips Test 3 times a day & as needed for symptoms of irregular blood glucose 3/24/21  Yes Jose Brush MD   Cholecalciferol (VITAMIN D3) 1.25 MG (16971 UT) CAPS Take by mouth   Yes Historical Provider, MD   vitamin B-12 (CYANOCOBALAMIN) 1000 MCG tablet Take 1,000 mcg by mouth daily   Yes Historical Provider, MD   glipiZIDE (GLUCOTROL) 10 MG tablet Take 1 tablet by mouth 2 times daily 2/16/21  Yes Jose Brush MD   atorvastatin (LIPITOR) 40 MG tablet TAKE 1 TABLET BY MOUTH IN THE EVENING FOR CHOLESTEROL 2/16/21  Yes Jose Brush MD   Blood Glucose Monitoring Suppl (BLOOD GLUCOSE MONITOR SYSTEM) w/Device KIT 1 each by Does not apply route 3 times daily Dispense what ever brand is covered by insurance 11/19/20  Yes Jose Brush MD   ibuprofen (ADVIL;MOTRIN) 800 MG tablet take 1 tablet by mouth every 8 hours if needed for pain 11/5/20  Yes Historical Provider, MD   Elastic Bandages & Supports (ACE KNEE BRACE HINGED) [address] 1 Units by Does not apply route once for 1 dose 11/12/20 4/12/21 Yes Delmar Agnel DO     Social History     Socioeconomic History    Marital status: Legally      Spouse name: None    Number of children: None    Years of education: None    Highest education level: None   Occupational frequency, dysuria, nocturia, hesitancy, or incontinence  Musculoskeletal: joint pains off and on. Morning stiffness. Ambulating well  Neurologic: no paralysis, paresis, paresthesia, seizures, tremors, or headaches  Hematologic/Lymphatic/Immunologic: no anemia, abnormal bleeding/bruising, fever, chills, night sweats, swollen glands, or unexplained weight loss  Endocrine: no heat or cold intolerance and no polyphagia, polydipsia, or polyuria        OBJECTIVE:     VS:  Wt Readings from Last 3 Encounters:   04/12/21 221 lb (100.2 kg)   03/26/21 210 lb (95.3 kg)   03/24/21 210 lb (95.3 kg)     Temp Readings from Last 3 Encounters:   04/12/21 97 °F (36.1 °C)   03/30/21 96.8 °F (36 °C) (Infrared)   02/16/21 98.6 °F (37 °C)     BP Readings from Last 3 Encounters:   04/12/21 112/70   03/30/21 136/87   03/15/21 (!) 140/77        General appearance: Alert, Awake, Oriented times 3, no distress  Skin: Warm and dry  Head: Normocephalic. No masses, lesions or tenderness noted  Eyes: Conjunctivae appear normal. PERLE  Ears: External ears normal  Nose/Sinuses: TENDERNESS RIGHT MAXILLARY SINUS. Oropharynx: Oropharynx clear with no exudate seen  Neck: Neck supple. TENDERNESS LEFT JUGULODIGASTRIC GLAND   No jugular venous distension,  or thyromegaly Trachea midline  Chest:  Normal. Movements are Normal and Equal.  Lungs: Lungs clear to auscultation bilaterally. No ronchi, crackles or wheezes  Heart: S1 S2  Regular rate and rhythm. No rub, murmur or gallop  Abdomen: Abdomen soft, non-tender. BS normal. No masses, organomegaly. Back: Grossly Normal and Equal. DTR are Normal. SLR is Normal.  Extremities: Arthritic changes are noted. Movements are limited. Pedal pulses are normal.  Musculoskeletal: Muscular strength appears intact.  No joint effusion, tenderness, swelling or warmth  Neuro:  No focal motor or sensory deficits        ASSESSMENT     Patient Active Problem List    Diagnosis Date Noted    Onychomycosis 02/15/2021    Tinea pedis of both feet 02/15/2021    Rupture of anterior cruciate ligament of right knee     COPD with acute exacerbation ([facility] [address] 07/15/2020    Hypertension     Type 2 diabetes mellitus with hyperglycemia, without long-term current use of insulin (HCC)     Hyperlipidemia     CKD (chronic kidney disease) stage 2, GFR 60-89 ml/min     Tinea corporis 02/24/2011        Diagnosis:     ICD-10-CM    1. Type 2 diabetes mellitus without complication, without long-term current use of insulin (HCC)  E11.9     FAIR CONTROL   2. Chronic obstructive pulmonary disease with (acute) exacerbation (HCC)  J44.1     STABLE   3. Tinea pedis of both feet  B35.3 clotrimazole-betamethasone (LOTRISONE) 1-0.05 % cream   4. Familial hypercholesterolemia  E78.01     CONTROLLED   5. Essential hypertension  I10     CONTROLLED   6. Acute bacterial sinusitis  J01.90     B96.89     RIGHT MAXILLARY   7. Chronic diarrhea  K52.9 Jeremiah Davison MD, Stacy Samples ([facility])   8. Type 2 diabetes mellitus with hyperglycemia, without long-term current use of insulin ([facility])  E11.65        PLAN:           Patient Instructions   REST  FORCE FLUID ORALLY. TYLENOL AS NEEDED. AMOXIL 500 MG. 3 TIMES A DAY. LOW SALT,LOW CARB. AND LOW FAT DIET. CONTINUE CURRENT MEDICATIONS TAKING REGULARLY. REGULAR WALKING ADVISED. ADVISED WEIGHT REDUCTION. SEE DR. CHANDU WOO FOR CHRONIC DIARRHEA. FASTING FOR LAB WORK ONE MORNING. NEXT APPOINTMENT IN 1 MONTH. Return in about 1 month (around 5/12/2021) for FOLLOW UP VISIT. I have reviewed my findings and recommendations with Abi Francisco.     Electronically signed by Agnes Husain MD on 4/12/21 at 9:46 AM EDT

## 2021-04-12 NOTE — TELEPHONE ENCOUNTER
Patient states he is taking requip 1mg 3 times daily can he take it 4 times a day or is there something else? Some times he needs it more then 3 times daily.

## 2021-04-12 NOTE — PATIENT INSTRUCTIONS
REST  FORCE FLUID ORALLY. TYLENOL AS NEEDED. AMOXIL 500 MG. 3 TIMES A DAY. LOW SALT,LOW CARB. AND LOW FAT DIET. CONTINUE CURRENT MEDICATIONS TAKING REGULARLY. REGULAR WALKING ADVISED. ADVISED WEIGHT REDUCTION. SEE DR. CHANDU WOO FOR CHRONIC DIARRHEA. FASTING FOR LAB WORK ONE MORNING. NEXT APPOINTMENT IN 1 MONTH.

## 2021-04-12 NOTE — PROGRESS NOTES
Gabriel Kebede is a 73 y.o. male, who presents   Chief Complaint   Patient presents with    Elbow Pain     Left cubital tunnel syndrome. HPI[de-identified] Numbness and tingling been present in both hands for a long time. There is no history of injury or incident in the recent past.  Patient did have a fall many many years ago which resulted in some loss of motion in his right elbow and he had surgery on that at that time. There is been nothing since that time. He notices numbness and tingling when he is driving and even with other tasks and even at rest at times. Most of his numbness and tingling in prep some weaknesses in the radial sides of his hands. There is been no targeted treatment for this but he has had electrodiagnostic studies. This showed significant evidence of nerve compression. Allergies; medications; past medical, surgical, family, and social history; and problem list have been reviewed today and updated as indicated in this encounter - see below following Ortho specifics. Musculoskeletal: Skin condition and circulation are good in both upper extremities. The right elbow lacks about 10 degrees of extension. Flexion is good. There is no crepitus or instability. Shoulders and hands move well. There is no pain. There is no gross muscle atrophy in left or right hands. There may be a slight decrease in  strength on the left. Pin strength is maintained. Abduction of the thumbs is fairly strong as is flexion of small fingers. Pinprick perception is maintained in all fingers. There is some dryness of all fingers with print pattern remaining. Provocative tests are positive in the form of percussion over the carpal tunnels, with wrist flexion, with arm elevation and with percussion over the left ulnar nerve about the elbow.     Radiologic Studies: Electrodiagnostic studies 3/24/2021 shows prolonged distal latencies and velocity changes in bilateral median and ulnar nerves at wrist and MENISCECTOMY AND DEBRIDEMENT. ([address]) performed by Roanne Barthel, DO at [address] [place] [address], LAPAROSCOPIC N/A 5/18/2020    CHOLECYSTECTOMY LAPAROSCOPIC WITH IOC performed by Cassie Alejandre MD at [facility]         Current Outpatient Medications   Medication Sig Dispense Refill    doxepin (SINEQUAN) 10 MG capsule TAKE UP TO 3 CAPSULES BY MOUTH AT BEDTIME AS DIRECTED FOR INSOMNIA 30 capsule 1    rOPINIRole (REQUIP) 1 MG tablet Take 1 tablet by mouth 3 times daily for 60 doses 60 tablet 0    albuterol sulfate HFA (VENTOLIN HFA) 108 (90 Base) MCG/ACT inhaler Inhale 2 puffs into the lungs 4 times daily as needed for Wheezing 1 Inhaler 0    Lancets 30G MISC 1 each by Does not apply route 3 times daily Dispense what ever brand is covered by insurance 100 each 3    hydroCHLOROthiazide (MICROZIDE) 12.5 MG capsule Take 1 capsule by mouth daily 30 capsule 3    carvedilol (COREG) 25 MG tablet Take 1 tablet by mouth 2 times daily 60 tablet 3    clotrimazole-betamethasone (LOTRISONE) 1-0.05 % cream Apply topically 2 times daily.  45 g 3    losartan (COZAAR) 50 MG tablet Take 1 tablet by mouth daily 30 tablet 2    sildenafil (REVATIO) 20 MG tablet Take 1 tablet by mouth daily as needed (FOR SEXUAL DYSFUNCTION) 5 tablet 0    amoxicillin (AMOXIL) 500 MG capsule Take 1 capsule by mouth 3 times daily for 10 days 30 capsule 0    DULoxetine (CYMBALTA) 60 MG extended release capsule Take 1 capsule by mouth daily for 16 days 16 capsule 0    blood glucose monitor strips Test 3 times a day & as needed for symptoms of irregular blood glucose 100 strip 3    Cholecalciferol (VITAMIN D3) 1.25 MG (73037 UT) CAPS Take by mouth      vitamin B-12 (CYANOCOBALAMIN) 1000 MCG tablet Take 1,000 mcg by mouth daily      glipiZIDE (GLUCOTROL) 10 MG tablet Take 1 tablet by mouth 2 times daily 60 tablet 3    atorvastatin (LIPITOR) 40 MG tablet TAKE 1 TABLET BY MOUTH IN THE EVENING FOR CHOLESTEROL 90 tablet 1    Blood Glucose Monitoring Suppl (BLOOD GLUCOSE MONITOR SYSTEM) w/Device KIT 1 each by Does not apply route 3 times daily Dispense what ever brand is covered by insurance 1 kit 0    ibuprofen (ADVIL;MOTRIN) 800 MG tablet take 1 tablet by mouth every 8 hours if needed for pain      Elastic Bandages & Supports (ACE KNEE BRACE HINGED) MISC 1 Units by Does not apply route once for 1 dose 1 each 0     No current facility-administered medications for this visit.         No Known Allergies    Social History     Socioeconomic History    Marital status: Legally      Spouse name: None    Number of children: None    Years of education: None    Highest education level: None   Occupational History    None   Social Needs    Financial resource strain: None    Food insecurity     Worry: None     Inability: None    Transportation needs     Medical: None     Non-medical: None   Tobacco Use    Smoking status: Former Smoker     Packs/day: 0.50     Years: 15.00     Pack years: 7.50     Types: Cigarettes     Start date: 1965     Quit date: 1986     Years since quittin.1    Smokeless tobacco: Former User     Quit date: 1986   Substance and Sexual Activity    Alcohol use: Yes     Comment: beer    Drug use: No    Sexual activity: None   Lifestyle    Physical activity     Days per week: None     Minutes per session: None    Stress: None   Relationships    Social connections     Talks on phone: None     Gets together: None     Attends [employer] service: None     Active member of club or organization: None     Attends meetings of clubs or organizations: None     Relationship status: None    Intimate partner violence     Fear of current or ex partner: None     Emotionally abused: None     Physically abused: None     Forced sexual activity: None   Other Topics Concern    None   Social History Narrative    None       Family History   Problem Relation Age of Onset    Hypertension Mother     Heart Attack Mother     Diabetes Father     Diabetes Maternal Grandmother     Hypertension Paternal Grandmother          Review of Systems:   As follows except as previously noted in HPI:  Constitutional: Negative for chills, diaphoresis,  fever   Respiratory: Negative for cough, shortness of breath and wheezing. Cardiovascular: Negative for chest pain and palpitations. Neurological: Negative for dizziness, syncope,   GI / : abdominal pain or cramping  Musculoskeletal: see HPI       Objective:   Physical Exam   Constitutional: Oriented to person, place, and time. and appears well-developed and well-nourished. :   Head: Normocephalic and atraumatic. Neck: Neck supple. Eyes: EOM are normal.   Pulmonary/Chest: Effort normal.  No respiratory distress, no wheezes. Neurological: Alert and oriented to person  Skin: Skin is warm and dry. Kristin Hudson DO    4/12/21  10:32 AM    All reasonable efforts have been made to minimize the risk of errors that may occur in the use of voice recognition and other electronic means of charting.

## 2021-04-13 LAB
HEPATITIS C ANTIBODY INTERPRETATION: NORMAL
HIV-1 AND HIV-2 ANTIBODIES: NORMAL

## 2021-04-19 ENCOUNTER — TELEPHONE (OUTPATIENT)
Dept: FAMILY MEDICINE CLINIC | Age: 66
End: 2021-04-19

## 2021-04-19 DIAGNOSIS — J01.90 ACUTE BACTERIAL SINUSITIS: Primary | ICD-10-CM

## 2021-04-19 DIAGNOSIS — B96.89 ACUTE BACTERIAL SINUSITIS: Primary | ICD-10-CM

## 2021-04-19 RX ORDER — AZITHROMYCIN 250 MG/1
250 TABLET, FILM COATED ORAL DAILY
Qty: 6 TABLET | Refills: 0 | Status: SHIPPED
Start: 2021-04-19 | End: 2021-05-24 | Stop reason: ALTCHOICE

## 2021-04-19 NOTE — TELEPHONE ENCOUNTER
Danielle Owusu says the amoxicillin is not helping with his ear ache. He is asking if you will send in something different. Also she says he has to take the Requip 4-5 times a day and is asking if you can increase the dosage.     Please advise

## 2021-04-19 NOTE — TELEPHONE ENCOUNTER
ZITHROMAX PRESCRIPTION FORWARDED TO HIS PHARMACY. RECOMMEND NOT TAKING REQUIP MORE THAN 3 TABLETS A DAY. DISCUSS NEXT VISIT.

## 2021-04-26 RX ORDER — DULOXETIN HYDROCHLORIDE 60 MG/1
60 CAPSULE, DELAYED RELEASE ORAL DAILY
Qty: 16 CAPSULE | Refills: 0 | OUTPATIENT
Start: 2021-04-26 | End: 2021-05-12

## 2021-04-28 RX ORDER — DULOXETIN HYDROCHLORIDE 60 MG/1
60 CAPSULE, DELAYED RELEASE ORAL DAILY
Qty: 20 CAPSULE | Refills: 0 | Status: SHIPPED
Start: 2021-04-28 | End: 2021-05-21

## 2021-04-28 NOTE — TELEPHONE ENCOUNTER
Called and left message for patient that prescription was sent in for cymbalta enough to last until his appointment in may.

## 2021-05-17 ENCOUNTER — PROCEDURE VISIT (OUTPATIENT)
Dept: PODIATRY | Age: 66
End: 2021-05-17
Payer: MEDICARE

## 2021-05-17 ENCOUNTER — TELEPHONE (OUTPATIENT)
Dept: PHYSICAL MEDICINE AND REHAB | Age: 66
End: 2021-05-17

## 2021-05-17 ENCOUNTER — OFFICE VISIT (OUTPATIENT)
Dept: FAMILY MEDICINE CLINIC | Age: 66
End: 2021-05-17
Payer: MEDICARE

## 2021-05-17 VITALS
WEIGHT: 223 LBS | HEIGHT: 70 IN | OXYGEN SATURATION: 96 % | DIASTOLIC BLOOD PRESSURE: 80 MMHG | SYSTOLIC BLOOD PRESSURE: 130 MMHG | BODY MASS INDEX: 31.92 KG/M2 | RESPIRATION RATE: 16 BRPM | TEMPERATURE: 97 F | HEART RATE: 61 BPM

## 2021-05-17 VITALS — WEIGHT: 223 LBS | HEIGHT: 70 IN | BODY MASS INDEX: 31.92 KG/M2

## 2021-05-17 DIAGNOSIS — I10 ESSENTIAL HYPERTENSION: ICD-10-CM

## 2021-05-17 DIAGNOSIS — R26.2 DIFFICULTY WALKING: ICD-10-CM

## 2021-05-17 DIAGNOSIS — E78.2 MIXED HYPERCHOLESTEROLEMIA AND HYPERTRIGLYCERIDEMIA: ICD-10-CM

## 2021-05-17 DIAGNOSIS — E11.51 TYPE II DIABETES MELLITUS WITH PERIPHERAL CIRCULATORY DISORDER (HCC): ICD-10-CM

## 2021-05-17 DIAGNOSIS — J44.1 CHRONIC OBSTRUCTIVE PULMONARY DISEASE WITH (ACUTE) EXACERBATION (HCC): ICD-10-CM

## 2021-05-17 DIAGNOSIS — M79.675 PAIN OF TOE OF LEFT FOOT: ICD-10-CM

## 2021-05-17 DIAGNOSIS — G25.81 RESTLESS LEG SYNDROME: ICD-10-CM

## 2021-05-17 DIAGNOSIS — M79.674 PAIN OF TOE OF RIGHT FOOT: ICD-10-CM

## 2021-05-17 DIAGNOSIS — N18.2 CKD (CHRONIC KIDNEY DISEASE) STAGE 2, GFR 60-89 ML/MIN: Chronic | ICD-10-CM

## 2021-05-17 DIAGNOSIS — E11.9 TYPE 2 DIABETES MELLITUS WITHOUT COMPLICATION, WITHOUT LONG-TERM CURRENT USE OF INSULIN (HCC): Primary | ICD-10-CM

## 2021-05-17 DIAGNOSIS — B35.1 ONYCHOMYCOSIS: Primary | ICD-10-CM

## 2021-05-17 LAB — HBA1C MFR BLD: 6.9 %

## 2021-05-17 PROCEDURE — 3017F COLORECTAL CA SCREEN DOC REV: CPT | Performed by: FAMILY MEDICINE

## 2021-05-17 PROCEDURE — 4040F PNEUMOC VAC/ADMIN/RCVD: CPT | Performed by: FAMILY MEDICINE

## 2021-05-17 PROCEDURE — 1036F TOBACCO NON-USER: CPT | Performed by: FAMILY MEDICINE

## 2021-05-17 PROCEDURE — 2022F DILAT RTA XM EVC RTNOPTHY: CPT | Performed by: FAMILY MEDICINE

## 2021-05-17 PROCEDURE — 3044F HG A1C LEVEL LT 7.0%: CPT | Performed by: FAMILY MEDICINE

## 2021-05-17 PROCEDURE — 11721 DEBRIDE NAIL 6 OR MORE: CPT | Performed by: PODIATRIST

## 2021-05-17 PROCEDURE — G8417 CALC BMI ABV UP PARAM F/U: HCPCS | Performed by: FAMILY MEDICINE

## 2021-05-17 PROCEDURE — 83036 HEMOGLOBIN GLYCOSYLATED A1C: CPT | Performed by: FAMILY MEDICINE

## 2021-05-17 PROCEDURE — G8427 DOCREV CUR MEDS BY ELIG CLIN: HCPCS | Performed by: FAMILY MEDICINE

## 2021-05-17 PROCEDURE — 1123F ACP DISCUSS/DSCN MKR DOCD: CPT | Performed by: FAMILY MEDICINE

## 2021-05-17 PROCEDURE — G8926 SPIRO NO PERF OR DOC: HCPCS | Performed by: FAMILY MEDICINE

## 2021-05-17 PROCEDURE — 3023F SPIROM DOC REV: CPT | Performed by: FAMILY MEDICINE

## 2021-05-17 PROCEDURE — 99214 OFFICE O/P EST MOD 30 MIN: CPT | Performed by: FAMILY MEDICINE

## 2021-05-17 RX ORDER — DOXEPIN HYDROCHLORIDE 10 MG/1
CAPSULE ORAL
Qty: 30 CAPSULE | Refills: 1 | Status: SHIPPED
Start: 2021-05-17 | End: 2021-07-01 | Stop reason: SDUPTHER

## 2021-05-17 RX ORDER — GLIPIZIDE 10 MG/1
10 TABLET ORAL 2 TIMES DAILY
Qty: 60 TABLET | Refills: 3 | Status: SHIPPED
Start: 2021-05-17 | End: 2021-07-01 | Stop reason: SDUPTHER

## 2021-05-17 SDOH — ECONOMIC STABILITY: TRANSPORTATION INSECURITY
IN THE PAST 12 MONTHS, HAS LACK OF TRANSPORTATION KEPT YOU FROM MEETINGS, WORK, OR FROM GETTING THINGS NEEDED FOR DAILY LIVING?: NO

## 2021-05-17 SDOH — ECONOMIC STABILITY: INCOME INSECURITY: IN THE LAST 12 MONTHS, WAS THERE A TIME WHEN YOU WERE NOT ABLE TO PAY THE MORTGAGE OR RENT ON TIME?: NO

## 2021-05-17 SDOH — ECONOMIC STABILITY: HOUSING INSECURITY
IN THE LAST 12 MONTHS, WAS THERE A TIME WHEN YOU DID NOT HAVE A STEADY PLACE TO SLEEP OR SLEPT IN A SHELTER (INCLUDING NOW)?: NO

## 2021-05-17 ASSESSMENT — SOCIAL DETERMINANTS OF HEALTH (SDOH): HOW HARD IS IT FOR YOU TO PAY FOR THE VERY BASICS LIKE FOOD, HOUSING, MEDICAL CARE, AND HEATING?: NOT HARD AT ALL

## 2021-05-17 ASSESSMENT — PATIENT HEALTH QUESTIONNAIRE - PHQ9
SUM OF ALL RESPONSES TO PHQ QUESTIONS 1-9: 0
SUM OF ALL RESPONSES TO PHQ9 QUESTIONS 1 & 2: 0
1. LITTLE INTEREST OR PLEASURE IN DOING THINGS: 0

## 2021-05-17 NOTE — TELEPHONE ENCOUNTER
Pt called in to r/s his same day missed appt. He stated his appt with Dr. Ricardo Morales ran over. I offered the first available on 6/14/2021 and he stated he needs to be seen before then. Please, advise. Thank you.

## 2021-05-17 NOTE — PROGRESS NOTES
Patient is here for nail care.  Estela Urnea MD 5/17/2021  Electronically signed by Karlie Hill LPN on 6/53/4326 at 7:05 PM

## 2021-05-17 NOTE — PATIENT INSTRUCTIONS
LOW SALT,LOW CARB. AND LOW FAT DIET. CONTINUE CURRENT MEDICATIONS TAKING REGULARLY. TAKE REQUIP 1 MG. 4 TIMES A DAY. REGULAR WALKING ADVISED. ADVISED WEIGHT REDUCTION. FASTING FOR LAB WORK PRIOR TO NEXT VISIT. NEXT APPOINTMENT IN 3 MONTHS FOR ANNUAL PHYSICAL EXAMINATION .

## 2021-05-17 NOTE — PROGRESS NOTES
21     Mary Tomas    : 1955  Sex: male  Age: 73 y.o. Subjective: The patient is seen today for evaluation regarding diabetic foot evaluation and mycotic nail care. No other complaints noted. Chief Complaint   Patient presents with    Nail Problem       Current Medications:    Current Outpatient Medications:     doxepin (SINEQUAN) 10 MG capsule, TAKE UP TO 3 CAPSULES BY MOUTH AT BEDTIME AS DIRECTED FOR INSOMNIA, Disp: 30 capsule, Rfl: 1    glipiZIDE (GLUCOTROL) 10 MG tablet, Take 1 tablet by mouth 2 times daily, Disp: 60 tablet, Rfl: 3    DULoxetine (CYMBALTA) 60 MG extended release capsule, Take 1 capsule by mouth daily for 20 days, Disp: 20 capsule, Rfl: 0    azithromycin (ZITHROMAX) 250 MG tablet, Take 1 tablet by mouth daily, Disp: 6 tablet, Rfl: 0    albuterol sulfate HFA (VENTOLIN HFA) 108 (90 Base) MCG/ACT inhaler, Inhale 2 puffs into the lungs 4 times daily as needed for Wheezing, Disp: 1 Inhaler, Rfl: 0    Lancets 30G MISC, 1 each by Does not apply route 3 times daily Dispense what ever brand is covered by insurance, Disp: 100 each, Rfl: 3    hydroCHLOROthiazide (MICROZIDE) 12.5 MG capsule, Take 1 capsule by mouth daily, Disp: 30 capsule, Rfl: 3    carvedilol (COREG) 25 MG tablet, Take 1 tablet by mouth 2 times daily, Disp: 60 tablet, Rfl: 3    clotrimazole-betamethasone (LOTRISONE) 1-0.05 % cream, Apply topically 2 times daily. , Disp: 45 g, Rfl: 3    losartan (COZAAR) 50 MG tablet, Take 1 tablet by mouth daily, Disp: 30 tablet, Rfl: 2    sildenafil (REVATIO) 20 MG tablet, Take 1 tablet by mouth daily as needed (FOR SEXUAL DYSFUNCTION), Disp: 5 tablet, Rfl: 0    rOPINIRole (REQUIP) 1 MG tablet, Take 1 tablet by mouth 3 times daily for 90 doses, Disp: 180 tablet, Rfl: 1    blood glucose monitor strips, Test 3 times a day & as needed for symptoms of irregular blood glucose, Disp: 100 strip, Rfl: 3    Cholecalciferol (VITAMIN D3) 1.25 MG (25471 UT) CAPS, Take by mouth, Disp: , Rfl:     vitamin B-12 (CYANOCOBALAMIN) 1000 MCG tablet, Take 1,000 mcg by mouth daily, Disp: , Rfl:     atorvastatin (LIPITOR) 40 MG tablet, TAKE 1 TABLET BY MOUTH IN THE EVENING FOR CHOLESTEROL, Disp: 90 tablet, Rfl: 1    Blood Glucose Monitoring Suppl (BLOOD GLUCOSE MONITOR SYSTEM) w/Device KIT, 1 each by Does not apply route 3 times daily Dispense what ever brand is covered by insurance, Disp: 1 kit, Rfl: 0    ibuprofen (ADVIL;MOTRIN) 800 MG tablet, take 1 tablet by mouth every 8 hours if needed for pain, Disp: , Rfl:     Elastic Bandages & Supports (ACE KNEE BRACE HINGED) MISC, 1 Units by Does not apply route once for 1 dose, Disp: 1 each, Rfl: 0    Allergies:  No Known Allergies    Past Surgical History:   Procedure Laterality Date    ANTERIOR CRUCIATE LIGAMENT REPAIR Right 7/13/2020    RIGHT KNEE ARTHROSCOPY, (ANTERIOR CRUCIATE LIGAMENT) ACL RECONSTRUCTION. ALLOGRAFT. MEDIAL MENISCECTOMY AND DEBRIDEMENT. ([address]) performed by Tyra Headley DO at [address] [place] [address], LAPAROSCOPIC N/A 5/18/2020    CHOLECYSTECTOMY LAPAROSCOPIC WITH IOC performed by Mary Quintero MD at [place] [address]       Past Medical History:   Diagnosis Date    Arthritis     Diabetes mellitus ([facility] [address]     Fungal infection of foot     Hyperlipidemia     Hypertension     Type 2 diabetes mellitus without complication ([facility] [address]        Vitals:    05/17/21 1303   Weight: 223 lb (101.2 kg)   Height: 5' 10\" (1.778 m)       Exam:  Neurovascular status unchanged. At this time the nail/s 1, 2, 5 right foot and nail/s 1, 2, 5 left foot are noted to be thickened, dystrophic and discolored with subungual debris present. Tenderness noted to palpation. Minimal hair growth is noted to both lower extremities. Edema noted with both varicosities and stasis skin changes present bilaterally. [place]ness is noted to the digital regions to palpation. Capillary fill time delayed digital areas bilateral foot. No heel fissuring or macerations of the web spaces. No plantar calluses and/or ulcerative areas are noted. Patient is having difficulty with gait/walking. Plan Per Assessment  Margie Duffy was seen today for nail problem. Diagnoses and all orders for this visit:    Onychomycosis    Pain of toe of right foot    Pain of toe of left foot    Type II diabetes mellitus with peripheral circulatory disorder (HCC)    Difficulty walking        1. Evaluation and Management  2. Manual and electrical debridement of the mycotic nails was performed for thickness and length to prevent injection and/or ulceration. 3. I recommended antifungal cream to the nails daily. 4. It was discussed in detail with the patient proper caring for the vascular compromised foot. The fact that they have compromised blood flow put the patient at risk for infection/gangrene/amputation. The patient should not walk barefoot. Shoe gear should fit properly and socks should be worn with shoes. Exercise is very important to prevent worsening of the disease process but before performing an exercise program should check with their family physician first.  If any skin lesions are noted, they are instructed to contact the office immediately. 5. It was discussed in detail with the patient proper hygiene for the diabetic foot. They are to get in the habit of looking at their feet or have someone look at them. If they are unable to do daily, they are to look for any signs of redness, blistering, cracking, swelling, drainage, open lesions, etc.  They are to dry in between the toes after each bath or shower gently. The water should be tested with the elbow to prevent burns. The patient is to refrain from soaking their feet unless instructed by myself to do otherwise. They are to refrain from going barefoot. Shoe gear should be inspected for any foreign objects. Shoes should have a deep wide toe box.   With any type of shoe, the feet should be inspected for any signs of pressure, i.e. redness, blistering, or open sores. Further instructional guidelines were dispensed to the patient. 6. We will see the patient back at a later date for continued podiatric management and care. Patient was advised to call the office with any questions or concerns prior to their next appointment if needed. Seen By:    Osmar Moser DPM    Electronically signed by Osmar Moser DPM on 5/17/2021 at 1:26 PM      This note was created using voice recognition software. The note was reviewed however may contain grammatical errors.

## 2021-05-17 NOTE — PROGRESS NOTES
21  Yes Agnes Husain MD   rOPINIRole (REQUIP) 1 MG tablet Take 1 tablet by mouth 3 times daily for 90 doses 21 Yes Agnes Husain MD   blood glucose monitor strips Test 3 times a day & as needed for symptoms of irregular blood glucose 3/24/21  Yes Agnes Husain MD   Cholecalciferol (VITAMIN D3) 1.25 MG (64038 UT) CAPS Take by mouth   Yes Historical Provider, MD   vitamin B-12 (CYANOCOBALAMIN) 1000 MCG tablet Take 1,000 mcg by mouth daily   Yes Historical Provider, MD   atorvastatin (LIPITOR) 40 MG tablet TAKE 1 TABLET BY MOUTH IN THE EVENING FOR CHOLESTEROL 21  Yes Agnes Husain MD   Blood Glucose Monitoring Suppl (BLOOD GLUCOSE MONITOR SYSTEM) w/Device KIT 1 each by Does not apply route 3 times daily Dispense what ever brand is covered by insurance 20  Yes Agnes Husain MD   ibuprofen (ADVIL;MOTRIN) 800 MG tablet take 1 tablet by mouth every 8 hours if needed for pain 20  Yes Historical Provider, MD   Elastic Bandages & Supports (ACE KNEE BRACE HINGED) [facility] 1 Units by Does not apply route once for 1 dose 20 Yes Katiuska Kan DO     Social History     Socioeconomic History    Marital status: Legally      Spouse name: None    Number of children: None    Years of education: None    Highest education level: None   Occupational History    None   Tobacco Use    Smoking status: Former Smoker     Packs/day: 0.50     Years: 15.00     Pack years: 7.50     Types: Cigarettes     Start date: 1965     Quit date: 1986     Years since quittin.2    Smokeless tobacco: Former User     Quit date: 1986   Vaping Use    Vaping Use: Never used   Substance and Sexual Activity    Alcohol use: Yes     Comment: beer    Drug use: No    Sexual activity: None   Other Topics Concern    None   Social History Narrative    None     Social Determinants of Health     Financial Resource Strain: Low Risk     Difficulty of Paying Living Expenses: Not hard at all   Food Insecurity: No Food Insecurity    Worried About Running Out of Food in the Last Year: Never true    Ran Out of Food in the Last Year: Never true   Transportation Needs: No Transportation Needs    Lack of Transportation (Medical): No    Lack of Transportation (Non-Medical): No   Physical Activity:     Days of Exercise per Week:     Minutes of Exercise per Session:    Stress:     Feeling of Stress :    Social Connections:     Frequency of Communication with Friends and Family:     Frequency of Social Gatherings with Friends and Family:     Attends Confucianism Services:     Active Member of Clubs or Organizations:     Attends Club or Organization Meetings:     Marital Status:    Intimate Partner Violence:     Fear of Current or Ex-Partner:     Emotionally Abused:     Physically Abused:     Sexually Abused:        I have reviewed Jeffery's allergies, medications, problem list, medical, social and family history and have updated as needed in the electronic medical record  Review Of Systems:    Skin: no abnormal pigmentation, rash, scaling, itching, masses, hair or nail changes  Eyes: no blurring, diplopia, or eye pain  Ears/Nose/Throat: no hearing loss, tinnitus, vertigo, nosebleed, nasal congestion, rhinorrhea, sore throat  Respiratory: no cough, pleuritic chest pain, dyspnea, or wheezing  Cardiovascular: no chest pain, angina, dyspnea on exertion, orthopnea, PND, palpitations, or claudication  Gastrointestinal: no nausea, vomiting, heartburn, diarrhea, constipation, bloating,  abdominal pain, or blood per rectum. Appetite is good  Genitourinary: no urinary urgency, frequency, dysuria, nocturia, hesitancy, or incontinence  Musculoskeletal: joint pains off and on. Morning stiffness.  Ambulating well  Neurologic: no paralysis, paresis, paresthesia, seizures, tremors, or headaches  Hematologic/Lymphatic/Immunologic: no anemia, abnormal bleeding/bruising, fever, chills, night sweats, swollen glands, or unexplained weight loss  Endocrine: no heat or cold intolerance and no polyphagia, polydipsia, or polyuria        OBJECTIVE:     VS:  Wt Readings from Last 3 Encounters:   05/17/21 223 lb (101.2 kg)   04/12/21 221 lb (100.2 kg)   04/12/21 221 lb (100.2 kg)     Temp Readings from Last 3 Encounters:   05/17/21 97 °F (36.1 °C)   04/12/21 97 °F (36.1 °C)   03/30/21 96.8 °F (36 °C) (Infrared)     BP Readings from Last 3 Encounters:   05/17/21 130/80   04/12/21 112/70   03/30/21 136/87        General appearance: Alert, Awake, Oriented times 3, no distress  Skin: Warm and dry  Head: Normocephalic. No masses, lesions or tenderness noted  Eyes: Conjunctivae appear normal. PERLE  Ears: External ears normal  Nose/Sinuses: Nares normal. Septum midline. Mucosa normal. No drainage  Oropharynx: Oropharynx clear with no exudate seen  Neck: Neck supple. No jugular venous distension, lymphadenopathy or thyromegaly Trachea midline  Chest:  Normal. Movements are Normal and Equal.  Lungs: Lungs clear to auscultation bilaterally. No ronchi, crackles or wheezes  Heart: S1 S2  Regular rate and rhythm. No rub, murmur or gallop  Abdomen: Abdomen soft, non-tender. BS normal. No masses, organomegaly. Back: Grossly Normal and Equal. DTR are Normal. SLR is Normal.  Extremities: Arthritic changes are noted. Movements are limited. Pedal pulses are normal.  Musculoskeletal: Muscular strength appears intact.  No joint effusion, tenderness, swelling or warmth  Neuro:  No focal motor or sensory deficits        ASSESSMENT     Patient Active Problem List    Diagnosis Date Noted    Mixed hypercholesterolemia and hypertriglyceridemia     Onychomycosis 02/15/2021    Tinea pedis of both feet 02/15/2021    Rupture of anterior cruciate ligament of right knee     COPD with acute exacerbation ([address] 07/15/2020    Hypertension     Type 2 diabetes mellitus with hyperglycemia, without long-term current use of insulin ([address]     Tinea corporis 02/24/2011 Diagnosis:     ICD-10-CM    1. Type 2 diabetes mellitus without complication, without long-term current use of insulin (HCC)  E11.9 POCT glycosylated hemoglobin (Hb A1C)    CONTROLLED   2. Chronic obstructive pulmonary disease with (acute) exacerbation (HCC)  J44.1     STABLE   3. Essential hypertension  I10     CONTROLLED   4. Mixed hypercholesterolemia and hypertriglyceridemia  E78.2     POOR CONTRTOL   5. CKD (chronic kidney disease) stage 2, GFR 60-89 ml/min  N18.2     STABLE   6. Restless leg syndrome  G25.81     FAIR CONTROL       PLAN:      LABORATORY RESULTS 4/12/2021 REVIEWED AND DISCUSSED. REPEAT  LAB. ORDERED . A1C OF 6.9 TODAY DISCUSSED AND ADVISED. Patient Instructions   LOW SALT,LOW CARB. AND LOW FAT DIET. CONTINUE CURRENT MEDICATIONS TAKING REGULARLY. TAKE REQUIP 1 MG. 4 TIMES A DAY. REGULAR WALKING ADVISED. ADVISED WEIGHT REDUCTION. FASTING FOR LAB WORK PRIOR TO NEXT VISIT. NEXT APPOINTMENT IN 3 MONTHS FOR ANNUAL PHYSICAL EXAMINATION . Return in about 3 months (around 8/17/2021) for [place]. .         I have reviewed my findings and recommendations with Germán Hope.     Electronically signed by Denise Alegria MD on 5/17/21 at 9:12 AM EDT

## 2021-05-18 ENCOUNTER — OFFICE VISIT (OUTPATIENT)
Dept: ORTHOPEDIC SURGERY | Age: 66
End: 2021-05-18
Payer: MEDICARE

## 2021-05-18 VITALS — BODY MASS INDEX: 31.92 KG/M2 | HEIGHT: 70 IN | WEIGHT: 223 LBS

## 2021-05-18 DIAGNOSIS — M75.22 BICEPS TENDINITIS OF LEFT SHOULDER: ICD-10-CM

## 2021-05-18 DIAGNOSIS — M19.012 OSTEOARTHRITIS OF LEFT AC (ACROMIOCLAVICULAR) JOINT: ICD-10-CM

## 2021-05-18 DIAGNOSIS — S43.432A GLENOID LABRAL TEAR, LEFT, INITIAL ENCOUNTER: ICD-10-CM

## 2021-05-18 DIAGNOSIS — M75.102 NONTRAUMATIC TEAR OF LEFT ROTATOR CUFF, UNSPECIFIED TEAR EXTENT: Primary | ICD-10-CM

## 2021-05-18 DIAGNOSIS — Z71.82 EXERCISE COUNSELING: ICD-10-CM

## 2021-05-18 PROCEDURE — 3017F COLORECTAL CA SCREEN DOC REV: CPT | Performed by: ORTHOPAEDIC SURGERY

## 2021-05-18 PROCEDURE — G8417 CALC BMI ABV UP PARAM F/U: HCPCS | Performed by: ORTHOPAEDIC SURGERY

## 2021-05-18 PROCEDURE — 1123F ACP DISCUSS/DSCN MKR DOCD: CPT | Performed by: ORTHOPAEDIC SURGERY

## 2021-05-18 PROCEDURE — 1036F TOBACCO NON-USER: CPT | Performed by: ORTHOPAEDIC SURGERY

## 2021-05-18 PROCEDURE — 4040F PNEUMOC VAC/ADMIN/RCVD: CPT | Performed by: ORTHOPAEDIC SURGERY

## 2021-05-18 PROCEDURE — 99214 OFFICE O/P EST MOD 30 MIN: CPT | Performed by: ORTHOPAEDIC SURGERY

## 2021-05-18 PROCEDURE — G8427 DOCREV CUR MEDS BY ELIG CLIN: HCPCS | Performed by: ORTHOPAEDIC SURGERY

## 2021-05-18 NOTE — PATIENT INSTRUCTIONS
Patient Education        Rotator Cuff Problems: Care Instructions  Overview     The rotator cuff is a group of tendons and muscles around the shoulder that keeps the shoulder joint stable. It is what allows you to raise and rotate your arm. Over time, daily wear and exercise can cause the tendons to rub on the bones of your shoulder. This is called impingement. This condition may cause the tendons to bruise, degenerate, or tear. In many people, these problems do not cause pain. When they do cause pain, you can do things to reduce the pain and swelling. These include rest, physical therapy, ice and heat, and anti-inflammatory medicine. If you still have pain after trying these treatments, you and your doctor can discuss having a steroid injection or surgery. Follow-up care is a key part of your treatment and safety. Be sure to make and go to all appointments, and call your doctor if you are having problems. It's also a good idea to know your test results and keep a list of the medicines you take. How can you care for yourself at home? · Be safe with medicines. Read and follow all instructions on the label. ? If the doctor gave you a prescription medicine for pain, take it as prescribed. ? If you are not taking a prescription pain medicine, ask your doctor if you can take an over-the-counter medicine. · Put ice or a cold pack on your shoulder for 10 to 20 minutes at a time. Try to do this every 1 to 2 hours for the next 3 days (when you are awake). Put a thin cloth between the ice pack and your skin. · After 3 days, put a warm, wet towel on your shoulder. This is to relax the muscles and increase blood flow. While holding the towel on your shoulder, lean forward so your arm hangs freely, and gently swing your arm back and forth like a pendulum. You also can do this standing under a warm shower. · Follow your doctor's advice for physical therapy.  When your doctor says it is okay, try these stretching exercises. Do them slowly to avoid injury. Put a warm, wet towel on your shoulder before exercising. Stop any exercise that increases pain. ? Range-of-motion exercises. If it is not too painful, stretch your arm in four directions: across the body, up the back, to the side, and overhead. ? Pendulum exercise. Lean forward and hold onto a table or the back of a chair with your good arm. Bend at the waist, letting the arm with the sore shoulder hang straight down. Swing your arm back and forth like a pendulum, then in circles that start small and slowly grow larger. This exercise does not use the arm muscles. Instead, use your legs and your hips to create movement that makes your arm swing freely. Try this for about 5 minutes, several times a day. ? Wall climbing (to the side). Stand with your side to a wall so that your fingers can just touch it. Then turn so your body is turned slightly toward the wall. Walk the fingers of your injured arm up the wall as high as pain permits. Try not to shrug your shoulder up toward your ear as you move your arm up. Hold that position for a count of 15 to 30 seconds. Walk your fingers down to the starting position. Repeat 2 to 4 times, trying to reach higher each time. ? Wall climbing (to the front). Face a wall, standing so your fingers can just touch it. Walk the fingers of your affected arm up the wall as high as pain permits. Try not to shrug your shoulder up toward your ear as you move your arm up. Hold that position for a count of 15 to 30 seconds. Slowly walk your fingers to the starting position. Repeat 2 to 4 times, trying to reach higher each time. · Rest your shoulder when you are not doing stretches and other exercises. Your doctor may tell you to wait for the pain to go away before doing exercises. Do not lift heavy bags of groceries, play sports, or do anything else that makes you twist or stress your shoulder.  Avoid activities where you move your affected arm first.  Follow-up care is a key part of your treatment and safety. Be sure to make and go to all appointments, and call your doctor if you are having problems. It's also a good idea to know your test results and keep a list of the medicines you take. How can you care for yourself at home? · Rest your shoulder as much as you can. If your doctor put your arm in a sling or shoulder immobilizer, wear it as directed. Do not take it off before your doctor tells you to. If it is too tight, loosen it. · Be safe with medicines. Read and follow all instructions on the label. ? If the doctor gave you a prescription medicine for pain, take it as prescribed. ? If you are not taking a prescription pain medicine, ask your doctor if you can take an over-the-counter medicine. · Put ice or a cold pack on your shoulder for 10 to 20 minutes at a time. Try to do this every 1 to 2 hours for the next 3 days (when you are awake). Put a thin cloth between the ice pack and your skin. · After 3 days, put a warm, wet towel on your shoulder. This is to relax the muscles and help blood flow. · While holding a warm, wet towel on your shoulder, lean forward so your arm hangs freely, and gently swing your arm back and forth like a pendulum. You also can do this standing under a warm shower. · Do not do anything that makes your pain worse. · Follow your doctor's advice about whether you need physical therapy. When should you call for help? Call your doctor now or seek immediate medical care if:    · You have severe pain.     · You cannot move your shoulder or arm.     · You have tingling or numbness in your arm or hand.     · Your arm or hand is cool or pale. Watch closely for changes in your health, and be sure to contact your doctor if:    · Your pain gets worse.     · You have new or worse swelling in your arm or hand.     · You do not get better as expected. Where can you learn more?   Go to https://chpepiceweb.[email or web address].com. org and sign in to your [employer] account. Enter [id number] in the [facility] box to learn more about \"Rotator Cuff Injury: Care Instructions. \"     If you do not have an account, please click on the \"Sign Up Now\" link. Current as of: November 16, 2020               Content Version: 12.8  © 2006-2021 [employer]. Care instructions adapted under license by [employer] [facility] ([facility]). If you have questions about a medical condition or this instruction, always ask your healthcare professional. [place] [address] any warranty or liability for your use of this information. Patient Education        Rotator Cuff Rehabilitation  What is rotator cuff rehabilitation? Rotator cuff rehabilitation is a series of exercises you do after your surgery. It helps you get back your shoulder's range of motion and strength. You will work with your doctor and physical therapist to plan this exercise program. To get the best results, you need to do the exercises correctly and as often as your doctor tells you. Before you start any exercises, talk with your doctor or physical therapist. It is important that you know exactly how to do the exercises. Stop and call your doctor if you are not sure that you are doing the exercises correctly or if you have any pain. Hearing clicks and pops during exercise is not always cause for concern, but a grinding feeling may mean a more serious problem. Ice your shoulder after exercising if it is sore. Follow-up care is a key part of your treatment and safety. Be sure to make and go to all appointments, and call your doctor if you are having problems. It's also a good idea to know your test results and keep a list of the medicines you take. Stretching exercises  Do not start doing stretching exercises until your doctor says you can. Your doctor will tell you which exercises to do, and how often and how long to do them.   Posterior stretch   · Stand upright with your feet shoulder-width apart. · Put the hand of your affected arm on the opposite shoulder, and hold the elbow to your body. · Then, using your good arm, hold the elbow of your affected arm and move it gently up, away from, and across your body. External rotation   · Hold a lightweight stick or dariana in your good arm. It should be about 2 feet long. A curtain dariana may work well. · Lie on your back with your elbows next to your sides. Rest the elbow of your affected arm on a small pillow or folded towel. · Set your arms so that the elbows are bent at a 90-degree angle, like the letter \"L. \" Your hands will point straight up. · Hold the stick with both hands. Use your good arm to push the stick toward the affected arm so the affected arm moves outward, away from your body. Stop when you feel the arm stretching. Strength exercises  Do not start strength exercises until your doctor says you can. Usually, this is at least 6 to 8 weeks after surgery. Your doctor will tell you how often and how long to do the exercises. Arm raises to the side   · Stand upright with your feet shoulder-width apart and your affected arm at your side. · Slowly raise your injured arm to the side, with your thumb facing up. Raise your arm 60 degrees at the most (shoulder level is 90 degrees). · After holding the position for 3 to 5 seconds, lower your arm back to your side. If you need to, bring your \"good\" arm across your body and place it under the elbow as you lower your injured arm. Use your good arm to keep your injured arm from dropping down too fast during the downward motion. · Repeat 8 to 12 times. · When you first start out, don't hold any additional weight in your hand. As your strength improves, you may use a 1- to 2-pound dumbbell or a small can of food. Shoulder flexor   · Stand facing a wall. Your body should be about 6 inches away from the wall.   · Keep your affected arm and elbow to your side, and bend your elbow so that your arm is pointing toward the wall. · Make a closed fist with your thumb on top. · Push your hand into the wall and hold it for 6 seconds. Push with 25% to 50% of the force you have. Shoulder extension   · Stand with your back flat against a wall. · Keep your affected arm and elbow at your side, and bend your elbow so that your upper arm is against the wall and your lower arm is pointing straight ahead. Make a closed fist with your thumb on top. · Push your elbow gently back against the wall, holding for 6 seconds. Push with 25% to 50% of the force you have. Where can you learn more? Go to https://O-filmpeGalavantier.Fanvibe. org and sign in to your [employer] account. Enter IJesse in the [employer] box to learn more about \"Rotator Cuff Rehabilitation. \"     If you do not have an account, please click on the \"Sign Up Now\" link. Current as of: November 16, 2020               Content Version: 12.8  © 2006-2021 [employer]. Care instructions adapted under license by [facility] ([facility]). If you have questions about a medical condition or this instruction, always ask your healthcare professional. [place] [address] any warranty or liability for your use of this information. Patient Education        Shoulder Arthroscopy: Before Your Surgery  What is shoulder arthroscopy? Shoulder arthroscopy is a type of surgery. It lets a doctor repair shoulder problems without making a large cut (incision). To do this surgery, the doctor puts a lighted tube through small incisions in your shoulder. The tube is called an arthroscope or scope. Next, the doctor puts some surgical tools in the scope to help make any repairs. The incisions will leave scars that usually fade with time. This type of surgery is used to treat many shoulder problems. Osteoarthritis. This happens when your cartilage breaks down.  Cartilage is the hard, thick tissue that cushions the joints. For this problem, the doctor shaves and smooths rough surfaces on the shoulder joint. Loose body. This is a loose piece of bone or cartilage. It's often caused by an injury. The doctor may put the loose piece back in place. Sometimes the piece is removed. Impingement syndrome. This happens when shoulder tissue starts to swell and rub against a bone. This can occur in the tendons of the rotator cuff. Or it may happen in the tendons that connect the bicep to the shoulder. It can also occur in the bursa, the sac between the rotator cuff and the top of the shoulder blade. To fix this problem, your doctor removes the bursa and part of the bone from the point of your shoulder. This increases the space in the shoulder area. In a few weeks, the bursa re-forms. Shoulder arthroscopy is also used for other problems. These include rotator cuff problems, bicep tendon tears, and shoulder instability. This information does not cover these surgeries. Most people go home on the day of the surgery. When you can go back to work or your usual activities depends on your shoulder problem. You will probably need about 6 weeks or longer to recover. Follow-up care is a key part of your treatment and safety. Be sure to make and go to all appointments, and call your doctor if you are having problems. It's also a good idea to know your test results and keep a list of the medicines you take. How do you prepare for surgery? Surgery can be stressful. This information will help you understand what you can expect. And it will help you safely prepare for surgery. Preparing for surgery    · Be sure you have someone to take you home.  Anesthesia and pain medicine will make it unsafe for you to drive or get home on your own.     · Understand exactly what surgery is planned, along with the risks, benefits, and other options.     · If you take aspirin or some other blood thinner, ask your doctor if you should stop taking it before your surgery. Make sure that you understand exactly what your doctor wants you to do. These medicines increase the risk of bleeding.     · Tell your doctor ALL the medicines, vitamins, supplements, and herbal remedies you take. Some may increase the risk of problems during your surgery. Your doctor will tell you if you should stop taking any of them before the surgery and how soon to do it.     · Make sure your doctor and the hospital have a copy of your advance directive. If you don't have one, you may want to prepare one. It lets others know your health care wishes. It's a good thing to have before any type of surgery or procedure. What happens on the day of surgery? · Follow the instructions exactly about when to stop eating and drinking. If you don't, your surgery may be canceled. If your doctor told you to take your medicines on the day of surgery, take them with only a sip of water.     · Take a bath or shower before you come in for your surgery. Do not apply lotions, perfumes, deodorants, or nail polish.     · Do not shave the surgical site yourself.     · Take off all jewelry and piercings. And take out contact lenses, if you wear them. At the hospital or surgery center   · Bring a picture ID.     · The area for surgery is often marked to make sure there are no errors.     · You will be kept comfortable and safe by your anesthesia provider. The anesthesia may make you sleep. Or it may just numb the area being worked on.     · The surgery will take about 1 to 2 hours. It depends on what type of shoulder problem you have. When should you call your doctor? · You have questions or concerns.     · You don't understand how to prepare for your surgery.     · You become ill before the surgery (such as fever, flu, or a cold).     · You need to reschedule or have changed your mind about having the surgery. Where can you learn more? Go to https://chjamiaeb.healthHomeschool Snowboarding. org and sign in to for you to recover. But each person recovers at a different pace. Follow the steps below to get better as quickly as possible. How can you care for yourself at home? Activity    · Rest when you feel tired. Getting enough sleep will help you recover. You may be more comfortable if you sleep in a reclining chair. To make your arm and shoulder feel better, keep a thin pillow under the back of your arm while you are lying down.     · Try to walk each day. Start by walking a little more than you did the day before. Bit by bit, increase the amount you walk. Walking boosts blood flow and helps prevent pneumonia and constipation.     · For 2 to 3 weeks, avoid lifting anything heavier than a plate or a glass. You need to give your shoulder time to heal.     · Your arm may be in a sling. You may need to use the sling for a few days to a few weeks. Your doctor will advise you on how long to wear the sling.     · You may take the sling off when you dress or wash.     · Do not use your arm for repeated movements. These include painting, vacuuming, or using a computer. Diet    · You can eat your normal diet. If your stomach is upset, try bland, low-fat foods like plain rice, broiled chicken, toast, and yogurt.     · Drink plenty of fluids, unless your doctor tells you not to.     · You may notice that your bowel movements are not regular right after your surgery. This is common. Try to avoid constipation and straining with bowel movements. You may want to take a fiber supplement every day. If you have not had a bowel movement after a couple of days, ask your doctor about taking a mild laxative. Medicines    · Your doctor will tell you if and when you can restart your medicines. He or she will also give you instructions about taking any new medicines.     · If you take aspirin or some other blood thinner, ask your doctor if and when to start taking it again.  Make sure that you understand exactly what your doctor wants you to sudden chest pain and shortness of breath, or you cough up blood. Call your doctor now or seek immediate medical care if:    · Your hand is cool, pale, or numb, or it changes color.     · You are unable to move your fingers, wrist, or elbow.     · You are sick to your stomach or cannot keep fluids down.     · You have pain that does not get better after you take pain medicine.     · You have signs of infection, such as:  ? Increased pain, swelling, warmth, or redness. ? Red streaks leading from the incision. ? Pus draining from the incision. ? A fever.     · You have loose stitches, or your incision comes open.     · Your incision bleeds through your first dressing or is still bleeding 3 days after your surgery. Watch closely for changes in your health, and be sure to contact your doctor if:    · Your sling feels too tight, and you cannot loosen it.     · You have new or increased swelling in your arm.     · You have new pain that develops in another area of the affected limb. For example, you have pain in your hand or elbow.     · You do not have a bowel movement after taking a laxative.     · You do not get better as expected. Where can you learn more? Go to https://Founder International SoftwarepeBitLit.Lingua.ly. org and sign in to your [employer] account. Enter X826 in the [facility] box to learn more about \"Shoulder Arthroscopy: What to Expect at Home. \"     If you do not have an account, please click on the \"Sign Up Now\" link. Current as of: November 16, 2020               Content Version: 12.8  © 2006-2021 [employer]. Care instructions adapted under license by [facility] ([facility]). If you have questions about a medical condition or this instruction, always ask your healthcare professional. [place] [address] any warranty or liability for your use of this information.          Patient Education        Shoulder Arthroscopy: What to Expect at Home  Your Recovery     Arthroscopy is a way or a glass. You need to give your shoulder time to heal.     · Your arm may be in a sling. You may need to use the sling for a few days to a few weeks. Your doctor will advise you on how long to wear the sling.     · You may take the sling off when you dress or wash.     · Do not use your arm for repeated movements. These include painting, vacuuming, or using a computer. Diet    · You can eat your normal diet. If your stomach is upset, try bland, low-fat foods like plain rice, broiled chicken, toast, and yogurt.     · Drink plenty of fluids, unless your doctor tells you not to.     · You may notice that your bowel movements are not regular right after your surgery. This is common. Try to avoid constipation and straining with bowel movements. You may want to take a fiber supplement every day. If you have not had a bowel movement after a couple of days, ask your doctor about taking a mild laxative. Medicines    · Your doctor will tell you if and when you can restart your medicines. He or she will also give you instructions about taking any new medicines.     · If you take aspirin or some other blood thinner, ask your doctor if and when to start taking it again. Make sure that you understand exactly what your doctor wants you to do.     · Take pain medicines exactly as directed. ? If the doctor gave you a prescription medicine for pain, take it as prescribed. ? If you are not taking a prescription pain medicine, ask your doctor if you can take an over-the-counter medicine.     · If you think your pain medicine is making you sick to your stomach:  ? Take your medicine after meals (unless your doctor has told you not to). ? Ask your doctor for a different pain medicine.     · If your doctor prescribed antibiotics, take them as directed. Do not stop taking them just because you feel better. You need to take the full course of antibiotics.    Incision care    · If you have a dressing over your incision, keep it clean and dry. You may remove it 2 to 3 days after the surgery.     · If your incision is open to the air, keep the area clean and dry.     · If you have strips of tape on the incision, leave the tape on for a week or until it falls off. Exercise    · You may need rehabilitation. This is a series of exercises you do after your surgery. Rehab helps you get back your shoulder's range of motion and strength. You will work with your doctor and physical therapist to plan this exercise program. To get the best results, you need to do the exercises correctly and as often and as long as your doctor tells you. Ice    · To reduce swelling and pain, put ice or a cold pack on your shoulder for 10 to 20 minutes at a time. Do this every 1 to 2 hours. Put a thin cloth between the ice and your skin. Follow-up care is a key part of your treatment and safety. Be sure to make and go to all appointments, and call your doctor if you are having problems. It's also a good idea to know your test results and keep a list of the medicines you take. When should you call for help? Call 911 anytime you think you may need emergency care. For example, call if:    · You passed out (lost consciousness).     · You have severe trouble breathing.     · You have sudden chest pain and shortness of breath, or you cough up blood. Call your doctor now or seek immediate medical care if:    · Your hand is cool, pale, or numb, or it changes color.     · You are unable to move your fingers, wrist, or elbow.     · You are sick to your stomach or cannot keep fluids down.     · You have pain that does not get better after you take pain medicine.     · You have signs of infection, such as:  ? Increased pain, swelling, warmth, or redness. ? Red streaks leading from the incision. ? Pus draining from the incision. ?  A fever.     · You have loose stitches, or your incision comes open.     · Your incision bleeds through your first dressing or is still bleeding 3 days after your surgery. Watch closely for changes in your health, and be sure to contact your doctor if:    · Your sling feels too tight, and you cannot loosen it.     · You have new or increased swelling in your arm.     · You have new pain that develops in another area of the affected limb. For example, you have pain in your hand or elbow.     · You do not have a bowel movement after taking a laxative.     · You do not get better as expected. Where can you learn more? Go to https://Serious Business.Securant. org and sign in to your [employer] account. Enter C435 in the [employer] box to learn more about \"Shoulder Arthroscopy: What to Expect at Home. \"     If you do not have an account, please click on the \"Sign Up Now\" link. Current as of: November 16, 2020               Content Version: 12.8  © 3584-8028 Healthwise, Incorporated. Care instructions adapted under license by [facility] ([facility]). If you have questions about a medical condition or this instruction, always ask your healthcare professional. [address] any warranty or liability for your use of this information.
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
## 2021-05-21 RX ORDER — DULOXETIN HYDROCHLORIDE 60 MG/1
CAPSULE, DELAYED RELEASE ORAL
Qty: 30 CAPSULE | Refills: 0 | Status: SHIPPED
Start: 2021-05-21 | End: 2021-06-25

## 2021-05-21 NOTE — TELEPHONE ENCOUNTER
Patient last visit 3-24-21 next visit 6-18-21. Pharmacy requesting a refill on Cymbalta 60 mg 1 capsule qd sent 4-28-21. Keyed up enough medication to last till next visit. Please advise.

## 2021-05-24 ENCOUNTER — OFFICE VISIT (OUTPATIENT)
Dept: FAMILY MEDICINE CLINIC | Age: 66
End: 2021-05-24
Payer: MEDICARE

## 2021-05-24 VITALS
TEMPERATURE: 97.3 F | DIASTOLIC BLOOD PRESSURE: 62 MMHG | SYSTOLIC BLOOD PRESSURE: 134 MMHG | HEIGHT: 70 IN | WEIGHT: 225 LBS | OXYGEN SATURATION: 98 % | BODY MASS INDEX: 32.21 KG/M2 | HEART RATE: 75 BPM | RESPIRATION RATE: 16 BRPM

## 2021-05-24 DIAGNOSIS — Z01.818 PRE-OP EXAM: Primary | ICD-10-CM

## 2021-05-24 DIAGNOSIS — N18.2 CKD (CHRONIC KIDNEY DISEASE) STAGE 2, GFR 60-89 ML/MIN: ICD-10-CM

## 2021-05-24 DIAGNOSIS — J44.1 CHRONIC OBSTRUCTIVE PULMONARY DISEASE WITH (ACUTE) EXACERBATION (HCC): ICD-10-CM

## 2021-05-24 DIAGNOSIS — E11.9 TYPE 2 DIABETES MELLITUS WITHOUT COMPLICATION, WITHOUT LONG-TERM CURRENT USE OF INSULIN (HCC): ICD-10-CM

## 2021-05-24 DIAGNOSIS — E11.51 TYPE II DIABETES MELLITUS WITH PERIPHERAL CIRCULATORY DISORDER (HCC): ICD-10-CM

## 2021-05-24 DIAGNOSIS — E78.2 MIXED HYPERCHOLESTEROLEMIA AND HYPERTRIGLYCERIDEMIA: ICD-10-CM

## 2021-05-24 DIAGNOSIS — I10 ESSENTIAL HYPERTENSION: ICD-10-CM

## 2021-05-24 PROCEDURE — 99215 OFFICE O/P EST HI 40 MIN: CPT | Performed by: FAMILY MEDICINE

## 2021-05-24 PROCEDURE — 3017F COLORECTAL CA SCREEN DOC REV: CPT | Performed by: FAMILY MEDICINE

## 2021-05-24 PROCEDURE — 3023F SPIROM DOC REV: CPT | Performed by: FAMILY MEDICINE

## 2021-05-24 PROCEDURE — 93000 ELECTROCARDIOGRAM COMPLETE: CPT | Performed by: FAMILY MEDICINE

## 2021-05-24 PROCEDURE — 1123F ACP DISCUSS/DSCN MKR DOCD: CPT | Performed by: FAMILY MEDICINE

## 2021-05-24 PROCEDURE — 1036F TOBACCO NON-USER: CPT | Performed by: FAMILY MEDICINE

## 2021-05-24 PROCEDURE — G8427 DOCREV CUR MEDS BY ELIG CLIN: HCPCS | Performed by: FAMILY MEDICINE

## 2021-05-24 PROCEDURE — 3044F HG A1C LEVEL LT 7.0%: CPT | Performed by: FAMILY MEDICINE

## 2021-05-24 PROCEDURE — 2022F DILAT RTA XM EVC RTNOPTHY: CPT | Performed by: FAMILY MEDICINE

## 2021-05-24 PROCEDURE — G8926 SPIRO NO PERF OR DOC: HCPCS | Performed by: FAMILY MEDICINE

## 2021-05-24 PROCEDURE — G8417 CALC BMI ABV UP PARAM F/U: HCPCS | Performed by: FAMILY MEDICINE

## 2021-05-24 PROCEDURE — 4040F PNEUMOC VAC/ADMIN/RCVD: CPT | Performed by: FAMILY MEDICINE

## 2021-05-24 RX ORDER — FENOFIBRATE 134 MG/1
134 CAPSULE ORAL
Qty: 90 CAPSULE | Refills: 1 | Status: SHIPPED
Start: 2021-05-24 | End: 2021-05-25 | Stop reason: ALTCHOICE

## 2021-05-24 RX ORDER — PANTOPRAZOLE SODIUM 40 MG/1
TABLET, DELAYED RELEASE ORAL
COMMUNITY
Start: 2021-05-05 | End: 2021-08-30 | Stop reason: ALTCHOICE

## 2021-05-24 RX ORDER — MONTELUKAST SODIUM 4 MG/1
TABLET, CHEWABLE ORAL
COMMUNITY
Start: 2021-05-13 | End: 2021-05-24 | Stop reason: ALTCHOICE

## 2021-05-24 RX ORDER — ROPINIROLE 1 MG/1
1 TABLET, FILM COATED ORAL 4 TIMES DAILY
Qty: 120 TABLET | Refills: 3 | Status: SHIPPED
Start: 2021-05-24 | End: 2021-08-30 | Stop reason: SDUPTHER

## 2021-05-24 RX ORDER — ESOMEPRAZOLE MAGNESIUM 40 MG/1
CAPSULE, DELAYED RELEASE ORAL
COMMUNITY
Start: 2021-04-26 | End: 2021-05-25 | Stop reason: ALTCHOICE

## 2021-05-24 NOTE — PATIENT INSTRUCTIONS
LOW SALT,LOW CARB. AND LOW FAT DIET. CONTINUE CURRENT MEDICATIONS TAKING REGULARLY. STOP TAKING COLESTID TABLETS. TAKE LOFIBRA 1 TABLET DAILY. REGULAR WALKING ADVISED. ADVISED WEIGHT REDUCTION. OK FOR SURGERY WITH MODERATE RISK FOR GENERAL ANESTHESIA. NEXT APPOINTMENT IN 2 MONTHS.

## 2021-05-24 NOTE — PROGRESS NOTES
PRE-OP  EVALUATION NOTE. HISTORY OF PRESENT ILLNESS:      The patient is a 73 y.o. male patient Nirav Santana MD who presents with  PRE OP EVALUATION. SURGERY PLANNED BY DR. Ronnie RUEDA ON THE  LEFT SHOULDER. NO PROBLEM OR SYMPTOMS CURRENTLY. Past Medical History:   Diagnosis Date    Arthritis     Diabetes mellitus ([facility]     Fungal infection of foot     Hyperlipidemia     Hypertension     Type 2 diabetes mellitus without complication ([facility]            Past Surgical History:   Procedure Laterality Date    ANTERIOR CRUCIATE LIGAMENT REPAIR Right 7/13/2020    RIGHT KNEE ARTHROSCOPY, (ANTERIOR CRUCIATE LIGAMENT) ACL RECONSTRUCTION. ALLOGRAFT.  MEDIAL MENISCECTOMY AND DEBRIDEMENT. ([address]) performed by Susan Stephens DO at [address] [place] [address], LAPAROSCOPIC N/A 5/18/2020    CHOLECYSTECTOMY LAPAROSCOPIC WITH IOC performed by Paulo Dupont MD at [place] [address]         Medications Prior to Admission:      Current Outpatient Medications:     pantoprazole (PROTONIX) 40 MG tablet, take 1 tablet by mouth once daily, Disp: , Rfl:     esomeprazole (NEXIUM) 40 MG delayed release capsule, take 1 capsule by mouth once daily, Disp: , Rfl:     rOPINIRole (REQUIP) 1 MG tablet, Take 1 tablet by mouth 4 times daily for 120 doses, Disp: 120 tablet, Rfl: 3    fenofibrate micronized (LOFIBRA) 134 MG capsule, Take 1 capsule by mouth every morning (before breakfast), Disp: 90 capsule, Rfl: 1    DULoxetine (CYMBALTA) 60 MG extended release capsule, take 1 capsule by mouth once daily, Disp: 30 capsule, Rfl: 0    doxepin (SINEQUAN) 10 MG capsule, TAKE UP TO 3 CAPSULES BY MOUTH AT BEDTIME AS DIRECTED FOR INSOMNIA, Disp: 30 capsule, Rfl: 1    glipiZIDE (GLUCOTROL) 10 MG tablet, Take 1 tablet by mouth 2 times daily, Disp: 60 tablet, Rfl: 3    albuterol sulfate HFA (VENTOLIN HFA) 108 (90 Base) MCG/ACT inhaler, Inhale 2 puffs into the lungs 4 times daily as needed for drugs. Family History:   family history includes Diabetes in his father and maternal grandmother; Heart Attack in his mother; Hypertension in his mother and paternal grandmother. REVIEW OF SYSTEMS:  GENERAL:APPETITE-GOOD  EENT:NO SORE THROAT,EARACHE, HEADACHE OR CONGESTION  GI: BOWEL- NORMAL. NO NAUSEA  OR VOMITING. NO ABDOMINAL PAINS. : NO URINARY SYMPTOMS. MUSCULOSKELETAL: NO PAIN. MOVEMENTS ARE NORMAL. NEUROLOGY:NO DIZZINESS,PAIN OR OTHER SYMPTOMS    PHYSICAL EXAM:    Vitals:  /62   Pulse 75   Temp 97.3 °F (36.3 °C)   Resp 16   Ht 5' 10\" (1.778 m)   Wt 225 lb (102.1 kg)   SpO2 98%   BMI 32.28 kg/m²     General:  Awake, alert, oriented X 3. Well developed, well nourished, well groomed. No apparent distress. HEENT:  Normocephalic, atraumatic. Pupils equal, round, reactive to light. No scleral icterus. No conjunctival injection. Normal lips, teeth, and gums. No nasal discharge. Neck:  Supple  Heart:  RRR, no murmurs, gallops, rubs  Lungs:  CTA bilaterally, bilat symmetrical expansion, no wheeze, rales, or rhonchi  Abdomen:   Bowel sounds present, soft, nontender, no masses, no organomegaly, no peritoneal signs  Extremities:  No clubbing, cyanosis, or edema  Skin:  Warm and dry, no open lesions or rash  Neuro:  Cranial nerves 2-12 intact, no focal deficits  Breast: deferred  Rectal: deferred  Genitalia:  deferred    LABS:    CBC:   Lab Results   Component Value Date    WBC 5.0 11/13/2020    RBC 4.74 11/13/2020    HGB 14.4 11/13/2020    HCT 42.2 11/13/2020    MCV 89.0 11/13/2020    MCH 30.4 11/13/2020    MCHC 34.1 11/13/2020    RDW 13.0 11/13/2020     11/13/2020    MPV 10.5 11/13/2020     CMP:    Lab Results   Component Value Date     04/12/2021    K 4.3 04/12/2021    K 4.5 07/17/2020     04/12/2021    CO2 23 04/12/2021    BUN 16 04/12/2021    CREATININE 1.2 04/12/2021    GFRAA >60 04/12/2021    LABGLOM >60 04/12/2021    GLUCOSE 159 04/12/2021    PROT 6.8 04/12/2021 LABALBU 4.5 04/12/2021    LABALBU 4.3 02/24/2011    CALCIUM 9.9 04/12/2021    BILITOT 0.4 04/12/2021    ALKPHOS 61 04/12/2021    AST 38 04/12/2021    ALT 32 04/12/2021     EKG: within normal limits     ASSESSMENT:     Diagnosis Orders   1. Pre-op exam  EKG 12 Lead   2. Type 2 diabetes mellitus without complication, without long-term current use of insulin ([facility]     3. Type II diabetes mellitus with peripheral circulatory disorder (HCC)     4. Mixed hypercholesterolemia and hypertriglyceridemia     5. Essential hypertension     6. Chronic obstructive pulmonary disease with (acute) exacerbation (HCC)     7. CKD (chronic kidney disease) stage 2, GFR 60-89 ml/min             PLAN:  Patient Instructions   LOW SALT,LOW CARB. AND LOW FAT DIET. CONTINUE CURRENT MEDICATIONS TAKING REGULARLY. STOP TAKING COLESTID TABLETS. TAKE LOFIBRA 1 TABLET DAILY. REGULAR WALKING ADVISED. ADVISED WEIGHT REDUCTION. OK FOR SURGERY WITH MODERATE RISK FOR GENERAL ANESTHESIA. NEXT APPOINTMENT IN 2 MONTHS. Return in about 2 months (around 7/24/2021) for FOLLOW UP VISIT.        Note that over 50 minutes was spent in evaluation of the patient, review of the chart and pertinent records, discussion with family/staff, etc    Analisa Graff MD MD  2:45 PM  5/24/2021

## 2021-05-25 RX ORDER — MONTELUKAST SODIUM 4 MG/1
2 TABLET, CHEWABLE ORAL DAILY
COMMUNITY

## 2021-05-28 ENCOUNTER — ANESTHESIA EVENT (OUTPATIENT)
Dept: OPERATING ROOM | Age: 66
End: 2021-05-28
Payer: MEDICARE

## 2021-05-28 ENCOUNTER — HOSPITAL ENCOUNTER (OUTPATIENT)
Age: 66
Discharge: HOME | End: 2021-05-30
Payer: MEDICARE

## 2021-05-28 DIAGNOSIS — M75.102 NONTRAUMATIC TEAR OF LEFT ROTATOR CUFF, UNSPECIFIED TEAR EXTENT: ICD-10-CM

## 2021-05-28 LAB
ANION GAP SERPL CALCULATED.3IONS-SCNC: 18 MMOL/L (ref 7–16)
BUN BLDV-MCNC: 20 MG/DL (ref 6–23)
CALCIUM SERPL-MCNC: 10.1 MG/DL (ref 8.6–10.2)
CHLORIDE BLD-SCNC: 109 MMOL/L (ref 98–107)
CO2: 23 MMOL/L (ref 22–29)
CREAT SERPL-MCNC: 1.5 MG/DL (ref 0.7–1.2)
GFR AFRICAN AMERICAN: 57
GFR NON-AFRICAN AMERICAN: 47 ML/MIN/1.73
GLUCOSE BLD-MCNC: 177 MG/DL (ref 74–99)
HCT VFR BLD CALC: 40.3 % (ref 37–54)
HEMOGLOBIN: 13.5 G/DL (ref 12.5–16.5)
MCH RBC QN AUTO: 30.1 PG (ref 26–35)
MCHC RBC AUTO-ENTMCNC: 33.5 % (ref 32–34.5)
MCV RBC AUTO: 89.8 FL (ref 80–99.9)
PDW BLD-RTO: 13.5 FL (ref 11.5–15)
PLATELET # BLD: 188 E9/L (ref 130–450)
PMV BLD AUTO: 10.1 FL (ref 7–12)
POTASSIUM SERPL-SCNC: 4 MMOL/L (ref 3.5–5)
RBC # BLD: 4.49 E12/L (ref 3.8–5.8)
SARS-COV-2, NAAT: NOT DETECTED
SODIUM BLD-SCNC: 150 MMOL/L (ref 132–146)
WBC # BLD: 5.3 E9/L (ref 4.5–11.5)

## 2021-05-28 PROCEDURE — 87635 SARS-COV-2 COVID-19 AMP PRB: CPT

## 2021-05-28 NOTE — ANESTHESIA PRE PROCEDURE
Department of Anesthesiology  Preprocedure Note       Name:  Toyin Ivorian   Age:  73 y.o.  :  1955                                          MRN:  [id number]         Date:  2021      Surgeon: Julianne Whitney): Charlie Baptiste DO    Procedure: Procedure(s):  RIGHT KNEE ARTHROSCOPY, (ANTERIOR CRUCIATE LIGAMENT) ACL RECONSTRUCTION, ALLOGRAFT, MEDIAL MENISCECTOMY AND DEBRIDEMENT, (ARTHREX)-right    Medications prior to admission:   Prior to Admission medications    Medication Sig Start Date End Date Taking? Authorizing Provider   Cholecalciferol (VITAMIN D3) 125 MCG (5000 UT) TABS Take by mouth daily    Historical Provider, MD   colestipol (COLESTID) 1 g tablet Take 2 g by mouth daily    Historical Provider, MD   pantoprazole (PROTONIX) 40 MG tablet take 1 tablet by mouth once daily 21   Historical Provider, MD   rOPINIRole (REQUIP) 1 MG tablet Take 1 tablet by mouth 4 times daily for 120 doses 21  Adina Barnes MD   DULoxetine (CYMBALTA) 60 MG extended release capsule take 1 capsule by mouth once daily 21  Allison Reed DO   doxepin (SINEQUAN) 10 MG capsule TAKE UP TO 3 CAPSULES BY MOUTH AT BEDTIME AS DIRECTED FOR INSOMNIA 21   Adina Barnes MD   glipiZIDE (GLUCOTROL) 10 MG tablet Take 1 tablet by mouth 2 times daily 21   Adina Barnes MD   albuterol sulfate HFA (VENTOLIN HFA) 108 (90 Base) MCG/ACT inhaler Inhale 2 puffs into the lungs 4 times daily as needed for Wheezing 21   Adina Barnes MD   Lancets 30G MISC 1 each by Does not apply route 3 times daily Dispense what ever brand is covered by insurance 21   Adina Barnes MD   hydroCHLOROthiazide (MICROZIDE) 12.5 MG capsule Take 1 capsule by mouth daily 21   Adina Barnes MD   carvedilol (COREG) 25 MG tablet Take 1 tablet by mouth 2 times daily 21   Adina Barnes MD   clotrimazole-betamethasone (LOTRISONE) 1-0.05 % cream Apply topically 2 times daily.  21   Adina Barnes MD   losartan (COZAAR) 50 MG tablet Take 1 tablet by mouth daily 4/12/21   Kathy Staton MD   sildenafil (REVATIO) 20 MG tablet Take 1 tablet by mouth daily as needed (FOR SEXUAL DYSFUNCTION) 4/12/21   Kathy Staton MD   blood glucose monitor strips Test 3 times a day & as needed for symptoms of irregular blood glucose 3/24/21   Kathy Staton MD   vitamin B-12 (CYANOCOBALAMIN) 1000 MCG tablet Take 1,000 mcg by mouth daily    Historical Provider, MD   atorvastatin (LIPITOR) 40 MG tablet TAKE 1 TABLET BY MOUTH IN THE EVENING FOR CHOLESTEROL 2/16/21   Kathy Staton MD   Blood Glucose Monitoring Suppl (BLOOD GLUCOSE MONITOR SYSTEM) w/Device KIT 1 each by Does not apply route 3 times daily Dispense what ever brand is covered by insurance 11/19/20   Kathy Staton MD   ibuprofen (ADVIL;MOTRIN) 800 MG tablet take 1 tablet by mouth every 8 hours if needed for pain 11/5/20   Historical Provider, MD   Elastic Bandages & Supports (ACE KNEE BRACE HINGED) [facility] 1 Units by Does not apply route once for 1 dose 11/12/20 5/24/21  Sobia Hernandez, DO       Current medications:    Current Outpatient Medications   Medication Sig Dispense Refill    Cholecalciferol (VITAMIN D3) 125 MCG (5000 UT) TABS Take by mouth daily      colestipol (COLESTID) 1 g tablet Take 2 g by mouth daily      pantoprazole (PROTONIX) 40 MG tablet take 1 tablet by mouth once daily      rOPINIRole (REQUIP) 1 MG tablet Take 1 tablet by mouth 4 times daily for 120 doses 120 tablet 3    DULoxetine (CYMBALTA) 60 MG extended release capsule take 1 capsule by mouth once daily 30 capsule 0    doxepin (SINEQUAN) 10 MG capsule TAKE UP TO 3 CAPSULES BY MOUTH AT BEDTIME AS DIRECTED FOR INSOMNIA 30 capsule 1    glipiZIDE (GLUCOTROL) 10 MG tablet Take 1 tablet by mouth 2 times daily 60 tablet 3    albuterol sulfate HFA (VENTOLIN HFA) 108 (90 Base) MCG/ACT inhaler Inhale 2 puffs into the lungs 4 times daily as needed for Wheezing 1 Inhaler 0    Lancets 30G MISC 1 each by Does not Diagnosis Date    Anesthesia complication     states has difficulty breathing after surgery  usually needs nebulizer treatment    Arthritis     Asthma     since childhood    Diabetes mellitus ([facility]     Fungal infection of foot     GERD (gastroesophageal reflux disease)     Hyperlipidemia     Hypertension     Type 2 diabetes mellitus without complication ([facility]        Past Surgical History:        Procedure Laterality Date    ANTERIOR CRUCIATE LIGAMENT REPAIR Right 2020    RIGHT KNEE ARTHROSCOPY, (ANTERIOR CRUCIATE LIGAMENT) ACL RECONSTRUCTION. ALLOGRAFT. MEDIAL MENISCECTOMY AND DEBRIDEMENT. ([address]) performed by Billy Strong DO at [facility], LAPAROSCOPIC N/A 2020    CHOLECYSTECTOMY LAPAROSCOPIC WITH IOC performed by Rashid Rock MD at [facility], [place], DIAGNOSTIC      HERNIA REPAIR      NECK SURGERY      cervical spine surgery       Social History:    Social History     Tobacco Use    Smoking status: Former Smoker     Years: 15.00     Types: Cigarettes     Start date: 1965     Quit date: 1986     Years since quittin.2    Smokeless tobacco: Never Used   Substance Use Topics    Alcohol use: Yes     Comment: 2-3 beers per week                                Counseling given: Not Answered      Vital Signs (Current): There were no vitals filed for this visit.                                            BP Readings from Last 3 Encounters:   21 134/62   21 130/80   21 112/70       NPO Status:                                                                                 BMI:   Wt Readings from Last 3 Encounters:   21 225 lb (102.1 kg)   21 223 lb (101.2 kg)   21 223 lb (101.2 kg)     There is no height or weight on file to calculate BMI.    CBC:   Lab Results   Component Value Date    WBC 5.0 2020    RBC 4.74 2020    HGB 14.4 2020    HCT 42.2 2020    MCV 89.0 2020    RDW 13.0 2020     2020       CMP:   Lab Results   Component Value Date     2021    K 4.3 2021    K 4.5 2020     2021    CO2 23 2021    BUN 16 2021    CREATININE 1.2 2021    GFRAA >60 2021    LABGLOM >60 2021    GLUCOSE 159 2021    PROT 6.8 2021    CALCIUM 9.9 2021    BILITOT 0.4 2021    ALKPHOS 61 2021    AST 38 2021    ALT 32 2021       POC Tests: No results for input(s): POCGLU, POCNA, POCK, POCCL, POCBUN, POCHEMO, POCHCT in the last 72 hours. Coags:   Lab Results   Component Value Date    PROTIME 10.7 2020    INR 0.9 2020       HCG (If Applicable): No results found for: PREGTESTUR, PREGSERUM, HCG, HCGQUANT     ABGs: No results found for: PHART, PO2ART, CYY4OCM, BJC9IUF, BEART, M2KEGSGS     Type & Screen (If Applicable):  No results found for: LABABO, LABRH    Drug/Infectious Status (If Applicable):  No results found for: HIV, HEPCAB    COVID-19 Screening (If Applicable):   Lab Results   Component Value Date    COVID19 Not Detected 2020         Anesthesia Evaluation  Patient summary reviewed history of anesthetic complications (Reports needing nebulizer treatment after surgery): Airway: Mallampati: III  TM distance: >3 FB   Neck ROM: limited  Mouth opening: > = 3 FB Dental: normal exam     Comment: Dentition intact, patient denies any loose teeth. Pulmonary: breath sounds clear to auscultation  (+) COPD:  decreased breath sounds,  asthma:     (-) shortness of breath and not a current smoker                          ROS comment: Former Smoker 1.5 - 2.0 x 20 years  Quit Smokin86      CXR 2020:  FINDINGS:  Suboptimal inspiration was obtained for the chest x-ray on the AP view.     The lungs are without acute focal process.  There is no effusion or  pneumothorax. The cardiomediastinal silhouette is without acute process.  The  osseous structures are without acute process. Cardiovascular:  Exercise tolerance: good (>4 METS),   (+) hypertension: moderate, murmur ( Grade 1/6 murmur), hyperlipidemia      ECG reviewed  Rhythm: regular  Rate: normal  Echocardiogram reviewed               ROS comment: EKG 5/24/2021: NSR    Echo 7/2020:    Summary   Normal left ventricular chamber size and systolic function. Visually   estimated LVEF is 60-65 %. No wall motion abnormalities. Normal diastolic function. Normal right ventricle structure and function. No significant valvular abnormalities. No pericardial effusion. No previous echo for comparison. Neuro/Psych:               GI/Hepatic/Renal:   (+) GERD:, renal disease (CKD stage II): CRI,      (-) liver disease       Endo/Other:    (+) DiabetesType II DM, , : arthritis: OA., .                 Abdominal:         (-) obese     Vascular: negative vascular ROS. PCP evaluation 5/24/2020:  \"OK FOR SURGERY WITH MODERATE RISK FOR GENERAL ANESTHESIA\"      COVID test pending as of 5/28/2021. Patient to have rapid test performed 5/28/2021. Anesthesia Plan      general and regional     ASA 3     (Left interscalene single shot nerve block for post-operative pain management.)  Induction: intravenous. Anesthetic plan and risks discussed with patient. Plan discussed with CRNA.         Tiburcio Tinsley  6-1-21  08:45 AM

## 2021-06-01 ENCOUNTER — HOSPITAL ENCOUNTER (OUTPATIENT)
Age: 66
Setting detail: OUTPATIENT SURGERY
Discharge: HOME | End: 2021-06-01
Attending: ORTHOPAEDIC SURGERY | Admitting: ORTHOPAEDIC SURGERY
Payer: MEDICARE

## 2021-06-01 ENCOUNTER — ANESTHESIA (OUTPATIENT)
Dept: OPERATING ROOM | Age: 66
End: 2021-06-01
Payer: MEDICARE

## 2021-06-01 ENCOUNTER — TELEPHONE (OUTPATIENT)
Dept: FAMILY MEDICINE CLINIC | Age: 66
End: 2021-06-01

## 2021-06-01 VITALS
BODY MASS INDEX: 31.5 KG/M2 | TEMPERATURE: 97 F | SYSTOLIC BLOOD PRESSURE: 162 MMHG | WEIGHT: 225 LBS | HEART RATE: 62 BPM | HEIGHT: 71 IN | DIASTOLIC BLOOD PRESSURE: 93 MMHG | RESPIRATION RATE: 16 BRPM | OXYGEN SATURATION: 93 %

## 2021-06-01 VITALS
DIASTOLIC BLOOD PRESSURE: 61 MMHG | RESPIRATION RATE: 11 BRPM | OXYGEN SATURATION: 95 % | SYSTOLIC BLOOD PRESSURE: 96 MMHG | TEMPERATURE: 96.1 F

## 2021-06-01 DIAGNOSIS — M75.102 NONTRAUMATIC TEAR OF LEFT ROTATOR CUFF, UNSPECIFIED TEAR EXTENT: Primary | ICD-10-CM

## 2021-06-01 LAB
METER GLUCOSE: 187 MG/DL (ref 74–99)
METER GLUCOSE: 227 MG/DL (ref 74–99)

## 2021-06-01 PROCEDURE — 7100000000 HC PACU RECOVERY - FIRST 15 MIN: Performed by: ORTHOPAEDIC SURGERY

## 2021-06-01 PROCEDURE — 82962 GLUCOSE BLOOD TEST: CPT

## 2021-06-01 PROCEDURE — 6360000002 HC RX W HCPCS: Performed by: NURSE ANESTHETIST, CERTIFIED REGISTERED

## 2021-06-01 PROCEDURE — 3600000004 HC SURGERY LEVEL 4 BASE: Performed by: ORTHOPAEDIC SURGERY

## 2021-06-01 PROCEDURE — 2709999900 HC NON-CHARGEABLE SUPPLY: Performed by: ORTHOPAEDIC SURGERY

## 2021-06-01 PROCEDURE — C1713 ANCHOR/SCREW BN/BN,TIS/BN: HCPCS | Performed by: ORTHOPAEDIC SURGERY

## 2021-06-01 PROCEDURE — 3600000014 HC SURGERY LEVEL 4 ADDTL 15MIN: Performed by: ORTHOPAEDIC SURGERY

## 2021-06-01 PROCEDURE — 6370000000 HC RX 637 (ALT 250 FOR IP): Performed by: NURSE ANESTHETIST, CERTIFIED REGISTERED

## 2021-06-01 PROCEDURE — 6360000002 HC RX W HCPCS: Performed by: ANESTHESIOLOGY

## 2021-06-01 PROCEDURE — 3700000001 HC ADD 15 MINUTES (ANESTHESIA): Performed by: ORTHOPAEDIC SURGERY

## 2021-06-01 PROCEDURE — 29824 SHO ARTHRS SRG DSTL CLAVICLC: CPT | Performed by: ORTHOPAEDIC SURGERY

## 2021-06-01 PROCEDURE — 7100000010 HC PHASE II RECOVERY - FIRST 15 MIN: Performed by: ORTHOPAEDIC SURGERY

## 2021-06-01 PROCEDURE — 2500000003 HC RX 250 WO HCPCS: Performed by: ORTHOPAEDIC SURGERY

## 2021-06-01 PROCEDURE — 7100000001 HC PACU RECOVERY - ADDTL 15 MIN: Performed by: ORTHOPAEDIC SURGERY

## 2021-06-01 PROCEDURE — 64415 NJX AA&/STRD BRCH PLXS IMG: CPT | Performed by: ANESTHESIOLOGY

## 2021-06-01 PROCEDURE — 2580000003 HC RX 258: Performed by: ANESTHESIOLOGY

## 2021-06-01 PROCEDURE — 2500000003 HC RX 250 WO HCPCS: Performed by: NURSE ANESTHETIST, CERTIFIED REGISTERED

## 2021-06-01 PROCEDURE — 29823 SHO ARTHRS SRG XTNSV DBRDMT: CPT | Performed by: ORTHOPAEDIC SURGERY

## 2021-06-01 PROCEDURE — 29828 SHO ARTHRS SRG BICP TENODSIS: CPT | Performed by: ORTHOPAEDIC SURGERY

## 2021-06-01 PROCEDURE — 3700000000 HC ANESTHESIA ATTENDED CARE: Performed by: ORTHOPAEDIC SURGERY

## 2021-06-01 PROCEDURE — 29826 SHO ARTHRS SRG DECOMPRESSION: CPT | Performed by: ORTHOPAEDIC SURGERY

## 2021-06-01 PROCEDURE — 7100000011 HC PHASE II RECOVERY - ADDTL 15 MIN: Performed by: ORTHOPAEDIC SURGERY

## 2021-06-01 PROCEDURE — 2720000010 HC SURG SUPPLY STERILE: Performed by: ORTHOPAEDIC SURGERY

## 2021-06-01 PROCEDURE — 6360000002 HC RX W HCPCS: Performed by: ORTHOPAEDIC SURGERY

## 2021-06-01 PROCEDURE — 2580000003 HC RX 258: Performed by: ORTHOPAEDIC SURGERY

## 2021-06-01 PROCEDURE — 82962 GLUCOSE BLOOD TEST: CPT | Performed by: ORTHOPAEDIC SURGERY

## 2021-06-01 DEVICE — ANCHOR SUTURE 3.9X17.9 MM SWIVELOCK BIOCOMP: Type: IMPLANTABLE DEVICE | Site: SHOULDER | Status: FUNCTIONAL

## 2021-06-01 RX ORDER — SODIUM CHLORIDE, SODIUM LACTATE, POTASSIUM CHLORIDE, CALCIUM CHLORIDE 600; 310; 30; 20 MG/100ML; MG/100ML; MG/100ML; MG/100ML
INJECTION, SOLUTION INTRAVENOUS CONTINUOUS
Status: DISCONTINUED | OUTPATIENT
Start: 2021-06-01 | End: 2021-06-01 | Stop reason: HOSPADM

## 2021-06-01 RX ORDER — LIDOCAINE HYDROCHLORIDE 20 MG/ML
INJECTION, SOLUTION EPIDURAL; INFILTRATION; INTRACAUDAL; PERINEURAL PRN
Status: DISCONTINUED | OUTPATIENT
Start: 2021-06-01 | End: 2021-06-01 | Stop reason: SDUPTHER

## 2021-06-01 RX ORDER — ONDANSETRON 4 MG/1
4 TABLET, FILM COATED ORAL EVERY 6 HOURS PRN
Qty: 20 TABLET | Refills: 1 | Status: SHIPPED | OUTPATIENT
Start: 2021-06-01 | End: 2021-07-16

## 2021-06-01 RX ORDER — KETOROLAC TROMETHAMINE 10 MG/1
10 TABLET, FILM COATED ORAL EVERY 6 HOURS PRN
Qty: 20 TABLET | Refills: 0 | Status: SHIPPED | OUTPATIENT
Start: 2021-06-01 | End: 2021-07-16

## 2021-06-01 RX ORDER — DEXAMETHASONE SODIUM PHOSPHATE 10 MG/ML
INJECTION, SOLUTION INTRAMUSCULAR; INTRAVENOUS PRN
Status: DISCONTINUED | OUTPATIENT
Start: 2021-06-01 | End: 2021-06-01 | Stop reason: SDUPTHER

## 2021-06-01 RX ORDER — OXYCODONE HYDROCHLORIDE AND ACETAMINOPHEN 5; 325 MG/1; MG/1
1 TABLET ORAL EVERY 6 HOURS PRN
Qty: 28 TABLET | Refills: 0 | Status: SHIPPED | OUTPATIENT
Start: 2021-06-01 | End: 2021-06-08

## 2021-06-01 RX ORDER — EPHEDRINE SULFATE/0.9% NACL/PF 50 MG/5 ML
SYRINGE (ML) INTRAVENOUS PRN
Status: DISCONTINUED | OUTPATIENT
Start: 2021-06-01 | End: 2021-06-01 | Stop reason: SDUPTHER

## 2021-06-01 RX ORDER — OXYCODONE HYDROCHLORIDE AND ACETAMINOPHEN 5; 325 MG/1; MG/1
1 TABLET ORAL EVERY 4 HOURS PRN
Status: DISCONTINUED | OUTPATIENT
Start: 2021-06-01 | End: 2021-06-01 | Stop reason: HOSPADM

## 2021-06-01 RX ORDER — KETOROLAC TROMETHAMINE 30 MG/ML
INJECTION, SOLUTION INTRAMUSCULAR; INTRAVENOUS PRN
Status: DISCONTINUED | OUTPATIENT
Start: 2021-06-01 | End: 2021-06-01 | Stop reason: SDUPTHER

## 2021-06-01 RX ORDER — CEFAZOLIN SODIUM 2 G/50ML
2000 SOLUTION INTRAVENOUS ONCE
Status: COMPLETED | OUTPATIENT
Start: 2021-06-01 | End: 2021-06-01

## 2021-06-01 RX ORDER — NEOSTIGMINE METHYLSULFATE 1 MG/ML
INJECTION, SOLUTION INTRAVENOUS PRN
Status: DISCONTINUED | OUTPATIENT
Start: 2021-06-01 | End: 2021-06-01 | Stop reason: SDUPTHER

## 2021-06-01 RX ORDER — LABETALOL HYDROCHLORIDE 5 MG/ML
5 INJECTION, SOLUTION INTRAVENOUS EVERY 10 MIN PRN
Status: DISCONTINUED | OUTPATIENT
Start: 2021-06-01 | End: 2021-06-01 | Stop reason: HOSPADM

## 2021-06-01 RX ORDER — FENTANYL CITRATE 50 UG/ML
INJECTION, SOLUTION INTRAMUSCULAR; INTRAVENOUS PRN
Status: DISCONTINUED | OUTPATIENT
Start: 2021-06-01 | End: 2021-06-01 | Stop reason: SDUPTHER

## 2021-06-01 RX ORDER — PROPOFOL 10 MG/ML
INJECTION, EMULSION INTRAVENOUS PRN
Status: DISCONTINUED | OUTPATIENT
Start: 2021-06-01 | End: 2021-06-01 | Stop reason: SDUPTHER

## 2021-06-01 RX ORDER — PROCHLORPERAZINE EDISYLATE 5 MG/ML
5 INJECTION INTRAMUSCULAR; INTRAVENOUS
Status: DISCONTINUED | OUTPATIENT
Start: 2021-06-01 | End: 2021-06-01 | Stop reason: HOSPADM

## 2021-06-01 RX ORDER — ROCURONIUM BROMIDE 10 MG/ML
INJECTION, SOLUTION INTRAVENOUS PRN
Status: DISCONTINUED | OUTPATIENT
Start: 2021-06-01 | End: 2021-06-01 | Stop reason: SDUPTHER

## 2021-06-01 RX ORDER — ROPIVACAINE HYDROCHLORIDE 5 MG/ML
INJECTION, SOLUTION EPIDURAL; INFILTRATION; PERINEURAL
Status: COMPLETED | OUTPATIENT
Start: 2021-06-01 | End: 2021-06-01

## 2021-06-01 RX ORDER — ONDANSETRON 2 MG/ML
INJECTION INTRAMUSCULAR; INTRAVENOUS PRN
Status: DISCONTINUED | OUTPATIENT
Start: 2021-06-01 | End: 2021-06-01 | Stop reason: SDUPTHER

## 2021-06-01 RX ORDER — PROMETHAZINE HYDROCHLORIDE 25 MG/ML
6.25 INJECTION, SOLUTION INTRAMUSCULAR; INTRAVENOUS
Status: DISCONTINUED | OUTPATIENT
Start: 2021-06-01 | End: 2021-06-01 | Stop reason: HOSPADM

## 2021-06-01 RX ORDER — DIPHENHYDRAMINE HYDROCHLORIDE 50 MG/ML
12.5 INJECTION INTRAMUSCULAR; INTRAVENOUS
Status: DISCONTINUED | OUTPATIENT
Start: 2021-06-01 | End: 2021-06-01 | Stop reason: HOSPADM

## 2021-06-01 RX ORDER — MIDAZOLAM HYDROCHLORIDE 1 MG/ML
INJECTION INTRAMUSCULAR; INTRAVENOUS PRN
Status: DISCONTINUED | OUTPATIENT
Start: 2021-06-01 | End: 2021-06-01 | Stop reason: SDUPTHER

## 2021-06-01 RX ORDER — GLYCOPYRROLATE 1 MG/5 ML
SYRINGE (ML) INTRAVENOUS PRN
Status: DISCONTINUED | OUTPATIENT
Start: 2021-06-01 | End: 2021-06-01 | Stop reason: SDUPTHER

## 2021-06-01 RX ORDER — DOCUSATE SODIUM 100 MG/1
100 CAPSULE, LIQUID FILLED ORAL 2 TIMES DAILY PRN
Qty: 20 CAPSULE | Refills: 1 | Status: SHIPPED | OUTPATIENT
Start: 2021-06-01 | End: 2021-07-16

## 2021-06-01 RX ORDER — ALBUTEROL SULFATE 90 UG/1
AEROSOL, METERED RESPIRATORY (INHALATION) PRN
Status: DISCONTINUED | OUTPATIENT
Start: 2021-06-01 | End: 2021-06-01 | Stop reason: SDUPTHER

## 2021-06-01 RX ORDER — MEPERIDINE HYDROCHLORIDE 25 MG/ML
12.5 INJECTION INTRAMUSCULAR; INTRAVENOUS; SUBCUTANEOUS EVERY 5 MIN PRN
Status: DISCONTINUED | OUTPATIENT
Start: 2021-06-01 | End: 2021-06-01 | Stop reason: HOSPADM

## 2021-06-01 RX ORDER — BUPIVACAINE HYDROCHLORIDE AND EPINEPHRINE 2.5; 5 MG/ML; UG/ML
INJECTION, SOLUTION EPIDURAL; INFILTRATION; INTRACAUDAL; PERINEURAL PRN
Status: DISCONTINUED | OUTPATIENT
Start: 2021-06-01 | End: 2021-06-01 | Stop reason: ALTCHOICE

## 2021-06-01 RX ADMIN — ONDANSETRON 4 MG: 2 INJECTION INTRAMUSCULAR; INTRAVENOUS at 10:51

## 2021-06-01 RX ADMIN — SODIUM CHLORIDE, POTASSIUM CHLORIDE, SODIUM LACTATE AND CALCIUM CHLORIDE: 600; 310; 30; 20 INJECTION, SOLUTION INTRAVENOUS at 08:15

## 2021-06-01 RX ADMIN — LIDOCAINE HYDROCHLORIDE 50 MG: 20 INJECTION, SOLUTION EPIDURAL; INFILTRATION; INTRACAUDAL; PERINEURAL at 09:21

## 2021-06-01 RX ADMIN — Medication 0.6 MG: at 10:53

## 2021-06-01 RX ADMIN — FENTANYL CITRATE 100 MCG: 50 INJECTION, SOLUTION INTRAMUSCULAR; INTRAVENOUS at 10:18

## 2021-06-01 RX ADMIN — DEXAMETHASONE SODIUM PHOSPHATE 10 MG: 10 INJECTION, SOLUTION INTRAMUSCULAR; INTRAVENOUS at 09:40

## 2021-06-01 RX ADMIN — ROCURONIUM BROMIDE 50 MG: 10 SOLUTION INTRAVENOUS at 09:21

## 2021-06-01 RX ADMIN — MIDAZOLAM 2 MG: 1 INJECTION INTRAMUSCULAR; INTRAVENOUS at 08:53

## 2021-06-01 RX ADMIN — ROPIVACAINE HYDROCHLORIDE 20 ML: 5 INJECTION, SOLUTION EPIDURAL; INFILTRATION; PERINEURAL at 09:40

## 2021-06-01 RX ADMIN — Medication 3 MG: at 10:53

## 2021-06-01 RX ADMIN — KETOROLAC TROMETHAMINE 30 MG: 30 INJECTION, SOLUTION INTRAMUSCULAR; INTRAVENOUS at 10:51

## 2021-06-01 RX ADMIN — FENTANYL CITRATE 100 MCG: 50 INJECTION, SOLUTION INTRAMUSCULAR; INTRAVENOUS at 10:41

## 2021-06-01 RX ADMIN — FENTANYL CITRATE 50 MCG: 50 INJECTION, SOLUTION INTRAMUSCULAR; INTRAVENOUS at 08:55

## 2021-06-01 RX ADMIN — Medication 10 MG: at 10:47

## 2021-06-01 RX ADMIN — PROPOFOL 200 MG: 10 INJECTION, EMULSION INTRAVENOUS at 09:21

## 2021-06-01 RX ADMIN — CEFAZOLIN SODIUM 2000 MG: 2 SOLUTION INTRAVENOUS at 09:16

## 2021-06-01 RX ADMIN — ALBUTEROL SULFATE 2 PUFF: 90 AEROSOL, METERED RESPIRATORY (INHALATION) at 09:26

## 2021-06-01 ASSESSMENT — PULMONARY FUNCTION TESTS
PIF_VALUE: 15
PIF_VALUE: 12
PIF_VALUE: 16
PIF_VALUE: 17
PIF_VALUE: 36
PIF_VALUE: 12
PIF_VALUE: 5
PIF_VALUE: 4
PIF_VALUE: 11
PIF_VALUE: 12
PIF_VALUE: 11
PIF_VALUE: 14
PIF_VALUE: 12
PIF_VALUE: 13
PIF_VALUE: 12
PIF_VALUE: 10
PIF_VALUE: 12
PIF_VALUE: 4
PIF_VALUE: 13
PIF_VALUE: 2
PIF_VALUE: 3
PIF_VALUE: 2
PIF_VALUE: 15
PIF_VALUE: 15
PIF_VALUE: 5
PIF_VALUE: 11
PIF_VALUE: 12
PIF_VALUE: 20
PIF_VALUE: 12
PIF_VALUE: 13
PIF_VALUE: 2
PIF_VALUE: 10
PIF_VALUE: 12
PIF_VALUE: 16
PIF_VALUE: 9
PIF_VALUE: 12
PIF_VALUE: 2
PIF_VALUE: 18
PIF_VALUE: 12
PIF_VALUE: 4
PIF_VALUE: 12
PIF_VALUE: 15
PIF_VALUE: 12
PIF_VALUE: 12
PIF_VALUE: 14
PIF_VALUE: 14
PIF_VALUE: 12
PIF_VALUE: 3
PIF_VALUE: 12
PIF_VALUE: 13
PIF_VALUE: 2
PIF_VALUE: 14
PIF_VALUE: 14
PIF_VALUE: 12
PIF_VALUE: 8
PIF_VALUE: 12
PIF_VALUE: 2
PIF_VALUE: 12
PIF_VALUE: 1
PIF_VALUE: 2
PIF_VALUE: 12
PIF_VALUE: 2
PIF_VALUE: 12
PIF_VALUE: 13
PIF_VALUE: 9
PIF_VALUE: 12
PIF_VALUE: 7
PIF_VALUE: 13
PIF_VALUE: 12
PIF_VALUE: 12
PIF_VALUE: 14
PIF_VALUE: 7
PIF_VALUE: 14
PIF_VALUE: 12
PIF_VALUE: 14
PIF_VALUE: 15
PIF_VALUE: 12
PIF_VALUE: 3
PIF_VALUE: 1
PIF_VALUE: 8
PIF_VALUE: 15
PIF_VALUE: 12
PIF_VALUE: 12
PIF_VALUE: 7
PIF_VALUE: 14
PIF_VALUE: 14
PIF_VALUE: 11
PIF_VALUE: 28
PIF_VALUE: 12
PIF_VALUE: 12
PIF_VALUE: 2
PIF_VALUE: 12
PIF_VALUE: 14
PIF_VALUE: 19
PIF_VALUE: 8
PIF_VALUE: 26
PIF_VALUE: 36
PIF_VALUE: 12
PIF_VALUE: 12
PIF_VALUE: 13
PIF_VALUE: 12
PIF_VALUE: 12
PIF_VALUE: 14
PIF_VALUE: 2
PIF_VALUE: 12
PIF_VALUE: 12
PIF_VALUE: 2
PIF_VALUE: 3

## 2021-06-01 ASSESSMENT — PAIN SCALES - GENERAL
PAINLEVEL_OUTOF10: 0

## 2021-06-01 ASSESSMENT — PAIN - FUNCTIONAL ASSESSMENT: PAIN_FUNCTIONAL_ASSESSMENT: 0-10

## 2021-06-01 ASSESSMENT — ENCOUNTER SYMPTOMS: SHORTNESS OF BREATH: 0

## 2021-06-01 ASSESSMENT — LIFESTYLE VARIABLES: SMOKING_STATUS: 0

## 2021-06-01 ASSESSMENT — PAIN DESCRIPTION - DESCRIPTORS: DESCRIPTORS: BURNING;DISCOMFORT;SHARP

## 2021-06-01 NOTE — TELEPHONE ENCOUNTER
Per Dr. Karlie Dorsey, MA called pt to let him know he is to take both cholesterol medications. Pt is to take atorvastatin in the evenings and fenofibrate in the mornings. Pt verbalized understanding.

## 2021-06-01 NOTE — ANESTHESIA POSTPROCEDURE EVALUATION
Department of Anesthesiology  Postprocedure Note    Patient: Juanjose Yao  MRN: [id number]  YOB: 1955  Date of evaluation: 6/1/2021  Time:  1:20 PM     Procedure Summary     Date: 06/01/21 Room / Location: [address] [place] [address] 01 / [address]    Anesthesia Start: 3064 Anesthesia Stop: 0499    Procedure: LEFT SHOULDER ARTHROSCOPY, SUBACROMIAL DECOMPRESSION, DEBRIDEMENT ([address]) (Left ) Diagnosis: (NON TRAUMATIC TEAR OF LEFT ROTATOR CUFF)    Surgeons: Laurie Powers DO Responsible Provider: Osmar Cobb MD    Anesthesia Type: general, regional ASA Status: 3          Anesthesia Type: general, regional    Sri Phase I: Sri Score: 10    Sri Phase II: Sri Score: 10    Last vitals: Reviewed and per EMR flowsheets.        Anesthesia Post Evaluation    Patient location during evaluation: bedside  Patient participation: complete - patient participated  Level of consciousness: awake and alert  Pain score: 0  Airway patency: patent  Nausea & Vomiting: no nausea and no vomiting  Complications: no  Cardiovascular status: hemodynamically stable  Respiratory status: acceptable  Hydration status: euvolemic

## 2021-06-01 NOTE — TELEPHONE ENCOUNTER
Pt is wondering if he is suppose to be taking both fenofibrate and atorvastatin. I let pt know the fenofibrate is showing it has been discontinued. Pt was very confused. Pt is wondering if he is to take both cholesterol meds can they be taken at the same time or one in AM and one PM? Please Advise.

## 2021-06-01 NOTE — H&P
I have reviewed the history and physical and examined the patient and find no relevant changes. I have reviewed with the patient and/or family the risks, benefits, and alternatives to the procedure. The patient was counseled at length about the risks of kristina Covid-19 during their perioperative period and any recovery window from their procedure. The patient was made aware that kristina Covid-19  may worsen their prognosis for recovering from their procedure  and lend to a higher morbidity and/or mortality risk. All material risks, benefits, and reasonable alternatives including postponing the procedure were discussed. The patient does wish to proceed with the procedure at this time.         Kait Hunt DO  6/1/2021

## 2021-06-01 NOTE — OP NOTE
Operative Note      Patient: Juanjose Yao  YOB: 1955  MRN: [id number]    Date of Procedure: 6/1/2021    Pre-operative Diagnosis:   1. Left rotator cuff tear  2. Left shoulder impingement syndrome  3. Left shoulder AC OA  4. Left glenohumeral arthritis        Post-operative Diagnosis:  1. Left rotator cuff tendinitis   2. Left shoulder impingement syndrome  3. Left shoulder AC OA  4. Left glenohumeral arthritis  5. Left biceps long head tendon tear        Procedure:   1. Left shoulder arthroscopic Subacromial decompression  2. Left shoulder arthroscopic distal clavicle resection  3. Left shoulder extensive debridement  5. Left shoulder arthroscopic long head biceps tenodesis      Surgeon(s): Laurie Powers DO    Assistant:   * No surgical staff found *    Anesthesia: General    Estimated Blood Loss (mL): Minimal    Complications: None    Specimens:   * No specimens in log *    Implants:  Implant Name Type Inv. Item Serial No.  Lot No. LRB No. Used Action   ANCHOR SUTURE 3.9X17.9 MM [employer]  ANCHOR SUTURE 3.9X17.9 MM [employer] [facility] [id number] Left 1 Implanted         Drains: * No LDAs found *        INDICATIONS:   The patient is a 74 yo male who has progressive pain and weakness, loss of function of his Left shoulder. MRI demonstrated near full-thickness tear of the rotator cuff with tendinosis and impingment. He has failed conservative management. He elects to undergo the above-stated procedure after understanding the risks and benefits including but not limited to neurovascular injury, infection, continued pain, failed repair, need for revision, and loss of function. PROCEDURE:   The patient was taken to the operating room, placed supine on the operating table, underwent general anesthesia without difficulty. Left shoulder motion was ranged to check for any adhesive capsulitis under anesthesia. He received preoperative antibiotics.  Left shoulder was injection of 0.25% Marcaine with epinephrine. Dry sterile dressings were applied. Xeroform, 4 x 4 gauze, ABD, foam tape, ice bag, and sling. DISPOSITION: The patient was awoken from anesthesia in the operating room having tolerated the procedure well and was transported to the recovery room in stable condition.      Electronically signed by Abram Trevizo DO on 6/1/2021 at 4:44 PM

## 2021-06-01 NOTE — ANESTHESIA PROCEDURE NOTES
Peripheral Block    Patient location during procedure: procedure area  Start time: 6/1/2021 9:00 AM  End time: 6/1/2021 9:10 AM  Staffing  Performed: anesthesiologist   Anesthesiologist: Jackey Duverney, MD  Preanesthetic Checklist  Completed: patient identified, IV checked, site marked, risks and benefits discussed, surgical consent, monitors and equipment checked, pre-op evaluation, timeout performed, anesthesia consent given, oxygen available and patient being monitored  Peripheral Block  Patient position: supine  Prep: ChloraPrep  Patient monitoring: cardiac monitor, continuous pulse ox, frequent blood pressure checks and IV access  Block type: Brachial plexus  Laterality: left  Injection technique: single-shot  Guidance: ultrasound guided  Interscalene  Provider prep: mask and sterile gloves  Needle  Needle type: combined needle/nerve stimulator   Needle gauge: 21 G  Needle length: 5 cm  Needle localization: ultrasound guidance  Assessment  Injection assessment: negative aspiration for heme, no paresthesia on injection and local visualized surrounding nerve on ultrasound  Paresthesia pain: none  Slow fractionated injection: yes  Hemodynamics: stable  Additional Notes  Patient tolerated the procedure well.   Medications Administered  Ropivacaine (NAROPIN) injection 0.5%, 20 mL  Reason for block: post-op pain management and at surgeon's request
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
## 2021-06-01 NOTE — BRIEF OP NOTE
Brief Postoperative Note      Patient: Shanna Sargent  YOB: 1955  MRN: [id number]    Date of Procedure: 6/1/2021    Pre-Op Diagnosis: NON TRAUMATIC TEAR OF LEFT ROTATOR CUFF    Post-Op Diagnosis: Same       Procedure(s):  LEFT SHOULDER ARTHROSCOPY, SUBACROMIAL DECOMPRESSION, DEBRIDEMENT ([address])    Surgeon(s): Wilfredo Yepez DO    Assistant:  * No surgical staff found *    Anesthesia: General    Estimated Blood Loss (mL): Minimal    Complications: None    Specimens:   * No specimens in log *    Implants:  Implant Name Type Inv.  Item Serial No.  Lot No. LRB No. Used Action   ANCHOR SUTURE 3.9X17.9 MM [facility] SUTURE 3.9X17.9 MM [facility] [id number] Left 1 Implanted         Drains: * No LDAs found *    Findings: see report'    Electronically signed by Wilfredo Yepez DO on 6/1/2021 at 4:44 PM

## 2021-06-16 ENCOUNTER — OFFICE VISIT (OUTPATIENT)
Dept: ORTHOPEDIC SURGERY | Age: 66
End: 2021-06-16

## 2021-06-16 VITALS — HEIGHT: 71 IN | BODY MASS INDEX: 31.5 KG/M2 | WEIGHT: 225 LBS

## 2021-06-16 DIAGNOSIS — S46.212A RUPTURE OF LEFT BICEPS TENDON, INITIAL ENCOUNTER: Primary | ICD-10-CM

## 2021-06-16 PROCEDURE — 99024 POSTOP FOLLOW-UP VISIT: CPT | Performed by: ORTHOPAEDIC SURGERY

## 2021-06-16 NOTE — PROGRESS NOTES
Chief Complaint   Patient presents with    Shoulder Pain     2 week postop left shoulder arthroscopy. DOS 6/1/21. patient is doing well. Joe Sanders is here for follow-up after left shoulder arthroscopy. Findings at surgery:  Rotator cuff tendinitis, shoulder impingement, AC OA, glenohumeral arthritis, long head biceps tear. Pain is controlled without any medications. The patient denies fever, wound drainage, increasing redness, pus, increasing pain, increasing swelling. Post op problems reported: none. Shoulder exam - The incisions are clean, dry and intact. left normal; 170/170/60 range of motion, no pain on motion, no tenderness or deformity noted. Motor and sensory exam is grossly intact in B/L upper extremities. Special test results are as follow:  Impingement negative, Dorsey negative, Speeds negative, Apprehension negative, Hoffmann negative, Load Shiftnegative, Garrick manuver negative, Cross arm test negative. Encounter Diagnosis   Name Primary?  Rupture of left biceps tendon, initial encounter Yes       Plan:    The patient will continue with gentle ROM exercises and being activities as tolerated. The patient was educated about natural history and postoperative course and to continue physical therapy. Sling will be used for comfort ONLY otherwise discontinue. Patient is to continue analgesics and needed and use ice for pain. We will see the pain back in 4 weeks time for repeat evaluation.

## 2021-06-18 ENCOUNTER — OFFICE VISIT (OUTPATIENT)
Dept: PHYSICAL MEDICINE AND REHAB | Age: 66
End: 2021-06-18
Payer: MEDICARE

## 2021-06-18 ENCOUNTER — EVALUATION (OUTPATIENT)
Dept: PHYSICAL THERAPY | Age: 66
End: 2021-06-18
Payer: MEDICARE

## 2021-06-18 VITALS
DIASTOLIC BLOOD PRESSURE: 73 MMHG | HEIGHT: 71 IN | SYSTOLIC BLOOD PRESSURE: 139 MMHG | HEART RATE: 55 BPM | BODY MASS INDEX: 29.4 KG/M2 | WEIGHT: 210 LBS

## 2021-06-18 DIAGNOSIS — M54.2 CHRONIC NECK PAIN: Primary | ICD-10-CM

## 2021-06-18 DIAGNOSIS — M47.812 SPONDYLOSIS, CERVICAL: ICD-10-CM

## 2021-06-18 DIAGNOSIS — S46.212A RUPTURE OF LEFT BICEPS TENDON, INITIAL ENCOUNTER: Primary | ICD-10-CM

## 2021-06-18 DIAGNOSIS — G89.29 CHRONIC NECK PAIN: Primary | ICD-10-CM

## 2021-06-18 DIAGNOSIS — M48.02 CERVICAL STENOSIS OF SPINAL CANAL: ICD-10-CM

## 2021-06-18 PROBLEM — F33.0 MAJOR DEPRESSIVE DISORDER, RECURRENT, MILD (HCC): Status: ACTIVE | Noted: 2021-06-18

## 2021-06-18 PROBLEM — F33.1 MAJOR DEPRESSIVE DISORDER, RECURRENT, MODERATE (HCC): Status: RESOLVED | Noted: 2021-06-18 | Resolved: 2021-06-18

## 2021-06-18 PROBLEM — F33.1 MAJOR DEPRESSIVE DISORDER, RECURRENT, MODERATE (HCC): Status: ACTIVE | Noted: 2021-06-18

## 2021-06-18 PROBLEM — F33.9 MAJOR DEPRESSIVE DISORDER, RECURRENT, UNSPECIFIED (HCC): Status: RESOLVED | Noted: 2021-06-18 | Resolved: 2021-06-18

## 2021-06-18 PROBLEM — F33.9 MAJOR DEPRESSIVE DISORDER, RECURRENT, UNSPECIFIED (HCC): Status: ACTIVE | Noted: 2021-06-18

## 2021-06-18 PROBLEM — F33.0 MAJOR DEPRESSIVE DISORDER, RECURRENT, MILD (HCC): Status: RESOLVED | Noted: 2021-06-18 | Resolved: 2021-06-18

## 2021-06-18 PROCEDURE — 97162 PT EVAL MOD COMPLEX 30 MIN: CPT | Performed by: PHYSICAL THERAPIST

## 2021-06-18 PROCEDURE — G8417 CALC BMI ABV UP PARAM F/U: HCPCS | Performed by: PHYSICAL MEDICINE & REHABILITATION

## 2021-06-18 PROCEDURE — 4040F PNEUMOC VAC/ADMIN/RCVD: CPT | Performed by: PHYSICAL MEDICINE & REHABILITATION

## 2021-06-18 PROCEDURE — 97110 THERAPEUTIC EXERCISES: CPT | Performed by: PHYSICAL THERAPIST

## 2021-06-18 PROCEDURE — 1123F ACP DISCUSS/DSCN MKR DOCD: CPT | Performed by: PHYSICAL MEDICINE & REHABILITATION

## 2021-06-18 PROCEDURE — 3017F COLORECTAL CA SCREEN DOC REV: CPT | Performed by: PHYSICAL MEDICINE & REHABILITATION

## 2021-06-18 PROCEDURE — 1036F TOBACCO NON-USER: CPT | Performed by: PHYSICAL MEDICINE & REHABILITATION

## 2021-06-18 PROCEDURE — G8428 CUR MEDS NOT DOCUMENT: HCPCS | Performed by: PHYSICAL MEDICINE & REHABILITATION

## 2021-06-18 PROCEDURE — 99214 OFFICE O/P EST MOD 30 MIN: CPT | Performed by: PHYSICAL MEDICINE & REHABILITATION

## 2021-06-18 RX ORDER — DULOXETIN HYDROCHLORIDE 60 MG/1
60 CAPSULE, DELAYED RELEASE ORAL DAILY
Qty: 30 CAPSULE | Refills: 2 | Status: SHIPPED
Start: 2021-06-18 | End: 2021-07-26 | Stop reason: SDUPTHER

## 2021-06-18 RX ORDER — CYCLOBENZAPRINE HCL 10 MG
10 TABLET ORAL NIGHTLY PRN
Qty: 30 TABLET | Refills: 0 | Status: SHIPPED | OUTPATIENT
Start: 2021-06-18 | End: 2021-06-28

## 2021-06-18 RX ORDER — FENOFIBRATE 134 MG/1
CAPSULE ORAL
COMMUNITY
Start: 2021-05-25 | End: 2021-10-22 | Stop reason: SDUPTHER

## 2021-06-18 NOTE — PROGRESS NOTES
Physical Therapy Daily Treatment Note    Date: 2021  Patient Name: Danny Edgar  : 1955   MRN: [id number]  DOInjury:   DOSx: 2021   Referring Provider: Cristobal Mckeon DO  [address] [facility]     Medical Diagnosis:      Diagnosis Orders   1. Rupture of left biceps tendon, initial encounter         Outcome Measure:   QuickDASH (Disorders of the Arm, Shoulder, and Hand) 52% disability    Access Code: [email or web address]  URL: [email or web address]  Date: 2021  Prepared by: Gio Cuba    Exercises  Supported Elbow Flexion Extension PROM - 3 x daily - 7 x weekly - 10 reps  Seated Shoulder Flexion Towel Slide at Table Top - 3 x daily - 7 x weekly - 10 reps    Precautions: PROM elbow / shoulder / biceps until 21      S: See eval  O:  Time 6513-4840     Visit  Repeat outcome measure at mid point and end. Pain Pain 2/10     ROM Limited IR compared to R (T12 vs T9)     Modalities         MO   Manual         MT         Exercise      Table slides R arm assist to table top  FE 2 x 10  Horizontal ADD / ABD 2 x 10  TE   Passive elbow ROM 2 x 10 R arm performing the work  TE   IR reaching behind back   TE         Standing wand flex   TE   Standing flexion   TE   ROWS: H   TA   ROWS: M   TA   ROWS: L   TA   ER   TE   IR   TE                                             A:  Tolerated well. Above added to written HEP. Discussed anatomy, physiology, body mechanics, principles of loading, and progressive loading/activity. Reviewed standard biceps tenodesis protocol; which is passive motion for the first 4 weeks. Reviewed relevant anatomy. Encouraged patient to follow rehab protocol which would mean he needs to stop using the arm for chores and tasks that he has been doing around the home; such as chainsaw, string rachel, and mowing. Patient verbalized understanding.     P: Continue with rehab plan  Gio Cuba PT    Treatment Charges: Mins Units   Initial Evaluation 30 1   Re-Evaluation     Ther Exercise         TE 10 1   Manual Therapy     MT     Ther Activities        TA     Gait Training          GT     Neuro Re-education NR     Modalities     Non-Billable Service Time     Other     Total Time/Units 40 2
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
## 2021-06-18 NOTE — PROGRESS NOTES
Cristian Frias D.O. [facility]  [address] [facility] [place]  Phone: [phone]  Fax: [phone]        6/18/21    Chief Complaint   Patient presents with    Neck Pain     6 week follow up       HPI:  Anahi Galloway is a 73y.o. year old man seen today in follow up regarding neck pain. Interval history: Since the last visit the patient had left shoulder surgery by Dr. Liudmila Finney 2 weeks ago. He states his neck and left arm are have continued to be painful. Today, the pain is rated Pain Score:   8 where 0 is no pain and 10 is pain as bad as it can be. The pain is located in the neck,  radiates distally to the left hand digits 1-2, and is described as sharp and shooting. This pain occurs intermittently. The symptoms have been unchanged since onset. Symptoms are exacerbated by extending neck. Factors which relieve the pain include nothing. Other associated symptoms include paresthesias. Otherwise, the pain assessment has not changed since the last visit. Past Medical History:   Diagnosis Date    Anesthesia complication     states has difficulty breathing after surgery  usually needs nebulizer treatment    Arthritis     Asthma     since childhood    Diabetes mellitus ([address]     Fungal infection of foot     GERD (gastroesophageal reflux disease)     Hyperlipidemia     Hypertension     Type 2 diabetes mellitus without complication ([address]        Past Surgical History:   Procedure Laterality Date    ANTERIOR CRUCIATE LIGAMENT REPAIR Right 07/13/2020    RIGHT KNEE ARTHROSCOPY, (ANTERIOR CRUCIATE LIGAMENT) ACL RECONSTRUCTION. ALLOGRAFT.  MEDIAL MENISCECTOMY AND DEBRIDEMENT. ([address]) performed by Shivani Horner DO at [facility], LAPAROSCOPIC N/A 05/18/2020    CHOLECYSTECTOMY LAPAROSCOPIC WITH IOC performed by Jeffery Man MD at [facility], COLON, DIAGNOSTIC      HERNIA REPAIR      NECK SURGERY cervical spine surgery    SHOULDER ARTHROSCOPY Left 2021    LEFT SHOULDER ARTHROSCOPY, SUBACROMIAL DECOMPRESSION, DEBRIDEMENT (ARTHREX) performed by Nikunj Reynolds DO at [address] History     Tobacco Use    Smoking status: Former Smoker     Years: 15.00     Types: Cigarettes     Start date: 1965     Quit date: 1986     Years since quittin.3    Smokeless tobacco: Never Used   Vaping Use    Vaping Use: Never used   Substance Use Topics    Alcohol use: Yes     Comment: 2-3 beers per week    Drug use: No       Family History   Problem Relation Age of Onset    Hypertension Mother     Heart Attack Mother     Diabetes Father     Diabetes Maternal Grandmother     Hypertension Paternal Grandmother        Current Outpatient Medications   Medication Sig Dispense Refill    fenofibrate micronized (LOFIBRA) 134 MG capsule take 1 capsule by mouth every morning TAKE BEFORE BEAKFAST      DULoxetine (CYMBALTA) 60 MG extended release capsule Take 1 capsule by mouth daily for 20 days 30 capsule 2    cyclobenzaprine (FLEXERIL) 10 MG tablet Take 1 tablet by mouth nightly as needed for Muscle spasms 30 tablet 0    docusate sodium (COLACE) 100 MG capsule Take 1 capsule by mouth 2 times daily as needed for Constipation 20 capsule 1    ondansetron (ZOFRAN) 4 MG tablet Take 1 tablet by mouth every 6 hours as needed for Nausea or Vomiting 20 tablet 1    Cholecalciferol (VITAMIN D3) 125 MCG (5000 UT) TABS Take by mouth daily      colestipol (COLESTID) 1 g tablet Take 2 g by mouth daily      pantoprazole (PROTONIX) 40 MG tablet take 1 tablet by mouth once daily      rOPINIRole (REQUIP) 1 MG tablet Take 1 tablet by mouth 4 times daily for 120 doses 120 tablet 3    DULoxetine (CYMBALTA) 60 MG extended release capsule take 1 capsule by mouth once daily 30 capsule 0    doxepin (SINEQUAN) 10 MG capsule TAKE UP TO 3 CAPSULES BY MOUTH AT BEDTIME AS DIRECTED FOR INSOMNIA 30 capsule 1    apparent distress. Body habitus is non-obese. HEENT: No rhinorrhea, sneezing, yawning, or lacrimation. No scleral icterus or conjunctival injection. SKIN: No piloerection. No tract marks. No rash. PSYCH: Mood and affect are appropriate. Hygiene is appropriate. CARDIOVASCULAR  Heart is regular rate and rhythm. There is no edema. RESPIRATORY: Respirations are regular and unlabored. There is no cyanosis. GASTROINTESTINAL: Soft abdomen, non-tender. MSK: There is no joint effusion, deformity, instability, swelling, erythema or warmth. AROM cervical spine 20* left rotation, 20* left lateral flexion 0* extension, otherwise WNL. Tender left trapezius and cervical paraspinals. Negative Spurling. NEURO: Gait is normal. No focal sensorimotor deficit. Reflexes 2+ and symmetric in lower extremities. Impression:   1. Chronic neck pain    2. Cervical stenosis of spinal canal    3. Spondylosis, cervical        Plan:  Orders Placed This Encounter   Procedures    MRI CERVICAL SPINE WO CONTRAST     Standing Status:   Future     Standing Expiration Date:   6/18/2022   [facility]     Referral Priority:   Routine     Referral Type:   Eval and Treat     Referral Reason:   Specialty Services Required     Requested Specialty:   Physical Therapy     Number of Visits Requested:   1       Orders Placed This Encounter   Medications    DULoxetine (CYMBALTA) 60 MG extended release capsule     Sig: Take 1 capsule by mouth daily for 20 days     Dispense:  30 capsule     Refill:  2    cyclobenzaprine (FLEXERIL) 10 MG tablet     Sig: Take 1 tablet by mouth nightly as needed for Muscle spasms     Dispense:  30 tablet     Refill:  0       There are no discontinued medications. The patient was educated about the diagnosis, prognosis, indications, risks and benefits of treatment. An opportunity to ask questions was given to the patient and questions were answered.   The patient agreed to proceed with the recommended treatment as described above. Return in about 2 months (around 8/18/2021). Thank you for allowing me to participate in the care of your patient. Adilson Don D.O., P.T.   Board Certified Physical Medicine and Rehabilitation  Board Certified Electrodiagnostic Medicine

## 2021-06-24 ENCOUNTER — TELEPHONE (OUTPATIENT)
Dept: PHYSICAL MEDICINE AND REHAB | Age: 66
End: 2021-06-24

## 2021-06-24 NOTE — TELEPHONE ENCOUNTER
----- Message from Orlando Fairbanks DO sent at 6/24/2021  3:35 PM EDT -----  Test result was abnormal.  Please schedule follow up to discuss results and determine treatment plan.

## 2021-06-25 ENCOUNTER — OFFICE VISIT (OUTPATIENT)
Dept: PHYSICAL MEDICINE AND REHAB | Age: 66
End: 2021-06-25
Payer: MEDICARE

## 2021-06-25 VITALS
HEIGHT: 71 IN | SYSTOLIC BLOOD PRESSURE: 160 MMHG | DIASTOLIC BLOOD PRESSURE: 89 MMHG | BODY MASS INDEX: 31.36 KG/M2 | WEIGHT: 224 LBS | HEART RATE: 58 BPM

## 2021-06-25 DIAGNOSIS — M47.812 SPONDYLOSIS, CERVICAL: ICD-10-CM

## 2021-06-25 DIAGNOSIS — M54.2 CHRONIC NECK PAIN: ICD-10-CM

## 2021-06-25 DIAGNOSIS — M48.02 CERVICAL STENOSIS OF SPINAL CANAL: Primary | ICD-10-CM

## 2021-06-25 DIAGNOSIS — G89.29 CHRONIC NECK PAIN: ICD-10-CM

## 2021-06-25 PROCEDURE — 1036F TOBACCO NON-USER: CPT | Performed by: PHYSICAL MEDICINE & REHABILITATION

## 2021-06-25 PROCEDURE — 99214 OFFICE O/P EST MOD 30 MIN: CPT | Performed by: PHYSICAL MEDICINE & REHABILITATION

## 2021-06-25 PROCEDURE — 4040F PNEUMOC VAC/ADMIN/RCVD: CPT | Performed by: PHYSICAL MEDICINE & REHABILITATION

## 2021-06-25 PROCEDURE — 3017F COLORECTAL CA SCREEN DOC REV: CPT | Performed by: PHYSICAL MEDICINE & REHABILITATION

## 2021-06-25 PROCEDURE — G8427 DOCREV CUR MEDS BY ELIG CLIN: HCPCS | Performed by: PHYSICAL MEDICINE & REHABILITATION

## 2021-06-25 PROCEDURE — G8417 CALC BMI ABV UP PARAM F/U: HCPCS | Performed by: PHYSICAL MEDICINE & REHABILITATION

## 2021-06-25 PROCEDURE — 1123F ACP DISCUSS/DSCN MKR DOCD: CPT | Performed by: PHYSICAL MEDICINE & REHABILITATION

## 2021-06-25 NOTE — PROGRESS NOTES
cervical spine surgery    SHOULDER ARTHROSCOPY Left 2021    LEFT SHOULDER ARTHROSCOPY, SUBACROMIAL DECOMPRESSION, DEBRIDEMENT (ARTHREX) performed by Elizabeth Musa DO at [address] History     Tobacco Use    Smoking status: Former Smoker     Years: 15.00     Types: Cigarettes     Start date: 1965     Quit date: 1986     Years since quittin.3    Smokeless tobacco: Never Used   Vaping Use    Vaping Use: Never used   Substance Use Topics    Alcohol use: Yes     Comment: 2-3 beers per week    Drug use: No       Family History   Problem Relation Age of Onset    Hypertension Mother     Heart Attack Mother     Diabetes Father     Diabetes Maternal Grandmother     Hypertension Paternal Grandmother        Current Outpatient Medications   Medication Sig Dispense Refill    fenofibrate micronized (LOFIBRA) 134 MG capsule take 1 capsule by mouth every morning TAKE BEFORE BEAKFAST      DULoxetine (CYMBALTA) 60 MG extended release capsule Take 1 capsule by mouth daily for 20 days 30 capsule 2    cyclobenzaprine (FLEXERIL) 10 MG tablet Take 1 tablet by mouth nightly as needed for Muscle spasms 30 tablet 0    docusate sodium (COLACE) 100 MG capsule Take 1 capsule by mouth 2 times daily as needed for Constipation 20 capsule 1    ondansetron (ZOFRAN) 4 MG tablet Take 1 tablet by mouth every 6 hours as needed for Nausea or Vomiting 20 tablet 1    Cholecalciferol (VITAMIN D3) 125 MCG (5000 UT) TABS Take by mouth daily      colestipol (COLESTID) 1 g tablet Take 2 g by mouth daily      pantoprazole (PROTONIX) 40 MG tablet take 1 tablet by mouth once daily      doxepin (SINEQUAN) 10 MG capsule TAKE UP TO 3 CAPSULES BY MOUTH AT BEDTIME AS DIRECTED FOR INSOMNIA 30 capsule 1    glipiZIDE (GLUCOTROL) 10 MG tablet Take 1 tablet by mouth 2 times daily 60 tablet 3    albuterol sulfate HFA (VENTOLIN HFA) 108 (90 Base) MCG/ACT inhaler Inhale 2 puffs into the lungs 4 times daily as needed for Wheezing 1 Inhaler 0    Lancets 30G MISC 1 each by Does not apply route 3 times daily Dispense what ever brand is covered by insurance 100 each 3    hydroCHLOROthiazide (MICROZIDE) 12.5 MG capsule Take 1 capsule by mouth daily 30 capsule 3    carvedilol (COREG) 25 MG tablet Take 1 tablet by mouth 2 times daily 60 tablet 3    clotrimazole-betamethasone (LOTRISONE) 1-0.05 % cream Apply topically 2 times daily. 45 g 3    losartan (COZAAR) 50 MG tablet Take 1 tablet by mouth daily 30 tablet 2    sildenafil (REVATIO) 20 MG tablet Take 1 tablet by mouth daily as needed (FOR SEXUAL DYSFUNCTION) 5 tablet 0    blood glucose monitor strips Test 3 times a day & as needed for symptoms of irregular blood glucose 100 strip 3    vitamin B-12 (CYANOCOBALAMIN) 1000 MCG tablet Take 1,000 mcg by mouth daily      atorvastatin (LIPITOR) 40 MG tablet TAKE 1 TABLET BY MOUTH IN THE EVENING FOR CHOLESTEROL 90 tablet 1    Blood Glucose Monitoring Suppl (BLOOD GLUCOSE MONITOR SYSTEM) w/Device KIT 1 each by Does not apply route 3 times daily Dispense what ever brand is covered by insurance 1 kit 0    ketorolac (TORADOL) 10 MG tablet Take 1 tablet by mouth every 6 hours as needed for Pain Patient received prior injection 20 tablet 0    rOPINIRole (REQUIP) 1 MG tablet Take 1 tablet by mouth 4 times daily for 120 doses 120 tablet 3    Elastic Bandages & Supports (ACE KNEE BRACE HINGED) MISC 1 Units by Does not apply route once for 1 dose 1 each 0     No current facility-administered medications for this visit. No Known Allergies    Review of Systems:  No new weakness, paresthesia, incontinence of bowel or bladder, saddle anesthesia, falls or gait dysfunction. Otherwise, per HPI. Physical Exam:   Blood pressure (!) 160/89, pulse 58, height 5' 10.5\" (1.791 m), weight 224 lb (101.6 kg). GENERAL: The patient is in no apparent distress. Body habitus is non-obese. HEENT: No rhinorrhea, sneezing, yawning, or lacrimation.  No scleral icterus or conjunctival injection. SKIN: No piloerection. No tract marks. No rash. PSYCH: Mood and affect are appropriate. Hygiene is appropriate. CARDIOVASCULAR  Heart is regular rate and rhythm. There is no edema. RESPIRATORY: Respirations are regular and unlabored. There is no cyanosis. GASTROINTESTINAL: Soft abdomen, non-tender. MSK: There is no joint effusion, deformity, instability, swelling, erythema or warmth. AROM cervical spine 20* left rotation, 20* left lateral flexion 0* extension, otherwise WNL. Tender left trapezius and cervical paraspinals. Negative Spurling. NEURO: Gait is normal. No focal sensorimotor deficit. Reflexes 2+ and symmetric in lower extremities. Impression:   1. Cervical stenosis of spinal canal    2. Spondylosis, cervical    3. Chronic neck pain        Plan:  Orders Placed This Encounter   Procedures   Cindy Silva MD, Neurosurgery, [facility]     Referral Priority:   Routine     Referral Type:   Eval and Treat     Referral Reason:   Specialty Services Required     Referred to Provider:   Ashu Stoddard MD     Requested Specialty:   Neurosurgery     Number of Visits Requested:   1     Continue current medications      Medications Discontinued During This Encounter   Medication Reason    DULoxetine (CYMBALTA) 60 MG extended release capsule LIST CLEANUP       The patient was educated about the diagnosis, prognosis, indications, risks and benefits of treatment. An opportunity to ask questions was given to the patient and questions were answered. The patient agreed to proceed with the recommended treatment as described above. Return for after seen by Karine Prieto. Thank you for allowing me to participate in the care of your patient. Sari Parrish D.O., P.T.   Board Certified Physical Medicine and Rehabilitation  Board Certified Electrodiagnostic Medicine

## 2021-06-28 ENCOUNTER — TELEPHONE (OUTPATIENT)
Dept: PHYSICAL THERAPY | Age: 66
End: 2021-06-28

## 2021-06-30 ENCOUNTER — TREATMENT (OUTPATIENT)
Dept: PHYSICAL THERAPY | Age: 66
End: 2021-06-30
Payer: MEDICARE

## 2021-06-30 DIAGNOSIS — S46.212A RUPTURE OF LEFT BICEPS TENDON, INITIAL ENCOUNTER: Primary | ICD-10-CM

## 2021-06-30 PROCEDURE — 97110 THERAPEUTIC EXERCISES: CPT | Performed by: PHYSICAL THERAPIST

## 2021-06-30 NOTE — PROGRESS NOTES
Physical Therapy Daily Treatment Note    Date: 2021  Patient Name: Anay Jones  : 1955   MRN: [id number]  DOInjury:   DOSx: 2021   Referring Provider: Nolberto Craig DO     Medical Diagnosis:      Diagnosis Orders   1. Rupture of left biceps tendon, initial encounter         Outcome Measure:   QuickDASH (Disorders of the Arm, Shoulder, and Hand) 52% disability    Access Code: [email or web address]  URL: [email or web address]  Date: 2021  Prepared by: Robert Ramos    Exercises  Seated Shoulder Flexion AAROM with Pulley Behind - 2 x daily - 3 min time  Seated Shoulder Flexion Towel Slide at Table Top - 3 x daily - 7 x weekly - 10 reps  Supine Shoulder Press - 2 x daily - 3 sets - 10 reps  Standing Shoulder External Rotation AAROM with Dowel - 2 x daily - 3 sets - 10 reps  Standing Shoulder Internal Rotation AAROM with Dowel - 2 x daily - 3 sets - 10 reps  Supine Shoulder Flexion with Dowel - 2 x daily - 3 sets - 10 reps      Precautions: strengthening 21      S: States shoulder feels good except when he moves it. He has been active doing yard work and using string rachel. Additionally, patient has order from Dr. Omer Frost for cervical PT. We received word from Dr. Omer Frost to hold off on cervical PT until patient see Dr. Tylor Akbar. O:  Time 6000-2095     Visit  Repeat outcome measure at mid point and end. Pain Pain 0/10 at rest; 7/10 with movement     ROM Limited IR compared to R (L4 vs T9)     Modalities         MO   Manual         MT         Exercise      Pulley flex X 3 min     Table slides vs wall slides  1 x 10 ea  TE   Wand flexion standing 1 x 10 -- reports L hand numbness with each rep. Stopped.    TE   Standing wand ER/IR 3 x 10  TE   Supine wand bench press 3 x 10  TE   Supine wand flexion 3 x 10  TE   IR wand behind back 3 x 10  TE            TE   IR reaching behind back   TE         Standing wand flex   TE   Standing flexion   TE   ROWS: H   TA   ROWS: M   TA   ROWS: L   TA ER   TE   IR   TE                                             A:  Tolerated well. Above added to written HEP. Reviewed standard biceps tenodesis protocol; which is no lifting, pushing, pulling, or resistance until week 7.     P: Continue with rehab plan  Robert Ramos PT    Treatment Charges: Mins Units   Initial Evaluation     Re-Evaluation     Ther Exercise         TE 40 3   Manual Therapy     MT     Ther Activities        TA     Gait Training          GT     Neuro Re-education NR     Modalities     Non-Billable Service Time     Other     Total Time/Units 40 3

## 2021-07-01 RX ORDER — GLIPIZIDE 10 MG/1
10 TABLET ORAL 2 TIMES DAILY
Qty: 60 TABLET | Refills: 3 | Status: SHIPPED
Start: 2021-07-01 | End: 2021-12-07 | Stop reason: SDUPTHER

## 2021-07-01 RX ORDER — DOXEPIN HYDROCHLORIDE 10 MG/1
CAPSULE ORAL
Qty: 30 CAPSULE | Refills: 1 | Status: SHIPPED
Start: 2021-07-01 | End: 2021-08-30 | Stop reason: SDUPTHER

## 2021-07-16 ENCOUNTER — OFFICE VISIT (OUTPATIENT)
Dept: ORTHOPEDIC SURGERY | Age: 66
End: 2021-07-16

## 2021-07-16 VITALS — WEIGHT: 224 LBS | HEIGHT: 71 IN | BODY MASS INDEX: 31.36 KG/M2

## 2021-07-16 DIAGNOSIS — S46.212A RUPTURE OF LEFT BICEPS TENDON, INITIAL ENCOUNTER: Primary | ICD-10-CM

## 2021-07-16 PROCEDURE — 99024 POSTOP FOLLOW-UP VISIT: CPT | Performed by: ORTHOPAEDIC SURGERY

## 2021-07-16 NOTE — PROGRESS NOTES
Chief Complaint   Patient presents with    Shoulder Pain     6 week post op left shoulder arthroscopy. DOS 6/1/2021             Luisa Hernandez is here for follow-up after left shoulder arthroscopy. Findings at surgery:  Rotator cuff tendinitis, shoulder impingement, AC OA, glenohumeral arthritis, long head biceps tear. Pain is controlled without any medications. The patient denies fever, wound drainage, increasing redness, pus, increasing pain, increasing swelling. Post op problems reported: none. Shoulder exam - The incisions are clean, dry and intact. left normal; 170/170/60 range of motion, no pain on motion, no tenderness or deformity noted. Motor and sensory exam is grossly intact in B/L upper extremities. Special test results are as follow:  Impingement negative, Dorsey negative, Speeds negative, Apprehension negative, Hoffmann negative, Load Shiftnegative, Garrick manuver negative, Cross arm test negative. Encounter Diagnosis   Name Primary?  Rupture of left biceps tendon, initial encounter Yes       Plan:    The patient will continue with gentle ROM exercises and being activities as tolerated. The patient was educated about natural history and postoperative course and to continue physical therapy. Sling will be used for comfort ONLY otherwise discontinue. Patient is to continue analgesics and needed and use ice for pain. We will see the pain back in 8 weeks time for repeat evaluation.

## 2021-07-16 NOTE — PROGRESS NOTES
Chief Complaint   Patient presents with    Shoulder Pain     6 week post op left shoulder arthroscopy. DOS 6/1/2021             Maurilio Alfaro is here for follow-up after left shoulder arthroscopy. Findings at surgery:  Rotator cuff tendinitis, shoulder impingement, AC OA, glenohumeral arthritis, long head biceps tear. Pain is controlled without any medications. The patient denies fever, wound drainage, increasing redness, pus, increasing pain, increasing swelling. Post op problems reported: none. Shoulder exam - The incisions are clean, dry and intact. left normal; 170/170/60 range of motion, no pain on motion, no tenderness or deformity noted. Motor and sensory exam is grossly intact in B/L upper extremities. Special test results are as follow:  Impingement negative, Dorsey negative, Speeds negative, Apprehension negative, Hoffmann negative, Load Shiftnegative, Garrick manuver negative, Cross arm test negative. No diagnosis found. Plan:    The patient will continue with gentle ROM exercises and being activities as tolerated. The patient was educated about natural history and postoperative course and to continue physical therapy. Sling will be used for comfort ONLY otherwise discontinue. Patient is to continue analgesics and needed and use ice for pain. We will see the pain back in 4 weeks time for repeat evaluation.

## 2021-07-23 ENCOUNTER — TREATMENT (OUTPATIENT)
Dept: PHYSICAL THERAPY | Age: 66
End: 2021-07-23
Payer: MEDICARE

## 2021-07-23 DIAGNOSIS — S46.212A RUPTURE OF LEFT BICEPS TENDON, INITIAL ENCOUNTER: Primary | ICD-10-CM

## 2021-07-23 DIAGNOSIS — S83.511D RUPTURE OF ANTERIOR CRUCIATE LIGAMENT OF RIGHT KNEE, SUBSEQUENT ENCOUNTER: ICD-10-CM

## 2021-07-23 PROCEDURE — 97110 THERAPEUTIC EXERCISES: CPT

## 2021-07-23 NOTE — PROGRESS NOTES
Physical Therapy Daily Treatment Note    Date: 2021  Patient Name: Deirdre Caldera  : 1955   MRN: [id number]  DOInjury:   DOSx: 2021   Referring Provider: Teddy Hannah DO     Medical Diagnosis:      Diagnosis Orders   1. Rupture of left biceps tendon, initial encounter     2. Rupture of anterior cruciate ligament of right knee, subsequent encounter         Outcome Measure:   QuickDASH (Disorders of the Arm, Shoulder, and Hand) 52% disability    Access Code: [email or web address]  URL: [email or web address]  Date: 2021  Prepared by: Abhi Centeno    Exercises  Seated Shoulder Flexion AAROM with Pulley Behind - 2 x daily - 3 min time  Seated Shoulder Flexion Towel Slide at Table Top - 3 x daily - 7 x weekly - 10 reps  Supine Shoulder Press - 2 x daily - 3 sets - 10 reps  Standing Shoulder External Rotation AAROM with Dowel - 2 x daily - 3 sets - 10 reps  Standing Shoulder Internal Rotation AAROM with Dowel - 2 x daily - 3 sets - 10 reps  Supine Shoulder Flexion with Dowel - 2 x daily - 3 sets - 10 reps      Precautions: strengthening 21      S: States shoulder has been hurting when reaching for something or just gets random pain. 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 But not really using left arm. No lifting. Additionally, patient has order from Dr. Cierra Keenan for cervical PT. We received word from Dr. Cierra Keenan to hold off on cervical PT until patient see Dr. Jono Lira. O:  Time [id number]     Visit 3/12 Repeat outcome measure at mid point and end. Pain Pain 0/10 at rest; 7/10 with movement     ROM Limited IR compared to R (L4 vs T9)     Modalities         MO   Manual         MT         Exercise      Pulley flexIR X 3 min ea      TE   Wand flexion standing 1 x 10 -- reports L hand numbness with each rep. Stopped.    TE   Standing wand ER/IR 3 x 10  TE   Supine wand bench press 3 x 10  TE   Supine wand flexion 3 x 10  TE   IR wand behind back 3 x 10  TE            TE   IR reaching behind back   TE         Standing wand flex   TE   Standing flexion   TE   ROWS: H   TA   ROWS: M   TA   ROWS: L   TA   ER   TE   IR   TE                                             A:  Tolerated fair. Above added to written HEP. Did not start strengthening  Reviewed standard biceps tenodesis protocol; which is no lifting, pushing, pulling, or resistance until week 7. Left hand going numb with all activities.   P: Continue with rehab plan  Orthodoxy Milan, PTA    Treatment Charges: Mins Units   Initial Evaluation     Re-Evaluation     Ther Exercise         TE 40 3   Manual Therapy     MT     Ther Activities        TA     Gait Training          GT     Neuro Re-education NR     Modalities     Non-Billable Service Time     Other     Total Time/Units 40 3

## 2021-07-26 ENCOUNTER — TREATMENT (OUTPATIENT)
Dept: PHYSICAL THERAPY | Age: 66
End: 2021-07-26
Payer: MEDICARE

## 2021-07-26 ENCOUNTER — OFFICE VISIT (OUTPATIENT)
Dept: FAMILY MEDICINE CLINIC | Age: 66
End: 2021-07-26
Payer: MEDICARE

## 2021-07-26 VITALS
WEIGHT: 221 LBS | DIASTOLIC BLOOD PRESSURE: 78 MMHG | BODY MASS INDEX: 30.94 KG/M2 | TEMPERATURE: 98 F | OXYGEN SATURATION: 98 % | RESPIRATION RATE: 16 BRPM | SYSTOLIC BLOOD PRESSURE: 142 MMHG | HEIGHT: 71 IN | HEART RATE: 63 BPM

## 2021-07-26 DIAGNOSIS — E55.9 HYPOVITAMINOSIS D: ICD-10-CM

## 2021-07-26 DIAGNOSIS — M48.02 CERVICAL STENOSIS OF SPINAL CANAL: ICD-10-CM

## 2021-07-26 DIAGNOSIS — M54.2 CHRONIC NECK PAIN: ICD-10-CM

## 2021-07-26 DIAGNOSIS — S46.212A RUPTURE OF LEFT BICEPS TENDON, INITIAL ENCOUNTER: Primary | ICD-10-CM

## 2021-07-26 DIAGNOSIS — E78.2 MIXED HYPERCHOLESTEROLEMIA AND HYPERTRIGLYCERIDEMIA: ICD-10-CM

## 2021-07-26 DIAGNOSIS — J44.1 CHRONIC OBSTRUCTIVE PULMONARY DISEASE WITH (ACUTE) EXACERBATION (HCC): ICD-10-CM

## 2021-07-26 DIAGNOSIS — I10 ESSENTIAL HYPERTENSION: ICD-10-CM

## 2021-07-26 DIAGNOSIS — G89.29 CHRONIC NECK PAIN: ICD-10-CM

## 2021-07-26 DIAGNOSIS — N18.2 CKD (CHRONIC KIDNEY DISEASE) STAGE 2, GFR 60-89 ML/MIN: ICD-10-CM

## 2021-07-26 DIAGNOSIS — E11.9 TYPE 2 DIABETES MELLITUS WITHOUT COMPLICATION, WITHOUT LONG-TERM CURRENT USE OF INSULIN (HCC): Primary | ICD-10-CM

## 2021-07-26 PROCEDURE — 2022F DILAT RTA XM EVC RTNOPTHY: CPT | Performed by: FAMILY MEDICINE

## 2021-07-26 PROCEDURE — 3017F COLORECTAL CA SCREEN DOC REV: CPT | Performed by: FAMILY MEDICINE

## 2021-07-26 PROCEDURE — G8417 CALC BMI ABV UP PARAM F/U: HCPCS | Performed by: FAMILY MEDICINE

## 2021-07-26 PROCEDURE — G8427 DOCREV CUR MEDS BY ELIG CLIN: HCPCS | Performed by: FAMILY MEDICINE

## 2021-07-26 PROCEDURE — 4040F PNEUMOC VAC/ADMIN/RCVD: CPT | Performed by: FAMILY MEDICINE

## 2021-07-26 PROCEDURE — 99214 OFFICE O/P EST MOD 30 MIN: CPT | Performed by: FAMILY MEDICINE

## 2021-07-26 PROCEDURE — G8926 SPIRO NO PERF OR DOC: HCPCS | Performed by: FAMILY MEDICINE

## 2021-07-26 PROCEDURE — 3023F SPIROM DOC REV: CPT | Performed by: FAMILY MEDICINE

## 2021-07-26 PROCEDURE — 3044F HG A1C LEVEL LT 7.0%: CPT | Performed by: FAMILY MEDICINE

## 2021-07-26 PROCEDURE — 1036F TOBACCO NON-USER: CPT | Performed by: FAMILY MEDICINE

## 2021-07-26 PROCEDURE — 97110 THERAPEUTIC EXERCISES: CPT

## 2021-07-26 PROCEDURE — 1123F ACP DISCUSS/DSCN MKR DOCD: CPT | Performed by: FAMILY MEDICINE

## 2021-07-26 RX ORDER — ATORVASTATIN CALCIUM 40 MG/1
TABLET, FILM COATED ORAL
Qty: 90 TABLET | Refills: 1 | Status: SHIPPED
Start: 2021-07-26 | End: 2021-10-22 | Stop reason: SDUPTHER

## 2021-07-26 RX ORDER — LOSARTAN POTASSIUM 50 MG/1
50 TABLET ORAL DAILY
Qty: 30 TABLET | Refills: 2 | Status: SHIPPED
Start: 2021-07-26 | End: 2021-12-07 | Stop reason: SDUPTHER

## 2021-07-26 RX ORDER — HYDROCHLOROTHIAZIDE 12.5 MG/1
12.5 CAPSULE, GELATIN COATED ORAL DAILY
Qty: 30 CAPSULE | Refills: 3 | Status: SHIPPED
Start: 2021-07-26 | End: 2021-10-22 | Stop reason: SDUPTHER

## 2021-07-26 RX ORDER — DULOXETIN HYDROCHLORIDE 60 MG/1
60 CAPSULE, DELAYED RELEASE ORAL DAILY
Qty: 30 CAPSULE | Refills: 2 | Status: SHIPPED
Start: 2021-07-26 | End: 2021-10-22 | Stop reason: SDUPTHER

## 2021-07-26 RX ORDER — CARVEDILOL 25 MG/1
25 TABLET ORAL 2 TIMES DAILY
Qty: 60 TABLET | Refills: 3 | Status: SHIPPED
Start: 2021-07-26 | End: 2021-10-22 | Stop reason: SDUPTHER

## 2021-07-26 NOTE — PROGRESS NOTES
OFFICE PROGRESS NOTE      SUBJECTIVE:        Patient ID:   Ruth Mcdaniel is a 73 y.o. male who presents for   Chief Complaint   Patient presents with    Post-Op Check     2 month follow up on arm     Health Maintenance     due for awv and micro and dm next visit            HPI:     FEELS GOOD. WATCHING DIET GOOD. WALKING FOR EXERCISE. TAKING MEDICATIONS REGULARLY. SEEING DR. Lori Holder FOR THE LEFT SHOULDER PAIN. CURRENTLY GOING FOR PT. Prior to Admission medications    Medication Sig Start Date End Date Taking?  Authorizing Provider   atorvastatin (LIPITOR) 40 MG tablet TAKE 1 TABLET BY MOUTH IN THE EVENING FOR CHOLESTEROL 7/26/21  Yes Hakeem Andrade MD   losartan (COZAAR) 50 MG tablet Take 1 tablet by mouth daily 7/26/21  Yes Hakeem Andrade MD   carvedilol (COREG) 25 MG tablet Take 1 tablet by mouth 2 times daily 7/26/21  Yes Hakeem Andrade MD   hydroCHLOROthiazide (MICROZIDE) 12.5 MG capsule Take 1 capsule by mouth daily 7/26/21  Yes Hakeem Andrade MD   DULoxetine (CYMBALTA) 60 MG extended release capsule Take 1 capsule by mouth daily for 20 days 7/26/21 8/15/21 Yes Hakeem Andrade MD   doxepin (SINEQUAN) 10 MG capsule TAKE UP TO 3 CAPSULES BY MOUTH AT BEDTIME AS DIRECTED FOR INSOMNIA 7/1/21  Yes Hakeem Andrade MD   glipiZIDE (GLUCOTROL) 10 MG tablet Take 1 tablet by mouth 2 times daily 7/1/21  Yes Hakeem Andrade MD   fenofibrate micronized (LOFIBRA) 134 MG capsule take 1 capsule by mouth every morning TAKE BEFORE BEAKFAST 5/25/21  Yes Historical Provider, MD   Cholecalciferol (VITAMIN D3) 125 MCG (5000 UT) TABS Take by mouth daily   Yes Historical Provider, MD   colestipol (COLESTID) 1 g tablet Take 2 g by mouth daily   Yes Historical Provider, MD   pantoprazole (PROTONIX) 40 MG tablet take 1 tablet by mouth once daily 5/5/21  Yes Historical Provider, MD   rOPINIRole (REQUIP) 1 MG tablet Take 1 tablet by mouth 4 times daily for 120 doses 5/24/21 7/26/21 Yes Cherylene Hausen, MD   albuterol sulfate HFA (VENTOLIN HFA) 108 (90 Base) MCG/ACT inhaler Inhale 2 puffs into the lungs 4 times daily as needed for Wheezing 21  Yes Cherylene Hausen, MD   Lancets 30G MISC 1 each by Does not apply route 3 times daily Dispense what ever brand is covered by insurance 21  Yes Cherylene Hausen, MD   clotrimazole-betamethasone (LOTRISONE) 1-0.05 % cream Apply topically 2 times daily.  21  Yes Cherylene Hausen, MD   sildenafil (REVATIO) 20 MG tablet Take 1 tablet by mouth daily as needed (FOR SEXUAL DYSFUNCTION) 21  Yes Cherylene Hausen, MD   blood glucose monitor strips Test 3 times a day & as needed for symptoms of irregular blood glucose 3/24/21  Yes Cherylene Hausen, MD   vitamin B-12 (CYANOCOBALAMIN) 1000 MCG tablet Take 1,000 mcg by mouth daily   Yes Historical MD Nathan   Blood Glucose Monitoring Suppl (BLOOD GLUCOSE MONITOR SYSTEM) w/Device KIT 1 each by Does not apply route 3 times daily Dispense what ever brand is covered by insurance 20  Yes Cherylene Hausen, MD   Elastic Bandages & Supports (ACE KNEE BRACE HINGED) [address] 1 Units by Does not apply route once for 1 dose 20 Yes Maricel Aparicio DO     Social History     Socioeconomic History    Marital status: Legally      Spouse name: None    Number of children: None    Years of education: None    Highest education level: None   Occupational History    None   Tobacco Use    Smoking status: Former Smoker     Packs/day: 1.50     Years: 15.00     Pack years: 22.50     Types: Cigarettes     Start date: 1965     Quit date: 1986     Years since quittin.4    Smokeless tobacco: Never Used   Vaping Use    Vaping Use: Never used   Substance and Sexual Activity    Alcohol use: Yes     Comment: 2-3 beers per week    Drug use: No    Sexual activity: None   Other Topics Concern    None   Social History Narrative    None     Social Determinants of Health     Financial Resource Strain: Low Risk  Difficulty of Paying Living Expenses: Not hard at all   Food Insecurity: No Food Insecurity    Worried About Running Out of Food in the Last Year: Never true    Ran Out of Food in the Last Year: Never true   Transportation Needs: No Transportation Needs    Lack of Transportation (Medical): No    Lack of Transportation (Non-Medical): No   Physical Activity:     Days of Exercise per Week:     Minutes of Exercise per Session:    Stress:     Feeling of Stress :    Social Connections:     Frequency of Communication with Friends and Family:     Frequency of Social Gatherings with Friends and Family:     Attends Sabianist Services:     Active Member of Clubs or Organizations:     Attends Club or Organization Meetings:     Marital Status:    Intimate Partner Violence:     Fear of Current or Ex-Partner:     Emotionally Abused:     Physically Abused:     Sexually Abused:        I have reviewed Jeffery's allergies, medications, problem list, medical, social and family history and have updated as needed in the electronic medical record  Review Of Systems:    Skin: no abnormal pigmentation, rash, scaling, itching, masses, hair or nail changes  Eyes: no blurring, diplopia, or eye pain  Ears/Nose/Throat: no hearing loss, tinnitus, vertigo, nosebleed, nasal congestion, rhinorrhea, sore throat  Respiratory: no cough, pleuritic chest pain, dyspnea, or wheezing  Cardiovascular: no chest pain, angina, dyspnea on exertion, orthopnea, PND, palpitations, or claudication  Gastrointestinal: no nausea, vomiting, heartburn, diarrhea, constipation, bloating,  abdominal pain, or blood per rectum. Appetite is good  Genitourinary: no urinary urgency, frequency, dysuria, nocturia, hesitancy, or incontinence  Musculoskeletal: joint pains off and on. Morning stiffness.  Ambulating well  Neurologic: no paralysis, paresis, paresthesia, seizures, tremors, or headaches  Hematologic/Lymphatic/Immunologic: no anemia, abnormal bleeding/bruising, fever, chills, night sweats, swollen glands, or unexplained weight loss  Endocrine: no heat or cold intolerance and no polyphagia, polydipsia, or polyuria        OBJECTIVE:     VS:  Wt Readings from Last 3 Encounters:   07/26/21 221 lb (100.2 kg)   07/16/21 224 lb (101.6 kg)   06/25/21 224 lb (101.6 kg)     Temp Readings from Last 3 Encounters:   07/26/21 98 °F (36.7 °C)   06/01/21 96.1 °F (35.6 °C)   06/01/21 97 °F (36.1 °C) (Skin)     BP Readings from Last 3 Encounters:   07/26/21 (!) 146/90   06/25/21 (!) 160/89   06/18/21 139/73        General appearance: Alert, Awake, Oriented times 3, no distress  Skin: Warm and dry  Head: Normocephalic. No masses, lesions or tenderness noted  Eyes: Conjunctivae appear normal. PERLE  Ears: External ears normal  Nose/Sinuses: Nares normal. Septum midline. Mucosa normal. No drainage  Oropharynx: Oropharynx clear with no exudate seen  Neck: Neck supple. No jugular venous distension, lymphadenopathy or thyromegaly Trachea midline  Chest:  Normal. Movements are Normal and Equal.  Lungs: Lungs clear to auscultation bilaterally. No ronchi, crackles or wheezes  Heart: S1 S2  Regular rate and rhythm. No rub, murmur or gallop  Abdomen: Abdomen soft, non-tender. BS normal. No masses, organomegaly. Back: Grossly Normal and Equal. DTR are Normal. SLR is Normal.  Extremities: Arthritic changes are noted. Movements are limited. Pedal pulses are normal.  Musculoskeletal: Muscular strength appears intact.  No joint effusion, tenderness, swelling or warmth  Neuro:  No focal motor or sensory deficits        ASSESSMENT     Patient Active Problem List    Diagnosis Date Noted    Mixed hypercholesterolemia and hypertriglyceridemia     Rupture of left biceps tendon 06/18/2021    Major depressive disorder, recurrent, moderate 06/18/2021    Type II diabetes mellitus with peripheral circulatory disorder ([facility] [address] 05/17/2021    Difficulty walking 05/17/2021    Onychomycosis

## 2021-07-26 NOTE — PATIENT INSTRUCTIONS
LOW SALT,LOW CARB. AND LOW FAT DIET. CONTINUE CURRENT MEDICATIONS TAKING REGULARLY. REGULAR WALKING ADVISED. ADVISED WEIGHT REDUCTION. FASTING FOR LAB WORK ONE MORNING. NEXT APPOINTMENT IN 3 MONTHS FOR ANNUAL PHYSICAL EXAMINATION.

## 2021-07-26 NOTE — PROGRESS NOTES
Physical Therapy Daily Treatment Note    Date: 2021  Patient Name: Dk Hendricks  : 1955   MRN: [id number]  DOInjury:   DOSx: 2021   Referring Provider: Russell Monterroso DO     Medical Diagnosis:      Diagnosis Orders   1. Rupture of left biceps tendon, initial encounter         Outcome Measure:   QuickDASH (Disorders of the Arm, Shoulder, and Hand) 52% disability    Access Code: [email or web address]  URL: [email or web address]  Date: 2021  Prepared by: Александр Aguiar    Exercises  Seated Shoulder Flexion AAROM with Pulley Behind - 2 x daily - 3 min time  Seated Shoulder Flexion Towel Slide at Table Top - 3 x daily - 7 x weekly - 10 reps  Supine Shoulder Press - 2 x daily - 3 sets - 10 reps  Standing Shoulder External Rotation AAROM with Dowel - 2 x daily - 3 sets - 10 reps  Standing Shoulder Internal Rotation AAROM with Dowel - 2 x daily - 3 sets - 10 reps  Supine Shoulder Flexion with Dowel - 2 x daily - 3 sets - 10 reps      Precautions: strengthening 21      S: States shoulder continues to hurt. States he is fine at rest but as day and activity level increases so does pain. 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 Additionally, patient has order from Dr. Bijan Watt for cervical PT. We received word from Dr. Bijan Watt to hold off on cervical PT until patient see Dr. Diego Estrada. O:  Time [id number]     Visit  Repeat outcome measure at mid point and end. Pain Pain 0/10 at rest; 7/10 with movement     ROM Limited IR compared to R (L4 vs T9)     Modalities         MO   Manual         MT         Exercise      Pulley flex/IR X 3 min ea      TE   Wand flexion standing 1 x 10 -- reports L hand numbness with each rep. Stopped.    TE   Standing wand ER/IR 3 x 10  TE   Supine wand bench press 3 x 10  TE   Supine wand flexion 3 x 10  TE   IR wand behind back 3 x 10  TE            TE   IR reaching behind back   TE         Standing wand flex   TE   Standing flexion   TE   ROWS: H   TA   ROWS: M TA   ROWS: L   TA   ER   TE   IR   TE                                             A:  Tolerated fair. Numbness in L UE with exercise. Painful 'catching\" by end of exercises. Did not start strengthening  Reviewed standard biceps tenodesis protocol; which is no lifting, pushing, pulling, or resistance until week 7. Left hand going numb with all activities.   P: Continue with rehab plan  Angelika Greco PTA    Treatment Charges: Mins Units   Initial Evaluation     Re-Evaluation     Ther Exercise         TE 40 3   Manual Therapy     MT     Ther Activities        TA     Gait Training          GT     Neuro Re-education NR     Modalities     Non-Billable Service Time     Other     Total Time/Units 40 3

## 2021-07-30 ENCOUNTER — INITIAL CONSULT (OUTPATIENT)
Dept: NEUROSURGERY | Age: 66
End: 2021-07-30
Payer: MEDICARE

## 2021-07-30 ENCOUNTER — TELEPHONE (OUTPATIENT)
Dept: NEUROSURGERY | Age: 66
End: 2021-07-30

## 2021-07-30 ENCOUNTER — TREATMENT (OUTPATIENT)
Dept: PHYSICAL THERAPY | Age: 66
End: 2021-07-30
Payer: MEDICARE

## 2021-07-30 VITALS
BODY MASS INDEX: 31.64 KG/M2 | WEIGHT: 221 LBS | HEART RATE: 67 BPM | HEIGHT: 70 IN | OXYGEN SATURATION: 95 % | TEMPERATURE: 98 F | RESPIRATION RATE: 18 BRPM | SYSTOLIC BLOOD PRESSURE: 130 MMHG | DIASTOLIC BLOOD PRESSURE: 72 MMHG

## 2021-07-30 DIAGNOSIS — G56.22 CUBITAL TUNNEL SYNDROME ON LEFT: Primary | ICD-10-CM

## 2021-07-30 DIAGNOSIS — S46.212A RUPTURE OF LEFT BICEPS TENDON, INITIAL ENCOUNTER: Primary | ICD-10-CM

## 2021-07-30 PROCEDURE — 97110 THERAPEUTIC EXERCISES: CPT

## 2021-07-30 PROCEDURE — 99204 OFFICE O/P NEW MOD 45 MIN: CPT | Performed by: NEUROLOGICAL SURGERY
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
## 2021-07-30 NOTE — TELEPHONE ENCOUNTER
Pt called, said he forgot to ask in visit if he should continue doing PT up until surgery or stop going as of now. He has 2 appointments before the surgery date.     Pt callback is  [phone], pt said if he does not answer to leave a VM with the answer on the VM

## 2021-07-30 NOTE — PROGRESS NOTES
Physical Therapy Daily Treatment Note    Date: 2021  Patient Name: Sofía Lagunas  : 1955   MRN: [id number]  DOInjury:   DOSx: 2021   Referring Provider: Keli Kumar DO     Medical Diagnosis:      Diagnosis Orders   1. Rupture of left biceps tendon, initial encounter         Outcome Measure:   QuickDASH (Disorders of the Arm, Shoulder, and Hand) 52% disability    Access Code: [email or web address]  URL: [email or web address]  Date: 2021  Prepared by: Heidi Patrick    Exercises  Seated Shoulder Flexion AAROM with Pulley Behind - 2 x daily - 3 min time  Seated Shoulder Flexion Towel Slide at Table Top - 3 x daily - 7 x weekly - 10 reps  Supine Shoulder Press - 2 x daily - 3 sets - 10 reps  Standing Shoulder External Rotation AAROM with Dowel - 2 x daily - 3 sets - 10 reps  Standing Shoulder Internal Rotation AAROM with Dowel - 2 x daily - 3 sets - 10 reps  Supine Shoulder Flexion with Dowel - 2 x daily - 3 sets - 10 reps      Precautions: strengthening 21      S: States shoulder continues to hurt. He is getting numbness and tingling in first three digits of left hand. PT states he has an appointment with Dr Jodee Matthew (neurologist) today. O: Patient has order from Dr. Olivia Saleem for cervical PT. We received word from Dr. Olivia Saleem to hold off on cervical PT until patient see Dr. Bill Newman. Time [id number]     Visit  Repeat outcome measure at mid point and end.     Pain Pain 0/10 at rest; 7/10 with movement     ROM Limited IR compared to R (L4 vs T9)     Modalities         MO   Manual         MT         Exercise      Pulley flex/IR X 3 min flex, x 1.5 m IR  IR painful     TE   Wand flexion standing   TE   Seated wand flexion 3 x 10     Standing wand ER/IR 3 x 10  TE   Supine wand bench press 3 x 10  TE   Supine wand flexion 3 x 10  TE   IR wand behind back 3 x 10  TE         Bicep flex/ext AROM 2 x 15              TE   IR reaching behind back   TE         Standing wand flex   TE   Standing flexion TE   ROWS: H   TA   ROWS: M   TA   ROWS: L   TA   ER   TE   IR   TE                                             A:  Tolerated fair. Numbness in L UE with exercise. Painful 'catching\" by end of exercises. Did not start strengthening. Reviewed standard biceps tenodesis protocol; which is no lifting, pushing, pulling, or resistance until week 7. Left hand going numb with all activities.   P: Continue with rehab plan  Mayo Splinter, PTA    Treatment Charges: Mins Units   Initial Evaluation     Re-Evaluation     Ther Exercise         TE 40 3   Manual Therapy     MT     Ther Activities        TA     Gait Training          GT     Neuro Re-education NR     Modalities     Non-Billable Service Time     Other     Total Time/Units 40 3

## 2021-08-02 ENCOUNTER — TELEPHONE (OUTPATIENT)
Dept: NEUROSURGERY | Age: 66
End: 2021-08-02

## 2021-08-02 ENCOUNTER — TREATMENT (OUTPATIENT)
Dept: PHYSICAL THERAPY | Age: 66
End: 2021-08-02
Payer: MEDICARE

## 2021-08-02 DIAGNOSIS — S46.212A RUPTURE OF LEFT BICEPS TENDON, INITIAL ENCOUNTER: Primary | ICD-10-CM

## 2021-08-02 DIAGNOSIS — S83.511D RUPTURE OF ANTERIOR CRUCIATE LIGAMENT OF RIGHT KNEE, SUBSEQUENT ENCOUNTER: ICD-10-CM

## 2021-08-02 PROCEDURE — 97110 THERAPEUTIC EXERCISES: CPT

## 2021-08-02 NOTE — PROGRESS NOTES
Physical Therapy Daily Treatment Note    Date: 2021  Patient Name: Eder Lanza  : 1955   MRN: [id number]  DOInjury:   DOSx: 2021   Referring Provider: Huong Krishna DO     Medical Diagnosis:      Diagnosis Orders   1. Rupture of left biceps tendon, initial encounter     2. Rupture of anterior cruciate ligament of right knee, subsequent encounter         Outcome Measure:   QuickDASH (Disorders of the Arm, Shoulder, and Hand) 52% disability    Access Code: [email or web address]  URL: [email or web address]  Date: 2021  Prepared by: Derrek Almaraz    Exercises  Seated Shoulder Flexion AAROM with Pulley Behind - 2 x daily - 3 min time  Seated Shoulder Flexion Towel Slide at Table Top - 3 x daily - 7 x weekly - 10 reps  Supine Shoulder Press - 2 x daily - 3 sets - 10 reps  Standing Shoulder External Rotation AAROM with Dowel - 2 x daily - 3 sets - 10 reps  Standing Shoulder Internal Rotation AAROM with Dowel - 2 x daily - 3 sets - 10 reps  Supine Shoulder Flexion with Dowel - 2 x daily - 3 sets - 10 reps      Precautions: strengthening 21      S: States shoulder continues to hurt. He is getting numbness and tingling in first three digits of left hand. Pt saw Dr Alexa Mchugh and states he is going to have elbow surgery around .  O: Added bands today. Patient has order from Dr. Kristal Lomas for cervical PT. We received word from Dr. Kristal Lomas to hold off on cervical PT until patient see Dr. Filiberto Farfan. Time [id number]     Visit  Repeat outcome measure at mid point and end.     Pain 3/10      ROM Limited IR compared to R (L4 vs T9)     Modalities         MO   Manual         MT         Exercise      Pulley flex/IR X 3/3 min ea      TE   Wand flexion standing   TE   Seated wand flexion blk 3 x 10  Pain free range    Standing wand ER/IR 3 x 10  TE   Supine wand bench press blk bar 2 x 15  TE   Supine wand flexion 3 x 10  TE   Supine punches NEXT     IR wand behind back 3 x 10  TE         Bicep flex/ext 1# 2 x 15              TE   IR reaching behind back   TE         Standing wand flex   TE   Standing flexion   TE   ROWS: H Green x 20 Limited range TA   ROWS: M Green x 20  TA   ROWS: L   TA   Punches       ER   TE   IR   TE                                             A:  Tolerated well.   Weakness continues, overhead activities cause L digit paresthisia Adjusted ROM so not to reproduce numbness and tingling into Left UE digits  P: Continue with rehab plan  Chepe Burris PTA    Treatment Charges: Mins Units   Initial Evaluation     Re-Evaluation     Ther Exercise         TE 40 3   Manual Therapy     MT     Ther Activities        TA     Gait Training          GT     Neuro Re-education NR     Modalities     Non-Billable Service Time     Other     Total Time/Units 40 3

## 2021-08-02 NOTE — TELEPHONE ENCOUNTER
Patient does not want to have surgery on August 9.  He would like to schedule it for Sept 1. [phone]

## 2021-08-06 ENCOUNTER — TREATMENT (OUTPATIENT)
Dept: PHYSICAL THERAPY | Age: 66
End: 2021-08-06
Payer: MEDICARE

## 2021-08-06 DIAGNOSIS — S46.212A RUPTURE OF LEFT BICEPS TENDON, INITIAL ENCOUNTER: Primary | ICD-10-CM

## 2021-08-06 PROCEDURE — 97110 THERAPEUTIC EXERCISES: CPT

## 2021-08-06 NOTE — PROGRESS NOTES
Physical Therapy Daily Treatment Note    Date: 2021  Patient Name: Ruth Mcdaniel  : 1955   MRN: [id number]  DOInjury:   DOSx: 2021   Referring Provider: Chriss Najjar, DO     Medical Diagnosis:      Diagnosis Orders   1. Rupture of left biceps tendon, initial encounter         Outcome Measure:   QuickDASH (Disorders of the Arm, Shoulder, and Hand) 52% disability    Access Code: [email or web address]  URL: [email or web address]  Date: 2021  Prepared by: Primus List    Exercises  Seated Shoulder Flexion AAROM with Pulley Behind - 2 x daily - 3 min time  Seated Shoulder Flexion Towel Slide at Table Top - 3 x daily - 7 x weekly - 10 reps  Supine Shoulder Press - 2 x daily - 3 sets - 10 reps  Standing Shoulder External Rotation AAROM with Dowel - 2 x daily - 3 sets - 10 reps  Standing Shoulder Internal Rotation AAROM with Dowel - 2 x daily - 3 sets - 10 reps  Supine Shoulder Flexion with Dowel - 2 x daily - 3 sets - 10 reps      Precautions: strengthening 21      S: States shoulder continues to hurt. He is getting numbness and tingling in first three digits of left hand. Pt saw Dr Michael Perez and states he is going to have elbow surgery around .  O: Added bands today. Patient has order from Dr. Braxton Pool for cervical PT. We received word from Dr. Braxton Pool to hold off on cervical PT until patient see Dr. Leon Overall. Time 2617-7223     Visit  Repeat outcome measure at mid point and end.     Pain 3/10      ROM Flexion 155*A/165*P  IR T9 21    Modalities         MO   Manual         MT         Exercise      Pulley flex/IR X 3/3 min ea      TE      TE   Supine wand bench press blk bar 2 x 15  TE   Supine wand flexion   TE   Supine punches NEXT     IR wand behind back 3 x 10  TE         Bicep flex/ext 5# 2 x 15     sidelying ER 0# 2 x 20 Limited range    Standing wand IR/ER   TE   Standing wand flex blk 3 x 10 Pain free range TE   Standing flexion   TE   ROWS: H Green x 20 Limited range TA   ROWS: M Green x 20  TA   ROWS: L   TA   Punches       ER Unable performed side lying  TE   IR Green 3 x 10   TE                                             A:  Tolerated well. Weakness continues.  Experiences pain and numbness and tingling into right hand/fingers with activities above 90*  P: Continue with rehab plan  Demario Scott PTA    Treatment Charges: Mins Units   Initial Evaluation     Re-Evaluation     Ther Exercise         TE 40 3   Manual Therapy     MT     Ther Activities        TA     Gait Training          GT     Neuro Re-education NR     Modalities     Non-Billable Service Time     Other     Total Time/Units 40 3

## 2021-08-09 ENCOUNTER — TREATMENT (OUTPATIENT)
Dept: PHYSICAL THERAPY | Age: 66
End: 2021-08-09
Payer: MEDICARE

## 2021-08-09 DIAGNOSIS — S46.212A RUPTURE OF LEFT BICEPS TENDON, INITIAL ENCOUNTER: Primary | ICD-10-CM

## 2021-08-09 PROCEDURE — 97110 THERAPEUTIC EXERCISES: CPT

## 2021-08-09 NOTE — PROGRESS NOTES
Physical Therapy Daily Treatment Note    Date: 2021  Patient Name: Shaye Can  : 1955   MRN: [id number]  DOInjury:   DOSx: 2021   Referring Provider: Lynda Akins DO     Medical Diagnosis:      Diagnosis Orders   1. Rupture of left biceps tendon, initial encounter         Outcome Measure:   QuickDASH (Disorders of the Arm, Shoulder, and Hand) 52% disability    Access Code: [email or web address]  URL: [email or web address]  Date: 2021  Prepared by: Faith Tripp    Exercises  Seated Shoulder Flexion AAROM with Pulley Behind - 2 x daily - 3 min time  Seated Shoulder Flexion Towel Slide at Table Top - 3 x daily - 7 x weekly - 10 reps  Supine Shoulder Press - 2 x daily - 3 sets - 10 reps  Standing Shoulder External Rotation AAROM with Dowel - 2 x daily - 3 sets - 10 reps  Standing Shoulder Internal Rotation AAROM with Dowel - 2 x daily - 3 sets - 10 reps  Supine Shoulder Flexion with Dowel - 2 x daily - 3 sets - 10 reps      Precautions: strengthening 21      S: States shoulder continues to hurt. Reports 3/10 pain today but reports he has a headache that is about 8/10 making it difficult to function. O:   Patient has order from Dr. Earl Liao for cervical PT. We received word from Dr. Earl Liao to hold off on cervical PT until patient see Dr. Isa Franco. Time 2004-5660     Visit  Repeat outcome measure at mid point and end.     Pain 3/10      ROM Flexion 155*A/165*P  IR T9 21    Modalities      ice X 10 min  MO   Manual         MT         Exercise      Pulley flex/IR X 3/3 min ea      TE      TE   Supine wand bench press blk bar 2 x 15  TE   Supine wand flexion   TE   Supine punches NEXT     IR wand behind back 3 x 10  TE   Supine flexion 2 x 15     Bicep flex/ext 5# 2 x 15     sidelying ER 0# 2 x 20 Limited range    Standing wand IR/ER   TE   Standing wand flex Pain free range TE   Standing flexion  TE   ROWS: H Limited range TA   ROWS: M  TA   ROWS: L   TA   Punches       ER Unable performed side lying  TE   IR   TE                                             A:  Tolerated fair. Shoulder painful with overhead activities. Headache making it difficult to concentrate.     P: Continue with rehab plan  Angelika Greco PTA    Treatment Charges: Mins Units   Initial Evaluation     Re-Evaluation     Ther Exercise         TE 25 2   Manual Therapy     MT     Ther Activities        TA     Gait Training          GT     Neuro Re-education NR     Modalities     Non-Billable Service Time 10 0   Other     Total Time/Units 35 2

## 2021-08-13 ENCOUNTER — TELEPHONE (OUTPATIENT)
Dept: PHYSICAL THERAPY | Age: 66
End: 2021-08-13

## 2021-08-16 ENCOUNTER — TREATMENT (OUTPATIENT)
Dept: PHYSICAL THERAPY | Age: 66
End: 2021-08-16
Payer: MEDICARE

## 2021-08-16 ENCOUNTER — PREP FOR PROCEDURE (OUTPATIENT)
Dept: NEUROSURGERY | Age: 66
End: 2021-08-16

## 2021-08-16 DIAGNOSIS — Z01.818 PRE-OP EVALUATION: Primary | ICD-10-CM

## 2021-08-16 DIAGNOSIS — S46.212A RUPTURE OF LEFT BICEPS TENDON, INITIAL ENCOUNTER: Primary | ICD-10-CM

## 2021-08-16 PROCEDURE — 97110 THERAPEUTIC EXERCISES: CPT

## 2021-08-16 RX ORDER — SODIUM CHLORIDE 9 MG/ML
25 INJECTION, SOLUTION INTRAVENOUS PRN
Status: CANCELLED | OUTPATIENT
Start: 2021-08-16

## 2021-08-16 RX ORDER — SODIUM CHLORIDE 9 MG/ML
INJECTION, SOLUTION INTRAVENOUS CONTINUOUS
Status: CANCELLED | OUTPATIENT
Start: 2021-08-16

## 2021-08-16 RX ORDER — SODIUM CHLORIDE 0.9 % (FLUSH) 0.9 %
10 SYRINGE (ML) INJECTION EVERY 12 HOURS SCHEDULED
Status: CANCELLED | OUTPATIENT
Start: 2021-08-16

## 2021-08-16 RX ORDER — SODIUM CHLORIDE 0.9 % (FLUSH) 0.9 %
10 SYRINGE (ML) INJECTION PRN
Status: CANCELLED | OUTPATIENT
Start: 2021-08-16

## 2021-08-16 NOTE — PROGRESS NOTES
Physical Therapy Daily Treatment Note    Date: 2021  Patient Name: Mikal Elliott  : 1955   MRN: [id number]  DOInjury:   DOSx: 2021   Referring Provider: Crescencio Cabrera DO     Medical Diagnosis:      Diagnosis Orders   1. Rupture of left biceps tendon, initial encounter         Outcome Measure:   QuickDASH (Disorders of the Arm, Shoulder, and Hand) 52% disability    Access Code: [email or web address]  URL: [email or web address]  Date: 2021  Prepared by: Chelsey Lara    Exercises  Seated Shoulder Flexion AAROM with Pulley Behind - 2 x daily - 3 min time  Seated Shoulder Flexion Towel Slide at Table Top - 3 x daily - 7 x weekly - 10 reps  Supine Shoulder Press - 2 x daily - 3 sets - 10 reps  Standing Shoulder External Rotation AAROM with Dowel - 2 x daily - 3 sets - 10 reps  Standing Shoulder Internal Rotation AAROM with Dowel - 2 x daily - 3 sets - 10 reps  Supine Shoulder Flexion with Dowel - 2 x daily - 3 sets - 10 reps      Precautions: strengthening 21      S: States shoulder feels tired but pain and motion are better today, States he carried a bunch of vinyl prashanth yesterday and shoulder felt good. Still has numbness into fingers but not as bad today. O: Patient has order from Dr. Zoran Aden for cervical PT. Pt is having surgery on his elbow 2021    Pt 10 min late  Time 0524-2700     Visit  Repeat outcome measure at mid point and end.     Pain 3/10      ROM Flexion 155*A/165*P  IR T9 21    Modalities      ice   MO   Manual         MT         Exercise      Pulley flex/IR X 3/3 min ea      TE      TE   Supine wand bench press   TE   Supine wand flexion   TE   Supine punches NEXT     IR wand behind back  TE   Supine flexion     Bicep flex/ext 5# 2 x 15     sidelying ER  Limited range    Standing wand IR/ER   TE   Standing wand flex Pain free range TE   Standing flexion  TE   Standing press up 3# x 8 ea UE     ROWS: H Green x 20 Limited range TA   ROWS: M Green x 20  TA   ROWS: L TA   Punches       ER Green 2 x 10  TE   IR Green 2 x 10   TE                                             A:  Tolerated well.  Weakness noted but improved ROM with decreased pain    P: Continue with rehab plan  Chepe Burris PTA    Treatment Charges: Mins Units   Initial Evaluation     Re-Evaluation     Ther Exercise         TE 30 2   Manual Therapy     MT     Ther Activities        TA     Gait Training          GT     Neuro Re-education NR     Modalities     Non-Billable Service Time     Other     Total Time/Units 30 2

## 2021-08-20 ENCOUNTER — TREATMENT (OUTPATIENT)
Dept: PHYSICAL THERAPY | Age: 66
End: 2021-08-20
Payer: MEDICARE

## 2021-08-20 DIAGNOSIS — S46.212A RUPTURE OF LEFT BICEPS TENDON, INITIAL ENCOUNTER: Primary | ICD-10-CM

## 2021-08-20 PROCEDURE — 97110 THERAPEUTIC EXERCISES: CPT

## 2021-08-20 NOTE — PROGRESS NOTES
Physical Therapy Daily Treatment Note    Date: 2021  Patient Name: Rosan Bence  : 1955   MRN: [id number]  DOInjury:   DOSx: 2021   Referring Provider: Anup Pereira DO     Medical Diagnosis:      Diagnosis Orders   1. Rupture of left biceps tendon, initial encounter         Outcome Measure:   QuickDASH (Disorders of the Arm, Shoulder, and Hand) 52% disability    Access Code: [email or web address]  URL: [email or web address]  Date: 2021  Prepared by: Radha Kras    Exercises  Seated Shoulder Flexion AAROM with Pulley Behind - 2 x daily - 3 min time  Seated Shoulder Flexion Towel Slide at Table Top - 3 x daily - 7 x weekly - 10 reps  Supine Shoulder Press - 2 x daily - 3 sets - 10 reps  Standing Shoulder External Rotation AAROM with Dowel - 2 x daily - 3 sets - 10 reps  Standing Shoulder Internal Rotation AAROM with Dowel - 2 x daily - 3 sets - 10 reps  Supine Shoulder Flexion with Dowel - 2 x daily - 3 sets - 10 reps      Precautions: strengthening 21      S: States shoulder feels good. Pain is greatly diminished at rest and with non overhead activities. Pt still c/o numbness/tingling in first three digits of left hand. Pain and weakness with overhead activities. O: Patient has order from Dr. RAMSEY [facility] [place], [place] for cervical PT. Pt is having surgery on his elbow 2021    Pt 15 min late  Time 1635-4121     Visit  Repeat outcome measure at mid point and end.     Pain 1-2/10      ROM Flexion 155*A/165*P  IR T9 21    Modalities      ice   MO   Manual         MT         Exercise      UBE      Pulley flex/IR X 3/3 min ea      TE      TE   Supine wand bench press   TE   Supine wand flexion   TE   Supine punches NEXT     IR wand behind back  TE   Supine flexion     Bicep flex/ext 7# 2 x 15     sidelying ER  Limited range    Standing wand IR/ER   TE   Standing wand flex Pain free range TE   Standing flexion  TE   Standing press up 3# x 8 ea UE     ROWS: H Green 2 x 20  TA   ROWS: M Green 2 x 20 TA   ROWS: L   TA   Punches       ER Green 2 x 10  TE   IR Green 2 x 10   TE                                             A:  Tolerated well.  Weakness noted but improved ROM with decreased pain    P: Continue with rehab plan  Trini Karimi PTA    Treatment Charges: Mins Units   Initial Evaluation     Re-Evaluation     Ther Exercise         TE 25 2   Manual Therapy     MT     Ther Activities        TA     Gait Training          GT     Neuro Re-education NR     Modalities     Non-Billable Service Time     Other     Total Time/Units 25 2

## 2021-08-26 ENCOUNTER — APPOINTMENT (OUTPATIENT)
Dept: CT IMAGING | Age: 66
End: 2021-08-26
Payer: MEDICARE

## 2021-08-26 ENCOUNTER — HOSPITAL ENCOUNTER (EMERGENCY)
Age: 66
Discharge: HOME | End: 2021-08-26
Attending: STUDENT IN AN ORGANIZED HEALTH CARE EDUCATION/TRAINING PROGRAM
Payer: MEDICARE

## 2021-08-26 ENCOUNTER — HOSPITAL ENCOUNTER (EMERGENCY)
Age: 66
Discharge: HOME | End: 2021-08-26
Payer: MEDICARE

## 2021-08-26 VITALS
TEMPERATURE: 98.3 F | HEART RATE: 75 BPM | OXYGEN SATURATION: 94 % | RESPIRATION RATE: 19 BRPM | DIASTOLIC BLOOD PRESSURE: 81 MMHG | BODY MASS INDEX: 31.5 KG/M2 | HEIGHT: 70 IN | WEIGHT: 220 LBS | SYSTOLIC BLOOD PRESSURE: 143 MMHG

## 2021-08-26 VITALS
HEART RATE: 80 BPM | OXYGEN SATURATION: 96 % | WEIGHT: 220 LBS | SYSTOLIC BLOOD PRESSURE: 144 MMHG | BODY MASS INDEX: 31.57 KG/M2 | TEMPERATURE: 97.5 F | DIASTOLIC BLOOD PRESSURE: 84 MMHG | RESPIRATION RATE: 18 BRPM

## 2021-08-26 DIAGNOSIS — R19.7 DIARRHEA, UNSPECIFIED TYPE: ICD-10-CM

## 2021-08-26 DIAGNOSIS — R10.32 LEFT LOWER QUADRANT ABDOMINAL PAIN: ICD-10-CM

## 2021-08-26 DIAGNOSIS — R19.7 DIARRHEA, UNSPECIFIED TYPE: Primary | ICD-10-CM

## 2021-08-26 DIAGNOSIS — R10.30 LOWER ABDOMINAL PAIN: Primary | ICD-10-CM

## 2021-08-26 DIAGNOSIS — N17.9 AKI (ACUTE KIDNEY INJURY) (HCC): ICD-10-CM

## 2021-08-26 DIAGNOSIS — E86.0 DEHYDRATION: ICD-10-CM

## 2021-08-26 LAB
ALBUMIN SERPL-MCNC: 4.4 G/DL (ref 3.5–5.2)
ALP BLD-CCNC: 62 U/L (ref 40–129)
ALT SERPL-CCNC: 23 U/L (ref 0–40)
ANION GAP SERPL CALCULATED.3IONS-SCNC: 13 MMOL/L (ref 7–16)
AST SERPL-CCNC: 22 U/L (ref 0–39)
BACTERIA: NORMAL /HPF
BASOPHILS ABSOLUTE: 0 E9/L (ref 0–0.2)
BASOPHILS RELATIVE PERCENT: 0.3 % (ref 0–2)
BILIRUB SERPL-MCNC: 0.7 MG/DL (ref 0–1.2)
BILIRUBIN URINE: NEGATIVE
BLOOD, URINE: NEGATIVE
BUN BLDV-MCNC: 20 MG/DL (ref 6–23)
CALCIUM SERPL-MCNC: 9.3 MG/DL (ref 8.6–10.2)
CHLORIDE BLD-SCNC: 105 MMOL/L (ref 98–107)
CLARITY: NORMAL
CO2: 21 MMOL/L (ref 22–29)
COLOR: YELLOW
CREAT SERPL-MCNC: 1.3 MG/DL (ref 0.7–1.2)
EOSINOPHILS ABSOLUTE: 0.22 E9/L (ref 0.05–0.5)
EOSINOPHILS RELATIVE PERCENT: 3.5 % (ref 0–6)
EPITHELIAL CELLS, UA: NORMAL /HPF
GFR AFRICAN AMERICAN: >60
GFR NON-AFRICAN AMERICAN: 55 ML/MIN/1.73
GLUCOSE BLD-MCNC: 148 MG/DL (ref 74–99)
GLUCOSE URINE: NEGATIVE MG/DL
HCT VFR BLD CALC: 44.1 % (ref 37–54)
HEMOGLOBIN: 15.4 G/DL (ref 12.5–16.5)
KETONES, URINE: NEGATIVE MG/DL
LACTIC ACID, SEPSIS: 1.2 MMOL/L (ref 0.5–1.9)
LEUKOCYTE ESTERASE, URINE: NEGATIVE
LIPASE: 36 U/L (ref 13–60)
LYMPHOCYTES ABSOLUTE: 0.19 E9/L (ref 1.5–4)
LYMPHOCYTES RELATIVE PERCENT: 2.6 % (ref 20–42)
MCH RBC QN AUTO: 30.8 PG (ref 26–35)
MCHC RBC AUTO-ENTMCNC: 34.9 % (ref 32–34.5)
MCV RBC AUTO: 88.2 FL (ref 80–99.9)
MONOCYTES ABSOLUTE: 0.5 E9/L (ref 0.1–0.95)
MONOCYTES RELATIVE PERCENT: 7.9 % (ref 2–12)
NEUTROPHILS ABSOLUTE: 5.33 E9/L (ref 1.8–7.3)
NEUTROPHILS RELATIVE PERCENT: 86 % (ref 43–80)
NITRITE, URINE: NEGATIVE
PDW BLD-RTO: 13.1 FL (ref 11.5–15)
PH UA: 5.5 (ref 5–9)
PLATELET # BLD: 201 E9/L (ref 130–450)
PMV BLD AUTO: 9.5 FL (ref 7–12)
POTASSIUM REFLEX MAGNESIUM: 3.7 MMOL/L (ref 3.5–5)
PROTEIN UA: NEGATIVE MG/DL
RBC # BLD: 5 E12/L (ref 3.8–5.8)
RBC UA: NORMAL /HPF (ref 0–2)
SARS-COV-2, NAAT: NOT DETECTED
SODIUM BLD-SCNC: 139 MMOL/L (ref 132–146)
SPECIFIC GRAVITY UA: >=1.03 (ref 1–1.03)
TOTAL PROTEIN: 7.8 G/DL (ref 6.4–8.3)
UROBILINOGEN, URINE: 0.2 E.U./DL
WBC # BLD: 6.2 E9/L (ref 4.5–11.5)
WBC UA: NORMAL /HPF (ref 0–5)

## 2021-08-26 PROCEDURE — 6360000002 HC RX W HCPCS: Performed by: STUDENT IN AN ORGANIZED HEALTH CARE EDUCATION/TRAINING PROGRAM

## 2021-08-26 PROCEDURE — 2580000003 HC RX 258: Performed by: STUDENT IN AN ORGANIZED HEALTH CARE EDUCATION/TRAINING PROGRAM

## 2021-08-26 PROCEDURE — 6360000004 HC RX CONTRAST MEDICATION: Performed by: RADIOLOGY

## 2021-08-26 PROCEDURE — 87635 SARS-COV-2 COVID-19 AMP PRB: CPT

## 2021-08-26 PROCEDURE — 99211 OFF/OP EST MAY X REQ PHY/QHP: CPT

## 2021-08-26 PROCEDURE — 85025 COMPLETE CBC W/AUTO DIFF WBC: CPT

## 2021-08-26 PROCEDURE — 80053 COMPREHEN METABOLIC PANEL: CPT

## 2021-08-26 PROCEDURE — 6370000000 HC RX 637 (ALT 250 FOR IP): Performed by: STUDENT IN AN ORGANIZED HEALTH CARE EDUCATION/TRAINING PROGRAM

## 2021-08-26 PROCEDURE — 74177 CT ABD & PELVIS W/CONTRAST: CPT

## 2021-08-26 PROCEDURE — 93005 ELECTROCARDIOGRAM TRACING: CPT | Performed by: STUDENT IN AN ORGANIZED HEALTH CARE EDUCATION/TRAINING PROGRAM

## 2021-08-26 PROCEDURE — 83690 ASSAY OF LIPASE: CPT

## 2021-08-26 PROCEDURE — 81001 URINALYSIS AUTO W/SCOPE: CPT

## 2021-08-26 PROCEDURE — 83605 ASSAY OF LACTIC ACID: CPT

## 2021-08-26 RX ORDER — ONDANSETRON 2 MG/ML
4 INJECTION INTRAMUSCULAR; INTRAVENOUS EVERY 6 HOURS PRN
Status: DISCONTINUED | OUTPATIENT
Start: 2021-08-26 | End: 2021-08-26 | Stop reason: HOSPADM

## 2021-08-26 RX ORDER — MORPHINE SULFATE 4 MG/ML
4 INJECTION, SOLUTION INTRAMUSCULAR; INTRAVENOUS ONCE
Status: COMPLETED | OUTPATIENT
Start: 2021-08-26 | End: 2021-08-26

## 2021-08-26 RX ORDER — ROPINIROLE 1 MG/1
1 TABLET, FILM COATED ORAL ONCE
Status: COMPLETED | OUTPATIENT
Start: 2021-08-26 | End: 2021-08-26

## 2021-08-26 RX ORDER — 0.9 % SODIUM CHLORIDE 0.9 %
1000 INTRAVENOUS SOLUTION INTRAVENOUS ONCE
Status: COMPLETED | OUTPATIENT
Start: 2021-08-26 | End: 2021-08-26

## 2021-08-26 RX ADMIN — SODIUM CHLORIDE 1000 ML: 9 INJECTION, SOLUTION INTRAVENOUS at 15:42

## 2021-08-26 RX ADMIN — MORPHINE SULFATE 4 MG: 4 INJECTION, SOLUTION INTRAMUSCULAR; INTRAVENOUS at 15:44

## 2021-08-26 RX ADMIN — ROPINIROLE HYDROCHLORIDE 1 MG: 1 TABLET, FILM COATED ORAL at 17:06

## 2021-08-26 RX ADMIN — IOPAMIDOL 75 ML: 755 INJECTION, SOLUTION INTRAVENOUS at 16:56

## 2021-08-26 RX ADMIN — ONDANSETRON 4 MG: 2 INJECTION INTRAMUSCULAR; INTRAVENOUS at 15:44

## 2021-08-26 ASSESSMENT — PAIN DESCRIPTION - PAIN TYPE: TYPE: ACUTE PAIN

## 2021-08-26 ASSESSMENT — PAIN DESCRIPTION - LOCATION: LOCATION: ABDOMEN

## 2021-08-26 ASSESSMENT — PAIN SCALES - GENERAL
PAINLEVEL_OUTOF10: 8
PAINLEVEL_OUTOF10: 8

## 2021-08-26 NOTE — ED PROVIDER NOTES
ED  Provider Note  Admit Date/RoomTime: 8/26/2021  2:55 PM  ED Room: 20/20      History of Present Illness:  8/26/21, Time: 2:56 PM EDT  Chief Complaint   Patient presents with    Diarrhea    Abdominal Pain     LOW ABD SINCE 400AM       Bran Baez is a 73 y.o. male presenting to the ED for lower quadrant abd pain  Onset: acute, 0100 this morning   Location: bilateral lower quadrants of abdomen   Timing: constant    Duration: hours   Associated symptoms: nausea without emesis, chills, no diaphoresis, no cp/sob, +c/c/r x 1 week, no covid vaccination or infection in past, no loss taste/smell, +diarrhea x1 day no melena/hematochezia, no recent abx, s/p cholecystectomy   Severity: severe   Exacerbated by: none   Relieved by: none     Review of Systems:   A complete review of systems was performed and pertinent positives and negatives are stated within HPI, all other systems reviewed and are negative.        --------------------------------------------- PATIENT HISTORY--------------------------------------------  Past Medical History:  has a past medical history of Anesthesia complication, Arthritis, Asthma, Diabetes mellitus ([facility] [address] Fungal infection of foot, GERD (gastroesophageal reflux disease), Hyperlipidemia, Hypertension, and Type 2 diabetes mellitus without complication ([facility] [address] Past Surgical History:  has a past surgical history that includes hernia repair; Neck surgery; Cholecystectomy, laparoscopic (N/A, 05/18/2020); Anterior cruciate ligament repair (Right, 07/13/2020); Colonoscopy; Endoscopy, colon, diagnostic; and Shoulder arthroscopy (Left, 6/1/2021). Family History: family history includes Diabetes in his father and maternal grandmother; Heart Attack in his mother; Hypertension in his mother and paternal grandmother. . Unless otherwise noted, family history is non contributory    Social History:  reports that he quit smoking about 35 years ago. His smoking use included cigarettes.  He started smoking % 2.6 (L) 20.0 - 42.0 %    Monocytes % 7.9 2.0 - 12.0 %    Eosinophils % 3.5 0.0 - 6.0 %    Basophils % 0.3 0.0 - 2.0 %    Neutrophils Absolute 5.33 1.80 - 7.30 E9/L    Lymphocytes Absolute 0.19 (L) 1.50 - 4.00 E9/L    Monocytes Absolute 0.50 0.10 - 0.95 E9/L    Eosinophils Absolute 0.22 0.05 - 0.50 E9/L    Basophils Absolute 0.00 0.00 - 0.20 E9/L   Comprehensive Metabolic Panel w/ Reflex to MG   Result Value Ref Range    Sodium 139 132 - 146 mmol/L    Potassium reflex Magnesium 3.7 3.5 - 5.0 mmol/L    Chloride 105 98 - 107 mmol/L    CO2 21 (L) 22 - 29 mmol/L    Anion Gap 13 7 - 16 mmol/L    Glucose 148 (H) 74 - 99 mg/dL    BUN 20 6 - 23 mg/dL    CREATININE 1.3 (H) 0.7 - 1.2 mg/dL    GFR Non-African American 55 >=60 mL/min/1.73    GFR African American >60     Calcium 9.3 8.6 - 10.2 mg/dL    Total Protein 7.8 6.4 - 8.3 g/dL    Albumin 4.4 3.5 - 5.2 g/dL    Total Bilirubin 0.7 0.0 - 1.2 mg/dL    Alkaline Phosphatase 62 40 - 129 U/L    ALT 23 0 - 40 U/L    AST 22 0 - 39 U/L   Lactate, Sepsis   Result Value Ref Range    Lactic Acid, Sepsis 1.2 0.5 - 1.9 mmol/L   Lipase   Result Value Ref Range    Lipase 36 13 - 60 U/L   Urinalysis, reflex to microscopic   Result Value Ref Range    Color, UA Yellow Straw/Yellow    Clarity, UA SLCLOUDY Clear    Glucose, Ur Negative Negative mg/dL    Bilirubin Urine Negative Negative    Ketones, Urine Negative Negative mg/dL    Specific Gravity, UA >=1.030 1.005 - 1.030    Blood, Urine Negative Negative    pH, UA 5.5 5.0 - 9.0    Protein, UA Negative Negative mg/dL    Urobilinogen, Urine 0.2 <2.0 E.U./dL    Nitrite, Urine Negative Negative    Leukocyte Esterase, Urine Negative Negative   Microscopic Urinalysis   Result Value Ref Range    WBC, UA NONE 0 - 5 /HPF    RBC, UA NONE 0 - 2 /HPF    Epithelial Cells, UA FEW /HPF    Bacteria, UA NONE SEEN None Seen /HPF   EKG 12 Lead   Result Value Ref Range    Ventricular Rate 79 BPM    Atrial Rate 79 BPM    P-R Interval 176 ms    QRS Duration 98 patient for dysrhythmia(s), tachycardia/bradycardia and/or changes in rhythm. I, Dr. Shaina Neff, am the primary provider of record    Medical Decision Making:   Drew Adler is a 73 y.o. male presenting to the ED for abdominal pain. Suspect diverticulitis vs appendicitis    Differential includes but not limited to biliary pathology, gastritis/PUD, SBO, biliary pathology, diverticulitis, appendicitis, UTI, kidney stone  Workup: CBC, CMP, lipase, lactate, CT abd/pelvis, UA , ekg    Oxygen Saturation Interpretation: 95 % on RA. This patient's ED course included: a personal history and physicial examination    This patient has remained hemodynamically stable during their ED course. Consultations:      ED Counseling: This emergency provider has spoken with the patient and any family present to discuss clinical status, results, plan of care, diagnosis and prognosis as able to be determined at this time. Any questions were answered and patient and/or family/POA are agreeable with the plan.       --------------------------------- IMPRESSION AND DISPOSITION ---------------------------------    IMPRESSION  1. Lower abdominal pain    2. Diarrhea, unspecified type    3. Dehydration    4. TROY (acute kidney injury) ([facility] [address]        DISPOSITION  Disposition: Discharge to home  Patient condition is good      This report was transcribed using voice recognition software. Every effort was made to ensure accuracy; however, transcription errors may be present.          John Momin MD  08/26/21 6777

## 2021-08-26 NOTE — ED PROVIDER NOTES
HPI:  8/26/21,   Time: 2:12 PM EDT         Misty Carreno is a 73 y.o. male presenting to the ED for diarrhea, sweating, nausea and left-sided abdominal pain, beginning  1 AM this morning ago. The complaint has been persistent, moderate in severity, and worsened by nothing. Presents for complaints of persistent diarrhea throughout the day which began approximate 1 AM this morning. Reports nausea but denies any vomiting. States has had near episodes of diaphoresis throughout the day. Denies any fever. States he had decreased urine output. He states he just feels like a truck head hit him. He also has reports a past medical history of diverticulosis. He denies any fever. Denies any chest pain or shortness of breath. ROS:   Pertinent positives and negatives are stated within HPI, all other systems reviewed and are negative.  --------------------------------------------- PAST HISTORY ---------------------------------------------  Past Medical History:  has a past medical history of Anesthesia complication, Arthritis, Asthma, Diabetes mellitus ([facility] [address] Fungal infection of foot, GERD (gastroesophageal reflux disease), Hyperlipidemia, Hypertension, and Type 2 diabetes mellitus without complication ([facility] [address] Past Surgical History:  has a past surgical history that includes hernia repair; Neck surgery; Cholecystectomy, laparoscopic (N/A, 05/18/2020); Anterior cruciate ligament repair (Right, 07/13/2020); Colonoscopy; Endoscopy, colon, diagnostic; and Shoulder arthroscopy (Left, 6/1/2021). Social History:  reports that he quit smoking about 35 years ago. His smoking use included cigarettes. He started smoking about 56 years ago. He has a 22.50 pack-year smoking history. He has never used smokeless tobacco. He reports current alcohol use. He reports that he does not use drugs.     Family History: family history includes Diabetes in his father and maternal grandmother; Heart Attack in his mother; Hypertension in his mother and paternal grandmother. The patients home medications have been reviewed. Allergies: Adhesive tape    -------------------------------------------------- RESULTS -------------------------------------------------  All laboratory and radiology results have been personally reviewed by myself   LABS:  No results found for this visit on 08/26/21. RADIOLOGY:  Interpreted by Radiologist.  No orders to display       ------------------------- NURSING NOTES AND VITALS REVIEWED ---------------------------   The nursing notes within the ED encounter and vital signs as below have been reviewed. BP (!) 144/84   Pulse 80   Temp 97.5 °F (36.4 °C) (Infrared)   Resp 18   Wt 220 lb (99.8 kg)   SpO2 96%   BMI 31.57 kg/m²   Oxygen Saturation Interpretation: Normal      ---------------------------------------------------PHYSICAL EXAM--------------------------------------      Constitutional/General: Alert and oriented x3, well appearing, non toxic in NAD  Head: NC/AT  Eyes: PERRL, EOMI  Mouth: Oropharynx clear, handling secretions, no trismus  Neck: Supple, full ROM, no meningeal signs  Pulmonary: Lungs clear to auscultation bilaterally, no wheezes, rales, or rhonchi. Not in respiratory distress  Cardiovascular:  Regular rate and rhythm, no murmurs, gallops, or rubs. 2+ distal pulses  Abdomen: Soft, non distended, has tenderness on palpation to the left lower abdominal region with hyperactive bowel sounds. Extremities: Moves all extremities x 4. Warm and well perfused  Skin: warm and clammy without rash  Neurologic: GCS 15,  Psych: Normal Affect      ------------------------------ ED COURSE/MEDICAL DECISION MAKING----------------------  Medications - No data to display      Medical Decision Making:    Due to the limitations at this urgent care patient is advised to go to the emergency room for evaluation to rule out the presence of diverticulitis.   His belly is slightly distended he is tender to the left lower quadrant with reports of diarrhea intermittently throughout the day. States has had diaphoresis and worsening abdominal pain. Counseling: The emergency provider has spoken with the patient and discussed todays results, in addition to providing specific details for the plan of care and counseling regarding the diagnosis and prognosis. Questions are answered at this time and they are agreeable with the plan.      --------------------------------- IMPRESSION AND DISPOSITION ---------------------------------    IMPRESSION  1. Diarrhea, unspecified type    2.  Left lower quadrant abdominal pain        DISPOSITION  Disposition: Discharge to [facility] emergency room  Patient condition is stable                 JAIR Raines CNP  08/26/21 1414

## 2021-08-27 ENCOUNTER — TELEPHONE (OUTPATIENT)
Dept: PHYSICAL THERAPY | Age: 66
End: 2021-08-27

## 2021-08-27 LAB
EKG ATRIAL RATE: 79 BPM
EKG P AXIS: 56 DEGREES
EKG P-R INTERVAL: 176 MS
EKG Q-T INTERVAL: 354 MS
EKG QRS DURATION: 98 MS
EKG QTC CALCULATION (BAZETT): 405 MS
EKG R AXIS: 17 DEGREES
EKG T AXIS: 59 DEGREES
EKG VENTRICULAR RATE: 79 BPM

## 2021-08-29 ENCOUNTER — APPOINTMENT (OUTPATIENT)
Dept: CT IMAGING | Age: 66
End: 2021-08-29
Payer: MEDICARE

## 2021-08-29 ENCOUNTER — HOSPITAL ENCOUNTER (EMERGENCY)
Age: 66
Discharge: HOME | End: 2021-08-30
Attending: STUDENT IN AN ORGANIZED HEALTH CARE EDUCATION/TRAINING PROGRAM
Payer: MEDICARE

## 2021-08-29 VITALS
HEART RATE: 69 BPM | SYSTOLIC BLOOD PRESSURE: 126 MMHG | WEIGHT: 220 LBS | BODY MASS INDEX: 31.57 KG/M2 | OXYGEN SATURATION: 95 % | RESPIRATION RATE: 16 BRPM | TEMPERATURE: 96.1 F | DIASTOLIC BLOOD PRESSURE: 75 MMHG

## 2021-08-29 DIAGNOSIS — R11.2 NAUSEA VOMITING AND DIARRHEA: Primary | ICD-10-CM

## 2021-08-29 DIAGNOSIS — R19.7 NAUSEA VOMITING AND DIARRHEA: Primary | ICD-10-CM

## 2021-08-29 LAB
ALBUMIN SERPL-MCNC: 4.3 G/DL (ref 3.5–5.2)
ALP BLD-CCNC: 59 U/L (ref 40–129)
ALT SERPL-CCNC: 35 U/L (ref 0–40)
ANION GAP SERPL CALCULATED.3IONS-SCNC: 12 MMOL/L (ref 7–16)
AST SERPL-CCNC: 29 U/L (ref 0–39)
ATYPICAL LYMPHOCYTE RELATIVE PERCENT: 0.9 % (ref 0–4)
BACTERIA: NORMAL /HPF
BASOPHILS ABSOLUTE: 0 E9/L (ref 0–0.2)
BASOPHILS RELATIVE PERCENT: 0.4 % (ref 0–2)
BILIRUB SERPL-MCNC: 0.6 MG/DL (ref 0–1.2)
BILIRUBIN URINE: NEGATIVE
BLOOD, URINE: NEGATIVE
BUN BLDV-MCNC: 29 MG/DL (ref 6–23)
BURR CELLS: ABNORMAL
CALCIUM SERPL-MCNC: 9.5 MG/DL (ref 8.6–10.2)
CHLORIDE BLD-SCNC: 104 MMOL/L (ref 98–107)
CLARITY: CLEAR
CO2: 22 MMOL/L (ref 22–29)
COLOR: YELLOW
CREAT SERPL-MCNC: 1.5 MG/DL (ref 0.7–1.2)
EOSINOPHILS ABSOLUTE: 0.21 E9/L (ref 0.05–0.5)
EOSINOPHILS RELATIVE PERCENT: 4.4 % (ref 0–6)
GFR AFRICAN AMERICAN: 57
GFR NON-AFRICAN AMERICAN: 47 ML/MIN/1.73
GLUCOSE BLD-MCNC: 162 MG/DL (ref 74–99)
GLUCOSE URINE: NEGATIVE MG/DL
HCT VFR BLD CALC: 41.8 % (ref 37–54)
HEMOGLOBIN: 15.3 G/DL (ref 12.5–16.5)
KETONES, URINE: NEGATIVE MG/DL
LACTIC ACID: 1 MMOL/L (ref 0.5–2.2)
LEUKOCYTE ESTERASE, URINE: NEGATIVE
LIPASE: 36 U/L (ref 13–60)
LYMPHOCYTES ABSOLUTE: 1.68 E9/L (ref 1.5–4)
LYMPHOCYTES RELATIVE PERCENT: 34.2 % (ref 20–42)
MAGNESIUM: 1.8 MG/DL (ref 1.6–2.6)
MCH RBC QN AUTO: 30.7 PG (ref 26–35)
MCHC RBC AUTO-ENTMCNC: 36.6 % (ref 32–34.5)
MCV RBC AUTO: 83.9 FL (ref 80–99.9)
MONOCYTES ABSOLUTE: 0.38 E9/L (ref 0.1–0.95)
MONOCYTES RELATIVE PERCENT: 7.9 % (ref 2–12)
NEUTROPHILS ABSOLUTE: 2.54 E9/L (ref 1.8–7.3)
NEUTROPHILS RELATIVE PERCENT: 52.6 % (ref 43–80)
NITRITE, URINE: NEGATIVE
OVALOCYTES: ABNORMAL
PDW BLD-RTO: 12.9 FL (ref 11.5–15)
PH UA: 6 (ref 5–9)
PLATELET # BLD: 263 E9/L (ref 130–450)
PMV BLD AUTO: 9.3 FL (ref 7–12)
POIKILOCYTES: ABNORMAL
POTASSIUM SERPL-SCNC: 3.6 MMOL/L (ref 3.5–5)
PROTEIN UA: NORMAL MG/DL
RBC # BLD: 4.98 E12/L (ref 3.8–5.8)
RBC UA: NORMAL /HPF (ref 0–2)
SODIUM BLD-SCNC: 138 MMOL/L (ref 132–146)
SPECIFIC GRAVITY UA: >=1.03 (ref 1–1.03)
TOTAL PROTEIN: 7.9 G/DL (ref 6.4–8.3)
UROBILINOGEN, URINE: 0.2 E.U./DL
WBC # BLD: 4.8 E9/L (ref 4.5–11.5)
WBC UA: NORMAL /HPF (ref 0–5)

## 2021-08-29 PROCEDURE — 83690 ASSAY OF LIPASE: CPT

## 2021-08-29 PROCEDURE — 87324 CLOSTRIDIUM AG IA: CPT

## 2021-08-29 PROCEDURE — 74177 CT ABD & PELVIS W/CONTRAST: CPT

## 2021-08-29 PROCEDURE — 2580000003 HC RX 258: Performed by: NURSE PRACTITIONER

## 2021-08-29 PROCEDURE — 82705 FATS/LIPIDS FECES QUAL: CPT

## 2021-08-29 PROCEDURE — 82270 OCCULT BLOOD FECES: CPT

## 2021-08-29 PROCEDURE — 6360000002 HC RX W HCPCS: Performed by: NURSE PRACTITIONER

## 2021-08-29 PROCEDURE — 87425 ROTAVIRUS AG IA: CPT

## 2021-08-29 PROCEDURE — 96374 THER/PROPH/DIAG INJ IV PUSH: CPT

## 2021-08-29 PROCEDURE — 83735 ASSAY OF MAGNESIUM: CPT

## 2021-08-29 PROCEDURE — 83605 ASSAY OF LACTIC ACID: CPT

## 2021-08-29 PROCEDURE — 99282 EMERGENCY DEPT VISIT SF MDM: CPT

## 2021-08-29 PROCEDURE — 80053 COMPREHEN METABOLIC PANEL: CPT

## 2021-08-29 PROCEDURE — 87449 NOS EACH ORGANISM AG IA: CPT

## 2021-08-29 PROCEDURE — 6360000004 HC RX CONTRAST MEDICATION: Performed by: RADIOLOGY

## 2021-08-29 PROCEDURE — 96372 THER/PROPH/DIAG INJ SC/IM: CPT

## 2021-08-29 PROCEDURE — 81001 URINALYSIS AUTO W/SCOPE: CPT

## 2021-08-29 PROCEDURE — 85025 COMPLETE CBC W/AUTO DIFF WBC: CPT

## 2021-08-29 PROCEDURE — 84484 ASSAY OF TROPONIN QUANT: CPT

## 2021-08-29 PROCEDURE — 87045 FECES CULTURE AEROBIC BACT: CPT

## 2021-08-29 RX ORDER — METRONIDAZOLE 500 MG/1
500 TABLET ORAL 3 TIMES DAILY
COMMUNITY
End: 2021-08-30

## 2021-08-29 RX ORDER — PROMETHAZINE HYDROCHLORIDE 25 MG/ML
12.5 INJECTION, SOLUTION INTRAMUSCULAR; INTRAVENOUS ONCE
Status: COMPLETED | OUTPATIENT
Start: 2021-08-29 | End: 2021-08-30

## 2021-08-29 RX ORDER — 0.9 % SODIUM CHLORIDE 0.9 %
1000 INTRAVENOUS SOLUTION INTRAVENOUS ONCE
Status: COMPLETED | OUTPATIENT
Start: 2021-08-29 | End: 2021-08-29

## 2021-08-29 RX ORDER — ONDANSETRON 2 MG/ML
4 INJECTION INTRAMUSCULAR; INTRAVENOUS ONCE
Status: COMPLETED | OUTPATIENT
Start: 2021-08-29 | End: 2021-08-29

## 2021-08-29 RX ADMIN — IOPAMIDOL 75 ML: 755 INJECTION, SOLUTION INTRAVENOUS at 21:33

## 2021-08-29 RX ADMIN — ONDANSETRON 4 MG: 2 INJECTION INTRAMUSCULAR; INTRAVENOUS at 20:35

## 2021-08-29 RX ADMIN — SODIUM CHLORIDE 1000 ML: 9 INJECTION, SOLUTION INTRAVENOUS at 20:35

## 2021-08-29 ASSESSMENT — ENCOUNTER SYMPTOMS
SHORTNESS OF BREATH: 0
COUGH: 0
VOMITING: 1
NAUSEA: 1
DIARRHEA: 1
EYE DISCHARGE: 0
ABDOMINAL PAIN: 1
SINUS PRESSURE: 0
SORE THROAT: 0
BACK PAIN: 0
EYE REDNESS: 0
EYE PAIN: 0
WHEEZING: 0

## 2021-08-29 ASSESSMENT — PAIN SCALES - GENERAL: PAINLEVEL_OUTOF10: 4

## 2021-08-29 ASSESSMENT — PAIN DESCRIPTION - LOCATION: LOCATION: ABDOMEN

## 2021-08-29 ASSESSMENT — PAIN DESCRIPTION - PAIN TYPE: TYPE: ACUTE PAIN

## 2021-08-30 ENCOUNTER — OFFICE VISIT (OUTPATIENT)
Dept: FAMILY MEDICINE CLINIC | Age: 66
End: 2021-08-30
Payer: MEDICARE

## 2021-08-30 ENCOUNTER — TELEPHONE (OUTPATIENT)
Dept: PHYSICAL THERAPY | Age: 66
End: 2021-08-30

## 2021-08-30 VITALS
BODY MASS INDEX: 30.52 KG/M2 | SYSTOLIC BLOOD PRESSURE: 117 MMHG | OXYGEN SATURATION: 97 % | HEIGHT: 70 IN | RESPIRATION RATE: 16 BRPM | HEART RATE: 67 BPM | WEIGHT: 213.2 LBS | DIASTOLIC BLOOD PRESSURE: 70 MMHG | TEMPERATURE: 97.7 F

## 2021-08-30 DIAGNOSIS — G20 PARKINSON'S DISEASE (HCC): Primary | ICD-10-CM

## 2021-08-30 DIAGNOSIS — N18.31 TYPE 2 DIABETES MELLITUS WITH STAGE 3A CHRONIC KIDNEY DISEASE, WITHOUT LONG-TERM CURRENT USE OF INSULIN (HCC): ICD-10-CM

## 2021-08-30 DIAGNOSIS — E11.22 TYPE 2 DIABETES MELLITUS WITH STAGE 3A CHRONIC KIDNEY DISEASE, WITHOUT LONG-TERM CURRENT USE OF INSULIN (HCC): ICD-10-CM

## 2021-08-30 DIAGNOSIS — K52.9 ACUTE GASTROENTERITIS: ICD-10-CM

## 2021-08-30 DIAGNOSIS — F33.1 MAJOR DEPRESSIVE DISORDER, RECURRENT, MODERATE (HCC): ICD-10-CM

## 2021-08-30 PROBLEM — G20.A1 PARKINSON'S DISEASE: Status: ACTIVE | Noted: 2021-08-30

## 2021-08-30 LAB
C DIFF TOXIN/ANTIGEN: NORMAL
OCCULT BLOOD SCREENING: NORMAL
ROTAVIRUS ANTIGEN: NORMAL
TROPONIN, HIGH SENSITIVITY: 7 NG/L (ref 0–11)

## 2021-08-30 PROCEDURE — 1036F TOBACCO NON-USER: CPT | Performed by: FAMILY MEDICINE

## 2021-08-30 PROCEDURE — 4040F PNEUMOC VAC/ADMIN/RCVD: CPT | Performed by: FAMILY MEDICINE

## 2021-08-30 PROCEDURE — G8417 CALC BMI ABV UP PARAM F/U: HCPCS | Performed by: FAMILY MEDICINE

## 2021-08-30 PROCEDURE — G8427 DOCREV CUR MEDS BY ELIG CLIN: HCPCS | Performed by: FAMILY MEDICINE

## 2021-08-30 PROCEDURE — 99212 OFFICE O/P EST SF 10 MIN: CPT | Performed by: FAMILY MEDICINE

## 2021-08-30 PROCEDURE — 3017F COLORECTAL CA SCREEN DOC REV: CPT | Performed by: FAMILY MEDICINE

## 2021-08-30 PROCEDURE — 2022F DILAT RTA XM EVC RTNOPTHY: CPT | Performed by: FAMILY MEDICINE

## 2021-08-30 PROCEDURE — 6360000002 HC RX W HCPCS: Performed by: STUDENT IN AN ORGANIZED HEALTH CARE EDUCATION/TRAINING PROGRAM

## 2021-08-30 PROCEDURE — 1123F ACP DISCUSS/DSCN MKR DOCD: CPT | Performed by: FAMILY MEDICINE

## 2021-08-30 PROCEDURE — 3044F HG A1C LEVEL LT 7.0%: CPT | Performed by: FAMILY MEDICINE

## 2021-08-30 RX ORDER — DICYCLOMINE HCL 20 MG
20 TABLET ORAL 4 TIMES DAILY
Qty: 40 TABLET | Refills: 0 | Status: SHIPPED
Start: 2021-08-30 | End: 2022-02-11 | Stop reason: SDUPTHER

## 2021-08-30 RX ORDER — DOXEPIN HYDROCHLORIDE 10 MG/1
CAPSULE ORAL
Qty: 30 CAPSULE | Refills: 1 | Status: SHIPPED
Start: 2021-08-30 | End: 2021-12-07 | Stop reason: SDUPTHER

## 2021-08-30 RX ORDER — ONDANSETRON 4 MG/1
4 TABLET, ORALLY DISINTEGRATING ORAL EVERY 8 HOURS PRN
Qty: 10 TABLET | Refills: 0 | Status: SHIPPED | OUTPATIENT
Start: 2021-08-30 | End: 2021-08-30

## 2021-08-30 RX ORDER — ROPINIROLE 1 MG/1
1 TABLET, FILM COATED ORAL 4 TIMES DAILY
Qty: 120 TABLET | Refills: 3 | Status: SHIPPED
Start: 2021-08-30 | End: 2021-10-22 | Stop reason: SDUPTHER

## 2021-08-30 RX ADMIN — PROMETHAZINE HYDROCHLORIDE 12.5 MG: 25 INJECTION INTRAMUSCULAR; INTRAVENOUS at 00:28

## 2021-08-30 NOTE — ED NOTES
FIRST PROVIDER CONTACT ASSESSMENT NOTE      Department of Emergency Medicine   Admit Date: No admission date for patient encounter. Chief Complaint: Emesis (diarrhea, here thursday for same c/o)      History of Present Illness:    Maurilio Alfaro is a 73 y.o. male who presents to the ED for diarrhea and nausea states he was here 3 days ago for the same complaint had labs and IV fluids that were given and was advised to follow-up with PCP or return if any worse. He states his diarrhea is progressively worsening and he is now having nausea with vomiting. He denies any chest pain or shortness of breath. He denies any fever or chills.         -----------------END OF FIRST PROVIDER CONTACT ASSESSMENT NOTE--------------  Electronically signed by JAIR Sandhu CNP   DD: 8/29/21               JAIR Costa CNP  08/29/21 2004

## 2021-08-30 NOTE — ED PROVIDER NOTES
ED  Provider Note  Admit Date/RoomTime: 8/29/2021  8:05 PM  ED Room: 23/23     HPI:   Sadaf Mistry is a 73 y.o. male presenting to the ED for diarrhe, beginning 4 days ago. History comes primarily from the patient. Past medical history includes COPD, diabetes. The complaint has been persistent, moderate in severity, improved by nothing and worsened by nothing. Associated symptoms include nausea and vomiting. Kelley Quintana developed diarrhea 4 days ago at which point he was seen in our facility, assessed, had no significant abnormalities and was discharged after receiving fluids. He was seen the following day by his gastroenterologist, who prescribed him Flagyl and took a stool sample. Before he could initiate this treatment, he had dinner this evening and then had nausea and had his first episode of emesis associated with his GI symptomatology. For this reason he presented back to [facility] emergency department for further evaluation and treatment. On arrival, the patient was assessed with history, physical exam, imaging studies and laboratory studies, vital signs. Vital signs were stable on arrival and the patient was afebrile. Review of Systems   Constitutional: Positive for activity change and appetite change. Negative for chills and fever. HENT: Negative for ear pain, sinus pressure and sore throat. Eyes: Negative for pain, discharge and redness. Respiratory: Negative for cough, shortness of breath and wheezing. Cardiovascular: Negative for chest pain. Gastrointestinal: Positive for abdominal pain, diarrhea, nausea and vomiting. Genitourinary: Negative for dysuria and frequency. Musculoskeletal: Negative for arthralgias and back pain. Skin: Negative for rash and wound. Neurological: Negative for weakness and headaches. Hematological: Negative for adenopathy. All other systems reviewed and are negative.        Physical Exam  Constitutional:       General: He is not in acute distress. Appearance: He is well-developed. He is not diaphoretic. HENT:      Head: Normocephalic and atraumatic. Mouth/Throat:      Dentition: Abnormal dentition. Eyes:      Pupils: Pupils are equal, round, and reactive to light. Neck:      Vascular: No JVD. Trachea: No tracheal deviation. Cardiovascular:      Rate and Rhythm: Regular rhythm. Heart sounds: No murmur heard. No friction rub. No gallop. Pulmonary:      Effort: Pulmonary effort is normal. No respiratory distress. Breath sounds: Normal breath sounds. No stridor. No wheezing or rales. Chest:      Chest wall: No tenderness. Abdominal:      General: Bowel sounds are normal. There is no distension. Palpations: Abdomen is soft. Tenderness: There is abdominal tenderness (Generalized abdominal tenderness). There is no guarding. Musculoskeletal:         General: Normal range of motion. Cervical back: Normal range of motion. Skin:     General: Skin is warm and dry. Neurological:      Mental Status: He is alert. Cranial Nerves: No cranial nerve deficit. Psychiatric:         Behavior: Behavior normal.          Procedures     MDM  Number of Diagnoses or Management Options  Nausea vomiting and diarrhea  Diagnosis management comments: Emergency Department evaluation was notable for stable evaluation in the emergency department. Patient's laboratory studies revealed unremarkable urinalysis, normal troponin, metabolic panel revealing mild creatinine elevation, normal lactic acid level, no significant anemia, thrombocytopenia or leukocytosis. Patient symptoms improved with administration of supportive therapies. Imaging of the abdomen revealed no evidence of diverticulitis or other significant intra-abdominal pathology. Patient has already stable to care with gastroenterology.   And was advised to follow-up with their gastroenterologist for the results of their laboratory studies including stool cultures. Patient will be discharged home with supportive therapies    They were advised to return to the emergency department should they develop fever, chills, night sweats, nausea, vomiting, diarrhea, chest pain, shortness of breath, or worsening of their symptoms despite treatment from this emergency department visit. They were instructed to follow-up with their primary care provider in 2 days. This information was relayed to the patient who understood this plan of care and was amenable to the plan.            --------------------------------------------- PAST HISTORY ---------------------------------------------  Past Medical History:  has a past medical history of Anesthesia complication, Arthritis, Asthma, [facility] Fungal infection of foot, GERD (gastroesophageal reflux disease), Hyperlipidemia, Hypertension, and Type 2 diabetes mellitus without complication ([facility] Past Surgical History:  has a past surgical history that includes hernia repair; Neck surgery; Cholecystectomy, laparoscopic (N/A, 05/18/2020); Anterior cruciate ligament repair (Right, 07/13/2020); Colonoscopy; Endoscopy, colon, diagnostic; and Shoulder arthroscopy (Left, 6/1/2021). Social History:  reports that he quit smoking about 35 years ago. His smoking use included cigarettes. He started smoking about 56 years ago. He has a 22.50 pack-year smoking history. He has never used smokeless tobacco. He reports current alcohol use. He reports that he does not use drugs. Family History: family history includes Diabetes in his father and maternal grandmother; Heart Attack in his mother; Hypertension in his mother and paternal grandmother. The patients home medications have been reviewed.     Allergies: Adhesive tape    -------------------------------------------------- RESULTS -------------------------------------------------  Labs:  Results for orders placed or performed during the hospital encounter of 08/29/21 CBC Auto Differential   Result Value Ref Range    WBC 4.8 4.5 - 11.5 E9/L    RBC 4.98 3.80 - 5.80 E12/L    Hemoglobin 15.3 12.5 - 16.5 g/dL    Hematocrit 41.8 37.0 - 54.0 %    MCV 83.9 80.0 - 99.9 fL    MCH 30.7 26.0 - 35.0 pg    MCHC 36.6 (H) 32.0 - 34.5 %    RDW 12.9 11.5 - 15.0 fL    Platelets 767 833 - 100 E9/L    MPV 9.3 7.0 - 12.0 fL    Neutrophils % 52.6 43.0 - 80.0 %    Lymphocytes % 34.2 20.0 - 42.0 %    Monocytes % 7.9 2.0 - 12.0 %    Eosinophils % 4.4 0.0 - 6.0 %    Basophils % 0.4 0.0 - 2.0 %    Neutrophils Absolute 2.54 1.80 - 7.30 E9/L    Lymphocytes Absolute 1.68 1.50 - 4.00 E9/L    Monocytes Absolute 0.38 0.10 - 0.95 E9/L    Eosinophils Absolute 0.21 0.05 - 0.50 E9/L    Basophils Absolute 0.00 0.00 - 0.20 E9/L    Atypical Lymphocytes Relative 0.9 0.0 - 4.0 %    Poikilocytes 1+     [place] Cells 1+     Ovalocytes 1+    Comprehensive Metabolic Panel   Result Value Ref Range    Sodium 138 132 - 146 mmol/L    Potassium 3.6 3.5 - 5.0 mmol/L    Chloride 104 98 - 107 mmol/L    CO2 22 22 - 29 mmol/L    Anion Gap 12 7 - 16 mmol/L    Glucose 162 (H) 74 - 99 mg/dL    BUN 29 (H) 6 - 23 mg/dL    CREATININE 1.5 (H) 0.7 - 1.2 mg/dL    GFR Non-African American 47 >=60 mL/min/1.73    GFR African American 57     Calcium 9.5 8.6 - 10.2 mg/dL    Total Protein 7.9 6.4 - 8.3 g/dL    Albumin 4.3 3.5 - 5.2 g/dL    Total Bilirubin 0.6 0.0 - 1.2 mg/dL    Alkaline Phosphatase 59 40 - 129 U/L    ALT 35 0 - 40 U/L    AST 29 0 - 39 U/L   Magnesium   Result Value Ref Range    Magnesium 1.8 1.6 - 2.6 mg/dL   Lactic Acid, Plasma   Result Value Ref Range    Lactic Acid 1.0 0.5 - 2.2 mmol/L   Urinalysis   Result Value Ref Range    Color, UA Yellow Straw/Yellow    Clarity, UA Clear Clear    Glucose, Ur Negative Negative mg/dL    Bilirubin Urine Negative Negative    Ketones, Urine Negative Negative mg/dL    Specific Gravity, UA >=1.030 1.005 - 1.030    Blood, Urine Negative Negative    pH, UA 6.0 5.0 - 9.0    Protein, UA TRACE Negative mg/dL    Urobilinogen, Urine 0.2 <2.0 E.U./dL    Nitrite, Urine Negative Negative    Leukocyte Esterase, Urine Negative Negative   Lipase   Result Value Ref Range    Lipase 36 13 - 60 U/L   Microscopic Urinalysis   Result Value Ref Range    WBC, UA 0-1 0 - 5 /HPF    RBC, UA NONE 0 - 2 /HPF    Bacteria, UA NONE SEEN None Seen /HPF   Troponin   Result Value Ref Range    Troponin, High Sensitivity 7 0 - 11 ng/L       Radiology:  CT ABDOMEN PELVIS W IV CONTRAST Additional Contrast? None   Final Result   No evidence for diverticulitis. Cholecystectomy. Significant prostatomegaly. Hepatomegaly with hepatic steatosis.             ------------------------- NURSING NOTES AND VITALS REVIEWED ---------------------------  Date / Time Roomed:  8/29/2021  8:05 PM  ED Bed Assignment:  23/23    The nursing notes within the ED encounter and vital signs as below have been reviewed. /75   Pulse 69   Temp 96.1 °F (35.6 °C) (Temporal)   Resp 16   Wt 220 lb (99.8 kg)   SpO2 95%   BMI 31.57 kg/m²   Oxygen Saturation Interpretation: Normal      ------------------------------------------ PROGRESS NOTES ------------------------------------------  9:00 PM EDT  I have spoken with the patient and discussed todays results, in addition to providing specific details for the plan of care and counseling regarding the diagnosis and prognosis. Their questions are answered at this time and they are agreeable with the plan. I discussed at length with them reasons for immediate return here for re evaluation. They will followup with their gastroenterologist by calling their office tomorrow. --------------------------------- ADDITIONAL PROVIDER NOTES ---------------------------------  At this time the patient is without objective evidence of an acute process requiring hospitalization or inpatient management.   They have remained hemodynamically stable throughout their entire ED visit and are stable for discharge with outpatient follow-up. The plan has been discussed in detail and they are aware of the specific conditions for emergent return, as well as the importance of follow-up. Discharge Medication List as of 8/30/2021 12:25 AM      START taking these medications    Details   ondansetron (ZOFRAN ODT) 4 MG disintegrating tablet Take 1 tablet by mouth every 8 hours as needed for Nausea or Vomiting, Disp-10 tablet, R-0Print             Diagnosis:  1. Nausea vomiting and diarrhea        Disposition:  Patient's disposition: Discharge to home  Patient's condition is stable.             Cornelio [address] DO  Resident  08/30/21 [id number]

## 2021-08-30 NOTE — PROGRESS NOTES
OFFICE PROGRESS NOTE      SUBJECTIVE:        Patient ID:   Maurilio Alfaro is a 73 y.o. male who presents for   Chief Complaint   Patient presents with   [employer] ED Follow-up     went to gastro doc on friday: started taking on Saturday Still nauseated and has pressure on stomach           HPI:     ABDOMINAL PAIN AND CRAMPING FOR 4 DAYS. ALSO HAD DIARRHEA AND VOMITING BUT SETTLING DOWN NOW. WAS SEEN AT [facility] ER 4 DAYS AGO. RECEIVED IV FLUIDS AND IV MEDICATION. AS THE PROBLEM PERSISTED, HE WENT TO DR. CHANDU WOO' OFFICE. HE GAVE STOOL SAMLE THERE AND WAS STARTED ON ANTIBIOTIC. NOW HAS SEVERE ABDOMINAL CRAMPS. HAS NOT GONE TO BATHROOM YET TODAY. WATCHING DIET GOOD. WALKING FOR EXERCISE. TAKING MEDICATIONS REGULARLY. Prior to Admission medications    Medication Sig Start Date End Date Taking?  Authorizing Provider   rOPINIRole (REQUIP) 1 MG tablet Take 1 tablet by mouth 4 times daily for 120 doses 8/30/21 9/29/21 Yes Micaela Gaitan MD   doxepin (SINEQUAN) 10 MG capsule TAKE UP TO 3 CAPSULES BY MOUTH AT BEDTIME AS DIRECTED FOR INSOMNIA 8/30/21  Yes Micaela Gaitan MD   dicyclomine (BENTYL) 20 MG tablet Take 1 tablet by mouth 4 times daily 8/30/21  Yes Micaela Gaitan MD   atorvastatin (LIPITOR) 40 MG tablet TAKE 1 TABLET BY MOUTH IN THE EVENING FOR CHOLESTEROL 7/26/21  Yes Micaela Gaitan MD   losartan (COZAAR) 50 MG tablet Take 1 tablet by mouth daily 7/26/21  Yes Micaela Gaitan MD   carvedilol (COREG) 25 MG tablet Take 1 tablet by mouth 2 times daily 7/26/21  Yes Micaela Gaitan MD   hydroCHLOROthiazide (MICROZIDE) 12.5 MG capsule Take 1 capsule by mouth daily 7/26/21  Yes Micaela Gaitan MD   DULoxetine (CYMBALTA) 60 MG extended release capsule Take 1 capsule by mouth daily for 20 days 7/26/21 8/30/21 Yes Micaela Gaitan MD   glipiZIDE (GLUCOTROL) 10 MG tablet Take 1 tablet by mouth 2 times daily 7/1/21  Yes Micaela Gaitan MD   fenofibrate micronized (LOFIBRA) 134 MG capsule take 1 capsule by mouth every morning TAKE BEFORE BEAKFAST 21  Yes Historical Provider, MD   Cholecalciferol (VITAMIN D3) 125 MCG (5000 UT) TABS Take by mouth daily   Yes Historical Provider, MD   colestipol (COLESTID) 1 g tablet Take 2 g by mouth daily   Yes Historical Provider, MD   albuterol sulfate HFA (VENTOLIN HFA) 108 (90 Base) MCG/ACT inhaler Inhale 2 puffs into the lungs 4 times daily as needed for Wheezing 21  Yes Cristel De La Cruz MD   Lancets 30G MISC 1 each by Does not apply route 3 times daily Dispense what ever brand is covered by insurance 21  Yes Cristel De La Cruz MD   sildenafil (REVATIO) 20 MG tablet Take 1 tablet by mouth daily as needed (FOR SEXUAL DYSFUNCTION) 21  Yes Cristel De La Cruz MD   blood glucose monitor strips Test 3 times a day & as needed for symptoms of irregular blood glucose 3/24/21  Yes Cristel De La Cruz MD   vitamin B-12 (CYANOCOBALAMIN) 1000 MCG tablet Take 1,000 mcg by mouth daily   Yes Historical Provider, MD   Blood Glucose Monitoring Suppl (BLOOD GLUCOSE MONITOR SYSTEM) w/Device KIT 1 each by Does not apply route 3 times daily Dispense what ever brand is covered by insurance 20  Yes Cristel De La Cruz MD     Social History     Socioeconomic History    Marital status: Legally      Spouse name: None    Number of children: None    Years of education: None    Highest education level: None   Occupational History    None   Tobacco Use    Smoking status: Former Smoker     Packs/day: 1.50     Years: 15.00     Pack years: 22.50     Types: Cigarettes     Start date: 1965     Quit date: 1986     Years since quittin.5    Smokeless tobacco: Never Used   Vaping Use    Vaping Use: Never used   Substance and Sexual Activity    Alcohol use: Yes     Comment: 2-3 beers per week    Drug use: No    Sexual activity: None   Other Topics Concern    None   Social History Narrative    None     Social Determinants of Health     Financial Resource Strain: Low Risk     Difficulty of Paying Living Expenses: Not hard at all   Food Insecurity: No Food Insecurity    Worried About Running Out of Food in the Last Year: Never true    Julia of Food in the Last Year: Never true   Transportation Needs: No Transportation Needs    Lack of Transportation (Medical): No    Lack of Transportation (Non-Medical): No   Physical Activity:     Days of Exercise per Week:     Minutes of Exercise per Session:    Stress:     Feeling of Stress :    Social Connections:     Frequency of Communication with Friends and Family:     Frequency of Social Gatherings with Friends and Family:     Attends [employer] Services:     Active Member of Clubs or Organizations:     Attends Club or Organization Meetings:     Marital Status:    Intimate Partner Violence:     Fear of Current or Ex-Partner:     Emotionally Abused:     Physically Abused:     Sexually Abused:        I have reviewed Jeffery's allergies, medications, problem list, medical, social and family history and have updated as needed in the electronic medical record  Review Of Systems:    Skin: no abnormal pigmentation, rash, scaling, itching, masses, hair or nail changes  Eyes: no blurring, diplopia, or eye pain  Ears/Nose/Throat: no hearing loss, tinnitus, vertigo, nosebleed, nasal congestion, rhinorrhea, sore throat  Respiratory: no cough, pleuritic chest pain, dyspnea, or wheezing  Cardiovascular: no chest pain, angina, dyspnea on exertion, orthopnea, PND, palpitations, or claudication  Gastrointestinal: no nausea, vomiting, heartburn, diarrhea, constipation, bloating,  abdominal pain, or blood per rectum. Appetite is good  Genitourinary: no urinary urgency, frequency, dysuria, nocturia, hesitancy, or incontinence  Musculoskeletal: joint pains off and on. Morning stiffness.  Ambulating well  Neurologic: no paralysis, paresis, paresthesia, seizures, tremors, or headaches  Hematologic/Lymphatic/Immunologic: no anemia, abnormal bleeding/bruising, fever, chills, night sweats, swollen glands, or unexplained weight loss  Endocrine: no heat or cold intolerance and no polyphagia, polydipsia, or polyuria        OBJECTIVE:     VS:  Wt Readings from Last 3 Encounters:   08/30/21 213 lb 3.2 oz (96.7 kg)   08/29/21 220 lb (99.8 kg)   08/26/21 220 lb (99.8 kg)     Temp Readings from Last 3 Encounters:   08/30/21 97.7 °F (36.5 °C) (Temporal)   08/29/21 96.1 °F (35.6 °C) (Temporal)   08/26/21 98.3 °F (36.8 °C)     BP Readings from Last 3 Encounters:   08/30/21 117/70   08/29/21 126/75   08/26/21 (!) 143/81        General appearance: Alert, Awake, Oriented times 3, no distress  Skin: Warm and dry  Head: Normocephalic. No masses, lesions or tenderness noted  Eyes: Conjunctivae appear normal. PERLE  Ears: External ears normal  Nose/Sinuses: Nares normal. Septum midline. Mucosa normal. No drainage  Oropharynx: Oropharynx clear with no exudate seen  Neck: Neck supple. No jugular venous distension, lymphadenopathy or thyromegaly Trachea midline  Chest:  Normal. Movements are Normal and Equal.  Lungs: Lungs clear to auscultation bilaterally. No ronchi, crackles or wheezes  Heart: S1 S2  Regular rate and rhythm. No rub, murmur or gallop  Abdomen: Abdomen soft, non-tender. BS normal. No masses, organomegaly. Back: Grossly Normal and Equal. DTR are Normal. SLR is Normal.  Extremities: Arthritic changes are noted. Movements are limited. Pedal pulses are normal.  Musculoskeletal: Muscular strength appears intact.  No joint effusion, tenderness, swelling or warmth  Neuro:  No focal motor or sensory deficits        ASSESSMENT     Patient Active Problem List    Diagnosis Date Noted    Mixed hypercholesterolemia and hypertriglyceridemia     Parkinson's disease 08/30/2021    Rupture of left biceps tendon 06/18/2021    Major depressive disorder, recurrent, moderate 06/18/2021    Type 2 diabetes mellitus with chronic kidney disease ([facility] 05/17/2021    Difficulty walking 05/17/2021    Onychomycosis 02/15/2021    Tinea pedis of both feet 02/15/2021    Rupture of anterior cruciate ligament of right knee     [facility] 07/15/2020    Hypertension     Type 2 diabetes mellitus with hyperglycemia, without long-term current use of insulin ([facility])     Tinea corporis 02/24/2011        Diagnosis:     ICD-10-CM    1. Parkinson's disease  G20     STABLE   2. Type 2 diabetes mellitus with stage 3a chronic kidney disease, without long-term current use of insulin ([facility])  E11.22     N18.31     CONTROLLED   3. Major depressive disorder, recurrent, moderate ([facility])  F33.1     STABLE   4. Acute gastroenteritis  K52.9     ACTIVE        PLAN:           Patient Instructions   LOW SALT FOR BLOOD PRESSURE CONTROL. LOW CARBOHYDRATE FOR BLOOD SUGAR AND WEIGHT CONTROL. LOW FAT DIET FOR CHOLESTEROL CONTROL. DRINK ENOUGH FLUIDS FOR BETTER KIDNEY FUNCTION. TAKE COREG,COZAAR, HCTZ  FOR BLOOD PRESSURE CONTROL. TAKE GLUCOTROL  FOR BLOOD SUGAR CONTROL. TAKE LIPITOR   FOR CHOLESTEROL CONTROL. [place] Kan TAKE SINEQUAN, CYMBALTA  FOR MOOD CONTROL. REGULAR WALKING ADVISED. ADVISED WEIGHT REDUCTION. KEEP NEXT APPOINTMENT IN 2 MONTHS FOR ANNUAL PHYSICAL EXAMINATION . Return in about 2 months (around 10/30/2021) for [employer]. .         I have reviewed my findings and recommendations with Sadaf Mistry.     Electronically signed by Antonio Osullivan MD on 8/30/21 at 10:56 AM EDT

## 2021-09-01 ENCOUNTER — TELEPHONE (OUTPATIENT)
Dept: FAMILY MEDICINE CLINIC | Age: 66
End: 2021-09-01

## 2021-09-01 LAB
FECAL NEUTRAL FAT: NORMAL
FECAL SPLIT FATS: NORMAL

## 2021-09-01 NOTE — TELEPHONE ENCOUNTER
Left with neuro scheduling a message regarding information on patient upcoming surgery on the 8th of this month.

## 2021-09-02 NOTE — PROGRESS NOTES
Terry [facility] PRE-ADMISSION TESTING GENERAL INSTRUCTIONS- [facility]-phone number:[phone]    GENERAL INSTRUCTIONS  [x] Antibacterial Soap shower Night before and/or AM of Surgery  [] Karl wipe instruction sheet and wipes given. [x] Nothing by mouth after midnight, including gum, candy, mints, or water. [x] You may brush your teeth, gargle, but do NOT swallow water. []Hibiclens shower  the night before and the morning of surgery. Do not use             Hibiclens on your face or head. [x]No smoking, chewing tobacco, illegal drugs, or alcohol within 24 hours of your surgery. [x] Jewelry, valuables or body piercing's should not be brought to the hospital. All body and/or tongue piercing's must be removed prior to arriving to hospital.  ALL hair pins must be removed. [x] Do not wear makeup, lotions, powders, deodorant. Nail polish as directed by the nurse. [x] Arrange transportation with a responsible adult  to and from the hospital. If you do not have a responsible adult  to transport you, you will need to make arrangements with a medical transportation company (i.e. [email or web address]. A Uber/taxi/bus is not appropriate unless you are accompanied by a responsible adult ). Arrange for someone to be with you for the remainder of the day and for 24 hours after your procedure due to having had anesthesia. Who will be your  for transportation?____Sravanthi___________   Who will be staying with you for 24 hrs after your procedure?__________________  [x] Bring insurance card and photo ID.  [] Transfusion Bracelet: Please bring with you to hospital, day of surgery  [] Bring urine specimen day of surgery. Any small container is acceptable. [x] Use inhalers the morning of surgery and bring with you to hospital.  [] Bring copy of living will or healthcare power of  papers to be placed in your electronic record.   [] CPAP/BI-PAP: Please bring your machine if you are to spend the night in the hospital.     PARKING INSTRUCTIONS:   [x] Arrival Time:_0700____________  · [x] Parking lot '\"I\"  is located on [facility] (the corner of [facility] and [facility]). To enter, press the button and the gate will lift. A free token will be provided to exit the lot. One car per patient is allowed to park in this lot. All other cars are to park on [address] [place] [address] either in the parking garage or the handicap lot. [] To reach the [facility] lobby from [address] [place] [address], upon entering the hospital, take elevator B to the 3rd floor. EDUCATION INSTRUCTIONS:      [] Knee or hip replacement booklet & exercise pamphlets given. [] Bran 77 placed in chart. [] Pre-admission Testing educational folder given  [] Incentive Spirometry,coughing & deep breathing exercises reviewed. []Medication information sheet(s)   []Fluoroscopy-Xray used in surgery reviewed with patient. Educational pamphlet placed in chart. []Pain: Post-op pain is normal and to be expected. You will be asked to rate your pain from 0-10(a zero is not acceptable-education is needed). Your post-op pain goal is:  [] Ask your nurse for your pain medication. [] Joint camp offered. [] Joint replacement booklets given. [] Other:___________________________    MEDICATION INSTRUCTIONS:   [x]Bring a complete list of your medications, please write the last time you took the medicine, give this list to the nurse. [x] Take the following medications the morning of surgery with 1-2 ounces of water: Cymbalta, carvedilol & Requip, albuterol if needed   [x] Stop herbal supplements and vitamins 5 days before your surgery. [x] DO NOT take any diabetic medicine the morning of surgery. Follow instructions for insulin the day before surgery. [x] If you are diabetic and your blood sugar is low or you feel symptomatic, you may drink 1-2 ounces of apple juice or take a glucose tablet.   The morning of your procedure, you may call the pre-op area if you have concerns about your blood sugar [phone]. [x] Use your inhalers the morning of surgery. Bring your emergency inhaler with you day of surgery. [x] Follow physician instructions regarding any blood thinners you may be taking. WHAT TO EXPECT:  [x] The day of surgery you will be greeted and checked in by the [facility] [employer] [facility].  In addition, you will be registered in the [facility] by a Patient Access Representative. Please bring your photo ID and insurance card. A nurse will greet you in accordance to the time you are needed in the pre-op area to prepare you for surgery. Please do not be discouraged if you are not greeted in the order you arrive as there are many variables that are involved in patient preparation. Your patience is greatly appreciated as you wait for your nurse. Please bring in items such as: books, magazines, newspapers, electronics, or any other items  to occupy your time in the waiting area. [x]  Delays may occur with surgery and staff will make a sincere effort to keep you informed of delays. If any delays occur with your procedure, we apologize ahead of time for your inconvenience as we recognize the value of your time.

## 2021-09-03 ENCOUNTER — TREATMENT (OUTPATIENT)
Dept: PHYSICAL THERAPY | Age: 66
End: 2021-09-03
Payer: MEDICARE

## 2021-09-03 ENCOUNTER — HOSPITAL ENCOUNTER (OUTPATIENT)
Age: 66
Discharge: HOME | End: 2021-09-05
Payer: MEDICARE

## 2021-09-03 DIAGNOSIS — S83.511D RUPTURE OF ANTERIOR CRUCIATE LIGAMENT OF RIGHT KNEE, SUBSEQUENT ENCOUNTER: ICD-10-CM

## 2021-09-03 DIAGNOSIS — U07.1 COVID-19: ICD-10-CM

## 2021-09-03 DIAGNOSIS — S46.212A RUPTURE OF LEFT BICEPS TENDON, INITIAL ENCOUNTER: Primary | ICD-10-CM

## 2021-09-03 LAB — CULTURE, STOOL: NORMAL

## 2021-09-03 PROCEDURE — U0003 INFECTIOUS AGENT DETECTION BY NUCLEIC ACID (DNA OR RNA); SEVERE ACUTE RESPIRATORY SYNDROME CORONAVIRUS 2 (SARS-COV-2) (CORONAVIRUS DISEASE [COVID-19]), AMPLIFIED PROBE TECHNIQUE, MAKING USE OF HIGH THROUGHPUT TECHNOLOGIES AS DESCRIBED BY CMS-2020-01-R: HCPCS

## 2021-09-03 PROCEDURE — 97110 THERAPEUTIC EXERCISES: CPT

## 2021-09-03 PROCEDURE — U0005 INFEC AGEN DETEC AMPLI PROBE: HCPCS

## 2021-09-03 NOTE — PROGRESS NOTES
Physical Therapy Daily Treatment Note    Date: 9/3/2021  Patient Name: Rosan Bence  : 1955   MRN: [id number]  DOInjury:   DOSx: 2021   Referring Provider: Erik Wells DO     Medical Diagnosis:      Diagnosis Orders   1. Rupture of left biceps tendon, initial encounter     2. Rupture of anterior cruciate ligament of right knee, subsequent encounter         Outcome Measure:   QuickDASH (Disorders of the Arm, Shoulder, and Hand) 52% disability    Access Code: [email or web address]  URL: [email or web address]  Date: 2021  Prepared by: Radha Robert    Exercises  Seated Shoulder Flexion AAROM with Pulley Behind - 2 x daily - 3 min time  Seated Shoulder Flexion Towel Slide at Table Top - 3 x daily - 7 x weekly - 10 reps  Supine Shoulder Press - 2 x daily - 3 sets - 10 reps  Standing Shoulder External Rotation AAROM with Dowel - 2 x daily - 3 sets - 10 reps  Standing Shoulder Internal Rotation AAROM with Dowel - 2 x daily - 3 sets - 10 reps  Supine Shoulder Flexion with Dowel - 2 x daily - 3 sets - 10 reps      Precautions: strengthening 21      S: Pt reports 1/10 pain today in both shoulder and neck. States neck is tight and continues to have numbness and tingling into his left hand thumb and first two digits  O: Patient has order from Dr. Trupti Vázquez for cervical PT. Pt is having surgery on his elbow 2021  Started patient early. Appt at 9:20  Time 9967-3071     Visit 10/12 Repeat outcome measure at mid point and end.     Pain 1/10      ROM Flexion 150*A/165*P  IR T9 9/3/21    Modalities      ice   MO   Manual         MT         Exercise      UBE      Pulley flex/IR X 3/3 min ea      TE      TE   Supine wand bench press   TE   Supine wand flexion blk bar 3 x 10  TE   Supine punches NEXT     IR wand behind back  TE   Supine flexion     Bicep flex/ext 7# 2 x 15     sidelying ER  Limited range    Standing wand IR/ER   TE   Standing wand flex Pain free range TE   Standing flexion  TE   Standing press up blk bar 2 x 10     ROWS: H Green 2 x 20  TA   ROWS: M Green 2 x 20  TA   ROWS: L   TA   Punches       ER Green 2 x 10  TE   IR Green 2 x 10   TE                                             A:  Tolerated well.  PT continues with weakness and limited end range motion    P: Continue with rehab plan  Juan Alberto Ghosh PTA    Treatment Charges: Mins Units   Initial Evaluation     Re-Evaluation     Ther Exercise         TE 40 3   Manual Therapy     MT     Ther Activities        TA     Gait Training          GT     Neuro Re-education NR     Modalities     Non-Billable Service Time     Other     Total Time/Units 40 3

## 2021-09-05 LAB
SARS-COV-2: NOT DETECTED
SOURCE: NORMAL

## 2021-09-07 ENCOUNTER — ANESTHESIA EVENT (OUTPATIENT)
Dept: OPERATING ROOM | Age: 66
End: 2021-09-07
Payer: MEDICARE

## 2021-09-08 ENCOUNTER — HOSPITAL ENCOUNTER (OUTPATIENT)
Age: 66
Setting detail: OUTPATIENT SURGERY
Discharge: HOME | End: 2021-09-08
Attending: NEUROLOGICAL SURGERY | Admitting: NEUROLOGICAL SURGERY
Payer: MEDICARE

## 2021-09-08 ENCOUNTER — ANESTHESIA (OUTPATIENT)
Dept: OPERATING ROOM | Age: 66
End: 2021-09-08
Payer: MEDICARE

## 2021-09-08 VITALS
OXYGEN SATURATION: 94 % | WEIGHT: 213 LBS | SYSTOLIC BLOOD PRESSURE: 140 MMHG | BODY MASS INDEX: 30.49 KG/M2 | DIASTOLIC BLOOD PRESSURE: 76 MMHG | TEMPERATURE: 98 F | HEART RATE: 58 BPM | HEIGHT: 70 IN | RESPIRATION RATE: 17 BRPM

## 2021-09-08 VITALS — DIASTOLIC BLOOD PRESSURE: 72 MMHG | SYSTOLIC BLOOD PRESSURE: 114 MMHG | OXYGEN SATURATION: 96 %

## 2021-09-08 DIAGNOSIS — Z01.818 PRE-OP EVALUATION: ICD-10-CM

## 2021-09-08 DIAGNOSIS — Z01.812 PRE-OPERATIVE LABORATORY EXAMINATION: ICD-10-CM

## 2021-09-08 DIAGNOSIS — U07.1 COVID-19: Primary | ICD-10-CM

## 2021-09-08 DIAGNOSIS — G56.22 NEUROPATHY, ULNAR AT ELBOW, LEFT: ICD-10-CM

## 2021-09-08 LAB — METER GLUCOSE: 151 MG/DL (ref 74–99)

## 2021-09-08 PROCEDURE — 6370000000 HC RX 637 (ALT 250 FOR IP): Performed by: ANESTHESIOLOGY

## 2021-09-08 PROCEDURE — 2580000003 HC RX 258: Performed by: NURSE ANESTHETIST, CERTIFIED REGISTERED

## 2021-09-08 PROCEDURE — 3600000002 HC SURGERY LEVEL 2 BASE: Performed by: NEUROLOGICAL SURGERY

## 2021-09-08 PROCEDURE — 3600000012 HC SURGERY LEVEL 2 ADDTL 15MIN: Performed by: NEUROLOGICAL SURGERY

## 2021-09-08 PROCEDURE — 64718 REVISE ULNAR NERVE AT ELBOW: CPT | Performed by: NEUROLOGICAL SURGERY

## 2021-09-08 PROCEDURE — 7100000011 HC PHASE II RECOVERY - ADDTL 15 MIN: Performed by: NEUROLOGICAL SURGERY

## 2021-09-08 PROCEDURE — 82962 GLUCOSE BLOOD TEST: CPT

## 2021-09-08 PROCEDURE — 3700000000 HC ANESTHESIA ATTENDED CARE: Performed by: NEUROLOGICAL SURGERY

## 2021-09-08 PROCEDURE — 6360000002 HC RX W HCPCS: Performed by: NURSE ANESTHETIST, CERTIFIED REGISTERED

## 2021-09-08 PROCEDURE — 2580000003 HC RX 258

## 2021-09-08 PROCEDURE — 3700000001 HC ADD 15 MINUTES (ANESTHESIA): Performed by: NEUROLOGICAL SURGERY

## 2021-09-08 PROCEDURE — 94664 DEMO&/EVAL PT USE INHALER: CPT

## 2021-09-08 PROCEDURE — 64718 REVISE ULNAR NERVE AT ELBOW: CPT | Performed by: STUDENT IN AN ORGANIZED HEALTH CARE EDUCATION/TRAINING PROGRAM

## 2021-09-08 PROCEDURE — 6360000002 HC RX W HCPCS

## 2021-09-08 PROCEDURE — 2709999900 HC NON-CHARGEABLE SUPPLY: Performed by: NEUROLOGICAL SURGERY

## 2021-09-08 PROCEDURE — 2500000003 HC RX 250 WO HCPCS: Performed by: NEUROLOGICAL SURGERY

## 2021-09-08 PROCEDURE — 7100000010 HC PHASE II RECOVERY - FIRST 15 MIN: Performed by: NEUROLOGICAL SURGERY

## 2021-09-08 RX ORDER — PROPOFOL 10 MG/ML
INJECTION, EMULSION INTRAVENOUS PRN
Status: DISCONTINUED | OUTPATIENT
Start: 2021-09-08 | End: 2021-09-08 | Stop reason: SDUPTHER

## 2021-09-08 RX ORDER — FENTANYL CITRATE 50 UG/ML
INJECTION, SOLUTION INTRAMUSCULAR; INTRAVENOUS PRN
Status: DISCONTINUED | OUTPATIENT
Start: 2021-09-08 | End: 2021-09-08 | Stop reason: SDUPTHER

## 2021-09-08 RX ORDER — SODIUM CHLORIDE 9 MG/ML
25 INJECTION, SOLUTION INTRAVENOUS PRN
Status: DISCONTINUED | OUTPATIENT
Start: 2021-09-08 | End: 2021-09-08 | Stop reason: HOSPADM

## 2021-09-08 RX ORDER — MIDAZOLAM HYDROCHLORIDE 1 MG/ML
INJECTION INTRAMUSCULAR; INTRAVENOUS PRN
Status: DISCONTINUED | OUTPATIENT
Start: 2021-09-08 | End: 2021-09-08 | Stop reason: SDUPTHER

## 2021-09-08 RX ORDER — PROMETHAZINE HYDROCHLORIDE 25 MG/ML
25 INJECTION, SOLUTION INTRAMUSCULAR; INTRAVENOUS PRN
Status: DISCONTINUED | OUTPATIENT
Start: 2021-09-08 | End: 2021-09-08 | Stop reason: HOSPADM

## 2021-09-08 RX ORDER — SODIUM CHLORIDE 9 MG/ML
INJECTION, SOLUTION INTRAVENOUS CONTINUOUS PRN
Status: DISCONTINUED | OUTPATIENT
Start: 2021-09-08 | End: 2021-09-08 | Stop reason: SDUPTHER

## 2021-09-08 RX ORDER — HYDROCODONE BITARTRATE AND ACETAMINOPHEN 5; 325 MG/1; MG/1
1 TABLET ORAL EVERY 4 HOURS PRN
Qty: 24 TABLET | Refills: 0 | Status: SHIPPED | OUTPATIENT
Start: 2021-09-08 | End: 2021-09-12

## 2021-09-08 RX ORDER — LIDOCAINE HYDROCHLORIDE AND EPINEPHRINE 10; 10 MG/ML; UG/ML
INJECTION, SOLUTION INFILTRATION; PERINEURAL PRN
Status: DISCONTINUED | OUTPATIENT
Start: 2021-09-08 | End: 2021-09-08 | Stop reason: ALTCHOICE

## 2021-09-08 RX ORDER — SODIUM CHLORIDE 0.9 % (FLUSH) 0.9 %
10 SYRINGE (ML) INJECTION EVERY 12 HOURS SCHEDULED
Status: DISCONTINUED | OUTPATIENT
Start: 2021-09-08 | End: 2021-09-08 | Stop reason: HOSPADM

## 2021-09-08 RX ORDER — LABETALOL HYDROCHLORIDE 5 MG/ML
5 INJECTION, SOLUTION INTRAVENOUS EVERY 10 MIN PRN
Status: DISCONTINUED | OUTPATIENT
Start: 2021-09-08 | End: 2021-09-08 | Stop reason: HOSPADM

## 2021-09-08 RX ORDER — SODIUM CHLORIDE 0.9 % (FLUSH) 0.9 %
10 SYRINGE (ML) INJECTION PRN
Status: DISCONTINUED | OUTPATIENT
Start: 2021-09-08 | End: 2021-09-08 | Stop reason: HOSPADM

## 2021-09-08 RX ORDER — SODIUM CHLORIDE 9 MG/ML
INJECTION, SOLUTION INTRAVENOUS CONTINUOUS
Status: DISCONTINUED | OUTPATIENT
Start: 2021-09-08 | End: 2021-09-08 | Stop reason: HOSPADM

## 2021-09-08 RX ORDER — MEPERIDINE HYDROCHLORIDE 25 MG/ML
12.5 INJECTION INTRAMUSCULAR; INTRAVENOUS; SUBCUTANEOUS EVERY 5 MIN PRN
Status: DISCONTINUED | OUTPATIENT
Start: 2021-09-08 | End: 2021-09-08 | Stop reason: HOSPADM

## 2021-09-08 RX ORDER — IPRATROPIUM BROMIDE AND ALBUTEROL SULFATE 2.5; .5 MG/3ML; MG/3ML
1 SOLUTION RESPIRATORY (INHALATION) ONCE
Status: COMPLETED | OUTPATIENT
Start: 2021-09-08 | End: 2021-09-08

## 2021-09-08 RX ADMIN — SODIUM CHLORIDE: 9 INJECTION, SOLUTION INTRAVENOUS at 06:18

## 2021-09-08 RX ADMIN — FENTANYL CITRATE 25 MCG: 50 INJECTION, SOLUTION INTRAMUSCULAR; INTRAVENOUS at 08:06

## 2021-09-08 RX ADMIN — IPRATROPIUM BROMIDE AND ALBUTEROL SULFATE 1 AMPULE: .5; 2.5 SOLUTION RESPIRATORY (INHALATION) at 08:59

## 2021-09-08 RX ADMIN — SODIUM CHLORIDE: 9 INJECTION, SOLUTION INTRAVENOUS at 07:25

## 2021-09-08 RX ADMIN — FENTANYL CITRATE 25 MCG: 50 INJECTION, SOLUTION INTRAMUSCULAR; INTRAVENOUS at 07:37

## 2021-09-08 RX ADMIN — PROPOFOL 50 MG: 10 INJECTION, EMULSION INTRAVENOUS at 07:37

## 2021-09-08 RX ADMIN — FENTANYL CITRATE 25 MCG: 50 INJECTION, SOLUTION INTRAMUSCULAR; INTRAVENOUS at 08:00

## 2021-09-08 RX ADMIN — Medication 2000 MG: at 07:40

## 2021-09-08 RX ADMIN — MIDAZOLAM 1 MG: 1 INJECTION INTRAMUSCULAR; INTRAVENOUS at 07:26

## 2021-09-08 RX ADMIN — PROPOFOL 50 MCG/KG/MIN: 10 INJECTION, EMULSION INTRAVENOUS at 07:38

## 2021-09-08 RX ADMIN — PROPOFOL 30 MG: 10 INJECTION, EMULSION INTRAVENOUS at 07:42

## 2021-09-08 RX ADMIN — FENTANYL CITRATE 25 MCG: 50 INJECTION, SOLUTION INTRAMUSCULAR; INTRAVENOUS at 07:54

## 2021-09-08 ASSESSMENT — LIFESTYLE VARIABLES: SMOKING_STATUS: 0

## 2021-09-08 ASSESSMENT — PAIN SCALES - GENERAL
PAINLEVEL_OUTOF10: 0

## 2021-09-08 ASSESSMENT — PULMONARY FUNCTION TESTS
PIF_VALUE: 0
PIF_VALUE: 0
PIF_VALUE: 1
PIF_VALUE: 0

## 2021-09-08 ASSESSMENT — PAIN - FUNCTIONAL ASSESSMENT: PAIN_FUNCTIONAL_ASSESSMENT: 0-10

## 2021-09-08 ASSESSMENT — ENCOUNTER SYMPTOMS: SHORTNESS OF BREATH: 0

## 2021-09-08 NOTE — PROGRESS NOTES
Patient admitted to [facility] from surgery. Cart locked and in low position. Side rails are up. Call light is within reach. Patient and family oriented to [facility] routine proir to discharge.

## 2021-09-08 NOTE — H&P
[address] [facility] NEUROSURGERY  [address] [facility] [address]       Chief Complaint:   No chief complaint on file. HPI:    I had the pleasure of meeting Mr. Erin Guillen today in neurosurgical clinic. As you know this delightful 71-year-old right-handed single childless non-smoker and  presents with MRI evidence of cervical stenosis. Upon specific questioning and against that background the patient describes some neck pain and with radiculopathy down the left arm and into the medial 2 digits. His numbness in his medial 2 digits as well. Past Medical History:   Diagnosis Date    Anesthesia complication     states has difficulty breathing after surgery  usually needs nebulizer treatment    Arthritis     Asthma     since childhood    Diabetes mellitus ([address]     Fungal infection of foot     GERD (gastroesophageal reflux disease)     Hyperlipidemia     Hypertension     Type 2 diabetes mellitus without complication ([address]      Past Surgical History:   Procedure Laterality Date    ANTERIOR CRUCIATE LIGAMENT REPAIR Right 07/13/2020    RIGHT KNEE ARTHROSCOPY, (ANTERIOR CRUCIATE LIGAMENT) ACL RECONSTRUCTION. ALLOGRAFT.  MEDIAL MENISCECTOMY AND DEBRIDEMENT. ([address]) performed by Huong Krishna DO at [address] Left 9/8/2021    LEFT ULNAR NEUROPLASTY performed by Raulito Bustamante MD at [address] [facility], LAPAROSCOPIC N/A 05/18/2020    CHOLECYSTECTOMY LAPAROSCOPIC WITH IOC performed by January Morocho MD at [address] [facility], COLON, DIAGNOSTIC      HERNIA REPAIR      NECK SURGERY      cervical spine surgery    SHOULDER ARTHROSCOPY Left 6/1/2021    LEFT SHOULDER ARTHROSCOPY, SUBACROMIAL DECOMPRESSION, DEBRIDEMENT (ARTHREX) [facility] [address] [facility]      Family History   Problem Relation Age of Onset    Hypertension Mother     Heart Attack Mother     Diabetes Father    Youngvaleriatiago Jackson Diabetes Maternal Grandmother     Hypertension Paternal Grandmother       Social History     Socioeconomic History    Marital status: Legally      Spouse name: Not on file    Number of children: Not on file    Years of education: Not on file    Highest education level: Not on file   Occupational History    Not on file   Tobacco Use    Smoking status: Former Smoker     Packs/day: 1.50     Years: 15.00     Pack years: 22.50     Types: Cigarettes     Start date: 1965     Quit date: 1986     Years since quittin.5    Smokeless tobacco: Never Used   Vaping Use    Vaping Use: Never used   Substance and Sexual Activity    Alcohol use: Yes     Comment: 2-3 beers per week    Drug use: No    Sexual activity: Not on file   Other Topics Concern    Not on file   Social History Narrative    Not on file     Social Determinants of Health     Financial Resource Strain: Low Risk     Difficulty of Paying Living Expenses: Not hard at all   Food Insecurity: No Food Insecurity    Worried About [facility] in the Last Year: Never true    Julia of Food in the Last Year: Never true   Transportation Needs: No Transportation Needs    Lack of Transportation (Medical): No    Lack of Transportation (Non-Medical): No   Physical Activity:     Days of Exercise per Week:     Minutes of Exercise per Session:    Stress:     Feeling of Stress :    Social Connections:     Frequency of Communication with Friends and Family:     Frequency of Social Gatherings with Friends and Family:     Attends Nondenominational Services:     Active Member of Clubs or Organizations:     Attends Club or Organization Meetings:     Marital Status:    Intimate Partner Violence:     Fear of Current or Ex-Partner:     Emotionally Abused:     Physically Abused:     Sexually Abused:        Medications:   No current facility-administered medications for this encounter.      Current Outpatient Medications   Medication Sig Dispense Refill    rOPINIRole (REQUIP) 1 MG tablet Take 1 tablet by mouth 4 times daily for 120 doses 120 tablet 3    doxepin (SINEQUAN) 10 MG capsule TAKE UP TO 3 CAPSULES BY MOUTH AT BEDTIME AS DIRECTED FOR INSOMNIA 30 capsule 1    dicyclomine (BENTYL) 20 MG tablet Take 1 tablet by mouth 4 times daily 40 tablet 0    atorvastatin (LIPITOR) 40 MG tablet TAKE 1 TABLET BY MOUTH IN THE EVENING FOR CHOLESTEROL 90 tablet 1    losartan (COZAAR) 50 MG tablet Take 1 tablet by mouth daily 30 tablet 2    carvedilol (COREG) 25 MG tablet Take 1 tablet by mouth 2 times daily 60 tablet 3    hydroCHLOROthiazide (MICROZIDE) 12.5 MG capsule Take 1 capsule by mouth daily 30 capsule 3    glipiZIDE (GLUCOTROL) 10 MG tablet Take 1 tablet by mouth 2 times daily 60 tablet 3    fenofibrate micronized (LOFIBRA) 134 MG capsule take 1 capsule by mouth every morning TAKE BEFORE BEAKFAST      Cholecalciferol (VITAMIN D3) 125 MCG (5000 UT) TABS Take by mouth daily      colestipol (COLESTID) 1 g tablet Take 2 g by mouth daily      albuterol sulfate HFA (VENTOLIN HFA) 108 (90 Base) MCG/ACT inhaler Inhale 2 puffs into the lungs 4 times daily as needed for Wheezing 1 Inhaler 0    vitamin B-12 (CYANOCOBALAMIN) 1000 MCG tablet Take 1,000 mcg by mouth daily      DULoxetine (CYMBALTA) 60 MG extended release capsule Take 1 capsule by mouth daily for 20 days 30 capsule 2    Lancets 30G MISC 1 each by Does not apply route 3 times daily Dispense what ever brand is covered by insurance 100 each 3    sildenafil (REVATIO) 20 MG tablet Take 1 tablet by mouth daily as needed (FOR SEXUAL DYSFUNCTION) 5 tablet 0    blood glucose monitor strips Test 3 times a day & as needed for symptoms of irregular blood glucose 100 strip 3    Blood Glucose Monitoring Suppl (BLOOD GLUCOSE MONITOR SYSTEM) w/Device KIT 1 each by Does not apply route 3 times daily Dispense what ever brand is covered by insurance 1 kit 0        Allergies:    Adhesive tape Review of Systems:    Denies any chest pain, shortness of breath, headache, dyspnea, recent weight loss, fevers, chills or night sweats. Physical Examination:    BP (!) 140/76   Pulse 58   Temp 98 °F (36.7 °C)   Resp 17   Ht 5' 10\" (1.778 m)   Wt 213 lb (96.6 kg)   SpO2 94%   BMI 30.56 kg/m²      On focused neurological examination, he  is awake alert oriented and rationally conversant. Speech is clear and fluent. Pupils are equal and reactive to light bilaterally, extraocular movements are intact, visual fields are full to confrontation. His  face is symmetric and grossly intact to fine touch. Uvula and tongue are both midline. Shoulder shrug is symmetric and strong. Cervical spine is well aligned and nontender to direct palpation. He  can forward flex, extend , laterally rotate and laterally extend without limitation or pain. Motor examination reveals preserved power in the upper and lower extremities at 5 out of 5 throughout, except DI and HG 4/5; Froment's sign left reflexes are symmetric and brisk. Plantar responses are downgoing. There is no clonus. Patient is intact to fine touch in all dermatomes throughout and with subjective numbness in the medial digits on the left. Straight leg raise is negative. Palpation of the spine reveals no kyphotic or scoliotic gross deformities. He  can forward flex to well below the knee. There is no pain to deep percussion nor palpation. Froment's sign. Wartenberg's sign present. ASSESSMENT:    I personally reviewed Royal Hopkins's radiographic images, particularly his cervical MRI that demonstrates multilevel cervical stenosis. EMG shows slowing at the elbow of the ulnar nerve fibrillation potentials consistent with cubital tunnel syndrome        MEDICAL DECISION MAKING & PLAN:    Cubital tunnel syndrome    I explained the findings to Mr. Gerardo Matthew as well as showing him his MRI findings.   It offer him ulnar nerve release

## 2021-09-08 NOTE — PROGRESS NOTES
CLINICAL PHARMACY NOTE: MEDS TO BEDS    Total # of Prescriptions Filled: 1   The following medications were delivered to the patient:  [address] [facility]    Additional Documentation:

## 2021-09-08 NOTE — BRIEF OP NOTE
Brief Postoperative Note      Patient: Sadaf Mistry  YOB: 1955  MRN: [id number]    Date of Procedure: 9/8/2021    Pre-Op Diagnosis: CUBITAL TUNNEL SYNDROME    Post-Op Diagnosis: same       Procedure(s):  LEFT ULNAR NEUROPLASTY    Surgeon(s):  Xiomy Tate MD    Assistant:  Dae Smalls PA-C    Anesthesia: Monitor Anesthesia Care    Estimated Blood Loss (mL) 1 cc    Complications: no immediate    Specimens:   none  Implants:  none      Drains: none    Findings: severe fibrosis and compression; adequate decompression    Electronically signed by Xiomy Tate MD on 9/8/2021 at 4:41 PM

## 2021-09-08 NOTE — ANESTHESIA PRE PROCEDURE
apply route 3 times daily Dispense what ever brand is covered by insurance 4/12/21   Huong Turner MD   sildenafil (REVATIO) 20 MG tablet Take 1 tablet by mouth daily as needed (FOR SEXUAL DYSFUNCTION) 4/12/21   Huong Turner MD   blood glucose monitor strips Test 3 times a day & as needed for symptoms of irregular blood glucose 3/24/21   Huong Turner MD   vitamin B-12 (CYANOCOBALAMIN) 1000 MCG tablet Take 1,000 mcg by mouth daily    Historical Provider, MD   Blood Glucose Monitoring Suppl (BLOOD GLUCOSE MONITOR SYSTEM) w/Device KIT 1 each by Does not apply route 3 times daily Dispense what ever brand is covered by insurance 11/19/20   Huong Turner MD       Current medications:    No current facility-administered medications for this visit. No current outpatient medications on file. Facility-Administered Medications Ordered in Other Visits   Medication Dose Route Frequency Provider Last Rate Last Admin    0.9 % sodium chloride infusion   IntraVENous Continuous Pennelope BENOIT Diana 100 mL/hr at 09/08/21 0618 New Bag at 09/08/21 0618    0.9 % sodium chloride infusion  25 mL IntraVENous PRN Pennelope BENOIT Diana        ceFAZolin (ANCEF) 2000 mg in sterile water 20 mL IV syringe  2,000 mg IntraVENous On Call to [address], [place]        sodium chloride flush 0.9 % injection 10 mL  10 mL IntraVENous 2 times per day Pennelope BENOIT Diana        sodium chloride flush 0.9 % injection 10 mL  10 mL IntraVENous PRN Pennelope BENOIT Diana           Allergies:     Allergies   Allergen Reactions    Adhesive Tape Rash       Problem List:    Patient Active Problem List   Diagnosis Code    Tinea corporis B35.4    COPD with acute exacerbation ([facility] [address] J44.1    Hypertension I10    Type 2 diabetes mellitus with hyperglycemia, without long-term current use of insulin ([facility]) E11.65    Mixed hypercholesterolemia and hypertriglyceridemia E78.2    Rupture of anterior cruciate ligament of right knee S83.511A    Onychomycosis B35.1    Tinea pedis of both feet B35.3    Type 2 diabetes mellitus with chronic kidney disease ([facility] [address] E11.22    Difficulty walking R26.2    Rupture of left biceps tendon S46.212A    Major depressive disorder, recurrent, moderate F33.1    Parkinson's disease G20       Past Medical History:        Diagnosis Date    Anesthesia complication     states has difficulty breathing after surgery  usually needs nebulizer treatment    Arthritis     Asthma     since childhood    Diabetes mellitus ([facility] [address]     Fungal infection of foot     GERD (gastroesophageal reflux disease)     Hyperlipidemia     Hypertension     Type 2 diabetes mellitus without complication ([facility] [address]        Past Surgical History:        Procedure Laterality Date    ANTERIOR CRUCIATE LIGAMENT REPAIR Right 2020    RIGHT KNEE ARTHROSCOPY, (ANTERIOR CRUCIATE LIGAMENT) ACL RECONSTRUCTION. ALLOGRAFT. MEDIAL MENISCECTOMY AND DEBRIDEMENT. ([address]) performed by Russell Monterroso DO at [facility], LAPAROSCOPIC N/A 2020    CHOLECYSTECTOMY LAPAROSCOPIC WITH IOC performed by Kailey Rachel MD at [address] [facility], COLON, DIAGNOSTIC      HERNIA REPAIR      NECK SURGERY      cervical spine surgery    SHOULDER ARTHROSCOPY Left 2021    LEFT SHOULDER ARTHROSCOPY, SUBACROMIAL DECOMPRESSION, DEBRIDEMENT (AR[facility]REX) performed by Russell Monterroso DO at [address] History:    Social History     Tobacco Use    Smoking status: Former Smoker     Packs/day: 1.50     Years: 15.00     Pack years: 22.50     Types: Cigarettes     Start date: 1965     Quit date: 1986     Years since quittin.5    Smokeless tobacco: Never Used   Substance Use Topics    Alcohol use: Yes     Comment: 2-3 beers per week                                Counseling given: Not Answered      Vital Signs (Current): There were no vitals filed for this visit.                                            BP Readings from Last 3 Encounters: 09/08/21 (!) 171/83   08/30/21 117/70   08/29/21 126/75       NPO Status:                                                                                 BMI:   Wt Readings from Last 3 Encounters:   09/08/21 213 lb (96.6 kg)   08/30/21 213 lb 3.2 oz (96.7 kg)   08/29/21 220 lb (99.8 kg)     There is no height or weight on file to calculate BMI.    CBC:   Lab Results   Component Value Date    WBC 4.8 08/29/2021    RBC 4.98 08/29/2021    HGB 15.3 08/29/2021    HCT 41.8 08/29/2021    MCV 83.9 08/29/2021    RDW 12.9 08/29/2021     08/29/2021       CMP:   Lab Results   Component Value Date     08/29/2021    K 3.6 08/29/2021    K 3.7 08/26/2021     08/29/2021    CO2 22 08/29/2021    BUN 29 08/29/2021    CREATININE 1.5 08/29/2021    GFRAA 57 08/29/2021    LABGLOM 47 08/29/2021    GLUCOSE 162 08/29/2021    PROT 7.9 08/29/2021    CALCIUM 9.5 08/29/2021    BILITOT 0.6 08/29/2021    ALKPHOS 59 08/29/2021    AST 29 08/29/2021    ALT 35 08/29/2021       POC Tests: No results for input(s): POCGLU, POCNA, POCK, POCCL, POCBUN, POCHEMO, POCHCT in the last 72 hours. Coags:   Lab Results   Component Value Date    PROTIME 10.7 05/17/2020    INR 0.9 05/17/2020       HCG (If Applicable): No results found for: PREGTESTUR, PREGSERUM, HCG, HCGQUANT     ABGs: No results found for: PHART, PO2ART, MEA4IGD, UPP6TQS, BEART, N1IPWARJ     Type & Screen (If Applicable):  No results found for: LABABO, LABRH    Drug/Infectious Status (If Applicable):  No results found for: HIV, HEPCAB    COVID-19 Screening (If Applicable):   Lab Results   Component Value Date    COVID19 Not Detected 09/03/2021         Anesthesia Evaluation  Patient summary reviewed history of anesthetic complications (Reports needing nebulizer treatment after surgery): Airway: Mallampati: III  TM distance: >3 FB   Neck ROM: limited  Mouth opening: > = 3 FB Dental:      Comment: Dentition intact, patient denies any loose teeth.     Pulmonary: breath sounds clear to auscultation  (+) COPD:  decreased breath sounds,  asthma:     (-) shortness of breath and not a current smoker                          ROS comment: Former Smoker 1.5 - 2.0 x 20 years  Quit Smokin86      CXR 2020:  FINDINGS:  Suboptimal inspiration was obtained for the chest x-ray on the AP view.     The lungs are without acute focal process.  There is no effusion or  pneumothorax. The cardiomediastinal silhouette is without acute process. The  osseous structures are without acute process. Cardiovascular:  Exercise tolerance: good (>4 METS),   (+) hypertension: moderate, hyperlipidemia    Murmur:  Grade 1/6 murmur. ECG reviewed  Rhythm: regular  Rate: normal  Echocardiogram reviewed               ROS comment: EKG 2021: NSR    Echo 2020:    Summary   Normal left ventricular chamber size and systolic function. Visually   estimated LVEF is 60-65 %. No wall motion abnormalities. Normal diastolic function. Normal right ventricle structure and function. No significant valvular abnormalities. No pericardial effusion. No previous echo for comparison. Neuro/Psych:               GI/Hepatic/Renal:   (+) GERD:, renal disease (CKD stage II): CRI,      (-) liver disease       Endo/Other:    (+) DiabetesType II DM, , : arthritis: OA., .                 Abdominal:         (-) obese       Vascular: negative vascular ROS. Other Findings:               Anesthesia Plan      MAC     ASA 3       Induction: intravenous. Anesthetic plan and risks discussed with patient. Plan discussed with CRNA.             Margaret Mock MD  21  6:33 AM

## 2021-09-09 NOTE — ANESTHESIA POSTPROCEDURE EVALUATION
Department of Anesthesiology  Postprocedure Note    Patient: Deirdre Caldera  MRN: [id number]  YOB: 1955  Date of evaluation: 9/9/2021  Time:  2:35 PM     Procedure Summary     Date: 09/08/21 Room / Location: [facility] OR  / [facility]    Anesthesia Start: 0725 Anesthesia Stop: 2591    Procedure: LEFT ULNAR NEUROPLASTY (Left Elbow) Diagnosis: (CUBITAL TUNNEL SYNDROME)    Surgeons: Izabela Camarillo MD Responsible Provider: Augustin Rowe MD    Anesthesia Type: MAC ASA Status: 3          Anesthesia Type: MAC    Sri Phase I: Sri Score: 10    Sri Phase II: Sri Score: 10    Last vitals: Reviewed and per EMR flowsheets.        Anesthesia Post Evaluation    Patient location during evaluation: PACU  Patient participation: complete - patient participated  Level of consciousness: awake  Airway patency: patent  Nausea & Vomiting: no nausea and no vomiting  Complications: no  Cardiovascular status: hemodynamically stable  Respiratory status: acceptable  Hydration status: stable

## 2021-09-09 NOTE — OP NOTE
[address]                  [address] [id number],  [facility]                                OPERATIVE REPORT    PATIENT NAME: Mor Lyles                     :        1955  MED REC NO:   [id number]                            ROOM:  ACCOUNT NO:   [de-identified]                           ADMIT DATE: 2021  PROVIDER:     Ai Frazier    DATE OF PROCEDURE:  2021    ATTENDING SURGEON:  Ai Frazier MD    ASSISTANT:  Tala Morris PA-C    PREOPERATIVE DIAGNOSIS:  Left cubital tunnel syndrome. POSTOPERATIVE DIAGNOSIS:  Left cubital tunnel syndrome. PROCEDURES PERFORMED:  1.  A left ulnar neuroplasty at the elbow. 2.  An AF modifier for PA who assisted with primary opening and primary  closure. ANESTHESIA:  MAC, 1% lidocaine with epinephrine for local.    EBL:  1 cc    INDICATIONS FOR PROCEDURE:  The patient is a delightful 43-year-old   who presented with neck pain, but also with radicular  symptoms into the left arm into the medial two digits and with numbness. He demonstrated weakness of handgrip and dorsal interossei function and  EMG confirmed that he had cubital tunnel syndrome at the elbow likely  related to his occupation. Risks, benefits, and alternatives of  decompression were explained at length to the patient which included but  were not limited to the risk of infection, bleeding, failure to offer  benefit, need for reoperation, paralysis, paresthesia, and complications  of anesthesia. The patient articulated understanding and wished to  proceed. DESCRIPTION OF PROCEDURE:  The patient was taken to the operative suite  and placed under MAC sedation. The arm was placed in a stretched manner  and padded with multiple blankets in a relaxed position mindful of the  gentleman's recent shoulder surgery.   His hand was taped down and  maximally exposed the ulnar nerve just perpendicular planes to the best  of our ability. A curvilinear incision was planned based on palpation  of the medial epicondyle and the olecranon as well as palpation of the ulnar  nerve underlying it. The incision was then prepped and draped in the  usual fashion and 1% lidocaine with epinephrine was used to infiltrate  the curvilinear incision line. Incision was then fashioned using 15  blade scalpel down to the skin through the subcutaneous tissue with  hemostasis maintained using bipolar electrocautery only. I then used  blunt dissection to carry the dissection down further through the  cubital tunnel. The ulnar nerve was palpated multiple times to ensure  that we were in the correct plane, and the aponeurosis of the flexor  carpi ulnaris was first sectioned using blunt dissection and combination  with sharp dissection using Metzenbaum scissors. The ulnar nerve was  followed then distally within the ulnar groove and mindful of the ulnar  artery as well as the perineural arterial supply. The  coracobrachialis was incised. It was noted that there was extreme  fibrosis at the cubital tunnel and once again, this was incised. We  carried the dissection down distally between the two heads in the flexor  carpi ulnaris which were also incised. Bleeding points were identified  and coagulated with bipolar electrocautery while protecting the  underlying ulnar nerve. The wound was then copiously irrigated with  antibiotic-impregnated saline. The wound was then closed after gentle  palpation to ensure that no additional compression was found both  proximally and distally with 2-0 Vicryl suture in an inverted  interrupted fashion for the subcutaneous layers followed by running  continuous 3-0 Monocryl suture for the skin. Antibiotic ointment was  applied to the wound. Sterile dressing followed. This was wrapped in  Ace wrap. The patient was transferred to the recovery room in stable  condition.   All counts were correct and I certify I was present for the  case in its entirety.         Sebas Bar    D: 09/08/2021 20:31:24       T: 09/09/2021 1:23:06     [email or web address]  Job#: [id number]     Doc#: [id number]    CC:

## 2021-09-16 ENCOUNTER — OFFICE VISIT (OUTPATIENT)
Dept: NEUROSURGERY | Age: 66
End: 2021-09-16
Payer: MEDICARE

## 2021-09-16 VITALS
SYSTOLIC BLOOD PRESSURE: 138 MMHG | HEART RATE: 68 BPM | WEIGHT: 213 LBS | DIASTOLIC BLOOD PRESSURE: 78 MMHG | OXYGEN SATURATION: 98 % | RESPIRATION RATE: 18 BRPM | HEIGHT: 70 IN | BODY MASS INDEX: 30.49 KG/M2 | TEMPERATURE: 98.9 F

## 2021-09-16 DIAGNOSIS — G56.22 CUBITAL TUNNEL SYNDROME ON LEFT: Primary | ICD-10-CM

## 2021-09-16 PROCEDURE — 99024 POSTOP FOLLOW-UP VISIT: CPT

## 2021-09-17 ENCOUNTER — OFFICE VISIT (OUTPATIENT)
Dept: ORTHOPEDIC SURGERY | Age: 66
End: 2021-09-17
Payer: MEDICARE

## 2021-09-17 VITALS — TEMPERATURE: 98.5 F | HEIGHT: 70 IN | BODY MASS INDEX: 30.78 KG/M2 | WEIGHT: 215 LBS

## 2021-09-17 DIAGNOSIS — M75.102 NONTRAUMATIC TEAR OF LEFT ROTATOR CUFF, UNSPECIFIED TEAR EXTENT: ICD-10-CM

## 2021-09-17 DIAGNOSIS — M75.22 BICEPS TENDINITIS OF LEFT SHOULDER: ICD-10-CM

## 2021-09-17 DIAGNOSIS — Z71.82 EXERCISE COUNSELING: ICD-10-CM

## 2021-09-17 DIAGNOSIS — S46.212A RUPTURE OF LEFT BICEPS TENDON, INITIAL ENCOUNTER: Primary | ICD-10-CM

## 2021-09-17 DIAGNOSIS — M19.012 OSTEOARTHRITIS OF LEFT AC (ACROMIOCLAVICULAR) JOINT: ICD-10-CM

## 2021-09-17 DIAGNOSIS — S43.432A GLENOID LABRAL TEAR, LEFT, INITIAL ENCOUNTER: ICD-10-CM

## 2021-09-17 PROCEDURE — G8417 CALC BMI ABV UP PARAM F/U: HCPCS | Performed by: ORTHOPAEDIC SURGERY

## 2021-09-17 PROCEDURE — G8427 DOCREV CUR MEDS BY ELIG CLIN: HCPCS | Performed by: ORTHOPAEDIC SURGERY

## 2021-09-17 PROCEDURE — 1123F ACP DISCUSS/DSCN MKR DOCD: CPT | Performed by: ORTHOPAEDIC SURGERY

## 2021-09-17 PROCEDURE — 4040F PNEUMOC VAC/ADMIN/RCVD: CPT | Performed by: ORTHOPAEDIC SURGERY

## 2021-09-17 PROCEDURE — 1036F TOBACCO NON-USER: CPT | Performed by: ORTHOPAEDIC SURGERY

## 2021-09-17 PROCEDURE — 3017F COLORECTAL CA SCREEN DOC REV: CPT | Performed by: ORTHOPAEDIC SURGERY

## 2021-09-17 PROCEDURE — 99214 OFFICE O/P EST MOD 30 MIN: CPT | Performed by: ORTHOPAEDIC SURGERY

## 2021-09-17 NOTE — PROGRESS NOTES
Medications:     rOPINIRole (REQUIP) 1 MG tablet, Take 1 tablet by mouth 4 times daily for 120 doses, Disp: 120 tablet, Rfl: 3    doxepin (SINEQUAN) 10 MG capsule, TAKE UP TO 3 CAPSULES BY MOUTH AT BEDTIME AS DIRECTED FOR INSOMNIA, Disp: 30 capsule, Rfl: 1    dicyclomine (BENTYL) 20 MG tablet, Take 1 tablet by mouth 4 times daily, Disp: 40 tablet, Rfl: 0    atorvastatin (LIPITOR) 40 MG tablet, TAKE 1 TABLET BY MOUTH IN THE EVENING FOR CHOLESTEROL, Disp: 90 tablet, Rfl: 1    losartan (COZAAR) 50 MG tablet, Take 1 tablet by mouth daily, Disp: 30 tablet, Rfl: 2    carvedilol (COREG) 25 MG tablet, Take 1 tablet by mouth 2 times daily, Disp: 60 tablet, Rfl: 3    hydroCHLOROthiazide (MICROZIDE) 12.5 MG capsule, Take 1 capsule by mouth daily, Disp: 30 capsule, Rfl: 3    DULoxetine (CYMBALTA) 60 MG extended release capsule, Take 1 capsule by mouth daily for 20 days, Disp: 30 capsule, Rfl: 2    glipiZIDE (GLUCOTROL) 10 MG tablet, Take 1 tablet by mouth 2 times daily, Disp: 60 tablet, Rfl: 3    fenofibrate micronized (LOFIBRA) 134 MG capsule, take 1 capsule by mouth every morning TAKE BEFORE BEAKFAST, Disp: , Rfl:     Cholecalciferol (VITAMIN D3) 125 MCG (5000 UT) TABS, Take by mouth daily, Disp: , Rfl:     colestipol (COLESTID) 1 g tablet, Take 2 g by mouth daily, Disp: , Rfl:     albuterol sulfate HFA (VENTOLIN HFA) 108 (90 Base) MCG/ACT inhaler, Inhale 2 puffs into the lungs 4 times daily as needed for Wheezing, Disp: 1 Inhaler, Rfl: 0    Lancets 30G MISC, 1 each by Does not apply route 3 times daily Dispense what ever brand is covered by insurance, Disp: 100 each, Rfl: 3    sildenafil (REVATIO) 20 MG tablet, Take 1 tablet by mouth daily as needed (FOR SEXUAL DYSFUNCTION), Disp: 5 tablet, Rfl: 0    blood glucose monitor strips, Test 3 times a day & as needed for symptoms of irregular blood glucose, Disp: 100 strip, Rfl: 3    vitamin B-12 (CYANOCOBALAMIN) 1000 MCG tablet, Take 1,000 mcg by mouth daily, Disp: , Rfl:     Blood Glucose Monitoring Suppl (BLOOD GLUCOSE MONITOR SYSTEM) w/Device KIT, 1 each by Does not apply route 3 times daily Dispense what ever brand is covered by insurance, Disp: 1 kit, Rfl: 0  Allergies   Allergen Reactions    Adhesive Tape Rash     Social History     Socioeconomic History    Marital status: Legally      Spouse name: Not on file    Number of children: Not on file    Years of education: Not on file    Highest education level: Not on file   Occupational History    Not on file   Tobacco Use    Smoking status: Former Smoker     Packs/day: 1.50     Years: 15.00     Pack years: 22.50     Types: Cigarettes     Start date: 1965     Quit date: 1986     Years since quittin.5    Smokeless tobacco: Never Used   Vaping Use    Vaping Use: Never used   Substance and Sexual Activity    Alcohol use: Yes     Comment: 2-3 beers per week    Drug use: No    Sexual activity: Not on file   Other Topics Concern    Not on file   Social History Narrative    Not on file     Social Determinants of Health     Financial Resource Strain: Low Risk     Difficulty of Paying Living Expenses: Not hard at all   Food Insecurity: No Food Insecurity    Worried About Running Out of Food in the Last Year: Never true    Julia of Food in the Last Year: Never true   Transportation Needs: No Transportation Needs    Lack of Transportation (Medical): No    Lack of Transportation (Non-Medical):  No   Physical Activity:     Days of Exercise per Week:     Minutes of Exercise per Session:    Stress:     Feeling of Stress :    Social Connections:     Frequency of Communication with Friends and Family:     Frequency of Social Gatherings with Friends and Family:     Attends Sabianist Services:     Active Member of Clubs or Organizations:     Attends Club or Organization Meetings:     Marital Status:    Intimate Partner Violence:     Fear of Current or Ex-Partner:     Emotionally Abused:     Physically Abused:     Sexually Abused:      Family History   Problem Relation Age of Onset    Hypertension Mother     Heart Attack Mother     Diabetes Father     Diabetes Maternal Grandmother     Hypertension Paternal Grandmother            Physical Exam:    Temp 98.5 °F (36.9 °C)   Ht 5' 10\" (1.778 m)   Wt 215 lb (97.5 kg)   BMI 30.85 kg/m²     GENERAL: alert, appears stated age, cooperative, no acute distress    HEENT: Head is normocephalic, atraumatic. PERRLA. SKIN: Clean, dry, intact. There is not any cellulitis or cutaneous lesions noted in the upper extremities    PULMONARY: breathing is regular and unlabored, no acute distress    CV: The bilateral upper and lower extremities are warm and well-perfused with brisk capillary refill. 2+ pulses UE and LE bilateral.     PSYCHIATRY: Pleasant mood, appropriate behavior, follows commands    NEURO: Sensation is intact distally with light touch with no alteration. Motor exam of the upper extremities show elbow flexion and extension, wrist flexion and extension, and finger abduction grossly intact 5/5. Upper extremity reflexes are bilaterally symmetrical and within normal limits. LYMPH: No lymphedema present distally in upper or lower extremity. MUSCULOSKELETAL:  Examination of the Left shoulder shows: There is not a deformity. There is not erythema. There is not soft tissue swelling. Deltoid region is  tender to palpation. AC Joint is  tender to palpation. Clavicle is not tender to palpation. Bicipital Groove is  tender to palpation. Pectoralis  is not tender to palpation. Scapula/ trapezius is  tender to palpation.   Right:  ROM Full, Strength: Supraspinatus 5/5, Infraspinatus 5/5, Subscapularis 5/5  Left:  /180/60, Strength: Supraspinatus 5/5, Infraspinatus 5/5, Subscapularis 5/5  Left Shoulder:  Crepitus:  no   Tenderness:  none   Effusion:   none   Impingement: negative   Empty Can:  negative   Speed's:  negative Apprehension:  negative   Cross Arm Sign:  negative   Esopus's:  negative       Neer's:  negative      Belly Press Test:  negative      Drop Arm Test:  negative           Imaging:  Surgical pictures and imaging reviewed with patient in detail        Elizabeth Parker was seen today for shoulder pain. Diagnoses and all orders for this visit:    Rupture of left biceps tendon, initial encounter    Nontraumatic tear of left rotator cuff, unspecified tear extent    Glenoid labral tear, left, initial encounter    Osteoarthritis of left AC (acromioclavicular) joint    Biceps tendinitis of left shoulder    Exercise counseling        Patient seen and examined. X-rays reviewed. Natural history and course discussed with patient. Treatment options discussed with patient in detail including risks and benefits. In a 15 minute assessment and discussion, patient was counseled on continued weight loss, diet, and physical activity relating to this condition. He was educated with options in detail including nutrition, joining a health club/ weight loss program, and use of cardio equipment such as the Arc Trainer and the importance of use as well as range of motion and HEP exercises for weight loss. Cristel Vargas DO               25 minutes was spent with patient. 50% or greater was spent counseling the patient.

## 2021-09-23 ENCOUNTER — OFFICE VISIT (OUTPATIENT)
Dept: NEUROSURGERY | Age: 66
End: 2021-09-23
Payer: MEDICARE

## 2021-09-23 VITALS
WEIGHT: 215 LBS | HEART RATE: 61 BPM | OXYGEN SATURATION: 99 % | HEIGHT: 70 IN | BODY MASS INDEX: 30.78 KG/M2 | TEMPERATURE: 97.5 F | RESPIRATION RATE: 18 BRPM

## 2021-09-23 DIAGNOSIS — G56.22 CUBITAL TUNNEL SYNDROME ON LEFT: ICD-10-CM

## 2021-09-23 DIAGNOSIS — Z98.890 S/P CUBITAL TUNNEL RELEASE: Primary | ICD-10-CM

## 2021-09-23 PROCEDURE — 99024 POSTOP FOLLOW-UP VISIT: CPT | Performed by: STUDENT IN AN ORGANIZED HEALTH CARE EDUCATION/TRAINING PROGRAM

## 2021-09-23 NOTE — PROGRESS NOTES
Post Operative Follow-up     This is a 73year old who presents to the office for a 2 week follow-up s/p left ulnar neuroplasty     Subjective: Eder Lanza is a 73 y.o.  male being seen in clinic for 2 week follow up. Overall the patient states he is feeling well. He does complain of some tingling in his left pinky and index finger but it continue to improve and is better than prior to surgery. Denies any new complaints, numbness or weakness in his extremities.      Physical Exam:              WDWN, no apparent distress              Non-labored breathing               Vitals Stable              Alert and oriented x3              EOMI              AGUILAR well              Motor strength symmetric              Sensation to LT intact bilaterally   Incision healing well without signs of infection.                    Imaging: N/A     Assessment: This is a 73 y.o.  male presenting for a 2 week follow-up s/p left ulnar neuroplasty     Plan:  -Incision clean, dry, intact  -Follow-up in neurosurgery clinic in on October 25th  -Please call or return to neurosurgery office sooner if symptoms worsen or if new issues arise in the interim.     Electronically signed by Madge Kawasaki, [place] on 9/23/2021 at 2:13 PM

## 2021-10-21 ENCOUNTER — HOSPITAL ENCOUNTER (EMERGENCY)
Age: 66
Discharge: HOME | End: 2021-10-21
Attending: EMERGENCY MEDICINE
Payer: MEDICARE

## 2021-10-21 ENCOUNTER — APPOINTMENT (OUTPATIENT)
Dept: GENERAL RADIOLOGY | Age: 66
End: 2021-10-21
Payer: MEDICARE

## 2021-10-21 VITALS
WEIGHT: 210 LBS | OXYGEN SATURATION: 96 % | RESPIRATION RATE: 24 BRPM | HEART RATE: 67 BPM | TEMPERATURE: 97.1 F | DIASTOLIC BLOOD PRESSURE: 86 MMHG | BODY MASS INDEX: 30.06 KG/M2 | SYSTOLIC BLOOD PRESSURE: 133 MMHG | HEIGHT: 70 IN

## 2021-10-21 DIAGNOSIS — J12.82 2019 NOVEL CORONAVIRUS-INFECTED PNEUMONIA (NCIP): ICD-10-CM

## 2021-10-21 DIAGNOSIS — U07.1 ACUTE COVID-19: Primary | ICD-10-CM

## 2021-10-21 DIAGNOSIS — U07.1 2019 NOVEL CORONAVIRUS-INFECTED PNEUMONIA (NCIP): ICD-10-CM

## 2021-10-21 LAB — SARS-COV-2, NAAT: DETECTED

## 2021-10-21 PROCEDURE — 87635 SARS-COV-2 COVID-19 AMP PRB: CPT

## 2021-10-21 PROCEDURE — 99282 EMERGENCY DEPT VISIT SF MDM: CPT

## 2021-10-21 PROCEDURE — 71046 X-RAY EXAM CHEST 2 VIEWS: CPT

## 2021-10-21 RX ORDER — ALBUTEROL SULFATE 90 UG/1
2 AEROSOL, METERED RESPIRATORY (INHALATION) EVERY 4 HOURS PRN
Qty: 18 G | Refills: 0 | Status: SHIPPED | OUTPATIENT
Start: 2021-10-21 | End: 2021-12-07 | Stop reason: SDUPTHER

## 2021-10-21 RX ORDER — DEXTROMETHORPHAN HYDROBROMIDE AND PROMETHAZINE HYDROCHLORIDE 15; 6.25 MG/5ML; MG/5ML
5 SYRUP ORAL 4 TIMES DAILY PRN
Qty: 180 ML | Refills: 0 | Status: SHIPPED | OUTPATIENT
Start: 2021-10-21 | End: 2021-10-28

## 2021-10-21 ASSESSMENT — ENCOUNTER SYMPTOMS
EYE DISCHARGE: 0
VOMITING: 0
COUGH: 1
NAUSEA: 0
BACK PAIN: 0
SINUS PRESSURE: 0
EYE PAIN: 0
ABDOMINAL PAIN: 0
SHORTNESS OF BREATH: 1
WHEEZING: 0
DIARRHEA: 0
SORE THROAT: 0
EYE REDNESS: 0
CHEST TIGHTNESS: 1
RHINORRHEA: 1

## 2021-10-21 NOTE — ED PROVIDER NOTES
Returned to [place] from [place] of state; exposed to [address] [place] state; developed dyspnea, cough, fatigue and malaise with myalgias in the last 3-5 days    The history is provided by the patient. Illness   The current episode started 3 to 5 days ago. The onset was gradual. The problem occurs occasionally. The problem has been gradually worsening. The problem is moderate. Nothing relieves the symptoms. Nothing aggravates the symptoms. Associated symptoms include congestion, rhinorrhea, muscle aches, cough and URI. Pertinent negatives include no fever, no abdominal pain, no diarrhea, no nausea, no vomiting, no ear pain, no headaches, no sore throat, no wheezing, no rash, no eye discharge, no eye pain and no eye redness. He has been less active. He has been drinking less than usual and eating less than usual. Urine output has been normal. The last void occurred less than 6 hours ago. There were sick contacts at home. He has received no recent medical care. Review of Systems   Constitutional: Positive for activity change, appetite change and fatigue. Negative for chills and fever. HENT: Positive for congestion and rhinorrhea. Negative for ear pain, sinus pressure and sore throat. Eyes: Negative for pain, discharge and redness. Respiratory: Positive for cough, chest tightness and shortness of breath. Negative for wheezing. Cardiovascular: Negative for chest pain. Gastrointestinal: Negative for abdominal pain, diarrhea, nausea and vomiting. Genitourinary: Negative for dysuria and frequency. Musculoskeletal: Positive for myalgias. Negative for arthralgias and back pain. Skin: Negative for rash and wound. Neurological: Negative for weakness and headaches. Hematological: Negative for adenopathy. Psychiatric/Behavioral: Negative. All other systems reviewed and are negative. Physical Exam  Vitals and nursing note reviewed. Constitutional:       Appearance: He is well-developed. HENT:      Head: Normocephalic and atraumatic. Right Ear: Tympanic membrane normal.      Left Ear: Tympanic membrane normal.      Nose: Congestion and rhinorrhea present. Mouth/Throat:      Pharynx: Posterior oropharyngeal erythema present. Eyes:      Pupils: Pupils are equal, round, and reactive to light. Cardiovascular:      Rate and Rhythm: Normal rate and regular rhythm. Heart sounds: Normal heart sounds. No murmur heard. Pulmonary:      Effort: Pulmonary effort is normal.      Breath sounds: Wheezing and rhonchi present. Abdominal:      General: Bowel sounds are normal.      Palpations: Abdomen is soft. Tenderness: There is no abdominal tenderness. There is no guarding or rebound. Musculoskeletal:      Cervical back: Normal range of motion and neck supple. Skin:     General: Skin is warm and dry. Neurological:      Mental Status: He is alert and oriented to person, place, and time. Psychiatric:         Behavior: Behavior normal.         Thought Content: Thought content normal.         Judgment: Judgment normal.        --------------------------------------------- PAST HISTORY ---------------------------------------------  Past Medical History:  has a past medical history of Anesthesia complication, Arthritis, Asthma, Diabetes mellitus ([facility] [address] [facility] Fungal infection of foot, GERD (gastroesophageal reflux disease), Hyperlipidemia, Hypertension, and [facility] Past Surgical History:  has a past surgical history that includes hernia repair; Neck surgery; Cholecystectomy, laparoscopic (N/A, 05/18/2020); Anterior cruciate ligament repair (Right, 07/13/2020); Colonoscopy; Endoscopy, colon, diagnostic; Shoulder arthroscopy (Left, 6/1/2021); and Arm Surgery (Left, 9/8/2021). Social History:  reports that he quit smoking about 35 years ago. His smoking use included cigarettes. He started smoking about 56 years ago.  He has a 22.50 pack-year smoking history. He has never used smokeless tobacco. He reports current alcohol use. He reports that he does not use drugs. Family History: family history includes Diabetes in his father and maternal grandmother; Heart Attack in his mother; Hypertension in his mother and paternal grandmother. The patients home medications have been reviewed. Allergies: Adhesive tape    -------------------------------------------------- RESULTS -------------------------------------------------  Results for orders placed or performed during the hospital encounter of 10/21/21   COVID-19, Rapid    Specimen: Nares   Result Value Ref Range    SARS-CoV-2, NAAT DETECTED (A) Not Detected     XR CHEST (2 VW)   Final Result   Faint bilateral pulmonary airspace opacities             ------------------------- NURSING NOTES AND VITALS REVIEWED ---------------------------   The nursing notes within the ED encounter and vital signs as below have been reviewed. /86   Pulse 67   Temp 97.1 °F (36.2 °C) (Temporal)   Resp 24   Ht 5' 10\" (1.778 m)   Wt 210 lb (95.3 kg)   SpO2 96%   BMI 30.13 kg/m²   Oxygen Saturation Interpretation: Normal      ------------------------------------------ PROGRESS NOTES ------------------------------------------   I have spoken with the patient and discussed todays results, in addition to providing specific details for the plan of care and counseling regarding the diagnosis and prognosis. Their questions are answered at this time and they are agreeable with the plan.      --------------------------------- ADDITIONAL PROVIDER NOTES ---------------------------------        This patient is stable for discharge. I have shared the specific conditions for return, as well as the importance of follow-up. IMPRESSION:     1.  Acute COVID-19    2. 2019 novel coronavirus-infected pneumonia (NCIP)      Patient's Medications   New Prescriptions    ALBUTEROL SULFATE HFA (VENTOLIN HFA) 108 (90 BASE) MCG/ACT INHALER    Inhale 2 puffs into the lungs every 4 hours as needed for Wheezing or Shortness of Breath    PROMETHAZINE-DEXTROMETHORPHAN (PROMETHAZINE-DM) 6.25-15 MG/5ML SYRUP    Take 5 mLs by mouth 4 times daily as needed for Cough   Previous Medications    ATORVASTATIN (LIPITOR) 40 MG TABLET    TAKE 1 TABLET BY MOUTH IN THE EVENING FOR CHOLESTEROL    BLOOD GLUCOSE MONITOR STRIPS    Test 3 times a day & as needed for symptoms of irregular blood glucose    BLOOD GLUCOSE MONITORING SUPPL (BLOOD GLUCOSE MONITOR SYSTEM) W/DEVICE KIT    1 each by Does not apply route 3 times daily Dispense what ever brand is covered by insurance    CARVEDILOL (COREG) 25 MG TABLET    Take 1 tablet by mouth 2 times daily    CHOLECALCIFEROL (VITAMIN D3) 125 MCG (5000 UT) TABS    Take by mouth daily    COLESTIPOL (COLESTID) 1 G TABLET    Take 2 g by mouth daily    DICYCLOMINE (BENTYL) 20 MG TABLET    Take 1 tablet by mouth 4 times daily    DOXEPIN (SINEQUAN) 10 MG CAPSULE    TAKE UP TO 3 CAPSULES BY MOUTH AT BEDTIME AS DIRECTED FOR INSOMNIA    DULOXETINE (CYMBALTA) 60 MG EXTENDED RELEASE CAPSULE    Take 1 capsule by mouth daily for 20 days    FENOFIBRATE MICRONIZED (LOFIBRA) 134 MG CAPSULE    take 1 capsule by mouth every morning TAKE BEFORE BEAKFAST    GLIPIZIDE (GLUCOTROL) 10 MG TABLET    Take 1 tablet by mouth 2 times daily    HYDROCHLOROTHIAZIDE (MICROZIDE) 12.5 MG CAPSULE    Take 1 capsule by mouth daily    LANCETS 30G MISC    1 each by Does not apply route 3 times daily Dispense what ever brand is covered by insurance    LOSARTAN (COZAAR) 50 MG TABLET    Take 1 tablet by mouth daily    ROPINIROLE (REQUIP) 1 MG TABLET    Take 1 tablet by mouth 4 times daily for 120 doses    SILDENAFIL (REVATIO) 20 MG TABLET    Take 1 tablet by mouth daily as needed (FOR SEXUAL DYSFUNCTION)    VITAMIN B-12 (CYANOCOBALAMIN) 1000 MCG TABLET    Take 1,000 mcg by mouth daily   Modified Medications    No medications on file   Discontinued Medications ALBUTEROL SULFATE HFA (VENTOLIN HFA) 108 (90 BASE) MCG/ACT INHALER    Inhale 2 puffs into the lungs 4 times daily as needed for Wheezing     XR CHEST (2 VW)    Result Date: 10/21/2021  EXAMINATION: TWO XRAY VIEWS OF THE CHEST 10/21/2021 2:01 pm COMPARISON: 07/15/2020 HISTORY: ORDERING SYSTEM PROVIDED HISTORY: dyspnea, covid symptoms TECHNOLOGIST PROVIDED HISTORY: Reason for exam:->dyspnea, covid symptoms FINDINGS: The cardiomediastinal silhouette is unremarkable. There are faint bilateral pulmonary airspace opacities. No effusion or pneumothorax. No acute osseous abnormality. .     Faint bilateral pulmonary airspace opacities   Labs Reviewed   COVID-19, RAPID - Abnormal; Notable for the following components:       Result Value    SARS-CoV-2, NAAT DETECTED (*)     All other components within normal limits     This patient was given the most recent CDC guidance (patient handout) on What to Do if You Are Sick during COVID-19 and Isolation versus Quarantine.     In addition, the patient also received verbal instructions regarding their diagnosis, treatment and follow-up plan, as well as potential signs and symptoms that may prompt additional medical attention. The patient verbalized understanding and all questions were answered prior to discharge.     Procedures     ANGELICA Borja,   10/21/21 1548

## 2021-10-22 ENCOUNTER — TELEPHONE (OUTPATIENT)
Dept: FAMILY MEDICINE CLINIC | Age: 66
End: 2021-10-22

## 2021-10-22 ENCOUNTER — HOSPITAL ENCOUNTER (OUTPATIENT)
Dept: INFUSION THERAPY | Age: 66
Setting detail: INFUSION SERIES
Discharge: HOME | End: 2021-10-22
Payer: MEDICARE

## 2021-10-22 ENCOUNTER — CARE COORDINATION (OUTPATIENT)
Dept: CARE COORDINATION | Age: 66
End: 2021-10-22

## 2021-10-22 VITALS
SYSTOLIC BLOOD PRESSURE: 112 MMHG | OXYGEN SATURATION: 97 % | TEMPERATURE: 97.8 F | HEART RATE: 82 BPM | DIASTOLIC BLOOD PRESSURE: 61 MMHG | RESPIRATION RATE: 20 BRPM

## 2021-10-22 DIAGNOSIS — M47.812 SPONDYLOSIS, CERVICAL: ICD-10-CM

## 2021-10-22 DIAGNOSIS — U07.1 COVID: Primary | ICD-10-CM

## 2021-10-22 DIAGNOSIS — U07.1 COVID: ICD-10-CM

## 2021-10-22 DIAGNOSIS — G89.29 CHRONIC NECK PAIN: ICD-10-CM

## 2021-10-22 DIAGNOSIS — M48.02 CERVICAL STENOSIS OF SPINAL CANAL: ICD-10-CM

## 2021-10-22 DIAGNOSIS — M54.2 CHRONIC NECK PAIN: ICD-10-CM

## 2021-10-22 PROCEDURE — 96365 THER/PROPH/DIAG IV INF INIT: CPT

## 2021-10-22 PROCEDURE — 2580000003 HC RX 258: Performed by: INTERNAL MEDICINE

## 2021-10-22 PROCEDURE — 6360000002 HC RX W HCPCS: Performed by: INTERNAL MEDICINE

## 2021-10-22 PROCEDURE — 2500000003 HC RX 250 WO HCPCS: Performed by: INTERNAL MEDICINE

## 2021-10-22 RX ORDER — HEPARIN SODIUM (PORCINE) LOCK FLUSH IV SOLN 100 UNIT/ML 100 UNIT/ML
500 SOLUTION INTRAVENOUS PRN
Status: CANCELLED | OUTPATIENT
Start: 2021-10-22

## 2021-10-22 RX ORDER — METHYLPREDNISOLONE SODIUM SUCCINATE 125 MG/2ML
125 INJECTION, POWDER, LYOPHILIZED, FOR SOLUTION INTRAMUSCULAR; INTRAVENOUS ONCE
Status: CANCELLED | OUTPATIENT
Start: 2021-10-22 | End: 2021-10-22

## 2021-10-22 RX ORDER — SODIUM CHLORIDE 9 MG/ML
25 INJECTION, SOLUTION INTRAVENOUS PRN
Status: CANCELLED | OUTPATIENT
Start: 2021-10-22

## 2021-10-22 RX ORDER — SODIUM CHLORIDE 0.9 % (FLUSH) 0.9 %
5-40 SYRINGE (ML) INJECTION PRN
Status: CANCELLED | OUTPATIENT
Start: 2021-10-22

## 2021-10-22 RX ORDER — SODIUM CHLORIDE 0.9 % (FLUSH) 0.9 %
5-40 SYRINGE (ML) INJECTION PRN
Status: DISCONTINUED | OUTPATIENT
Start: 2021-10-22 | End: 2021-10-23 | Stop reason: HOSPADM

## 2021-10-22 RX ORDER — SODIUM CHLORIDE 9 MG/ML
INJECTION, SOLUTION INTRAVENOUS CONTINUOUS
Status: CANCELLED | OUTPATIENT
Start: 2021-10-22

## 2021-10-22 RX ORDER — EPINEPHRINE 1 MG/ML
0.3 INJECTION, SOLUTION, CONCENTRATE INTRAVENOUS PRN
Status: CANCELLED | OUTPATIENT
Start: 2021-10-22

## 2021-10-22 RX ORDER — DIPHENHYDRAMINE HYDROCHLORIDE 50 MG/ML
50 INJECTION INTRAMUSCULAR; INTRAVENOUS ONCE
Status: CANCELLED | OUTPATIENT
Start: 2021-10-22 | End: 2021-10-22

## 2021-10-22 RX ADMIN — SODIUM CHLORIDE, PRESERVATIVE FREE 10 ML: 5 INJECTION INTRAVENOUS at 13:18

## 2021-10-22 RX ADMIN — SODIUM CHLORIDE, PRESERVATIVE FREE 10 ML: 5 INJECTION INTRAVENOUS at 12:35

## 2021-10-22 RX ADMIN — IMDEVIMAB: 1332 INJECTION, SOLUTION, CONCENTRATE INTRAVENOUS at 12:47

## 2021-10-22 NOTE — PROGRESS NOTES
Patient was observed for 1 hour post infusion. Treatment was tolerated well, no adverse effects. Educational material regarding infusion was sent home with patient.

## 2021-10-22 NOTE — CARE COORDINATION
Patient contacted regarding COVID-19 diagnosis. Discussed COVID-19 related testing which was available at this time. Test results were positive. Patient informed of results, if available? Yes. Ambulatory Care Manager contacted the patient by telephone to perform post discharge assessment. Call within 2 business days of discharge: Yes. Verified name and  with patient as identifiers. Provided introduction to self, and explanation of the CTN/ACM role, and reason for call due to risk factors for infection and/or exposure to COVID-19. Symptoms reviewed with patient who verbalized the following symptoms: cough, shortness of breath, no new symptoms and no worsening symptoms. Due to no new or worsening symptoms encounter was not routed to provider for escalation. Discussed follow-up appointments. If no appointment was previously scheduled, appointment scheduling offered: Yes. [facility] follow up appointment(s):   Future Appointments   Date Time Provider Roxana Daniel   10/22/2021 12:00 PM [address] [address] [facility] Misha [place]   10/25/2021  2:00 PM [facility]     [id number] [address] follow up appointment(s): n/a    Non-face-to-face services provided:  Obtained and reviewed discharge summary and/or continuity of care documents     Advance Care Planning:   Does patient have an Advance Directive:  decision maker updated. Educated patient about risk for severe COVID-19 due to risk factors according to CDC guidelines. ACM reviewed discharge instructions, medical action plan and red flag symptoms with the patient who verbalized understanding. Discussed COVID vaccination status: Yes. Education provided on COVID-19 vaccination as appropriate. Discussed exposure protocols and quarantine with CDC Guidelines. Patient was given an opportunity to verbalize any questions and concerns and agrees to contact ACM or health care provider for questions related to their healthcare.     Reviewed and educated patient on any new and changed medications related to discharge diagnosis     Was patient discharged with a pulse oximeter? Nataliia Vazquez denies any fever or increased shortness of breath. He reports he is using the albuterol inhaler as prescribed and it has given him some relief. He reports he is having a COVID antibody infusion today. MyChart program was explained and he declined. ACM provided contact information. Plan for follow-up call in 5-7 days based on severity of symptoms and risk factors.

## 2021-10-22 NOTE — TELEPHONE ENCOUNTER
----- Message from Andie Gallegos sent at 10/22/2021 10:13 AM EDT -----  Subject: Refill Request    QUESTIONS  Name of Medication? fenofibrate micronized (LOFIBRA) 134 MG capsule  Patient-reported dosage and instructions? 134mg capsule once a day  How many days do you have left? 30  Preferred Pharmacy? [facility] phone number (if available)? [phone]  ---------------------------------------------------------------------------  --------------,  Name of Medication? carvedilol (COREG) 25 MG tablet  Patient-reported dosage and instructions? 25mg tablet twice a day  How many days do you have left? 60  Preferred Pharmacy? [facility] phone number (if available)? [phone]  ---------------------------------------------------------------------------  --------------,  Name of Medication? hydroCHLOROthiazide (MICROZIDE) 12.5 MG capsule  Patient-reported dosage and instructions? 12.5mg tablet once a day  How many days do you have left? 30  Preferred Pharmacy? [facility] phone number (if available)? [phone]  ---------------------------------------------------------------------------  --------------,  Name of Medication? atorvastatin (LIPITOR) 40 MG tablet  Patient-reported dosage and instructions? 40 mg tablet once a day  How many days do you have left? 2  Preferred Pharmacy? [address] [facility] phone number (if available)? [phone]  ---------------------------------------------------------------------------  --------------,  Name of Medication? rOPINIRole (REQUIP) 1 MG tablet  Patient-reported dosage and instructions? 1mg tablet four times a day  How many days do you have left? 7  Preferred Pharmacy? [address] [facility] phone number (if available)? [phone]  ---------------------------------------------------------------------------  --------------,  Name of Medication?  DULoxetine (CYMBALTA) 60 MG extended release capsule  Patient-reported dosage and instructions? 60mg capsule two before bed  How many days do you have left? 0  Preferred Pharmacy? [facility] [place] phone number (if available)? [phone]  ---------------------------------------------------------------------------  --------------  CALL BACK INFO  What is the best way for the office to contact you? OK to leave message on   voicemail  Preferred Call Back Phone Number?  [phone]

## 2021-10-22 NOTE — TELEPHONE ENCOUNTER
----- Message from Sindy Goldberg sent at 10/22/2021 10:17 AM EDT -----  Subject: Refill Request    QUESTIONS  Name of Medication? Other - Pantoprazole   Patient-reported dosage and instructions? 40mg tablet, once a day   How many days do you have left? 4  Preferred Pharmacy? [facility] phone number (if available)? [phone]  ---------------------------------------------------------------------------  --------------  CALL BACK INFO  What is the best way for the office to contact you? OK to leave message on   voicemail  Preferred Call Back Phone Number?  [phone]

## 2021-10-25 ENCOUNTER — TELEPHONE (OUTPATIENT)
Dept: FAMILY MEDICINE CLINIC | Age: 66
End: 2021-10-25

## 2021-10-25 RX ORDER — DULOXETIN HYDROCHLORIDE 60 MG/1
60 CAPSULE, DELAYED RELEASE ORAL DAILY
Qty: 30 CAPSULE | Refills: 0 | Status: SHIPPED
Start: 2021-10-25 | End: 2021-12-07 | Stop reason: SDUPTHER

## 2021-10-25 RX ORDER — CARVEDILOL 25 MG/1
25 TABLET ORAL 2 TIMES DAILY
Qty: 60 TABLET | Refills: 0 | Status: SHIPPED
Start: 2021-10-25 | End: 2021-12-07 | Stop reason: SDUPTHER

## 2021-10-25 RX ORDER — HYDROCHLOROTHIAZIDE 12.5 MG/1
12.5 CAPSULE, GELATIN COATED ORAL DAILY
Qty: 30 CAPSULE | Refills: 0 | Status: SHIPPED
Start: 2021-10-25 | End: 2021-12-07 | Stop reason: SDUPTHER

## 2021-10-25 RX ORDER — ROPINIROLE 1 MG/1
1 TABLET, FILM COATED ORAL 4 TIMES DAILY
Qty: 120 TABLET | Refills: 0 | Status: SHIPPED
Start: 2021-10-25 | End: 2021-12-23 | Stop reason: SDUPTHER

## 2021-10-25 RX ORDER — FENOFIBRATE 134 MG/1
CAPSULE ORAL
Qty: 30 CAPSULE | Refills: 0 | Status: SHIPPED
Start: 2021-10-25 | End: 2021-12-07 | Stop reason: SDUPTHER

## 2021-10-25 RX ORDER — ATORVASTATIN CALCIUM 40 MG/1
TABLET, FILM COATED ORAL
Qty: 30 TABLET | Refills: 0 | Status: SHIPPED
Start: 2021-10-25 | End: 2021-12-07 | Stop reason: SDUPTHER

## 2021-10-25 NOTE — TELEPHONE ENCOUNTER
----- Message from Dunia Bingham sent at 10/25/2021 10:40 AM EDT -----  Subject: Message to Provider    QUESTIONS  Information for Provider? pt has covid and was given a cough med and has   used all of it. Pt wants to know if could get a refill or if pcp could   recommend something OTC. Pt uses [facility] [address]  ---------------------------------------------------------------------------  --------------  Lauri Christensen INFO  What is the best way for the office to contact you? OK to leave message on   voicemail  Preferred Call Back Phone Number? [phone]  ---------------------------------------------------------------------------  --------------  SCRIPT ANSWERS  Relationship to Patient?  Self

## 2021-10-25 NOTE — TELEPHONE ENCOUNTER
Online Visit    Date/Time: 2/1/2018 2:00:13 PM  To: VADIM BANKS  From: JESSICA ADAMS  Subject: Test results.     Low white blood cell count but stable.  Related to his ethnicity.  Lipid panel is better.  Continue a healthy diet.  All other labs are within NORMAL limits.    Verified Results  URINALYSIS SCREEN 31Jan2018 10:40AM JESSICA ADAMS     Test Name Result Flag Reference   APPEARANCE, URINE CLEAR     URINE BILIRUBIN NEGATIVE  NEGATIVE   URINE COLOR YELLOW  YELLOW   URINE GLUCOSE NEGATIVE mg/dl  NEGATIVE   KETONES NEGATIVE mg/dl  NEGATIVE   WBC-URINE NEGATIVE  NEGATIVE   NITRITE NEGATIVE  NEGATIVE   RBC-URINE NEGATIVE  NEGATIVE   URINE PROTEIN NEGATIVE mg/dl  NEGATIVE   PH-URINE 7.0 Units  5.0-7.0   URINE SPECIFIC GRAVITY 1.011  1.005-1.030   UROBILINOGEN-URINE 0.2 mg/dl  0.0-1.0   SPECIMEN TYPE      URINE, CLEAN CATCH/MIDSTREAM     COMP METABOLIC PANEL WITH CBCA, LIPID PANEL AND TSH (CMP,CBCA,LIPFA,TSH) 31Jan2018 10:23AM JESSICA ADAMS     Test Name Result Flag Reference   WHITE BLOOD COUNT 2.8 K/mcL L 4.2-11.0   RED CELL COUNT 4.77 mil/mcL  4.50-5.90   HEMOGLOBIN 15.3 g/dl  13.0-17.0   HEMATOCRIT 46.0 %  39.0-51.0   MEAN CORPUSCULAR VOLUME 96.4 fL  78.0-100.0   MEAN CORPUSCULAR HEMOGLOBIN 32.1 pg  26.0-34.0   MEAN CORPUSCULAR HGB CONC 33.3 g/dl  32.0-36.5   RDW-CV 12.9 %  11.0-15.0   PLATELET COUNT 149 K/mcL  140-450   VONDA% 45 %     LYM% 46 %     MON% 7 %     EOS% 2 %     BASO% 0 %     VONDA ABS 1.3 K/mcL L 1.8-7.7   LYM ABS 1.3 K/mcL  1.0-4.0   MON ABS 0.2 K/mcL L 0.3-0.9   EOS ABS 0.0 K/mcL L 0.1-0.5   BASO ABS 0.0 K/mcL  0.0-0.3   SODIUM 142 mmol/L  135-145   POTASSIUM 4.9 mmol/L  3.4-5.1   CHLORIDE 105 mmol/L     CARBON DIOXIDE 30 mmol/L  21-32   ANION GAP 12 mmol/L  10-20   GLUCOSE 85 mg/dl  65-99   BUN 12 mg/dl  6-20   CREATININE 1.28 mg/dl H 0.67-1.17   GFR EST.AFRICAN AMER 74     eGFR 60 - 89 mL/min/1.73m2 = Mild decrease in kidney function.   GFR EST.NONAFRI AMER 64     eGFR 60 - 89 mL/min/1.73m2  Pt informed and verbalized understanding. = Mild decrease in kidney function.   BUN/CREATININE RATIO 9  7-25   BILIRUBIN TOTAL 0.6 mg/dl  0.2-1.0   GOT/AST 21 Units/L  <38   ALKALINE PHOSPHATASE 80 Units/L     ALBUMIN 4.0 g/dl  3.6-5.1   TOTAL PROTEIN 7.2 g/dl  6.4-8.2   GLOBULIN (CALCULATED) 3.2 g/dl  2.0-4.0   A/G RATIO 1.2  1.0-2.4   CALCIUM 9.4 mg/dl  8.4-10.2   GPT/ALT 20 Units/L  <79   FASTING STATUS 8 hrs     CHOLESTEROL 200 mg/dl H <200   Desirable            <200  Borderline High      200 to 239  High                 >=240   HDL CHOLESTEROL 81 mg/dl  >39   Low            <40  Borderline Low 40 to 49  Near Optimal   50 to 59  Optimal        >=60   TRIGLYCERIDES 49 mg/dl  <150   Normal                   <150  Borderline High          150 to 199  High                     200 to 499  Very High                >=500   LDL CHOLESTEROL (CALCULATED) 109 mg/dl  <130   OPTIMAL               <100  NEAR OPTIMAL          100-129  BORDERLINE HIGH       130-159  HIGH                  160-189  VERY HIGH             >=190   NON-HDL CHOLESTEROL 119 mg/dl     Therapeutic Target:  CHD and risk equivalents <130  Multiple risk factors    <160  0 to 1 risk factors      <190   CHOLESTEROL/HDL RATIO 2.5  <4.5   TSH 0.855 mcUnits/mL  0.350-5.000   DIFF TYPE      AUTOMATED DIFFERENTIAL   FASTING STATUS 8 hrs       PSA - PROSTATE SPECIFIC AG 31Jan2018 10:23JESSICA ASHRAF     Test Name Result Flag Reference   PROSTATE SPECIFIC AG 0.53 ng/ml  <4.01   SUGGESTED AGE SPECIFIC REFERENCE RANGES     AGE                NG/ML  40-49              0.01-2.50  50-59              0.01-3.50  60-69              0.01-4.50  70-79              0.01-6.50     REFERENCE: JOURNAL OF UROLOGY 155, [id number]     [employer] Chemiluminescence     HEMOGLOBIN A1C GLYCOSYLATED 31Jan2018 10:23JESSICA ASHRAF     Test Name Result Flag Reference   HEMOGLOBIN A1C GLYH 5.5 %  4.5-5.6   ----DIABETIC SCREENING---  NON DIABETIC                 <5.7%  INCREASED RISK                5.7-6.4%  DIAGNOSTIC FOR  DIABETES      >6.4%     ----DIABETIC CONTROL---     A1C%           eAG mg/dL  6.0            126  6.5            140  7.0            154  7.5            169  8.0            183  8.5            197  9.0            212  9.5            226  10.0           240

## 2021-10-29 ENCOUNTER — CARE COORDINATION (OUTPATIENT)
Dept: CARE COORDINATION | Age: 66
End: 2021-10-29

## 2021-10-29 NOTE — CARE COORDINATION
Patient contacted regarding COVID-19 diagnosis and monoclonal antibody infusion follow up. Discussed COVID-19 related testing which was available at this time. Test results were positive. Patient informed of results, if available? Yes    Ambulatory Care Manager contacted the patient by telephone to perform follow-up assessment. Verified name and  with patient as identifiers. Patient has following risk factors of: COPD. Symptoms reviewed with patient who verbalized the following symptoms: cough, shortness of breath, no new symptoms and no worsening symptoms. Due to no new or worsening symptoms encounter was not routed to provider for escalation. Educated patient about risk for severe COVID-19 due to risk factors according to CDC guidelines. ACM reviewed discharge instructions, medical action plan and red flag symptoms with the patient who verbalized understanding. Discussed COVID vaccination status: Yes. Education provided on COVID-19 vaccination as appropriate. Discussed exposure protocols and quarantine with CDC Guidelines. Patient was given an opportunity to verbalize any questions and concerns and agrees to contact ACM or health care provider for questions related to their healthcare. Was patient discharged with a pulse oximeter? Nataliia Nath denies any fever. He reports he contacted his PCP and was told a follow up appointment was not necessary unless his symptoms did not resolve. ACM provided contact information. No further follow-up call identified based on severity of symptoms and risk factors.

## 2021-11-08 ENCOUNTER — OFFICE VISIT (OUTPATIENT)
Dept: NEUROSURGERY | Age: 66
End: 2021-11-08
Payer: MEDICARE

## 2021-11-08 ENCOUNTER — HOSPITAL ENCOUNTER (OUTPATIENT)
Age: 66
Discharge: HOME | End: 2021-11-10
Payer: MEDICARE

## 2021-11-08 ENCOUNTER — HOSPITAL ENCOUNTER (EMERGENCY)
Age: 66
Discharge: HOME | End: 2021-11-08
Attending: EMERGENCY MEDICINE
Payer: MEDICARE

## 2021-11-08 ENCOUNTER — HOSPITAL ENCOUNTER (OUTPATIENT)
Dept: GENERAL RADIOLOGY | Age: 66
Discharge: HOME | End: 2021-11-10
Payer: MEDICARE

## 2021-11-08 VITALS
BODY MASS INDEX: 31.5 KG/M2 | WEIGHT: 220 LBS | RESPIRATION RATE: 16 BRPM | TEMPERATURE: 97.7 F | SYSTOLIC BLOOD PRESSURE: 152 MMHG | HEIGHT: 70 IN | HEART RATE: 56 BPM | OXYGEN SATURATION: 97 % | DIASTOLIC BLOOD PRESSURE: 83 MMHG

## 2021-11-08 VITALS
HEIGHT: 70 IN | HEART RATE: 61 BPM | BODY MASS INDEX: 30.06 KG/M2 | OXYGEN SATURATION: 98 % | DIASTOLIC BLOOD PRESSURE: 78 MMHG | SYSTOLIC BLOOD PRESSURE: 122 MMHG | TEMPERATURE: 98 F | RESPIRATION RATE: 18 BRPM | WEIGHT: 210 LBS

## 2021-11-08 DIAGNOSIS — Z01.812 ENCOUNTER FOR PREOPERATIVE SCREENING LABORATORY TESTING FOR COVID-19 VIRUS: Primary | ICD-10-CM

## 2021-11-08 DIAGNOSIS — Z20.822 ENCOUNTER FOR PREOPERATIVE SCREENING LABORATORY TESTING FOR COVID-19 VIRUS: Primary | ICD-10-CM

## 2021-11-08 DIAGNOSIS — U07.1 PNEUMONIA DUE TO COVID-19 VIRUS: ICD-10-CM

## 2021-11-08 DIAGNOSIS — U07.1 PNEUMONIA DUE TO COVID-19 VIRUS: Primary | ICD-10-CM

## 2021-11-08 DIAGNOSIS — J12.82 PNEUMONIA DUE TO COVID-19 VIRUS: ICD-10-CM

## 2021-11-08 DIAGNOSIS — J12.82 PNEUMONIA DUE TO COVID-19 VIRUS: Primary | ICD-10-CM

## 2021-11-08 LAB — SARS-COV-2, NAAT: NOT DETECTED

## 2021-11-08 PROCEDURE — 71046 X-RAY EXAM CHEST 2 VIEWS: CPT

## 2021-11-08 PROCEDURE — 87635 SARS-COV-2 COVID-19 AMP PRB: CPT

## 2021-11-08 PROCEDURE — 99024 POSTOP FOLLOW-UP VISIT: CPT | Performed by: NEUROLOGICAL SURGERY

## 2021-11-08 PROCEDURE — 99282 EMERGENCY DEPT VISIT SF MDM: CPT

## 2021-11-08 RX ORDER — PANTOPRAZOLE SODIUM 40 MG/1
TABLET, DELAYED RELEASE ORAL
COMMUNITY
Start: 2021-11-04 | End: 2021-12-23 | Stop reason: SDUPTHER

## 2021-11-08 ASSESSMENT — ENCOUNTER SYMPTOMS
SINUS PRESSURE: 0
EYE REDNESS: 0
SORE THROAT: 0
DIARRHEA: 0
COUGH: 0
ABDOMINAL PAIN: 0
EYE PAIN: 0
NAUSEA: 0
WHEEZING: 0
BACK PAIN: 0
VOMITING: 0
EYE DISCHARGE: 0
SHORTNESS OF BREATH: 0

## 2021-11-08 NOTE — ED PROVIDER NOTES
Here for pre op testing; sent to this facility by PCP; [id number] ok testing encounter;  Diagnosed with COVID19 2 weeks prior at this facility; asymptomatic at present    The history is provided by the patient. Illness   The current episode started more than 1 week ago. The problem has been resolved. Nothing relieves the symptoms. Nothing aggravates the symptoms. Pertinent negatives include no fever, no abdominal pain, no diarrhea, no nausea, no vomiting, no ear pain, no headaches, no sore throat, no cough, no wheezing, no rash, no eye discharge, no eye pain and no eye redness. He has been behaving normally. He has been eating and drinking normally. Urine output has been normal. The last void occurred less than 6 hours ago. There were sick contacts at home. Recently, medical care has been given at this facility. Review of Systems   Constitutional: Negative for chills and fever. HENT: Negative for ear pain, sinus pressure and sore throat. Eyes: Negative for pain, discharge and redness. Respiratory: Negative for cough, shortness of breath and wheezing. Cardiovascular: Negative for chest pain. Gastrointestinal: Negative for abdominal pain, diarrhea, nausea and vomiting. Genitourinary: Negative for dysuria and frequency. Musculoskeletal: Negative for arthralgias and back pain. Skin: Negative for rash and wound. Neurological: Negative for weakness and headaches. Hematological: Negative for adenopathy. Psychiatric/Behavioral: Negative. All other systems reviewed and are negative. Physical Exam  Vitals and nursing note reviewed. Constitutional:       Appearance: He is well-developed. HENT:      Head: Normocephalic and atraumatic. Eyes:      Pupils: Pupils are equal, round, and reactive to light. Cardiovascular:      Rate and Rhythm: Normal rate and regular rhythm. Heart sounds: Normal heart sounds. No murmur heard.       Pulmonary:      Effort: Pulmonary effort is normal.      Breath sounds: Normal breath sounds. Abdominal:      General: Bowel sounds are normal.      Palpations: Abdomen is soft. Tenderness: There is no abdominal tenderness. There is no guarding or rebound. Musculoskeletal:      Cervical back: Normal range of motion and neck supple. Skin:     General: Skin is warm and dry. Neurological:      Mental Status: He is alert and oriented to person, place, and time. Psychiatric:         Behavior: Behavior normal.         Thought Content: Thought content normal.         Judgment: Judgment normal.        --------------------------------------------- PAST HISTORY ---------------------------------------------  Past Medical History:  has a past medical history of Anesthesia complication, Arthritis, Asthma, Diabetes mellitus ([facility] Fungal infection of foot, GERD (gastroesophageal reflux disease), Hyperlipidemia, Hypertension, and Type 2 diabetes mellitus without complication ([facility] Past Surgical History:  has a past surgical history that includes hernia repair; Neck surgery; Cholecystectomy, laparoscopic (N/A, 05/18/2020); Anterior cruciate ligament repair (Right, 07/13/2020); Colonoscopy; Endoscopy, colon, diagnostic; Shoulder arthroscopy (Left, 6/1/2021); and Arm Surgery (Left, 9/8/2021). Social History:  reports that he quit smoking about 35 years ago. His smoking use included cigarettes. He started smoking about 56 years ago. He has a 22.50 pack-year smoking history. He has never used smokeless tobacco. He reports current alcohol use. He reports that he does not use drugs. Family History: family history includes Diabetes in his father and maternal grandmother; Heart Attack in his mother; Hypertension in his mother and paternal grandmother. The patients home medications have been reviewed.     Allergies: Adhesive tape    -------------------------------------------------- RESULTS -------------------------------------------------  No results found for this visit on 11/08/21. No orders to display       ------------------------- NURSING NOTES AND VITALS REVIEWED ---------------------------   The nursing notes within the ED encounter and vital signs as below have been reviewed. BP (!) 152/83   Pulse 56   Temp 97.7 °F (36.5 °C) (Temporal)   Ht 5' 10\" (1.778 m)   Wt 220 lb (99.8 kg)   SpO2 97%   BMI 31.57 kg/m²   Oxygen Saturation Interpretation: Normal      ------------------------------------------ PROGRESS NOTES ------------------------------------------   I have spoken with the patient and discussed todays results, in addition to providing specific details for the plan of care and counseling regarding the diagnosis and prognosis. Their questions are answered at this time and they are agreeable with the plan.      --------------------------------- ADDITIONAL PROVIDER NOTES ---------------------------------        This patient is stable for discharge. I have shared the specific conditions for return, as well as the importance of follow-up. IMPRESSION:     1.  Encounter for preoperative screening laboratory testing for COVID-19 virus      Patient's Medications   New Prescriptions    No medications on file   Previous Medications    ALBUTEROL SULFATE HFA (VENTOLIN HFA) 108 (90 BASE) MCG/ACT INHALER    Inhale 2 puffs into the lungs every 4 hours as needed for Wheezing or Shortness of Breath    ATORVASTATIN (LIPITOR) 40 MG TABLET    TAKE 1 TABLET BY MOUTH IN THE EVENING FOR CHOLESTEROL    BLOOD GLUCOSE MONITOR STRIPS    Test 3 times a day & as needed for symptoms of irregular blood glucose    BLOOD GLUCOSE MONITORING SUPPL (BLOOD GLUCOSE MONITOR SYSTEM) W/DEVICE KIT    1 each by Does not apply route 3 times daily Dispense what ever brand is covered by insurance    CARVEDILOL (COREG) 25 MG TABLET    Take 1 tablet by mouth 2 times daily    CHOLECALCIFEROL (VITAMIN D3) 125 MCG (5000 UT) TABS    Take by mouth daily    COLESTIPOL (COLESTID) 1 G TABLET    Take 2 g by mouth daily    DICYCLOMINE (BENTYL) 20 MG TABLET    Take 1 tablet by mouth 4 times daily    DOXEPIN (SINEQUAN) 10 MG CAPSULE    TAKE UP TO 3 CAPSULES BY MOUTH AT BEDTIME AS DIRECTED FOR INSOMNIA    DULOXETINE (CYMBALTA) 60 MG EXTENDED RELEASE CAPSULE    Take 1 capsule by mouth daily for 20 days    FENOFIBRATE MICRONIZED (LOFIBRA) 134 MG CAPSULE    take 1 capsule by mouth every morning TAKE BEFORE BEAKFAST    GLIPIZIDE (GLUCOTROL) 10 MG TABLET    Take 1 tablet by mouth 2 times daily    HYDROCHLOROTHIAZIDE (MICROZIDE) 12.5 MG CAPSULE    Take 1 capsule by mouth daily    LANCETS 30G MISC    1 each by Does not apply route 3 times daily Dispense what ever brand is covered by insurance    LOSARTAN (COZAAR) 50 MG TABLET    Take 1 tablet by mouth daily    PANTOPRAZOLE (PROTONIX) 40 MG TABLET        ROPINIROLE (REQUIP) 1 MG TABLET    Take 1 tablet by mouth 4 times daily for 120 doses    SILDENAFIL (REVATIO) 20 MG TABLET    Take 1 tablet by mouth daily as needed (FOR SEXUAL DYSFUNCTION)    VITAMIN B-12 (CYANOCOBALAMIN) 1000 MCG TABLET    Take 1,000 mcg by mouth daily   Modified Medications    No medications on file   Discontinued Medications    No medications on file         Procedures     MDM                  Nayan Davalos DO  11/08/21 1224

## 2021-11-08 NOTE — ED NOTES
Pt came in with order for covid test Test completed Instructed pt to call [facility] and let them know test was done and to watch for results Any concerns we told him we would call him at home     Dalila Snyder RN  11/08/21 3614

## 2021-11-08 NOTE — PROGRESS NOTES
[address] [place] [address]       Chief Complaint:   Chief Complaint   Patient presents with    Post-Op Check     4 week post op L Ulnar Neuroplasty, pt states to be doing really wonderful. HPI:     I had the pleasure of seeing Earl Abebe today in neurosurgical clinic. This 71-year-old gentleman underwent left ulnar neuroplasty back on 8 September. Since that time he is continued to recover but he endorses that he recently was diagnosed with Covid on 28 October. Apparently he went to the walk-in clinic where they administered some type of IV medication and sent him on his way. He has had no follow-up for this and remains symptomatic with pneumonia coughing decreased appetite. From a neurosurgical perspective, he states that his hand function has improved dramatically and that he only has residual fingertip numbness on the left index finger which is secondary to an injury that he had in the past.  Overall he is extremely satisfied with his operation. Past Medical History:   Diagnosis Date    Anesthesia complication     states has difficulty breathing after surgery  usually needs nebulizer treatment    Arthritis     Asthma     since childhood    Diabetes mellitus ([address]     Fungal infection of foot     GERD (gastroesophageal reflux disease)     Hyperlipidemia     Hypertension     Type 2 diabetes mellitus without complication ([address]      Past Surgical History:   Procedure Laterality Date    ANTERIOR CRUCIATE LIGAMENT REPAIR Right 07/13/2020    RIGHT KNEE ARTHROSCOPY, (ANTERIOR CRUCIATE LIGAMENT) ACL RECONSTRUCTION. ALLOGRAFT.  MEDIAL MENISCECTOMY AND DEBRIDEMENT. ([address]) performed by Yousuf Forrest DO at [address] Left 9/8/2021    LEFT ULNAR NEUROPLASTY performed by Lauren Mckenna MD at [address] [place] [address], LAPAROSCOPIC N/A 05/18/2020    CHOLECYSTECTOMY LAPAROSCOPIC WITH IOC performed by Sri Anton MD at [facility], DIAGNOSTIC      HERNIA REPAIR      NECK SURGERY      cervical spine surgery    SHOULDER ARTHROSCOPY Left 2021    LEFT SHOULDER ARTHROSCOPY, SUBACROMIAL DECOMPRESSION, DEBRIDEMENT (ARTHREX) performed by Negar Velazquez DO at [address] [place] [address] [facility]      Family History   Problem Relation Age of Onset    Hypertension Mother     Heart Attack Mother     Diabetes Father     Diabetes Maternal Grandmother     Hypertension Paternal Grandmother       Social History     Socioeconomic History    Marital status: Legally      Spouse name: Not on file    Number of children: Not on file    Years of education: Not on file    Highest education level: Not on file   Occupational History    Not on file   Tobacco Use    Smoking status: Former Smoker     Packs/day: 1.50     Years: 15.00     Pack years: 22.50     Types: Cigarettes     Start date: 1965     Quit date: 1986     Years since quittin.7    Smokeless tobacco: Never Used   Vaping Use    Vaping Use: Never used   Substance and Sexual Activity    Alcohol use: Yes     Comment: 2-3 beers per week    Drug use: No    Sexual activity: Not on file   Other Topics Concern    Not on file   Social History Narrative    Not on file     Social Determinants of Health     Financial Resource Strain: Low Risk     Difficulty of Paying Living Expenses: Not hard at all   Food Insecurity: No Food Insecurity    Worried About Running Out of Food in the Last Year: Never true    Julia of Food in the Last Year: Never true   Transportation Needs: No Transportation Needs    Lack of Transportation (Medical): No    Lack of Transportation (Non-Medical):  No   Physical Activity:     Days of Exercise per Week: Not on file    Minutes of Exercise per Session: Not on file   Stress:     Feeling of Stress : Not on file   Social Connections:     Frequency of Communication with Friends and Family: Not on file    Frequency of Social Gatherings with Friends and Family: Not on file    Attends Restorationism Services: Not on file    Active Member of Clubs or Organizations: Not on file    Attends Club or Organization Meetings: Not on file    Marital Status: Not on file   Intimate Partner Violence:     Fear of Current or Ex-Partner: Not on file    Emotionally Abused: Not on file    Physically Abused: Not on file    Sexually Abused: Not on file   Housing Stability: Unknown    Unable to Pay for Housing in the Last Year: No    Number of [place] in the Last Year: Not on file    Unstable Housing in the Last Year: No       Medications:   Current Outpatient Medications   Medication Sig Dispense Refill    pantoprazole (PROTONIX) 40 MG tablet       fenofibrate micronized (LOFIBRA) 134 MG capsule take 1 capsule by mouth every morning TAKE BEFORE BEAKFAST 30 capsule 0    carvedilol (COREG) 25 MG tablet Take 1 tablet by mouth 2 times daily 60 tablet 0    hydroCHLOROthiazide (MICROZIDE) 12.5 MG capsule Take 1 capsule by mouth daily 30 capsule 0    atorvastatin (LIPITOR) 40 MG tablet TAKE 1 TABLET BY MOUTH IN THE EVENING FOR CHOLESTEROL 30 tablet 0    rOPINIRole (REQUIP) 1 MG tablet Take 1 tablet by mouth 4 times daily for 120 doses 120 tablet 0    DULoxetine (CYMBALTA) 60 MG extended release capsule Take 1 capsule by mouth daily for 20 days 30 capsule 0    albuterol sulfate HFA (VENTOLIN HFA) 108 (90 Base) MCG/ACT inhaler Inhale 2 puffs into the lungs every 4 hours as needed for Wheezing or Shortness of Breath 18 g 0    doxepin (SINEQUAN) 10 MG capsule TAKE UP TO 3 CAPSULES BY MOUTH AT BEDTIME AS DIRECTED FOR INSOMNIA 30 capsule 1    dicyclomine (BENTYL) 20 MG tablet Take 1 tablet by mouth 4 times daily 40 tablet 0    losartan (COZAAR) 50 MG tablet Take 1 tablet by mouth daily 30 tablet 2    glipiZIDE (GLUCOTROL) 10 MG tablet Take 1 tablet by mouth 2 times daily 60 tablet 3    Cholecalciferol (VITAMIN D3) 125 MCG (5000 UT) TABS Take by mouth daily      colestipol (COLESTID) 1 g tablet Take 2 g by mouth daily      Lancets 30G MISC 1 each by Does not apply route 3 times daily Dispense what ever brand is covered by insurance 100 each 3    sildenafil (REVATIO) 20 MG tablet Take 1 tablet by mouth daily as needed (FOR SEXUAL DYSFUNCTION) 5 tablet 0    blood glucose monitor strips Test 3 times a day & as needed for symptoms of irregular blood glucose 100 strip 3    vitamin B-12 (CYANOCOBALAMIN) 1000 MCG tablet Take 1,000 mcg by mouth daily      Blood Glucose Monitoring Suppl (BLOOD GLUCOSE MONITOR SYSTEM) w/Device KIT 1 each by Does not apply route 3 times daily Dispense what ever brand is covered by insurance 1 kit 0     No current facility-administered medications for this visit. Allergies:    Adhesive tape       Review of Systems:    Positive diagnosed on 28 October. Remains symptomatic with chest tightness, shortness of breath, coughing, and decreased appetite. Physical Examination:    /78   Pulse 61   Temp 98 °F (36.7 °C) (Temporal)   Resp 18   Ht 5' 10\" (1.778 m)   Wt 210 lb (95.3 kg)   SpO2 98%   BMI 30.13 kg/m²      On focused neurological examination he has improved dorsal interossei and handgrip function at 5 out of 5 throughout. His incision is well-healed. ASSESSMENT:      1. Pneumonia due to COVID-19 virus  -     COVID-19 Ambulatory; Future  -     XR CHEST STANDARD (2 VW); Future      MEDICAL DECISION MAKING & PLAN:    Patient has cervical stenosis that needs to be addressed but I did inform him that his Covid situation needs to be addressed as a priority. Toward that end I have ordered a follow-up PCR test for him as well as a chest x-ray. He is to follow-up with his family doctor ASAP. All questions were answered. Once his pneumonia and Covid symptoms have resolved we will address his cervical stenosis.     Thank you so much for allowing us to participate in the care of this patient. Electronically signed by Yumi Rossi MD on 11/8/2021 at 10:13 AM       NOTE: This report was transcribed using voice recognition software.  Every effort was made to ensure accuracy; however, inadvertent computerized transcription errors may be present

## 2021-11-09 ENCOUNTER — TELEPHONE (OUTPATIENT)
Dept: NEUROSURGERY | Age: 66
End: 2021-11-09

## 2021-11-09 NOTE — TELEPHONE ENCOUNTER
Pt would like results on his chest xray and covid test  Pt can be reached at [phone]. Per Dr Annalisa Uriostegui patient still has signs of pneumonia but it is improving, he needs to follow up with his PCP. Covid test is negative. Voicemail left for patient.

## 2021-11-17 LAB
ALBUMIN SERPL-MCNC: 4.6 G/DL (ref 3.5–5.2)
ALP BLD-CCNC: 60 U/L (ref 40–129)
ALT SERPL-CCNC: 24 U/L (ref 0–40)
ANION GAP SERPL CALCULATED.3IONS-SCNC: 14 MMOL/L (ref 7–16)
AST SERPL-CCNC: 22 U/L (ref 0–39)
BILIRUB SERPL-MCNC: 0.6 MG/DL (ref 0–1.2)
BUN BLDV-MCNC: 17 MG/DL (ref 6–23)
CALCIUM SERPL-MCNC: 9.7 MG/DL (ref 8.6–10.2)
CHLORIDE BLD-SCNC: 101 MMOL/L (ref 98–107)
CHOLESTEROL, TOTAL: 171 MG/DL (ref 0–199)
CO2: 24 MMOL/L (ref 22–29)
CREAT SERPL-MCNC: 1.2 MG/DL (ref 0.7–1.2)
GFR AFRICAN AMERICAN: >60
GFR NON-AFRICAN AMERICAN: >60 ML/MIN/1.73
GLUCOSE BLD-MCNC: 189 MG/DL (ref 74–99)
HDLC SERPL-MCNC: 38 MG/DL
LDL CHOLESTEROL CALCULATED: 81 MG/DL (ref 0–99)
POTASSIUM SERPL-SCNC: 4.3 MMOL/L (ref 3.5–5)
SODIUM BLD-SCNC: 139 MMOL/L (ref 132–146)
TOTAL PROTEIN: 7.8 G/DL (ref 6.4–8.3)
TRIGL SERPL-MCNC: 258 MG/DL (ref 0–149)
VITAMIN D 25-HYDROXY: 55 NG/ML (ref 30–100)
VLDLC SERPL CALC-MCNC: 52 MG/DL

## 2021-11-18 NOTE — RESULT ENCOUNTER NOTE
TRIGLYCERIDE STILL HIGH BUT MUCH IMPROVED. LOW SALT, LOW CARB. AND LOW FAT DIET. REGULAR EXERCISE. CONTINUE CURRENT MEDICATIONS.

## 2021-12-07 ENCOUNTER — OFFICE VISIT (OUTPATIENT)
Dept: FAMILY MEDICINE CLINIC | Age: 66
End: 2021-12-07
Payer: MEDICARE

## 2021-12-07 VITALS
SYSTOLIC BLOOD PRESSURE: 132 MMHG | BODY MASS INDEX: 31.07 KG/M2 | OXYGEN SATURATION: 98 % | HEIGHT: 70 IN | TEMPERATURE: 97.5 F | RESPIRATION RATE: 16 BRPM | HEART RATE: 65 BPM | DIASTOLIC BLOOD PRESSURE: 80 MMHG | WEIGHT: 217 LBS

## 2021-12-07 DIAGNOSIS — G89.29 CHRONIC NECK PAIN: ICD-10-CM

## 2021-12-07 DIAGNOSIS — M54.2 CHRONIC NECK PAIN: ICD-10-CM

## 2021-12-07 DIAGNOSIS — Z00.00 ROUTINE GENERAL MEDICAL EXAMINATION AT A HEALTH CARE FACILITY: Primary | ICD-10-CM

## 2021-12-07 DIAGNOSIS — E78.2 MIXED HYPERCHOLESTEROLEMIA AND HYPERTRIGLYCERIDEMIA: ICD-10-CM

## 2021-12-07 DIAGNOSIS — E11.22 TYPE 2 DIABETES MELLITUS WITH STAGE 3A CHRONIC KIDNEY DISEASE, WITHOUT LONG-TERM CURRENT USE OF INSULIN (HCC): ICD-10-CM

## 2021-12-07 DIAGNOSIS — F33.1 MAJOR DEPRESSIVE DISORDER, RECURRENT, MODERATE (HCC): ICD-10-CM

## 2021-12-07 DIAGNOSIS — M48.02 CERVICAL STENOSIS OF SPINAL CANAL: ICD-10-CM

## 2021-12-07 DIAGNOSIS — N18.31 TYPE 2 DIABETES MELLITUS WITH STAGE 3A CHRONIC KIDNEY DISEASE, WITHOUT LONG-TERM CURRENT USE OF INSULIN (HCC): ICD-10-CM

## 2021-12-07 LAB — HBA1C MFR BLD: 6.6 %

## 2021-12-07 PROCEDURE — 1123F ACP DISCUSS/DSCN MKR DOCD: CPT | Performed by: FAMILY MEDICINE

## 2021-12-07 PROCEDURE — 3044F HG A1C LEVEL LT 7.0%: CPT | Performed by: FAMILY MEDICINE

## 2021-12-07 PROCEDURE — 83036 HEMOGLOBIN GLYCOSYLATED A1C: CPT | Performed by: FAMILY MEDICINE

## 2021-12-07 PROCEDURE — G0438 PPPS, INITIAL VISIT: HCPCS | Performed by: FAMILY MEDICINE

## 2021-12-07 PROCEDURE — 4040F PNEUMOC VAC/ADMIN/RCVD: CPT | Performed by: FAMILY MEDICINE

## 2021-12-07 PROCEDURE — G8484 FLU IMMUNIZE NO ADMIN: HCPCS | Performed by: FAMILY MEDICINE

## 2021-12-07 PROCEDURE — 3017F COLORECTAL CA SCREEN DOC REV: CPT | Performed by: FAMILY MEDICINE

## 2021-12-07 RX ORDER — ROPINIROLE 1 MG/1
1 TABLET, FILM COATED ORAL 4 TIMES DAILY
Qty: 120 TABLET | Refills: 0 | Status: CANCELLED | OUTPATIENT
Start: 2021-12-07 | End: 2022-01-06

## 2021-12-07 RX ORDER — FENOFIBRATE 134 MG/1
134 CAPSULE ORAL
Qty: 30 CAPSULE | Refills: 0 | Status: SHIPPED
Start: 2021-12-07 | End: 2022-02-11 | Stop reason: SDUPTHER

## 2021-12-07 RX ORDER — HYDROCHLOROTHIAZIDE 12.5 MG/1
12.5 CAPSULE, GELATIN COATED ORAL DAILY
Qty: 90 CAPSULE | Refills: 0 | Status: SHIPPED
Start: 2021-12-07 | End: 2022-02-11 | Stop reason: SDUPTHER

## 2021-12-07 RX ORDER — ATORVASTATIN CALCIUM 40 MG/1
40 TABLET, FILM COATED ORAL DAILY
Qty: 90 TABLET | Refills: 0 | Status: SHIPPED
Start: 2021-12-07 | End: 2022-02-11 | Stop reason: SDUPTHER

## 2021-12-07 RX ORDER — CARVEDILOL 25 MG/1
25 TABLET ORAL 2 TIMES DAILY
Qty: 180 TABLET | Refills: 0 | Status: SHIPPED
Start: 2021-12-07 | End: 2022-02-11 | Stop reason: SDUPTHER

## 2021-12-07 RX ORDER — DOXEPIN HYDROCHLORIDE 10 MG/1
10 CAPSULE ORAL 2 TIMES DAILY
Qty: 60 CAPSULE | Refills: 0 | Status: SHIPPED
Start: 2021-12-07 | End: 2022-02-02 | Stop reason: SDUPTHER

## 2021-12-07 RX ORDER — ALBUTEROL SULFATE 90 UG/1
2 AEROSOL, METERED RESPIRATORY (INHALATION) EVERY 4 HOURS PRN
Qty: 18 G | Refills: 0 | Status: SHIPPED
Start: 2021-12-07 | End: 2022-02-11 | Stop reason: SDUPTHER

## 2021-12-07 RX ORDER — DULOXETIN HYDROCHLORIDE 60 MG/1
60 CAPSULE, DELAYED RELEASE ORAL DAILY
Qty: 90 CAPSULE | Refills: 0 | Status: SHIPPED
Start: 2021-12-07 | End: 2022-02-11 | Stop reason: SDUPTHER

## 2021-12-07 RX ORDER — GLIPIZIDE 10 MG/1
10 TABLET ORAL 2 TIMES DAILY
Qty: 180 TABLET | Refills: 0 | Status: SHIPPED
Start: 2021-12-07 | End: 2022-02-11 | Stop reason: SDUPTHER

## 2021-12-07 RX ORDER — LOSARTAN POTASSIUM 50 MG/1
50 TABLET ORAL DAILY
Qty: 90 TABLET | Refills: 0 | Status: SHIPPED
Start: 2021-12-07 | End: 2022-02-11 | Stop reason: SDUPTHER

## 2021-12-07 ASSESSMENT — LIFESTYLE VARIABLES
HOW OFTEN DO YOU HAVE A DRINK CONTAINING ALCOHOL: TWO TO THREE TIMES A WEEK
HAS A RELATIVE, FRIEND, DOCTOR, OR ANOTHER HEALTH PROFESSIONAL EXPRESSED CONCERN ABOUT YOUR DRINKING OR SUGGESTED YOU CUT DOWN: 0
HAVE YOU OR SOMEONE ELSE BEEN INJURED AS A RESULT OF YOUR DRINKING: NO
AUDIT-C TOTAL SCORE: 3
HOW OFTEN DURING THE LAST YEAR HAVE YOU NEEDED AN ALCOHOLIC DRINK FIRST THING IN THE MORNING TO GET YOURSELF GOING AFTER A NIGHT OF HEAVY DRINKING: 0
HOW OFTEN DO YOU HAVE A DRINK CONTAINING ALCOHOL: 3
HOW OFTEN DO YOU HAVE SIX OR MORE DRINKS ON ONE OCCASION: 0
HAVE YOU OR SOMEONE ELSE BEEN INJURED AS A RESULT OF YOUR DRINKING: 0
HOW OFTEN DURING THE LAST YEAR HAVE YOU HAD A FEELING OF GUILT OR REMORSE AFTER DRINKING: 0
HOW OFTEN DO YOU HAVE SIX OR MORE DRINKS ON ONE OCCASION: NEVER
HOW OFTEN DURING THE LAST YEAR HAVE YOU BEEN UNABLE TO REMEMBER WHAT HAPPENED THE NIGHT BEFORE BECAUSE YOU HAD BEEN DRINKING: 0
HOW MANY STANDARD DRINKS CONTAINING ALCOHOL DO YOU HAVE ON A TYPICAL DAY: 0
AUDIT TOTAL SCORE: 0
AUDIT-C TOTAL SCORE: 0
HOW OFTEN DURING THE LAST YEAR HAVE YOU FOUND THAT YOU WERE NOT ABLE TO STOP DRINKING ONCE YOU HAD STARTED: NEVER
AUDIT TOTAL SCORE: 3
HOW MANY STANDARD DRINKS CONTAINING ALCOHOL DO YOU HAVE ON A TYPICAL DAY: ONE OR TWO
HOW OFTEN DURING THE LAST YEAR HAVE YOU FAILED TO DO WHAT WAS NORMALLY EXPECTED FROM YOU BECAUSE OF DRINKING: 0
HOW OFTEN DURING THE LAST YEAR HAVE YOU NEEDED AN ALCOHOLIC DRINK FIRST THING IN THE MORNING TO GET YOURSELF GOING AFTER A NIGHT OF HEAVY DRINKING: NEVER
HOW OFTEN DURING THE LAST YEAR HAVE YOU FOUND THAT YOU WERE NOT ABLE TO STOP DRINKING ONCE YOU HAD STARTED: 0
HOW OFTEN DURING THE LAST YEAR HAVE YOU HAD A FEELING OF GUILT OR REMORSE AFTER DRINKING: NEVER
HOW OFTEN DURING THE LAST YEAR HAVE YOU FAILED TO DO WHAT WAS NORMALLY EXPECTED FROM YOU BECAUSE OF DRINKING: NEVER
HOW OFTEN DURING THE LAST YEAR HAVE YOU BEEN UNABLE TO REMEMBER WHAT HAPPENED THE NIGHT BEFORE BECAUSE YOU HAD BEEN DRINKING: NEVER
HAS A RELATIVE, FRIEND, DOCTOR, OR ANOTHER HEALTH PROFESSIONAL EXPRESSED CONCERN ABOUT YOUR DRINKING OR SUGGESTED YOU CUT DOWN: NO

## 2021-12-07 ASSESSMENT — PATIENT HEALTH QUESTIONNAIRE - PHQ9
SUM OF ALL RESPONSES TO PHQ QUESTIONS 1-9: 0
1. LITTLE INTEREST OR PLEASURE IN DOING THINGS: 0
2. FEELING DOWN, DEPRESSED OR HOPELESS: 0
SUM OF ALL RESPONSES TO PHQ9 QUESTIONS 1 & 2: 0

## 2021-12-07 NOTE — PROGRESS NOTES
Medicare Annual Wellness Visit  Name: Paul Jimenez Date: 2021   MRN: [id number] Sex: Male   Age: 73 y.o. Ethnicity: Non- / Non    : 1955 Race: White (non-)      Jojo Wilder is here for Medicare AWV (labs ), Health Maintenance (due for pneum and dtap and dm eye exam), and COPD (has questions about dx)    Screenings for behavioral, psychosocial and functional/safety risks, and cognitive dysfunction are all negative except as indicated below. These results, as well as other patient data from the [address] form, are documented in Flowsheets linked to this Encounter. Allergies   Allergen Reactions    Adhesive Tape Rash         Prior to Visit Medications    Medication Sig Taking?  Authorizing Provider   hydroCHLOROthiazide (MICROZIDE) 12.5 MG capsule Take 1 capsule by mouth daily Yes Nat Rubinstein, MD   atorvastatin (LIPITOR) 40 MG tablet Take 1 tablet by mouth daily TAKE 1 TABLET BY MOUTH IN THE EVENING FOR CHOLESTEROL Yes Nat Rubinstein, MD   DULoxetine (CYMBALTA) 60 MG extended release capsule Take 1 capsule by mouth daily Yes Nat Rubinstein, MD   losartan (COZAAR) 50 MG tablet Take 1 tablet by mouth daily Yes Nat Rubinstein, MD   glipiZIDE (GLUCOTROL) 10 MG tablet Take 1 tablet by mouth 2 times daily Yes Nat Rubinstein, MD   fenofibrate micronized (LOFIBRA) 134 MG capsule Take 1 capsule by mouth every morning (before breakfast) take 1 capsule by mouth every morning Annie London Yes Nat Rubinstein, MD   carvedilol (COREG) 25 MG tablet Take 1 tablet by mouth 2 times daily Yes Nat Rubinstein, MD   albuterol sulfate HFA (VENTOLIN HFA) 108 (90 Base) MCG/ACT inhaler Inhale 2 puffs into the lungs every 4 hours as needed for Wheezing or Shortness of Breath Yes Nat Rubinstein, MD   doxepin (SINEQUAN) 10 MG capsule Take 1 capsule by mouth 2 times daily TAKE UP TO 2 CAPSULES BY MOUTH AT BEDTIME Justine So Yes Nat Rubinstein, MD   pantoprazole (Evie Prazeres 26) 40 MG tablet   Historical Provider, MD   rOPINIRole (REQUIP) 1 MG tablet Take 1 tablet by mouth 4 times daily for 120 doses  Unknown MD Gricelda   dicyclomine (BENTYL) 20 MG tablet Take 1 tablet by mouth 4 times daily  Unknown MD Gricelda   Cholecalciferol (VITAMIN D3) 125 MCG (5000 UT) TABS Take by mouth daily  Historical Provider, MD   colestipol (COLESTID) 1 g tablet Take 2 g by mouth daily  Historical Provider, MD   Lancets 30G MISC 1 each by Does not apply route 3 times daily Dispense what ever brand is covered by insurance  Unknown MD Gricelda   sildenafil (REVATIO) 20 MG tablet Take 1 tablet by mouth daily as needed (Bisi Royals)  Unknown MD Gricelda   blood glucose monitor strips Test 3 times a day & as needed for symptoms of irregular blood glucose  Unknown MD Gricelda   vitamin B-12 (CYANOCOBALAMIN) 1000 MCG tablet Take 1,000 mcg by mouth daily  Historical Provider, MD   Blood Glucose Monitoring Suppl (BLOOD GLUCOSE MONITOR SYSTEM) w/Device KIT 1 each by Does not apply route 3 times daily Dispense what ever brand is covered by insurance  Unknown MD Gricelda         Past Medical History:   Diagnosis Date    Anesthesia complication     states has difficulty breathing after surgery  usually needs nebulizer treatment    Arthritis     Asthma     since childhood    Diabetes mellitus ([facility]     Fungal infection of foot     GERD (gastroesophageal reflux disease)     Hyperlipidemia     Hypertension     [facility]        Past Surgical History:   Procedure Laterality Date    ANTERIOR CRUCIATE LIGAMENT REPAIR Right 07/13/2020    RIGHT KNEE ARTHROSCOPY, (ANTERIOR CRUCIATE LIGAMENT) ACL RECONSTRUCTION. ALLOGRAFT.  MEDIAL MENISCECTOMY AND DEBRIDEMENT. ([address]) performed by Jessica Vickers DO at [address] Left 9/8/2021    LEFT ULNAR NEUROPLASTY performed by Baron Daniel MD at [address], LAPAROSCOPIC N/A 05/18/2020    CHOLECYSTECTOMY LAPAROSCOPIC WITH IOC performed by King Mohit MD at [facility], COLON, DIAGNOSTIC      HERNIA REPAIR      NECK SURGERY      cervical spine surgery    SHOULDER ARTHROSCOPY Left 6/1/2021    LEFT SHOULDER ARTHROSCOPY, SUBACROMIAL DECOMPRESSION, DEBRIDEMENT (ARTHREX) performed by Curry George DO at [address] [place] [address]         Family History   Problem Relation Age of Onset    Hypertension Mother     Heart Attack Mother     Diabetes Father     Diabetes Maternal Grandmother     Hypertension Paternal Grandmother        CareTeam (Including outside providers/suppliers regularly involved in providing care):   Patient Care Team:  Misti Cohen MD as PCP - General (Family Medicine)  Misti Cohen MD as PCP - [facility]    Wt Readings from Last 3 Encounters:   12/07/21 217 lb (98.4 kg)   11/08/21 220 lb (99.8 kg)   11/08/21 210 lb (95.3 kg)     Vitals:    12/07/21 1545   BP: 132/80   Pulse: 65   Resp: 16   Temp: 97.5 °F (36.4 °C)   SpO2: 98%   Weight: 217 lb (98.4 kg)   Height: 5' 10\" (1.778 m)     Body mass index is 31.14 kg/m². Based upon direct observation of the patient, evaluation of cognition reveals recent and remote memory intact.     General Appearance: alert and oriented to person, place and time, well developed and well- nourished, in no acute distress  Skin: warm and dry, no rash or erythema  Head: normocephalic and atraumatic  Eyes: pupils equal, round, and reactive to light, extraocular eye movements intact, conjunctivae normal  ENT: tympanic membrane, external ear and ear canal normal bilaterally, nose without deformity, nasal mucosa and turbinates normal without polyps  Neck: supple and non-tender without mass, no thyromegaly or thyroid nodules, no cervical lymphadenopathy  Pulmonary/Chest: clear to auscultation bilaterally- no wheezes, rales or rhonchi, normal air movement, no respiratory distress  Cardiovascular: normal rate, regular rhythm, normal S1 and S2, no murmurs, rubs, clicks, or gallops, distal pulses intact, no carotid bruits  Abdomen: soft, non-tender, non-distended, normal bowel sounds, no masses or organomegaly  Extremities: no cyanosis, clubbing or edema  Musculoskeletal: normal range of motion, no joint swelling, deformity or tenderness  Neurologic: reflexes normal and symmetric, no cranial nerve deficit, gait, coordination and speech normal    Patient's complete Health Risk Assessment and screening values have been reviewed and are found in Flowsheets. The following problems were reviewed today and where indicated follow up appointments were made and/or referrals ordered. Positive Risk Factor Screenings with Interventions:            General Health and ACP:  General  In general, how would you say your health is?: Very Good  In the past 7 days, have you experienced any of the following?  New or Increased Pain, New or Increased Fatigue, Loneliness, Social Isolation, Stress or Anger?: None of These  Do you get the social and emotional support that you need?: Yes  Do you have a Living Will?: (!) No  Advance Directives     Power of [address] [place] [address] Will ACP-Advance Directive ACP-Power of     Not on File Filed on 11/09/11 Filed Not on File      General Health Risk Interventions:  · No Living Will: Advance Care Planning addressed with patient today    Health Habits/Nutrition:  Health Habits/Nutrition  Do you exercise for at least 20 minutes 2-3 times per week?: (!) No  Have you lost any weight without trying in the past 3 months?: No  Do you eat only one meal per day?: No  Have you seen the dentist within the past year?: (!) No  Body mass index: (!) 31.13  Health Habits/Nutrition Interventions:  · Inadequate physical activity:  educational materials provided to promote increased physical activity  · Dental exam overdue:  patient encouraged to make appointment with his/her dentist    Hearing/Vision:  No exam data present  Hearing/Vision  Do you or your family notice any trouble with your hearing that hasn't been managed with hearing aids?: No  Do you have difficulty driving, watching TV, or doing any of your daily activities because of your eyesight?: No  Have you had an eye exam within the past year?: (!) No  Hearing/Vision Interventions:  · Vision concerns:  patient encouraged to make appointment with his/her eye specialist    Safety:  Safety  Do you have working smoke detectors?: Yes  Have all throw rugs been removed or fastened?: (!) No  Do you have non-slip mats or surfaces in all bathtubs/showers?: (!) No  Do all of your stairways have a railing or banister?: Yes  Are your doorways, halls and stairs free of clutter?: Yes  Do you always fasten your seatbelt when you are in a car?: Yes  Safety Interventions:  · Home safety tips provided     Personalized Preventive Plan   Current Health Maintenance Status  Immunization History   Administered Date(s) Administered    Influenza, Quadv, adjuvanted, 65 yrs +, IM, PF (Fluad) 11/13/2020        Health Maintenance   Topic Date Due    COVID-19 Vaccine (1) Never done    Diabetic retinal exam  Never done    DTaP/Tdap/Td vaccine (1 - Tdap) Never done    Shingles Vaccine (1 of 2) Never done    Pneumococcal 65+ years Vaccine (1 of 1 - PPSV23) Never done   [employer] Visit (AWV)  Never done    Flu vaccine (1) 09/01/2021    Diabetic foot exam  02/15/2022    A1C test (Diabetic or Prediabetic)  03/07/2022    Lipid screen  11/17/2022    Potassium monitoring  11/17/2022    Creatinine monitoring  11/17/2022    Colon cancer screen colonoscopy  10/22/2024    AAA screen  Completed    Hepatitis C screen  Completed    HIV screen  Completed    Hepatitis A vaccine  Aged Out    Hib vaccine  Aged Out    Meningococcal (ACWY) vaccine  Aged Out     Recommendations for [employer] Due: see orders and patient instructions/AVS.  .   Recommended screening schedule for the next 5-10 years is provided to the patient in written form: see Patient Marely Henry was seen today for medicare awv, health maintenance and copd. Diagnoses and all orders for this visit:    Routine general medical examination at a health care facility    Type 2 diabetes mellitus with stage 3a chronic kidney disease, without long-term current use of insulin (HCC)  -     POCT glycosylated hemoglobin (Hb A1C)    Chronic neck pain  Comments:  STABLE  Orders:  -     DULoxetine (CYMBALTA) 60 MG extended release capsule; Take 1 capsule by mouth daily  -     fenofibrate micronized (LOFIBRA) 134 MG capsule; Take 1 capsule by mouth every morning (before breakfast) take 1 capsule by mouth every morning TAKE BEFORE BEAKFAST    Cervical stenosis of spinal canal  Comments:  STABLE  Orders:  -     DULoxetine (CYMBALTA) 60 MG extended release capsule; Take 1 capsule by mouth daily    Chronic neck pain  -     DULoxetine (CYMBALTA) 60 MG extended release capsule; Take 1 capsule by mouth daily  -     fenofibrate micronized (LOFIBRA) 134 MG capsule; Take 1 capsule by mouth every morning (before breakfast) take 1 capsule by mouth every morning TAKE BEFORE BEAKFAST    Mixed hypercholesterolemia and hypertriglyceridemia    Major depressive disorder, recurrent, moderate    Other orders  -     hydroCHLOROthiazide (MICROZIDE) 12.5 MG capsule; Take 1 capsule by mouth daily  -     atorvastatin (LIPITOR) 40 MG tablet; Take 1 tablet by mouth daily TAKE 1 TABLET BY MOUTH IN THE EVENING FOR CHOLESTEROL  -     losartan (COZAAR) 50 MG tablet; Take 1 tablet by mouth daily  -     glipiZIDE (GLUCOTROL) 10 MG tablet; Take 1 tablet by mouth 2 times daily  -     carvedilol (COREG) 25 MG tablet; Take 1 tablet by mouth 2 times daily  -     albuterol sulfate HFA (VENTOLIN HFA) 108 (90 Base) MCG/ACT inhaler; Inhale 2 puffs into the lungs every 4 hours as needed for Wheezing or Shortness of Breath  -     doxepin (SINEQUAN) 10 MG capsule;  Take 1 capsule by mouth 2 times daily TAKE UP TO 2 CAPSULES BY MOUTH AT BEDTIME AS DIRECTED FOR INSOMNIA

## 2021-12-07 NOTE — PATIENT INSTRUCTIONS
LOW SALT FOR BLOOD PRESSURE CONTROL. LOW CARBOHYDRATE FOR BLOOD SUGAR AND WEIGHT CONTROL. LOW FAT DIET FOR CHOLESTEROL CONTROL. DRINK ENOUGH FLUIDS FOR BETTER KIDNEY FUNCTION. TAKE COREG 25 MG. 2 TIMES A DAY,COZAAR 50 MG. AND HCT  HCTZ 12.5 MG. DAILY FOR BLOOD PRESSURE CONTROL. TAKE GLUCOTROL 10 MG. 2 TIMES A DAY FOR BLOOD SUGAR CONTROL. TAKE LIPITOR  40 MG. NIGHTLY FOR CHOLESTEROL CONTROL. Marialuisa Ugarte TAKE SINEQUAN, CYMBALTA AS PER PSYCHIATRIST RECOMMENDATION  FOR MOOD CONTROL. REGULAR WALKING ADVISED. ADVISED WEIGHT REDUCTION. KEEP NEXT APPOINTMENT IN 2 MONTHS.       Personalized Preventive Plan for Cassandra Mancuso - 12/7/2021  Medicare offers a range of preventive health benefits. Some of the tests and screenings are paid in full while other may be subject to a deductible, co-insurance, and/or copay. Some of these benefits include a comprehensive review of your medical history including lifestyle, illnesses that may run in your family, and various assessments and screenings as appropriate. After reviewing your medical record and screening and assessments performed today your provider may have ordered immunizations, labs, imaging, and/or referrals for you. A list of these orders (if applicable) as well as your Preventive Care list are included within your After Visit Summary for your review. Other Preventive Recommendations:    · A preventive eye exam performed by an eye specialist is recommended every 1-2 years to screen for glaucoma; cataracts, macular degeneration, and other eye disorders. · A preventive dental visit is recommended every 6 months. · Try to get at least 150 minutes of exercise per week or 10,000 steps per day on a pedometer . · Order or download the FREE \"Exercise & Physical Activity: Your Everyday Guide\" from [employer]. Call 5-233.467.2974 or search [employer] online.   · You need 6236-6305 mg of calcium and 5896-8028 IU of vitamin D per day. It is possible to meet your calcium requirement with diet alone, but a vitamin D supplement is usually necessary to meet this goal.  · When exposed to the sun, use a sunscreen that protects against both UVA and UVB radiation with an SPF of 30 or greater. Reapply every 2 to 3 hours or after sweating, drying off with a towel, or swimming. · Always wear a seat belt when traveling in a car. Always wear a helmet when riding a bicycle or motorcycle.

## 2021-12-23 RX ORDER — PANTOPRAZOLE SODIUM 40 MG/1
40 TABLET, DELAYED RELEASE ORAL DAILY
Qty: 90 TABLET | Refills: 0 | Status: SHIPPED
Start: 2021-12-23 | End: 2022-02-11 | Stop reason: SDUPTHER

## 2021-12-23 RX ORDER — ROPINIROLE 1 MG/1
1 TABLET, FILM COATED ORAL 4 TIMES DAILY
Qty: 120 TABLET | Refills: 0 | Status: SHIPPED
Start: 2021-12-23 | End: 2022-01-20

## 2022-01-20 RX ORDER — ROPINIROLE 1 MG/1
TABLET, FILM COATED ORAL
Qty: 120 TABLET | Refills: 0 | Status: SHIPPED
Start: 2022-01-20 | End: 2022-02-11 | Stop reason: SDUPTHER

## 2022-02-02 ENCOUNTER — TELEPHONE (OUTPATIENT)
Dept: FAMILY MEDICINE CLINIC | Age: 67
End: 2022-02-02

## 2022-02-02 RX ORDER — DOXEPIN HYDROCHLORIDE 10 MG/1
10 CAPSULE ORAL 2 TIMES DAILY
Qty: 60 CAPSULE | Refills: 0 | Status: SHIPPED
Start: 2022-02-02 | End: 2022-02-03

## 2022-02-02 NOTE — TELEPHONE ENCOUNTER
----- Message from Kieran Cabot sent at 2/2/2022 11:23 AM EST -----  Subject: Refill Request    QUESTIONS  Name of Medication? doxepin (SINEQUAN) 10 MG capsule  Patient-reported dosage and instructions? 10 MG   How many days do you have left? 0  Preferred Pharmacy? [facility] phone number (if available)? [phone]  ---------------------------------------------------------------------------  --------------  CALL BACK INFO  What is the best way for the office to contact you? OK to leave message on   voicemail  Preferred Call Back Phone Number?  [phone]

## 2022-02-02 NOTE — TELEPHONE ENCOUNTER
They received E script for Doxepin with two different set of direction for taking it. Please verify instructions. Last seen 12/7/2021  Next appt 2/11/2022    [facility]   [address]

## 2022-02-03 RX ORDER — DOXEPIN HYDROCHLORIDE 10 MG/1
10 CAPSULE ORAL NIGHTLY
Qty: 60 CAPSULE | Refills: 0 | Status: SHIPPED
Start: 2022-02-03 | End: 2022-02-11 | Stop reason: SDUPTHER

## 2022-02-11 ENCOUNTER — OFFICE VISIT (OUTPATIENT)
Dept: FAMILY MEDICINE CLINIC | Age: 67
End: 2022-02-11
Payer: MEDICARE

## 2022-02-11 VITALS
SYSTOLIC BLOOD PRESSURE: 120 MMHG | WEIGHT: 220 LBS | HEART RATE: 67 BPM | BODY MASS INDEX: 31.57 KG/M2 | OXYGEN SATURATION: 95 % | DIASTOLIC BLOOD PRESSURE: 78 MMHG

## 2022-02-11 DIAGNOSIS — G89.29 CHRONIC NECK PAIN: ICD-10-CM

## 2022-02-11 DIAGNOSIS — E78.2 MIXED HYPERCHOLESTEROLEMIA AND HYPERTRIGLYCERIDEMIA: ICD-10-CM

## 2022-02-11 DIAGNOSIS — F33.1 MAJOR DEPRESSIVE DISORDER, RECURRENT, MODERATE (HCC): ICD-10-CM

## 2022-02-11 DIAGNOSIS — M54.2 CHRONIC NECK PAIN: ICD-10-CM

## 2022-02-11 DIAGNOSIS — M48.02 CERVICAL STENOSIS OF SPINAL CANAL: ICD-10-CM

## 2022-02-11 DIAGNOSIS — I10 ESSENTIAL HYPERTENSION: ICD-10-CM

## 2022-02-11 DIAGNOSIS — Z12.5 SCREENING FOR PROSTATE CANCER: ICD-10-CM

## 2022-02-11 DIAGNOSIS — N18.31 TYPE 2 DIABETES MELLITUS WITH STAGE 3A CHRONIC KIDNEY DISEASE, WITHOUT LONG-TERM CURRENT USE OF INSULIN (HCC): Primary | ICD-10-CM

## 2022-02-11 DIAGNOSIS — E11.22 TYPE 2 DIABETES MELLITUS WITH STAGE 3A CHRONIC KIDNEY DISEASE, WITHOUT LONG-TERM CURRENT USE OF INSULIN (HCC): Primary | ICD-10-CM

## 2022-02-11 PROCEDURE — 99214 OFFICE O/P EST MOD 30 MIN: CPT | Performed by: FAMILY MEDICINE

## 2022-02-11 RX ORDER — ALBUTEROL SULFATE 90 UG/1
2 AEROSOL, METERED RESPIRATORY (INHALATION) EVERY 4 HOURS PRN
Qty: 18 G | Refills: 0 | Status: SHIPPED
Start: 2022-02-11 | End: 2022-11-01 | Stop reason: SDUPTHER

## 2022-02-11 RX ORDER — FENOFIBRATE 134 MG/1
134 CAPSULE ORAL
Qty: 90 CAPSULE | Refills: 0 | Status: SHIPPED
Start: 2022-02-11 | End: 2022-07-22 | Stop reason: SDUPTHER

## 2022-02-11 RX ORDER — ATORVASTATIN CALCIUM 40 MG/1
40 TABLET, FILM COATED ORAL DAILY
Qty: 90 TABLET | Refills: 0 | Status: SHIPPED
Start: 2022-02-11 | End: 2022-06-16 | Stop reason: SDUPTHER

## 2022-02-11 RX ORDER — DICYCLOMINE HCL 20 MG
20 TABLET ORAL 4 TIMES DAILY
Qty: 40 TABLET | Refills: 0 | Status: SHIPPED
Start: 2022-02-11 | End: 2022-06-17 | Stop reason: ALTCHOICE

## 2022-02-11 RX ORDER — LOSARTAN POTASSIUM 50 MG/1
50 TABLET ORAL DAILY
Qty: 90 TABLET | Refills: 0 | Status: SHIPPED
Start: 2022-02-11 | End: 2022-08-25 | Stop reason: SDUPTHER

## 2022-02-11 RX ORDER — HYDROCHLOROTHIAZIDE 12.5 MG/1
12.5 CAPSULE, GELATIN COATED ORAL DAILY
Qty: 90 CAPSULE | Refills: 0 | Status: SHIPPED
Start: 2022-02-11 | End: 2022-08-25 | Stop reason: SDUPTHER

## 2022-02-11 RX ORDER — CARVEDILOL 25 MG/1
25 TABLET ORAL 2 TIMES DAILY
Qty: 180 TABLET | Refills: 0 | Status: SHIPPED
Start: 2022-02-11 | End: 2022-08-29 | Stop reason: SDUPTHER

## 2022-02-11 RX ORDER — DOXEPIN HYDROCHLORIDE 10 MG/1
10 CAPSULE ORAL NIGHTLY
Qty: 60 CAPSULE | Refills: 0 | Status: SHIPPED
Start: 2022-02-11 | End: 2022-05-05 | Stop reason: SDUPTHER

## 2022-02-11 RX ORDER — ROPINIROLE 1 MG/1
TABLET, FILM COATED ORAL
Qty: 120 TABLET | Refills: 0 | Status: SHIPPED
Start: 2022-02-11 | End: 2022-05-05 | Stop reason: SDUPTHER

## 2022-02-11 RX ORDER — PANTOPRAZOLE SODIUM 40 MG/1
40 TABLET, DELAYED RELEASE ORAL DAILY
Qty: 90 TABLET | Refills: 0 | Status: SHIPPED
Start: 2022-02-11 | End: 2022-08-25 | Stop reason: SDUPTHER

## 2022-02-11 RX ORDER — GLIPIZIDE 10 MG/1
10 TABLET ORAL 2 TIMES DAILY
Qty: 180 TABLET | Refills: 0 | Status: SHIPPED
Start: 2022-02-11 | End: 2022-06-17 | Stop reason: ALTCHOICE

## 2022-02-11 RX ORDER — DULOXETIN HYDROCHLORIDE 60 MG/1
60 CAPSULE, DELAYED RELEASE ORAL DAILY
Qty: 90 CAPSULE | Refills: 0 | Status: SHIPPED
Start: 2022-02-11 | End: 2022-05-05 | Stop reason: ALTCHOICE

## 2022-02-11 NOTE — PROGRESS NOTES
OFFICE PROGRESS NOTE      SUBJECTIVE:        Patient ID:   Miguel Porter is a 78 y.o. male who presents for   Chief Complaint   Patient presents with    Other     hoarsness    Hypertension    COPD           HPI:     HAS SOME HOARSENESS OF VOICE SINCE THIS MORNING. NO OTHER ASSOCIATED SYMPTOMS. WILL TRY SOME SALT WATER GARGLE AS RECOMMENDED. RECHECK BP, CHOLESTEROL AND DIABETES. MEDICATION REFILL. WATCHING DIET BUT NOT GOOD. WALKING SOME IN HOUSE FOR EXERCISE. TAKING MEDICATIONS REGULARLY. Prior to Admission medications    Medication Sig Start Date End Date Taking?  Authorizing Provider   albuterol sulfate HFA (VENTOLIN HFA) 108 (90 Base) MCG/ACT inhaler Inhale 2 puffs into the lungs every 4 hours as needed for Wheezing or Shortness of Breath 2/11/22  Yes Nancy Major MD   atorvastatin (LIPITOR) 40 MG tablet Take 1 tablet by mouth daily TAKE 1 TABLET BY MOUTH IN THE EVENING FOR CHOLESTEROL 2/11/22  Yes Nancy Major MD   carvedilol (COREG) 25 MG tablet Take 1 tablet by mouth 2 times daily 2/11/22  Yes Nancy Major MD   dicyclomine (BENTYL) 20 MG tablet Take 1 tablet by mouth 4 times daily 2/11/22  Yes Nancy Major MD   doxepin (SINEQUAN) 10 MG capsule Take 1 capsule by mouth nightly TAKE UP TO 2 CAPSULES BY MOUTH AT BEDTIME AS DIRECTED FOR INSOMNIA 2/11/22 3/13/22 Yes Nancy Major MD   DULoxetine (CYMBALTA) 60 MG extended release capsule Take 1 capsule by mouth daily 2/11/22 5/12/22 Yes Nancy Major MD   fenofibrate micronized (LOFIBRA) 134 MG capsule Take 1 capsule by mouth every morning (before breakfast) take 1 capsule by mouth every morning TAKE BEFORE BEAKFAST 2/11/22  Yes Nancy Major MD   glipiZIDE (GLUCOTROL) 10 MG tablet Take 1 tablet by mouth 2 times daily 2/11/22  Yes Nancy Major MD   hydroCHLOROthiazide (MICROZIDE) 12.5 MG capsule Take 1 capsule by mouth daily 2/11/22  Yes Nancy Major MD   Lancets 30G MISC 1 each by Does not apply route 3 times daily Dispense what ever brand is covered by insurance 22  Yes Kareem Rosa MD   losartan (COZAAR) 50 MG tablet Take 1 tablet by mouth daily 22  Yes Kareem Rosa MD   pantoprazole (PROTONIX) 40 MG tablet Take 1 tablet by mouth daily 22  Yes Kareem Rosa MD   rOPINIRole (REQUIP) 1 MG tablet take 1 tablet by mouth four times a day 22  Yes Kareem Rosa MD   sildenafil (REVATIO) 20 MG tablet Take 1 tablet by mouth daily as needed (FOR SEXUAL DYSFUNCTION) 21  Yes Kareem Rosa MD   vitamin B-12 (CYANOCOBALAMIN) 1000 MCG tablet Take 1,000 mcg by mouth daily   Yes Historical Provider, MD   Cholecalciferol (VITAMIN D3) 125 MCG (5000 UT) TABS Take by mouth daily    Historical Provider, MD   colestipol (COLESTID) 1 g tablet Take 2 g by mouth daily    Historical Provider, MD   blood glucose monitor strips Test 3 times a day & as needed for symptoms of irregular blood glucose 3/24/21   Kareem Rosa MD   Blood Glucose Monitoring Suppl (BLOOD GLUCOSE MONITOR SYSTEM) w/Device KIT 1 each by Does not apply route 3 times daily Dispense what ever brand is covered by insurance 20   Kareem Rosa MD     Social History     Socioeconomic History    Marital status: Legally      Spouse name: Not on file    Number of children: Not on file    Years of education: Not on file    Highest education level: Not on file   Occupational History    Not on file   Tobacco Use    Smoking status: Former Smoker     Packs/day: 1.50     Years: 15.00     Pack years: 22.50     Types: Cigarettes     Start date: 1965     Quit date: 1986     Years since quittin.9    Smokeless tobacco: Never Used   Vaping Use    Vaping Use: Never used   Substance and Sexual Activity    Alcohol use: Yes     Comment: 2-3 beers per week    Drug use: No    Sexual activity: Not on file   Other Topics Concern    Not on file   Social History Narrative    Not on file     Social Determinants of Health     Financial Resource Strain: Low Risk     Difficulty of Paying Living Expenses: Not hard at all   Food Insecurity: No Food Insecurity    Worried About Running Out of Food in the Last Year: Never true    Julia of Food in the Last Year: Never true   Transportation Needs: No Transportation Needs    Lack of Transportation (Medical): No    Lack of Transportation (Non-Medical):  No   Physical Activity:     Days of Exercise per Week: Not on file    Minutes of Exercise per Session: Not on file   Stress:     Feeling of Stress : Not on file   Social Connections:     Frequency of Communication with Friends and Family: Not on file    Frequency of Social Gatherings with Friends and Family: Not on file    Attends Ep[employer] Services: Not on file    Active Member of [address] [place] [address] [employer] or Organizations: Not on file    Attends Club or Organization Meetings: Not on file    Marital Status: Not on file   Intimate Partner Violence:     Fear of Current or Ex-Partner: Not on file    Emotionally Abused: Not on file    Physically Abused: Not on file    Sexually Abused: Not on file   Housing Stability: Unknown    Unable to Pay for Housing in the Last Year: No    Number of [place] in the Last Year: Not on file    Unstable Housing in the Last Year: No       I have reviewed Jeffery's allergies, medications, problem list, medical, social and family history and have updated as needed in the electronic medical record  Review Of Systems:    Skin: no abnormal pigmentation, rash, scaling, itching, masses, hair or nail changes  Eyes: no blurring, diplopia, or eye pain  Ears/Nose/Throat: no hearing loss, tinnitus, vertigo, nosebleed, nasal congestion, rhinorrhea, sore throat  Respiratory: no cough, pleuritic chest pain, dyspnea, or wheezing  Cardiovascular: no chest pain, angina, dyspnea on exertion, orthopnea, PND, palpitations, or claudication  Gastrointestinal: no nausea, vomiting, heartburn, diarrhea, constipation, bloating, abdominal pain, or blood per rectum. Appetite is good  Genitourinary: no urinary urgency, frequency, dysuria, nocturia, hesitancy, or incontinence  Musculoskeletal: joint pains off and on. Morning stiffness. Ambulating well  Neurologic: no paralysis, paresis, paresthesia, seizures, tremors, or headaches  Hematologic/Lymphatic/Immunologic: no anemia, abnormal bleeding/bruising, fever, chills, night sweats, swollen glands, or unexplained weight loss  Endocrine: no heat or cold intolerance and no polyphagia, polydipsia, or polyuria        OBJECTIVE:     VS:  Wt Readings from Last 3 Encounters:   02/11/22 220 lb (99.8 kg)   12/07/21 217 lb (98.4 kg)   11/08/21 220 lb (99.8 kg)     Temp Readings from Last 3 Encounters:   12/07/21 97.5 °F (36.4 °C)   11/08/21 97.7 °F (36.5 °C) (Temporal)   11/08/21 98 °F (36.7 °C) (Temporal)     BP Readings from Last 3 Encounters:   02/11/22 120/78   12/07/21 132/80   11/08/21 (!) 152/83        General appearance: Alert, Awake, Oriented times 3, no distress  Skin: Warm and dry  Head: Normocephalic. No masses, lesions or tenderness noted  Eyes: Conjunctivae appear normal. PERLE  Ears: External ears normal  Nose/Sinuses: Nares normal. Septum midline. Mucosa normal. No drainage  Oropharynx: Oropharynx clear with no exudate seen  Neck: Neck supple. No jugular venous distension, lymphadenopathy or thyromegaly Trachea midline  Chest:  Normal. Movements are Normal and Equal.  Lungs: Lungs clear to auscultation bilaterally. No ronchi, crackles or wheezes  Heart: S1 S2  Regular rate and rhythm. No rub, murmur or gallop  Abdomen: Abdomen soft, non-tender. BS normal. No masses, organomegaly. Back: Grossly Normal and Equal. DTR are Normal. SLR is Normal.  Extremities: Arthritic changes are noted. Movements are limited. Pedal pulses are normal.  Musculoskeletal: Muscular strength appears intact.  No joint effusion, tenderness, swelling or warmth  Neuro:  No focal motor or sensory deficits        ASSESSMENT     Patient Active Problem List    Diagnosis Date Noted    Mixed hypercholesterolemia and hypertriglyceridemia     COVID 10/22/2021    Cubital tunnel syndrome on left     Parkinson's disease 08/30/2021    Rupture of left biceps tendon 06/18/2021    Major depressive disorder, recurrent, moderate 06/18/2021    Type 2 diabetes mellitus with chronic kidney disease ([facility] 05/17/2021    Difficulty walking 05/17/2021    Onychomycosis 02/15/2021    Tinea pedis of both feet 02/15/2021    Rupture of anterior cruciate ligament of right knee     COPD with acute exacerbation ([facility] 07/15/2020    Hypertension     Type 2 diabetes mellitus with hyperglycemia, without long-term current use of insulin ([facility])     Tinea corporis 02/24/2011        Diagnosis:     ICD-10-CM    1. Type 2 diabetes mellitus with stage 3a chronic kidney disease, without long-term current use of insulin (HCC)  E11.22     N18.31     CONTROLLED   2. Mixed hypercholesterolemia and hypertriglyceridemia  E78.2 Comprehensive Metabolic Panel     Lipid Panel    FAIR CONTROL   3. Essential hypertension  I10     CONTROLLED   4. Major depressive disorder, recurrent, moderate  F33.1    5. Chronic neck pain  M54.2 DULoxetine (CYMBALTA) 60 MG extended release capsule    G89.29     STABLE   6. Cervical stenosis of spinal canal  M48.02 DULoxetine (CYMBALTA) 60 MG extended release capsule    STABLE   7. Screening for prostate cancer  Z12.5 PSA screening       PLAN:           Patient Instructions   LOW SALT FOR BLOOD PRESSURE CONTROL. LOW CARBOHYDRATE FOR BLOOD SUGAR AND WEIGHT CONTROL. LOW FAT DIET FOR CHOLESTEROL CONTROL. DRINK ENOUGH FLUIDS FOR BETTER KIDNEY FUNCTION. TAKE COREG 25 MG. 2 TIMES A DAY,COZAAR 50 MG. AND HCT  HCTZ 12.5 MG. DAILY FOR BLOOD PRESSURE CONTROL. TAKE GLUCOTROL 10 MG. 2 TIMES A DAY FOR BLOOD SUGAR CONTROL. TAKE LILOFIBRA 134 MG. IN THE MORNING AND LIPITOR  40 MG. AND   NIGHTLY FOR CHOLESTEROL CONTROL. Haseeb Hansen   TAKE SINEQUAN, CYMBALTA AS PER PSYCHIATRIST RECOMMENDATION  FOR MOOD CONTROL. TAKE PROTONIX 40 MG. DAILY FOR STOMACH ACID CONTROL. REGULAR WALKING ADVISED. ADVISED WEIGHT REDUCTION. FASTING FOR LAB WORK PRIOR TO NEXT VISIT. KEEP NEXT APPOINTMENT IN 3 MONTHS. Return in about 3 months (around 5/11/2022) for FOLLOW UP VISIT. I have reviewed my findings and recommendations with Daniel Jaramillo.     Electronically signed by Tila Villareal MD on 2/11/22 at 9:15 AM EST

## 2022-02-11 NOTE — PATIENT INSTRUCTIONS
LOW SALT FOR BLOOD PRESSURE CONTROL. LOW CARBOHYDRATE FOR BLOOD SUGAR AND WEIGHT CONTROL. LOW FAT DIET FOR CHOLESTEROL CONTROL. DRINK ENOUGH FLUIDS FOR BETTER KIDNEY FUNCTION. TAKE COREG 25 MG. 2 TIMES A DAY,COZAAR 50 MG. AND HCT  HCTZ 12.5 MG. DAILY FOR BLOOD PRESSURE CONTROL. TAKE GLUCOTROL 10 MG. 2 TIMES A DAY FOR BLOOD SUGAR CONTROL. TAKE LILOFIBRA 134 MG. IN THE MORNING AND LIPITOR  40 MG. AND   NIGHTLY FOR CHOLESTEROL CONTROL. Ruthann Alexander TAKE SINEQUAN, CYMBALTA AS PER PSYCHIATRIST RECOMMENDATION  FOR MOOD CONTROL. TAKE PROTONIX 40 MG. DAILY FOR STOMACH ACID CONTROL. REGULAR WALKING ADVISED. ADVISED WEIGHT REDUCTION. FASTING FOR LAB WORK PRIOR TO NEXT VISIT. KEEP NEXT APPOINTMENT IN 3 MONTHS.

## 2022-05-05 RX ORDER — DOXEPIN HYDROCHLORIDE 10 MG/1
10 CAPSULE ORAL NIGHTLY
Qty: 60 CAPSULE | Refills: 0 | Status: SHIPPED
Start: 2022-05-05 | End: 2022-07-22 | Stop reason: SDUPTHER

## 2022-05-05 RX ORDER — ROPINIROLE 1 MG/1
TABLET, FILM COATED ORAL
Qty: 120 TABLET | Refills: 0 | Status: SHIPPED
Start: 2022-05-05 | End: 2022-06-09 | Stop reason: SDUPTHER

## 2022-05-05 NOTE — TELEPHONE ENCOUNTER
Patient called for refills  Last seen 2/11/2022  Next appt 5/13/2022  [address] [facility]

## 2022-05-22 ENCOUNTER — HOSPITAL ENCOUNTER (EMERGENCY)
Age: 67
Discharge: HOME | End: 2022-05-22
Payer: MEDICARE

## 2022-05-22 ENCOUNTER — APPOINTMENT (OUTPATIENT)
Dept: GENERAL RADIOLOGY | Age: 67
End: 2022-05-22
Payer: MEDICARE

## 2022-05-22 VITALS
DIASTOLIC BLOOD PRESSURE: 84 MMHG | RESPIRATION RATE: 22 BRPM | SYSTOLIC BLOOD PRESSURE: 168 MMHG | TEMPERATURE: 97 F | HEART RATE: 71 BPM | OXYGEN SATURATION: 96 %

## 2022-05-22 DIAGNOSIS — S69.92XA INJURY OF LEFT HAND, INITIAL ENCOUNTER: Primary | ICD-10-CM

## 2022-05-22 PROCEDURE — 73130 X-RAY EXAM OF HAND: CPT

## 2022-05-22 PROCEDURE — 99283 EMERGENCY DEPT VISIT LOW MDM: CPT

## 2022-05-22 RX ORDER — NAPROXEN 500 MG/1
500 TABLET ORAL 2 TIMES DAILY WITH MEALS
Qty: 10 TABLET | Refills: 0 | Status: SHIPPED | OUTPATIENT
Start: 2022-05-22 | End: 2022-09-04

## 2022-05-22 ASSESSMENT — PAIN SCALES - GENERAL: PAINLEVEL_OUTOF10: 10

## 2022-05-22 NOTE — ED PROVIDER NOTES
[address]  Department of Emergency Medicine   ED  Encounter Note  Admit Date/RoomTime: 2022  2:15 PM  ED Room: [place] [address]    NAME: Roseanna Perla  : 1955  MRN: [id number]     Chief Complaint:  Hand Injury (smashed hand in hitch of trailer when trying hook to vehicle)    History of Present Illness       Roseanna Perla is a 78 y.o. old male presenting to the emergency department by private vehicle, for traumatic Left hand pain which occured 30 minute(s) prior to arrival.  The complaint is due to crushing injury via left hand being smashed onto hitch while trying to hook onto vehicle. He is right handed. Patient has no prior history of pain/injury with regards to today's visit. The patients tetanus status is unknown. Since onset the symptoms have been unchanged. His pain is aggraveated by any movement or pressure on or palpation of painful area and relieved by nothing, as no treatment has been provided prior to this visit. Patient denies additional injuries including but not limited to head trauma, loss of consciousness, chest pain, shortness of breath, fever, chills, nausea, vomiting, diarrhea. ROS   Pertinent positives and negatives are stated within HPI, all other systems reviewed and are negative. Past Medical History:  has a past medical history of Anesthesia complication, Arthritis, Asthma, Diabetes mellitus ([address] Fungal infection of foot, GERD (gastroesophageal reflux disease), Hyperlipidemia, Hypertension, and Type 2 diabetes mellitus without complication ([address] Surgical History:  has a past surgical history that includes hernia repair; Neck surgery; Cholecystectomy, laparoscopic (N/A, 2020); Anterior cruciate ligament repair (Right, 2020); Colonoscopy; Endoscopy, colon, diagnostic; Shoulder arthroscopy (Left, 2021); and Arm Surgery (Left, 2021). Social History:  reports that he quit smoking about 36 years ago.  His smoking use included cigarettes. He started smoking about 57 years ago. He has a 22.50 pack-year smoking history. He has never used smokeless tobacco. He reports current alcohol use. He reports that he does not use drugs. Family History: family history includes Diabetes in his father and maternal grandmother; Heart Attack in his mother; Hypertension in his mother and paternal grandmother. Allergies: Adhesive tape    Physical Exam   Oxygen Saturation Interpretation: Normal.        ED Triage Vitals   BP Temp Temp Source Pulse Resp SpO2 Height Weight   05/22/22 1413 05/22/22 1400 05/22/22 1400 05/22/22 1400 05/22/22 1400 05/22/22 1400 -- --   (!) 168/84 97 °F (36.1 °C) Infrared 71 22 96 %           Constitutional:  Alert, development consistent with age. Neck:  Normal ROM. Supple. Non-tender. Hand: Left dorsal & volar all mid aspect  Proximal Phalanx. Tenderness: moderate. Swelling: Mild. Deformity: no deformity observed/palpated. Skin:  no wounds or erythema. Neurovascular: Motor deficit: none. Sensory deficit:   none. Pulse deficit: none. Capillary refill: normal.  Fingers:  2nd, 3rd, 4th, 5th            Tenderness:  moderate. Swelling: Mild. Deformity: no deformity observed/palpated. ROM: diminished range with pain. Skin:  no wounds or erythema. Wrist:  diffusely across carpal bones. Tenderness:  none. Swelling: None. Deformity: no deformity observed/palpated. ROM: full range of motion. Skin: no wounds, erythema, or swelling. Lymphatics: No lymphangitis or adenopathy noted. Neurological:  Oriented. Motor functions intact. t.     Lab / Imaging Results   (All laboratory and radiology results have been personally reviewed by myself)  Labs:  No results found for this visit on 05/22/22. Imaging: All Radiology results interpreted by Radiologist unless otherwise noted. XR HAND LEFT (MIN 3 VIEWS)   Final Result   1. There is no fracture or dislocation   2. Dorsal soft tissue swelling at the level of the metacarpal heads   3. Findings of osteoarthritis. ED Course / Medical Decision Making   Medications - No data to display       Consult(s):   None    Procedure(s):  None    MDM:    Imaging was obtained based on moderate suspicion for fracture / bony abnormality as per history/physical findings. X-ray imaging demonstrated no fracture or dislocation with some dorsal soft tissue swelling at the level of the metacarpal heads as well as findings of osteoarthritis as read by radiology. Results were discussed with patient prior to disposition and all questions were answered. Patient appropriate for discharged home as he is alert and oriented, no acute distress and afebrile. No significant, emergent findings noted on x-ray imaging today. He was recommended to utilize NSAIDs and rest of the hand for the next 1 to 2 weeks for symptomatic relief. He was recommended to return to ER with new or worsening symptoms. Patient states he has both understandable and agreeable to this plan. Plan of Care/Counseling:  BENOIT Marrero reviewed today's visit with the patient and spouse / life partner in addition to providing specific details for the plan of care and counseling regarding the diagnosis and prognosis. Questions are answered at this time and are agreeable with the plan. Assessment      1. Injury of left hand, initial encounter      Plan   Discharged home.   Patient condition is good    New Medications     Discharge Medication List as of 5/22/2022  4:01 PM      START taking these medications    Details   naproxen (NAPROSYN) 500 MG tablet Take 1 tablet by mouth 2 times daily (with meals) for 5 days, Disp-10 tablet, R-0Normal           Electronically signed by Brice France PA   DD: 5/22/22  **This report was transcribed using voice recognition software. Every effort was made to ensure accuracy; however, inadvertent computerized transcription errors may be present.   END OF ED PROVIDER NOTE     Verner Lyons, PA  05/25/22 101

## 2022-06-09 RX ORDER — ROPINIROLE 1 MG/1
TABLET, FILM COATED ORAL
Qty: 120 TABLET | Refills: 0 | Status: SHIPPED
Start: 2022-06-09 | End: 2022-07-07

## 2022-06-16 RX ORDER — ATORVASTATIN CALCIUM 40 MG/1
40 TABLET, FILM COATED ORAL DAILY
Qty: 90 TABLET | Refills: 0 | Status: SHIPPED
Start: 2022-06-16 | End: 2022-06-17 | Stop reason: SDUPTHER

## 2022-06-16 NOTE — TELEPHONE ENCOUNTER
----- Message from Cornelio Rivera sent at 6/16/2022 10:34 AM EDT -----  Subject: Refill Request    QUESTIONS  Name of Medication? atorvastatin (LIPITOR) 40 MG tablet  Patient-reported dosage and instructions? one a day  How many days do you have left? 4  Preferred Pharmacy? [id number] Pharmacy phone number (if available)? [phone]  ---------------------------------------------------------------------------  --------------  CALL BACK INFO  What is the best way for the office to contact you? OK to leave message on   voicemail  Preferred Call Back Phone Number? [phone]  ---------------------------------------------------------------------------  --------------  SCRIPT ANSWERS  Relationship to Patient?  Self

## 2022-06-17 ENCOUNTER — OFFICE VISIT (OUTPATIENT)
Dept: FAMILY MEDICINE CLINIC | Age: 67
End: 2022-06-17
Payer: MEDICARE

## 2022-06-17 VITALS
HEART RATE: 51 BPM | DIASTOLIC BLOOD PRESSURE: 82 MMHG | OXYGEN SATURATION: 97 % | TEMPERATURE: 97.3 F | SYSTOLIC BLOOD PRESSURE: 130 MMHG

## 2022-06-17 DIAGNOSIS — I10 PRIMARY HYPERTENSION: ICD-10-CM

## 2022-06-17 DIAGNOSIS — B35.1 ONYCHOMYCOSIS: ICD-10-CM

## 2022-06-17 DIAGNOSIS — B68.9: ICD-10-CM

## 2022-06-17 DIAGNOSIS — G20 PARKINSON'S DISEASE (HCC): ICD-10-CM

## 2022-06-17 DIAGNOSIS — Z12.5 SCREENING FOR PROSTATE CANCER: ICD-10-CM

## 2022-06-17 DIAGNOSIS — E11.22 TYPE 2 DIABETES MELLITUS WITH STAGE 3A CHRONIC KIDNEY DISEASE, WITHOUT LONG-TERM CURRENT USE OF INSULIN (HCC): Primary | ICD-10-CM

## 2022-06-17 DIAGNOSIS — N18.31 TYPE 2 DIABETES MELLITUS WITH STAGE 3A CHRONIC KIDNEY DISEASE, WITHOUT LONG-TERM CURRENT USE OF INSULIN (HCC): Primary | ICD-10-CM

## 2022-06-17 DIAGNOSIS — E78.2 MIXED HYPERCHOLESTEROLEMIA AND HYPERTRIGLYCERIDEMIA: ICD-10-CM

## 2022-06-17 DIAGNOSIS — F33.1 MAJOR DEPRESSIVE DISORDER, RECURRENT, MODERATE (HCC): ICD-10-CM

## 2022-06-17 LAB
ALBUMIN SERPL-MCNC: 4.4 G/DL (ref 3.5–5.2)
ALP BLD-CCNC: 63 U/L (ref 40–129)
ALT SERPL-CCNC: 22 U/L (ref 0–40)
ANION GAP SERPL CALCULATED.3IONS-SCNC: 15 MMOL/L (ref 7–16)
AST SERPL-CCNC: 19 U/L (ref 0–39)
BILIRUB SERPL-MCNC: 0.6 MG/DL (ref 0–1.2)
BUN BLDV-MCNC: 21 MG/DL (ref 6–23)
CALCIUM SERPL-MCNC: 9.5 MG/DL (ref 8.6–10.2)
CHLORIDE BLD-SCNC: 101 MMOL/L (ref 98–107)
CHOLESTEROL, TOTAL: 230 MG/DL (ref 0–199)
CO2: 23 MMOL/L (ref 22–29)
CREAT SERPL-MCNC: 1.2 MG/DL (ref 0.7–1.2)
GFR AFRICAN AMERICAN: >60
GFR NON-AFRICAN AMERICAN: >60 ML/MIN/1.73
GLUCOSE BLD-MCNC: 182 MG/DL (ref 74–99)
HBA1C MFR BLD: 7.7 %
HDLC SERPL-MCNC: 33 MG/DL
LDL CHOLESTEROL CALCULATED: ABNORMAL MG/DL (ref 0–99)
POTASSIUM SERPL-SCNC: 4.1 MMOL/L (ref 3.5–5)
PROSTATE SPECIFIC ANTIGEN: 2.02 NG/ML (ref 0–4)
SODIUM BLD-SCNC: 139 MMOL/L (ref 132–146)
TOTAL PROTEIN: 7.2 G/DL (ref 6.4–8.3)
TRIGL SERPL-MCNC: 560 MG/DL (ref 0–149)
VLDLC SERPL CALC-MCNC: ABNORMAL MG/DL

## 2022-06-17 PROCEDURE — 99214 OFFICE O/P EST MOD 30 MIN: CPT | Performed by: FAMILY MEDICINE

## 2022-06-17 PROCEDURE — 1123F ACP DISCUSS/DSCN MKR DOCD: CPT | Performed by: FAMILY MEDICINE

## 2022-06-17 PROCEDURE — 83036 HEMOGLOBIN GLYCOSYLATED A1C: CPT | Performed by: FAMILY MEDICINE

## 2022-06-17 PROCEDURE — 3051F HG A1C>EQUAL 7.0%<8.0%: CPT | Performed by: FAMILY MEDICINE

## 2022-06-17 RX ORDER — CLOTRIMAZOLE AND BETAMETHASONE DIPROPIONATE 10; .64 MG/G; MG/G
CREAM TOPICAL
Qty: 45 G | Refills: 1 | Status: SHIPPED | OUTPATIENT
Start: 2022-06-17

## 2022-06-17 RX ORDER — ATORVASTATIN CALCIUM 40 MG/1
40 TABLET, FILM COATED ORAL DAILY
Qty: 90 TABLET | Refills: 0 | Status: SHIPPED
Start: 2022-06-17 | End: 2022-06-22 | Stop reason: SDUPTHER

## 2022-06-17 SDOH — ECONOMIC STABILITY: FOOD INSECURITY: WITHIN THE PAST 12 MONTHS, THE FOOD YOU BOUGHT JUST DIDN'T LAST AND YOU DIDN'T HAVE MONEY TO GET MORE.: NEVER TRUE

## 2022-06-17 SDOH — ECONOMIC STABILITY: FOOD INSECURITY: WITHIN THE PAST 12 MONTHS, YOU WORRIED THAT YOUR FOOD WOULD RUN OUT BEFORE YOU GOT MONEY TO BUY MORE.: NEVER TRUE

## 2022-06-17 ASSESSMENT — PATIENT HEALTH QUESTIONNAIRE - PHQ9
3. TROUBLE FALLING OR STAYING ASLEEP: 0
SUM OF ALL RESPONSES TO PHQ QUESTIONS 1-9: 2
9. THOUGHTS THAT YOU WOULD BE BETTER OFF DEAD, OR OF HURTING YOURSELF: 0
8. MOVING OR SPEAKING SO SLOWLY THAT OTHER PEOPLE COULD HAVE NOTICED. OR THE OPPOSITE, BEING SO FIGETY OR RESTLESS THAT YOU HAVE BEEN MOVING AROUND A LOT MORE THAN USUAL: 0
7. TROUBLE CONCENTRATING ON THINGS, SUCH AS READING THE NEWSPAPER OR WATCHING TELEVISION: 0
1. LITTLE INTEREST OR PLEASURE IN DOING THINGS: 0
10. IF YOU CHECKED OFF ANY PROBLEMS, HOW DIFFICULT HAVE THESE PROBLEMS MADE IT FOR YOU TO DO YOUR WORK, TAKE CARE OF THINGS AT HOME, OR GET ALONG WITH OTHER PEOPLE: 0
5. POOR APPETITE OR OVEREATING: 0
SUM OF ALL RESPONSES TO PHQ QUESTIONS 1-9: 2
SUM OF ALL RESPONSES TO PHQ9 QUESTIONS 1 & 2: 0
SUM OF ALL RESPONSES TO PHQ QUESTIONS 1-9: 2
2. FEELING DOWN, DEPRESSED OR HOPELESS: 0
SUM OF ALL RESPONSES TO PHQ QUESTIONS 1-9: 2
4. FEELING TIRED OR HAVING LITTLE ENERGY: 2
6. FEELING BAD ABOUT YOURSELF - OR THAT YOU ARE A FAILURE OR HAVE LET YOURSELF OR YOUR FAMILY DOWN: 0

## 2022-06-17 ASSESSMENT — SOCIAL DETERMINANTS OF HEALTH (SDOH): HOW HARD IS IT FOR YOU TO PAY FOR THE VERY BASICS LIKE FOOD, HOUSING, MEDICAL CARE, AND HEATING?: NOT HARD AT ALL

## 2022-06-17 NOTE — PROGRESS NOTES
OFFICE PROGRESS NOTE      SUBJECTIVE:        Patient ID:   Mohamud Mills is a 78 y.o. male who presents for   Chief Complaint   Patient presents with    Rash     Ringworm    Diabetes    Health Maintenance     Due for PSA           HPI:     MULTIPLE AREAS OF ITCHY RASH; UPPER CHEST ON BACK, LEFT LOWER ARM. RIGHT KNEE. APPLYING ATHLETES FOOT CREAM BUT NOT [address] MUCH. ALSO HAS TOE NAIL INFECTIONS. WOULD LIKE TO CHANGE PODIATRIST. RECHECK BP, CHOLESTEROL AND DIABETES. MEDICATION REFILL. FEELS GOOD. WATCHING DIET GOOD. WALKING FOR EXERCISE. TAKING MEDICATIONS REGULARLY. Prior to Admission medications    Medication Sig Start Date End Date Taking? Authorizing Provider   atorvastatin (LIPITOR) 40 MG tablet Take 1 tablet by mouth daily TAKE 1 TABLET BY MOUTH IN THE EVENING FOR CHOLESTEROL 6/17/22  Yes Baron Johnson MD   dapagliflozin (FARXIGA) 10 MG tablet Take 1 tablet by mouth every morning 6/17/22  Yes Baron Johnson MD   clotrimazole-betamethasone (LOTRISONE) 1-0.05 % cream Apply topically 2 times daily.  6/17/22  Yes Baron Johnson MD   rOPINIRole (REQUIP) 1 MG tablet take 1 tablet by mouth four times a day 6/9/22  Yes Baron Johnson MD   naproxen (NAPROSYN) 500 MG tablet Take 1 tablet by mouth 2 times daily (with meals) for 5 days 5/22/22 6/17/22 Yes BENOIT Pedroza   doxepin (SINEQUAN) 10 MG capsule Take 1 capsule by mouth nightly TAKE UP TO 2 CAPSULES BY MOUTH AT BEDTIME AS DIRECTED FOR INSOMNIA 5/5/22 6/17/22 Yes Baron Johnson MD   albuterol sulfate HFA (VENTOLIN HFA) 108 (90 Base) MCG/ACT inhaler Inhale 2 puffs into the lungs every 4 hours as needed for Wheezing or Shortness of Breath 2/11/22  Yes Baron Johnson MD   carvedilol (COREG) 25 MG tablet Take 1 tablet by mouth 2 times daily 2/11/22  Yes Baron Johnson MD   fenofibrate micronized (LOFIBRA) 134 MG capsule Take 1 capsule by mouth every morning (before breakfast) take 1 capsule by mouth every morning TAKE BEFORE BEAKFAST 22  Yes Joe Alejandre MD   hydroCHLOROthiazide (MICROZIDE) 12.5 MG capsule Take 1 capsule by mouth daily 22  Yes Joe Alejandre MD   Lancets 30G MISC 1 each by Does not apply route 3 times daily Dispense what ever brand is covered by insurance 22  Yes Joe Alejandre MD   losartan (COZAAR) 50 MG tablet Take 1 tablet by mouth daily 22  Yes Joe Alejandre MD   pantoprazole (PROTONIX) 40 MG tablet Take 1 tablet by mouth daily 22  Yes Joe Alejandre MD   Cholecalciferol (VITAMIN D3) 125 MCG (5000 UT) TABS Take by mouth daily   Yes Historical Provider, MD   colestipol (COLESTID) 1 g tablet Take 2 g by mouth daily   Yes Historical Provider, MD   sildenafil (REVATIO) 20 MG tablet Take 1 tablet by mouth daily as needed (FOR SEXUAL DYSFUNCTION) 21  Yes Joe Alejandre MD   blood glucose monitor strips Test 3 times a day & as needed for symptoms of irregular blood glucose 3/24/21  Yes Joe Alejandre MD   vitamin B-12 (CYANOCOBALAMIN) 1000 MCG tablet Take 1,000 mcg by mouth daily   Yes Historical Provider, MD   Blood Glucose Monitoring Suppl (BLOOD GLUCOSE MONITOR SYSTEM) w/Device KIT 1 each by Does not apply route 3 times daily Dispense what ever brand is covered by insurance 20  Yes Joe Alejandre MD     Social History     Socioeconomic History    Marital status: Legally      Spouse name: None    Number of children: None    Years of education: None    Highest education level: None   Occupational History    None   Tobacco Use    Smoking status: Former Smoker     Packs/day: 1.50     Years: 15.00     Pack years: 22.50     Types: Cigarettes     Start date: 1965     Quit date: 1986     Years since quittin.3    Smokeless tobacco: Never Used   Vaping Use    Vaping Use: Never used   Substance and Sexual Activity    Alcohol use: Yes     Comment: 2-3 beers per week    Drug use: No    Sexual activity: None   Other Topics Concern    None   Social History Narrative    None     Social Determinants of Health     Financial Resource Strain: Low Risk     Difficulty of Paying Living Expenses: Not hard at all   Food Insecurity: No Food Insecurity    Worried About Running Out of Food in the Last Year: Never true    Julia of Food in the Last Year: Never true   Transportation Needs:     Lack of Transportation (Medical): Not on file    Lack of Transportation (Non-Medical):  Not on file   Physical Activity:     Days of Exercise per Week: Not on file    Minutes of Exercise per Session: Not on file   Stress:     Feeling of Stress : Not on file   Social Connections:     Frequency of Communication with Friends and Family: Not on file    Frequency of Social Gatherings with Friends and Family: Not on file    Attends Latter day Services: Not on file    Active Member of [address] [place] [address] or Organizations: Not on file    Attends Club or Organization Meetings: Not on file    Marital Status: Not on file   Intimate Partner Violence:     Fear of Current or Ex-Partner: Not on file    Emotionally Abused: Not on file    Physically Abused: Not on file    Sexually Abused: Not on file   Housing Stability:     Unable to Pay for Housing in the Last Year: Not on file    Number of [place] in the Last Year: Not on file    Unstable Housing in the Last Year: Not on file       I have reviewed Jeffery's allergies, medications, problem list, medical, social and family history and have updated as needed in the electronic medical record  Review Of Systems:    Skin: no abnormal pigmentation, rash, scaling, itching, masses, hair or nail changes  Eyes: no blurring, diplopia, or eye pain  Ears/Nose/Throat: no hearing loss, tinnitus, vertigo, nosebleed, nasal congestion, rhinorrhea, sore throat  Respiratory: no cough, pleuritic chest pain, dyspnea, or wheezing  Cardiovascular: no chest pain, angina, dyspnea on exertion, orthopnea, PND, palpitations, or claudication  Gastrointestinal: no nausea, vomiting, heartburn, diarrhea, constipation, bloating,  abdominal pain, or blood per rectum. Appetite is good  Genitourinary: no urinary urgency, frequency, dysuria, nocturia, hesitancy, or incontinence  Musculoskeletal: joint pains off and on. Morning stiffness. Ambulating well  Neurologic: no paralysis, paresis, paresthesia, seizures, tremors, or headaches  Hematologic/Lymphatic/Immunologic: no anemia, abnormal bleeding/bruising, fever, chills, night sweats, swollen glands, or unexplained weight loss  Endocrine: no heat or cold intolerance and no polyphagia, polydipsia, or polyuria        OBJECTIVE:     VS:  Wt Readings from Last 3 Encounters:   02/11/22 220 lb (99.8 kg)   12/07/21 217 lb (98.4 kg)   11/08/21 220 lb (99.8 kg)     Temp Readings from Last 3 Encounters:   06/17/22 97.3 °F (36.3 °C)   05/22/22 97 °F (36.1 °C) (Infrared)   12/07/21 97.5 °F (36.4 °C)     BP Readings from Last 3 Encounters:   06/17/22 130/82   05/22/22 (!) 168/84   02/11/22 120/78        General appearance: Alert, Awake, Oriented times 3, no distress  Skin: Warm and dry. PERIPHERAL SPREADING RASH WITH IRREGULAR EDGES ON THE UPPER CHEST ON BACK, LEFT ARM AND RIGHT KNEE ON THE BACK. Head: Normocephalic. No masses, lesions or tenderness noted  Eyes: Conjunctivae appear normal. PERLE  Ears: External ears normal  Nose/Sinuses: Nares normal. Septum midline. Mucosa normal. No drainage  Oropharynx: Oropharynx clear with no exudate seen  Neck: Neck supple. No jugular venous distension, lymphadenopathy or thyromegaly Trachea midline  Chest:  Normal. Movements are Normal and Equal.  Lungs: Lungs clear to auscultation bilaterally. No ronchi, crackles or wheezes  Heart: S1 S2  Regular rate and rhythm. No rub, murmur or gallop  Abdomen: Abdomen soft, non-tender. BS normal. No masses, organomegaly. Back: Grossly Normal and Equal. DTR are Normal. SLR is Normal.  Extremities: Arthritic changes are noted. Movements are limited. Pedal pulses are normal.  Musculoskeletal: Muscular strength appears intact. No joint effusion, tenderness, swelling or warmth  Neuro:  No focal motor or sensory deficits        ASSESSMENT     Patient Active Problem List    Diagnosis Date Noted    Primary hypertension     Mixed hypercholesterolemia and hypertriglyceridemia     COVID 10/22/2021    Cubital tunnel syndrome on left     Parkinson's disease 08/30/2021    Rupture of left biceps tendon 06/18/2021    Major depressive disorder, recurrent, moderate 06/18/2021    Type 2 diabetes mellitus with chronic kidney disease ([facility] 05/17/2021    Difficulty walking 05/17/2021    Onychomycosis 02/15/2021    Tinea pedis of both feet 02/15/2021    Rupture of anterior cruciate ligament of right knee     COPD with acute exacerbation ([facility] 07/15/2020    Type 2 diabetes mellitus with hyperglycemia, without long-term current use of insulin ([facility])     Tinea corporis 02/24/2011        Diagnosis:     ICD-10-CM    1. Type 2 diabetes mellitus with stage 3a chronic kidney disease, without long-term current use of insulin ([facility])  E11.22 POCT glycosylated hemoglobin (Hb A1C)    N18.31     FAIR CONTROL   2. Primary hypertension  I10     CONTROLLED   3. Onychomycosis  B35.1     MULTIPLE SITES   4. Parkinson's disease  G20     STABLE   5. Mixed hypercholesterolemia and hypertriglyceridemia  E78.2     FAIR CONTROL   6. Major depressive disorder, recurrent, moderate  F33.1     CONTROLLED       PLAN:           Patient Instructions   LOW SALT FOR BLOOD PRESSURE CONTROL. LOW CARBOHYDRATE FOR BLOOD SUGAR AND WEIGHT CONTROL. LOW FAT DIET FOR CHOLESTEROL CONTROL. DRINK ENOUGH FLUIDS FOR BETTER KIDNEY FUNCTION. TAKE COREG 25 MG. 2 TIMES A DAY,COZAAR 50 MG. AND  HCTZ 12.5 MG. DAILY FOR BLOOD PRESSURE CONTROL. TAKE FARXIGA 10 MG. DAILY  FOR BLOOD SUGAR CONTROL. STOP TAKING GLUCOTROL 10 MG. 2 TIMES A DAY  TAKE LILOFIBRA 134 MG. IN THE MORNING AND LIPITOR  40 MG. AND   NIGHTLY FOR CHOLESTEROL CONTROL. [facility] TAKE SINEQUAN, CYMBALTA AS PER PSYCHIATRIST RECOMMENDATION  FOR MOOD CONTROL. TAKE PROTONIX 40 MG. DAILY FOR STOMACH ACID CONTROL. APPLY LOTRISONE CREAM TO THE RASH 2 TIMES A DAY. REGULAR WALKING ADVISED. ADVISED WEIGHT REDUCTION. BLOOD DRAW  NOW FOR LAB. TESTING. [facility] KEEP NEXT APPOINTMENT IN 2 MONTHS. Return in about 2 months (around 8/17/2022) for FOLLOW UP VISIT. I have reviewed my findings and recommendations with Barbara Murphy.     Electronically signed by Imelda Chairez MD on 6/17/22 at 8:54 AM EDT

## 2022-06-17 NOTE — PATIENT INSTRUCTIONS
LOW SALT FOR BLOOD PRESSURE CONTROL. LOW CARBOHYDRATE FOR BLOOD SUGAR AND WEIGHT CONTROL. LOW FAT DIET FOR CHOLESTEROL CONTROL. DRINK ENOUGH FLUIDS FOR BETTER KIDNEY FUNCTION. TAKE COREG 25 MG. 2 TIMES A DAY,COZAAR 50 MG. AND  HCTZ 12.5 MG. DAILY FOR BLOOD PRESSURE CONTROL. TAKE FARXIGA 10 MG. DAILY  FOR BLOOD SUGAR CONTROL. STOP TAKING GLUCOTROL 10 MG. 2 TIMES A DAY  TAKE LILOFIBRA 134 MG. IN THE MORNING AND LIPITOR  40 MG. AND   NIGHTLY FOR CHOLESTEROL CONTROL. Vasile Mirza TAKE SINEQUAN, CYMBALTA AS PER PSYCHIATRIST RECOMMENDATION  FOR MOOD CONTROL. TAKE PROTONIX 40 MG. DAILY FOR STOMACH ACID CONTROL. APPLY LOTRISONE CREAM TO THE RASH 2 TIMES A DAY. REGULAR WALKING ADVISED. ADVISED WEIGHT REDUCTION. BLOOD DRAW  NOW FOR LAB. TESTING. Vasile Mirza KEEP NEXT APPOINTMENT IN 2 MONTHS.

## 2022-06-20 ENCOUNTER — TELEPHONE (OUTPATIENT)
Dept: FAMILY MEDICINE CLINIC | Age: 67
End: 2022-06-20

## 2022-06-20 NOTE — TELEPHONE ENCOUNTER
----- Message from [place] sent at 6/20/2022 12:45 PM EDT -----  Subject: Referral Request    QUESTIONS   Reason for referral request? Pt is calling this afternoon to let his pcp   know that he doesn't want to go to [facility] (foot specialist). Pt is   wanting a referral to go to another foot specialist over in [facility]. Please call pt back. Has the physician seen you for this condition before? No   Preferred Specialist (if applicable)? Do you already have an appointment scheduled? No  Additional Information for Provider?   ---------------------------------------------------------------------------  --------------  CALL BACK INFO  What is the best way for the office to contact you? OK to leave message on   voicemail  Preferred Call Back Phone Number? [phone]  ---------------------------------------------------------------------------  --------------  SCRIPT ANSWERS  Relationship to Patient?  Self

## 2022-06-20 NOTE — TELEPHONE ENCOUNTER
Called pt and pt is agreeable to see Dr Josue Munroe. Subjective


Remarks


Patient seen and examined this morning.  Temperature 98.4, pulse 88, 

respiratory rate 18, blood pressure 100/55, pulse ox 97 on room air.  Patient 

is requesting PICC line however due to his IV drug use and failed attempts with 

PICC line treatments in the past will not be provided at this time.  Continuing 

IV antibiotics due to endocarditis.  Encourage patient to eat as much as he can 

tolerate and increase his protein in his diet.  Otherwise doing well no 

complaints at this time.


 (Popeye Blackburn MD, R3)





Objective


Vitals





Vital Signs








  Date Time  Temp Pulse Resp B/P (MAP) Pulse Ox O2 Delivery O2 Flow Rate FiO2


 


3/3/18 08:28 98.4 88 18 100/55 (70) 97   


 


3/3/18 05:30 98.0 69 17 110/64 (79) 97   


 


3/2/18 21:50 98.6 84 16 108/73 (85) 98   


 


3/2/18 12:00 97.9 86 16 101/66 (78) 98   














I/O      


 


 3/2/18 3/2/18 3/2/18 3/3/18 3/3/18 3/3/18





 07:00 15:00 23:00 07:00 15:00 23:00


 


Intake Total 925 ml 200 ml 1720 ml 2000 ml 100 ml 


 


Balance 925 ml 200 ml 1720 ml 2000 ml 100 ml 


 


      


 


Intake Oral 925 ml  1620 ml 1800 ml  


 


IV Total  200 ml 100 ml 200 ml 100 ml 


 


# Voids 2  1 3  


 


# Bowel Movements 0  0 0  








 (Popeye Blackburn MD, R3)


Result Diagram:  


3/3/18 0515                                                                    

            3/3/18 0515





Imaging





Last Impressions








Lower Extremity Ultrasound 2/25/18 0000 Signed





Impressions: 





 Service Date/Time:  Sunday, February 25, 2018 11:06 - CONCLUSION: No DVT.     





 Sheldon Zelaya MD 


 


Chest X-Ray 2/22/18 0731 Signed





Impressions: 





 Service Date/Time:  Thursday, February 22, 2018 07:48 - CONCLUSION:  1. 

Minimal 





 basilar atelectasis on the left. The lungs are otherwise clear.     Jared Padron MD 


 


Tumor Localization 2/22/18 0000 Signed





Impressions: 





 Service Date/Time:  Thursday, February 22, 2018 16:25 - CONCLUSION: Normal 





 examination.       Sheldon Spears MD 


 


Lumbar Spine MRI 2/22/18 0000 Signed





Impressions: 





 Service Date/Time:  Thursday, February 22, 2018 09:36 - CONCLUSION:  1. 

Evidence 





 of interval development of a superior right parasagittal extrusion of the disc 





 at the L4-5 level with enhancement in the extruded fragment.  There is 

extension 





 into the neural foramen on right side with neural impingement.  There is no 





 significant deformity of the thecal sac and.. 2. Stable broad-based bulging of 





 the L5-S1 disc and impingement of neural foraminal side. 3. Mild enhancement 

in 





 the posterior soft tissues of the lower lumbar region without discrete abscess 





 formation.     Ken Sykes MD 








Objective Remarks


GENERAL: This is a thin though not cachectic male.  No acute distress. 


SKIN: Cool and dry.


HEAD: Atraumatic. Normocephalic. 


EYES: Pupils equal round and reactive.


ENT: Mostly edentulous.  Nose without bleeding, purulent drainage or septal 

hematoma.


CARDIOVASCULAR: 3/6 soft systolic ejection murmur best heard at the cardiac 

apex.  Regular rate and rhythm


RESPIRATORY: Clear to auscultation. Breath sounds equal bilaterally. 


GASTROINTESTINAL: Abdomen soft, TTP in RUQ, nondistended.


MUSCULOSKELETAL:   No joint tenderness.    


Back: Swollen area on back almost resolved, but TTP soft tissue midline at L4/

L5. No erythema.  No paravertebral muscle tenderness.


NEUROLOGICAL: Awake and alert. Cranial nerves II through XII intact.  Motor and 

sensory grossly within normal limits. Five out of 5 muscle strength in all 

muscle groups.  Normal speech.


Medications and IVs





Current Medications








 Medications


  (Trade)  Dose


 Ordered  Sig/Eduardo


 Route  Start Time


 Stop Time Status Last Admin


 


  (NS Flush)  2 ml  UNSCH  PRN


 IV FLUSH  2/22/18 14:00


     


 


 


  (NS Flush)  2 ml  BID


 IV FLUSH  2/22/18 21:00


    3/3/18 07:25


 


 


  (Zofran Inj)  4 mg  Q6H  PRN


 IVP  2/22/18 14:00


    2/28/18 21:21


 


 


  (Ambien)  5 mg  HS  PRN


 PO  2/22/18 21:00


    3/2/18 22:00


 


 


  (Narcan Inj)  0.4 mg  UNSCH  PRN


 IV PUSH  2/22/18 14:00


     


 


 


  (Nadya-Colace)  1 tab  BID


 PO  2/22/18 21:00


    3/3/18 07:24


 


 


  (Milk Of


 Magnesia Liq)  30 ml  Q12H  PRN


 PO  2/22/18 14:00


     


 


 


  (Senokot)  17.2 mg  Q12H  PRN


 PO  2/22/18 14:00


     


 


 


  (Dulcolax Supp)  10 mg  DAILY  PRN


 RECTAL  2/22/18 14:00


     


 


 


  (Lactulose Liq)  30 ml  DAILY  PRN


 PO  2/22/18 14:00


     


 


 


  (Dolophine)  30 mg  DAILY


 PO  2/23/18 09:00


    3/3/18 07:24


 


 


  (Morphine Inj)  2 mg  Q3H  PRN


 IV PUSH  2/22/18 15:00


     


 


 


  (Flexeril)  5 mg  Q8H  PRN


 PO  2/22/18 20:15


    3/2/18 22:00


 


 


  (Pill Splitter)  1 ea  UNSCH  PRN


 OTHER  2/22/18 20:30


     


 


 


  (Tylenol)  500 mg  Q6H  PRN


 PO  2/23/18 12:45


    2/26/18 14:15


 


 


  (Protonix)  40 mg  DAILY


 PO  2/23/18 14:30


    3/3/18 07:25


 


 


  (Heparin Inj)  5,000 units  Q12HR


 SQ  2/25/18 21:00


    3/3/18 07:25


 


 


 Penicillin G


 Sodium [id number]


 units/Sodium


 Chloride  100 ml @ 


 200 mls/hr  Q4H


 IV  2/25/18 18:00


    3/3/18 08:52


 








 (Popeye Blackburn MD, R3)





A/P


Assessment and Plan


37-year-old male with history of IV drug use, endocarditis, vertebral 

osteomyelitis; he presented with acute on chronic low back pain, subjective 

fevers.  Infectious disease consult.  Echo shows endocarditis.  Positive blood 

cultures to date all strep viridans.  Treatment as below.


Discharge Planning


Unclear at this time


Patient does not appear to have a stable home environment and likely would not 

do well with a PICC line. Likely will need 6 weeks of inpatient abx treatment.


 (Popeye Blackburn MD, R3)


Attending Attestation


Patient seen and examined.  Case reviewed and discussed with the resident team.

  Agree with plan of care as discussed with me and documented in the resident 

note.


we can consider an anemia workup as he is still low on his H/H


 (Sonia Turcios MD)


Problem List:  


(1) Endocarditis


ICD Codes:  I38 - Endocarditis, valve unspecified


Plan:  Echo shows a moderate to large sized mobile echodensity (0.71.4 cm) 

located on the anterior mitral valve leaflet.


Infectious disease consulted.


Positive blood cultures, see below


Antibiotics per infectious disease: Penicillin and gentamicin





Patient understands with continued IV drug use and infection, he could have 

serious morbidity and even death





(2) Bacteremia


ICD Codes:  R78.81 - Bacteremia


Status:  Acute


Plan:  Infectious disease consulted


Sensitivities in the EMR


Endocarditis as above


Repeat blood cultures 2/27 at 0600 NGTDx4


Continue penicillin and gentamicin


he needs to have clear blood cultures prior to any consideration of a line, etc





Antibiotic history:


Vancomycin 2/23-2/24





(3) Right calf pain


ICD Codes:  M79.661 - Pain in right lower leg


Status:  Resolved


Plan:  Ultrasound 2/25: No DVT


Continue heparin, follow platelets


consider Heme consult is any questions or doubts with his platelets





(4) Methadone maintenance therapy patient


ICD Codes:  F11.20 - Opioid dependence, uncomplicated


Status:  Chronic


Plan:  Continue methadone 30 mg daily.





(5) History of drug abuse


ICD Codes:  Z87.898 - Personal history of other specified conditions


Status:  Acute


Plan:  No IV drug use for the last 1 year per the patient is what he said 

initially but then he admitted one time using iv 2 months ago. so many abusers 

are ashamed so it is difficult to be sure of their usage patterns.  He 

continues to ask for PICC line


He has been doing methamphetamines, snorting


urine drug screen ordered and in the EMR


Counseled cessation





(6) Anemia


ICD Codes:  D64.9 - Anemia, unspecified


Status:  Chronic


Plan:  Hemoccult stool negative


Transfuse for hemoglobin less than 7.


On his last admission he was pancytopenic


This is likely from endocarditis plus his Hepatitis





(7) Thrombocytopenia


ICD Codes:  D69.6 - Thrombocytopenia, unspecified


Status:  Chronic


Plan:  Appears to be chronic


He was pancytopenic at his last admission.


Likely from endocarditis plus Hep C


Careful with heparin


Consider hematology consult


Follow CBC





(8) Back pain


ICD Codes:  M54.9 - Dorsalgia, unspecified


Status:  Acute


Plan:  Initial Concern for recurrent vertebral osteomyelitis.


Consulted infectious disease


Consulted neurosurgery Dr. Nichols (known to patient from 6/2016)


Case discussed with infectious disease and neurosurgery.


antibiotics were not started initially. however, 4/4 blood cultures are 

positive for gram positive cocci (see above)


Possible biopsy however any back infection would likely be septic from the 

heart or have led to the endocarditis and may very well be the same organism.  


As patient is bacteremic, antibiotics per ID. starting with vancomycin


Continue methadone.  Morphine 2 mg IV every 3 hours when necessary pain 6-10.





(9) Hepatitis B infection


ICD Codes:  B19.10 - Unspecified viral hepatitis B without hepatic coma


Status:  Chronic


Plan:  not new infection. can investigate his status





(10) FEN/DVT PPX/GI PPX/Nursing Orders 


Status:  Acute


Plan:  Fluids: Tolerating by mouth


Electrolytes: Monitor and replace as needed


Nutrition: Regular diet


Prophylaxis: SCDs and heparin


 (Popeye Blackburn MD, R3)





Problem Qualifiers





(1) Endocarditis:  


Qualified Codes:  I33.0 - Acute and subacute infective endocarditis


(2) Anemia:  


Qualified Codes:  D64.9 - Anemia, unspecified


(3) Back pain:  


Qualified Codes:  M54.5 - Low back pain


(4) Hepatitis B infection:  








Popeye Blackburn MD, R3 Mar 3, 2018 11:19


Sonia Turcios MD Mar 3, 2018 12:24

## 2022-06-21 DIAGNOSIS — E78.2 ELEVATED CHOLESTEROL WITH ELEVATED TRIGLYCERIDES: Primary | ICD-10-CM

## 2022-06-22 ENCOUNTER — TELEPHONE (OUTPATIENT)
Dept: FAMILY MEDICINE CLINIC | Age: 67
End: 2022-06-22

## 2022-06-22 DIAGNOSIS — E78.2 ELEVATED CHOLESTEROL WITH ELEVATED TRIGLYCERIDES: ICD-10-CM

## 2022-06-22 RX ORDER — ATORVASTATIN CALCIUM 40 MG/1
80 TABLET, FILM COATED ORAL DAILY
Qty: 90 TABLET | Refills: 0 | Status: SHIPPED
Start: 2022-06-22 | End: 2022-06-22 | Stop reason: SDUPTHER

## 2022-06-22 RX ORDER — ATORVASTATIN CALCIUM 80 MG/1
80 TABLET, FILM COATED ORAL NIGHTLY
Qty: 90 TABLET | Refills: 0 | Status: SHIPPED
Start: 2022-06-22 | End: 2022-08-25 | Stop reason: SDUPTHER

## 2022-06-22 RX ORDER — EZETIMIBE 10 MG/1
10 TABLET ORAL DAILY
Qty: 90 TABLET | Refills: 1 | Status: SHIPPED | OUTPATIENT
Start: 2022-06-22

## 2022-07-07 RX ORDER — ROPINIROLE 1 MG/1
TABLET, FILM COATED ORAL
Qty: 120 TABLET | Refills: 0 | Status: SHIPPED
Start: 2022-07-07 | End: 2022-07-14 | Stop reason: SDUPTHER

## 2022-07-08 ENCOUNTER — TELEPHONE (OUTPATIENT)
Dept: FAMILY MEDICINE CLINIC | Age: 67
End: 2022-07-08

## 2022-07-08 NOTE — TELEPHONE ENCOUNTER
Patient called stating since he has been taking the Robin Cea on 6.17.22 his BS have gone up. They are now in the 300's. Patient did say the glipizide was working better. Please advise.     Last seen 6/17/2022  Next appt 9/2/2022

## 2022-07-14 RX ORDER — ROPINIROLE 1 MG/1
TABLET, FILM COATED ORAL
Qty: 120 TABLET | Refills: 2 | Status: SHIPPED
Start: 2022-07-14 | End: 2022-08-29 | Stop reason: SDUPTHER

## 2022-07-22 RX ORDER — DOXEPIN HYDROCHLORIDE 10 MG/1
10 CAPSULE ORAL NIGHTLY
Qty: 60 CAPSULE | Refills: 0 | Status: SHIPPED
Start: 2022-07-22 | End: 2022-08-25 | Stop reason: SDUPTHER

## 2022-07-22 RX ORDER — FENOFIBRATE 134 MG/1
134 CAPSULE ORAL
Qty: 90 CAPSULE | Refills: 0 | Status: SHIPPED
Start: 2022-07-22 | End: 2022-08-29 | Stop reason: SDUPTHER

## 2022-07-31 ENCOUNTER — HOSPITAL ENCOUNTER (EMERGENCY)
Age: 67
Discharge: HOME | End: 2022-07-31
Attending: EMERGENCY MEDICINE
Payer: MEDICARE

## 2022-07-31 ENCOUNTER — APPOINTMENT (OUTPATIENT)
Dept: GENERAL RADIOLOGY | Age: 67
End: 2022-07-31
Payer: MEDICARE

## 2022-07-31 VITALS
DIASTOLIC BLOOD PRESSURE: 84 MMHG | SYSTOLIC BLOOD PRESSURE: 138 MMHG | OXYGEN SATURATION: 95 % | HEIGHT: 70 IN | HEART RATE: 57 BPM | RESPIRATION RATE: 20 BRPM | WEIGHT: 215 LBS | BODY MASS INDEX: 30.78 KG/M2 | TEMPERATURE: 97.4 F

## 2022-07-31 DIAGNOSIS — J10.1 INFLUENZA B: Primary | ICD-10-CM

## 2022-07-31 LAB
ALBUMIN SERPL-MCNC: 4.6 G/DL (ref 3.5–5.2)
ALP BLD-CCNC: 63 U/L (ref 40–129)
ALT SERPL-CCNC: 20 U/L (ref 0–40)
ANION GAP SERPL CALCULATED.3IONS-SCNC: 9 MMOL/L (ref 7–16)
AST SERPL-CCNC: 15 U/L (ref 0–39)
BASOPHILS ABSOLUTE: 0.04 E9/L (ref 0–0.2)
BASOPHILS RELATIVE PERCENT: 0.6 % (ref 0–2)
BILIRUB SERPL-MCNC: 0.6 MG/DL (ref 0–1.2)
BUN BLDV-MCNC: 20 MG/DL (ref 6–23)
CALCIUM SERPL-MCNC: 9.5 MG/DL (ref 8.6–10.2)
CHLORIDE BLD-SCNC: 102 MMOL/L (ref 98–107)
CO2: 27 MMOL/L (ref 22–29)
CREAT SERPL-MCNC: 1.1 MG/DL (ref 0.7–1.2)
EOSINOPHILS ABSOLUTE: 0.24 E9/L (ref 0.05–0.5)
EOSINOPHILS RELATIVE PERCENT: 3.6 % (ref 0–6)
GFR AFRICAN AMERICAN: >60
GFR NON-AFRICAN AMERICAN: >60 ML/MIN/1.73
GLUCOSE BLD-MCNC: 176 MG/DL (ref 74–99)
HCT VFR BLD CALC: 41.4 % (ref 37–54)
HEMOGLOBIN: 14.3 G/DL (ref 12.5–16.5)
IMMATURE GRANULOCYTES #: 0.02 E9/L
IMMATURE GRANULOCYTES %: 0.3 % (ref 0–5)
INFLUENZA A BY PCR: NOT DETECTED
INFLUENZA B BY PCR: DETECTED
LACTIC ACID, SEPSIS: 1.1 MMOL/L (ref 0.5–1.9)
LYMPHOCYTES ABSOLUTE: 1.82 E9/L (ref 1.5–4)
LYMPHOCYTES RELATIVE PERCENT: 27.7 % (ref 20–42)
MCH RBC QN AUTO: 30.4 PG (ref 26–35)
MCHC RBC AUTO-ENTMCNC: 34.5 % (ref 32–34.5)
MCV RBC AUTO: 88.1 FL (ref 80–99.9)
MONOCYTES ABSOLUTE: 0.59 E9/L (ref 0.1–0.95)
MONOCYTES RELATIVE PERCENT: 9 % (ref 2–12)
NEUTROPHILS ABSOLUTE: 3.87 E9/L (ref 1.8–7.3)
NEUTROPHILS RELATIVE PERCENT: 58.8 % (ref 43–80)
PDW BLD-RTO: 13 FL (ref 11.5–15)
PLATELET # BLD: 216 E9/L (ref 130–450)
PMV BLD AUTO: 9.3 FL (ref 7–12)
POTASSIUM REFLEX MAGNESIUM: 3.8 MMOL/L (ref 3.5–5)
PRO-BNP: 38 PG/ML (ref 0–125)
RBC # BLD: 4.7 E12/L (ref 3.8–5.8)
SARS-COV-2, NAAT: NOT DETECTED
SODIUM BLD-SCNC: 138 MMOL/L (ref 132–146)
TOTAL PROTEIN: 7.3 G/DL (ref 6.4–8.3)
TROPONIN, HIGH SENSITIVITY: 6 NG/L (ref 0–11)
WBC # BLD: 6.6 E9/L (ref 4.5–11.5)

## 2022-07-31 PROCEDURE — 87502 INFLUENZA DNA AMP PROBE: CPT

## 2022-07-31 PROCEDURE — 85025 COMPLETE CBC W/AUTO DIFF WBC: CPT

## 2022-07-31 PROCEDURE — 94664 DEMO&/EVAL PT USE INHALER: CPT

## 2022-07-31 PROCEDURE — 36415 COLL VENOUS BLD VENIPUNCTURE: CPT

## 2022-07-31 PROCEDURE — 93005 ELECTROCARDIOGRAM TRACING: CPT | Performed by: EMERGENCY MEDICINE

## 2022-07-31 PROCEDURE — 83605 ASSAY OF LACTIC ACID: CPT

## 2022-07-31 PROCEDURE — 84484 ASSAY OF TROPONIN QUANT: CPT

## 2022-07-31 PROCEDURE — 99285 EMERGENCY DEPT VISIT HI MDM: CPT

## 2022-07-31 PROCEDURE — 83880 ASSAY OF NATRIURETIC PEPTIDE: CPT

## 2022-07-31 PROCEDURE — 6370000000 HC RX 637 (ALT 250 FOR IP): Performed by: EMERGENCY MEDICINE

## 2022-07-31 PROCEDURE — 71045 X-RAY EXAM CHEST 1 VIEW: CPT

## 2022-07-31 PROCEDURE — 94640 AIRWAY INHALATION TREATMENT: CPT

## 2022-07-31 PROCEDURE — 80053 COMPREHEN METABOLIC PANEL: CPT

## 2022-07-31 PROCEDURE — 87040 BLOOD CULTURE FOR BACTERIA: CPT

## 2022-07-31 PROCEDURE — 87635 SARS-COV-2 COVID-19 AMP PRB: CPT

## 2022-07-31 RX ORDER — BENZONATATE 100 MG/1
100 CAPSULE ORAL 2 TIMES DAILY PRN
Qty: 20 CAPSULE | Refills: 0 | Status: SHIPPED | OUTPATIENT
Start: 2022-07-31 | End: 2022-08-07

## 2022-07-31 RX ORDER — IPRATROPIUM BROMIDE AND ALBUTEROL SULFATE 2.5; .5 MG/3ML; MG/3ML
2 SOLUTION RESPIRATORY (INHALATION) ONCE
Status: COMPLETED | OUTPATIENT
Start: 2022-07-31 | End: 2022-07-31

## 2022-07-31 RX ADMIN — IPRATROPIUM BROMIDE AND ALBUTEROL SULFATE 2 AMPULE: .5; 2.5 SOLUTION RESPIRATORY (INHALATION) at 20:28

## 2022-07-31 ASSESSMENT — ENCOUNTER SYMPTOMS
NAUSEA: 0
ABDOMINAL PAIN: 0
RHINORRHEA: 0
BACK PAIN: 0
BLOOD IN STOOL: 0
VOMITING: 0
SHORTNESS OF BREATH: 1
COUGH: 1
CHEST TIGHTNESS: 0
COLOR CHANGE: 0
DIARRHEA: 1
SORE THROAT: 0

## 2022-07-31 ASSESSMENT — PAIN - FUNCTIONAL ASSESSMENT
PAIN_FUNCTIONAL_ASSESSMENT: 0-10
PAIN_FUNCTIONAL_ASSESSMENT: 0-10

## 2022-07-31 ASSESSMENT — PAIN DESCRIPTION - LOCATION
LOCATION: CHEST;THROAT
LOCATION: CHEST;THROAT

## 2022-07-31 ASSESSMENT — PAIN SCALES - GENERAL
PAINLEVEL_OUTOF10: 5
PAINLEVEL_OUTOF10: 5

## 2022-08-01 ENCOUNTER — OFFICE VISIT (OUTPATIENT)
Dept: PODIATRY | Age: 67
End: 2022-08-01
Payer: MEDICARE

## 2022-08-01 VITALS — BODY MASS INDEX: 30.78 KG/M2 | HEIGHT: 70 IN | WEIGHT: 215 LBS

## 2022-08-01 DIAGNOSIS — L84 CORNS AND CALLOSITIES: ICD-10-CM

## 2022-08-01 DIAGNOSIS — G60.8 HEREDITARY SENSORY NEUROPATHY: ICD-10-CM

## 2022-08-01 DIAGNOSIS — E11.9 TYPE 2 DIABETES MELLITUS WITHOUT COMPLICATION, UNSPECIFIED WHETHER LONG TERM INSULIN USE (HCC): ICD-10-CM

## 2022-08-01 DIAGNOSIS — M20.42 HAMMER TOES OF BOTH FEET: ICD-10-CM

## 2022-08-01 DIAGNOSIS — B35.1 TINEA UNGUIUM: Primary | ICD-10-CM

## 2022-08-01 DIAGNOSIS — M20.41 HAMMER TOES OF BOTH FEET: ICD-10-CM

## 2022-08-01 LAB
EKG ATRIAL RATE: 59 BPM
EKG P AXIS: 24 DEGREES
EKG P-R INTERVAL: 142 MS
EKG Q-T INTERVAL: 416 MS
EKG QRS DURATION: 100 MS
EKG QTC CALCULATION (BAZETT): 411 MS
EKG R AXIS: 20 DEGREES
EKG T AXIS: 46 DEGREES
EKG VENTRICULAR RATE: 59 BPM

## 2022-08-01 PROCEDURE — 11056 PARNG/CUTG B9 HYPRKR LES 2-4: CPT | Performed by: PODIATRIST

## 2022-08-01 PROCEDURE — 3051F HG A1C>EQUAL 7.0%<8.0%: CPT | Performed by: PODIATRIST

## 2022-08-01 PROCEDURE — 1123F ACP DISCUSS/DSCN MKR DOCD: CPT | Performed by: PODIATRIST

## 2022-08-01 PROCEDURE — 11721 DEBRIDE NAIL 6 OR MORE: CPT | Performed by: PODIATRIST

## 2022-08-01 PROCEDURE — 99212 OFFICE O/P EST SF 10 MIN: CPT | Performed by: PODIATRIST

## 2022-08-01 RX ORDER — TERBINAFINE HYDROCHLORIDE 250 MG/1
250 TABLET ORAL DAILY
Qty: 90 TABLET | Refills: 0 | Status: SHIPPED | OUTPATIENT
Start: 2022-08-01 | End: 2022-10-30

## 2022-08-01 NOTE — PROGRESS NOTES
Debra Rolle is here today as a new pt for a diabetic foot exam and nail care. his PCP is Juanita Lawler MD last OV was 2022. C/o nail fungus bilat feet. 22  Debra Rolle : 1955 Sex: male  Age: 78 y.o. Patient was referred by Juanita Lawler MD    CC:   Diabetic foot exam and painful elongated toenails 1-5 right and left    HPI:   This pleasant 52-year-old male patient referred me today second opinion for foot and ankle exam.  History of diabetes. Has seen another foot and ankle provider last year but no recent follow-up since May 2021. Does have thickened and yellow toenails which do bother him due to length of the toenails. Diabetic foot exam and painful elongated toenails 1-5 right and left. No additional pedal complaints at this time.     ROS:  Const: Denies constitutional symptoms  Musculo: Denies symptoms other than stated above  Skin: Denies symptoms other than stated above      Current Outpatient Medications:     terbinafine (LAMISIL) 250 MG tablet, Take 1 tablet by mouth in the morning., Disp: 90 tablet, Rfl: 0    benzonatate (TESSALON) 100 MG capsule, Take 1 capsule by mouth 2 times daily as needed for Cough, Disp: 20 capsule, Rfl: 0    doxepin (SINEQUAN) 10 MG capsule, Take 1 capsule by mouth nightly TAKE UP TO 2 CAPSULES BY MOUTH AT BEDTIME AS DIRECTED FOR INSOMNIA, Disp: 60 capsule, Rfl: 0    fenofibrate micronized (LOFIBRA) 134 MG capsule, Take 1 capsule by mouth every morning (before breakfast) take 1 capsule by mouth every morning TAKE BEFORE BEAKFAST, Disp: 90 capsule, Rfl: 0    rOPINIRole (REQUIP) 1 MG tablet, TAKE 2 TABLET BY MOUTH NIGHTLY, Disp: 120 tablet, Rfl: 2    ezetimibe (ZETIA) 10 MG tablet, Take 1 tablet by mouth daily, Disp: 90 tablet, Rfl: 1    atorvastatin (LIPITOR) 80 MG tablet, Take 1 tablet by mouth at bedtime TAKE 1 TABLET BY MOUTH IN THE EVENING FOR CHOLESTEROL, Disp: 90 tablet, Rfl: 0    dapagliflozin (FARXIGA) 10 MG tablet, Take 1 tablet by mouth every morning, Disp: 90 tablet, Rfl: 1    clotrimazole-betamethasone (LOTRISONE) 1-0.05 % cream, Apply topically 2 times daily. , Disp: 45 g, Rfl: 1    naproxen (NAPROSYN) 500 MG tablet, Take 1 tablet by mouth 2 times daily (with meals) for 5 days, Disp: 10 tablet, Rfl: 0    albuterol sulfate HFA (VENTOLIN HFA) 108 (90 Base) MCG/ACT inhaler, Inhale 2 puffs into the lungs every 4 hours as needed for Wheezing or Shortness of Breath, Disp: 18 g, Rfl: 0    carvedilol (COREG) 25 MG tablet, Take 1 tablet by mouth 2 times daily, Disp: 180 tablet, Rfl: 0    hydroCHLOROthiazide (MICROZIDE) 12.5 MG capsule, Take 1 capsule by mouth daily, Disp: 90 capsule, Rfl: 0    Lancets 30G MISC, 1 each by Does not apply route 3 times daily Dispense what ever brand is covered by insurance, Disp: 100 each, Rfl: 3    losartan (COZAAR) 50 MG tablet, Take 1 tablet by mouth daily, Disp: 90 tablet, Rfl: 0    pantoprazole (PROTONIX) 40 MG tablet, Take 1 tablet by mouth daily, Disp: 90 tablet, Rfl: 0    Cholecalciferol (VITAMIN D3) 125 MCG (5000 UT) TABS, Take by mouth daily, Disp: , Rfl:     colestipol (COLESTID) 1 g tablet, Take 2 g by mouth daily, Disp: , Rfl:     sildenafil (REVATIO) 20 MG tablet, Take 1 tablet by mouth daily as needed (FOR SEXUAL DYSFUNCTION), Disp: 5 tablet, Rfl: 0    blood glucose monitor strips, Test 3 times a day & as needed for symptoms of irregular blood glucose, Disp: 100 strip, Rfl: 3    vitamin B-12 (CYANOCOBALAMIN) 1000 MCG tablet, Take 1,000 mcg by mouth daily, Disp: , Rfl:     Blood Glucose Monitoring Suppl (BLOOD GLUCOSE MONITOR SYSTEM) w/Device KIT, 1 each by Does not apply route 3 times daily Dispense what ever brand is covered by insurance, Disp: 1 kit, Rfl: 0  Allergies   Allergen Reactions    Adhesive Tape Rash       Past Medical History:   Diagnosis Date    Anesthesia complication     states has difficulty breathing after surgery  usually needs nebulizer treatment    Arthritis     Asthma     since childhood Diabetes mellitus ([facility] [facility]     Fungal infection of foot     GERD (gastroesophageal reflux disease)     Hyperlipidemia     Hypertension     Type 2 diabetes mellitus without complication ([facility] [address]        There were no vitals filed for this visit. Work History/Social History: Foot and ankle history:     Focused Lower Extremity Physical Exam:      Neurovascular examination:    Dorsalis Pedis palpable bilateral.  Posterior tibialis palpable bilateral.    Capillary Refill Time:  Immediate return  Hair growth:  Symmetrical and bilateral   Skin:  Not atrophic  Edema: Mild edema bilateral feet. Mild edema bilateral ankles. Neurologic:  Light touch diminished bilateral.  Warm to coolness bilateral distal toes  Decreased epicritic sensation     Musculoskeletal/ Orthopedic examination:    Equinis: present bilateral  Dorsiflexion, plantarflexion, inversion, eversion bilateral 5 out of 5 muscle strength  Wiggling toes  Negative Homans    Dermatology examination:    Toenails 1 through 5 bilateral thickened, elongated, dystrophic, mycotic with subungual debris. Web spaces 1 through 4 bilateral clean dry and intact. Hyperkeratotic tissue noted first and fifth metatarsal bilateral.  No open wounds. All nails are very thickened and elongated. Assessment and Plan:  Rajani Daniels was seen today for callouses, nail problem and diabetes. Diagnoses and all orders for this visit:    Tinea unguium  -     Hepatic Function Panel; Future    Corns and callosities    Hereditary sensory neuropathy    Type 2 diabetes mellitus without complication, unspecified whether long term insulin use (HCC)    Hammer toes of both feet    Other orders  -     terbinafine (LAMISIL) 250 MG tablet; Take 1 tablet by mouth in the morning. Diabetic foot ankle exam.  Has seen Dr. Reji Saucedo in the past.  May 2021 was last appointment. Recent hemoglobin A1c from 6/17/2022.  7.7.   Manual debridement with standard technique toenails 1 through 5 right and left in thickness and length. Patient tolerated procedure well. Paring of hyperkeratotic tissue with #15 blade first and fifth metatarsal bilateral.  Ammonium lactate 12% twice daily. Blood work from 7/31/2022 reviewed and discussed. ALT 20, AST 15, alkaline phosphatase 63. I did place him on oral Lamisil 250 mg once daily for 90 days. Risk and benefits with liver function elevation discussed. I did place a new order for hepatic function testing to be done at 6 weeks. Follow-up in office 2 months. Return in about 2 months (around 10/1/2022). Seen By:  Zion Oliva DPM      Document was created using voice recognition software. Note was reviewed however may contain grammatical errors.

## 2022-08-01 NOTE — ED PROVIDER NOTES
Patient presents to the ED for evaluation of cough. He has had a cough for the past 2 weeks. Its been gradually worsening. Associated with shortness of breath that is present with exertion. Improves with rest.  Cough is productive of green and yellow sputum. No blood in sputum. Has some chest discomfort but it is only present with coughing. Pain rating into his back no fever or chills. No recent sick contacts. Prior history of pneumonia. Has history of COPD. Uses albuterol inhaler at home which does provide relief. No associated nausea or vomiting. He has chronic diarrhea and he follows GI for this. No blood in the stool. Symptoms have been gradually worsening and persistent. Moderate severity. No associated dizziness or lightheadedness. He is not vaccinated against COVID or influenza. Review of Systems   Constitutional:  Positive for fatigue. Negative for chills, diaphoresis and fever. HENT:  Negative for congestion, rhinorrhea and sore throat. Eyes:  Negative for visual disturbance. Respiratory:  Positive for cough and shortness of breath. Negative for chest tightness. Cardiovascular:  Positive for chest pain (With coughing). Negative for palpitations. Gastrointestinal:  Positive for diarrhea. Negative for abdominal pain, blood in stool, nausea and vomiting. Genitourinary:  Negative for decreased urine volume and difficulty urinating. Musculoskeletal:  Negative for arthralgias, back pain and myalgias. Skin:  Negative for color change and pallor. Neurological:  Negative for dizziness, syncope, light-headedness and headaches. Hematological:  Does not bruise/bleed easily. Psychiatric/Behavioral:  Negative for confusion. All other systems reviewed and are negative. Physical Exam  Vitals and nursing note reviewed. Constitutional:       General: He is not in acute distress. Appearance: He is well-developed and normal weight. He is not diaphoretic.    HENT: Head: Normocephalic and atraumatic. Right Ear: External ear normal.      Left Ear: External ear normal.      Mouth/Throat:      Mouth: Mucous membranes are moist.   Eyes:      Conjunctiva/sclera: Conjunctivae normal.      Pupils: Pupils are equal, round, and reactive to light. Cardiovascular:      Rate and Rhythm: Normal rate and regular rhythm. Heart sounds: Normal heart sounds. No murmur heard. Pulmonary:      Effort: Pulmonary effort is normal. No respiratory distress (No conversational dyspnea, accessory muscle use, or tachypnea). Breath sounds: Normal breath sounds. No decreased breath sounds, wheezing, rhonchi or rales. Abdominal:      General: Bowel sounds are normal. There is no distension. Palpations: Abdomen is soft. There is no mass. Tenderness: There is no abdominal tenderness. There is no guarding or rebound. Musculoskeletal:         General: No tenderness or deformity. Normal range of motion. Cervical back: Normal range of motion and neck supple. Comments: There is no pretibial edema nor calf tenderness bilaterally      Skin:     General: Skin is warm and dry. Coloration: Skin is not cyanotic, jaundiced or pale. Neurological:      Mental Status: He is alert and oriented to person, place, and time. Coordination: Coordination normal.        Procedures     MDM  Patient presented to the ED for evaluation of shortness of breath and a productive cough. History of COPD and prior pneumonia. Labs and imaging were assessed. CBC showed no evidence of leukocytosis. No evidence of anemia. CMP showed no electrolyte abnormalities or evidence of renal insufficiency with normal liver functions. [place] COVID and influenza swabs were obtained and patient is positive for influenza B. Troponin was 6. Chest x-ray was obtained and showed no acute cardiopulmonary disease.   Patient resting comfortably in bed in no distress and is feeling better after his breathing treatment. He is comfortably discharged home. He will follow-up with his PCP and understands if he has worsening symptoms or new concerns that he can return to the ED for further evaluation. EKG Interpretation    Interpreted by emergency department physician    Rhythm: sinus bradycardia  Rate: 59  Axis: normal  Ectopy: none  Conduction: normal  ST Segments: no acute change  T Waves: no acute change  Q Waves: none    Clinical Impression: no acute changes other than mild borderline bradycardia    Susan Kelley DO           --------------------------------------------- PAST HISTORY ---------------------------------------------  Past Medical History:  has a past medical history of Anesthesia complication, Arthritis, Asthma, Diabetes mellitus ([facility] Fungal infection of foot, GERD (gastroesophageal reflux disease), Hyperlipidemia, Hypertension, and Type 2 diabetes mellitus without complication ([facility] Past Surgical History:  has a past surgical history that includes hernia repair; Neck surgery; Cholecystectomy, laparoscopic (N/A, 05/18/2020); Anterior cruciate ligament repair (Right, 07/13/2020); Colonoscopy; Endoscopy, colon, diagnostic; Shoulder arthroscopy (Left, 6/1/2021); and Arm Surgery (Left, 9/8/2021). Social History:  reports that he quit smoking about 36 years ago. His smoking use included cigarettes. He started smoking about 57 years ago. He has a 22.50 pack-year smoking history. He has never used smokeless tobacco. He reports current alcohol use. He reports that he does not use drugs. Family History: family history includes Diabetes in his father and maternal grandmother; Heart Attack in his mother; Hypertension in his mother and paternal grandmother. The patients home medications have been reviewed.     Allergies: Adhesive tape    -------------------------------------------------- RESULTS -------------------------------------------------  Labs:  Results for orders placed or performed during the hospital encounter of 07/31/22   COVID-19, Rapid    Specimen: Nasopharyngeal Swab   Result Value Ref Range    SARS-CoV-2, NAAT Not Detected Not Detected   RAPID INFLUENZA A/B ANTIGENS    Specimen: Nasopharyngeal   Result Value Ref Range    Influenza A by PCR Not Detected Not Detected    Influenza B by PCR DETECTED (A) Not Detected   Lactate, Sepsis   Result Value Ref Range    Lactic Acid, Sepsis 1.1 0.5 - 1.9 mmol/L   CBC with Auto Differential   Result Value Ref Range    WBC 6.6 4.5 - 11.5 E9/L    RBC 4.70 3.80 - 5.80 E12/L    Hemoglobin 14.3 12.5 - 16.5 g/dL    Hematocrit 41.4 37.0 - 54.0 %    MCV 88.1 80.0 - 99.9 fL    MCH 30.4 26.0 - 35.0 pg    MCHC 34.5 32.0 - 34.5 %    RDW 13.0 11.5 - 15.0 fL    Platelets 379 490 - 962 E9/L    MPV 9.3 7.0 - 12.0 fL    Neutrophils % 58.8 43.0 - 80.0 %    Immature Granulocytes % 0.3 0.0 - 5.0 %    Lymphocytes % 27.7 20.0 - 42.0 %    Monocytes % 9.0 2.0 - 12.0 %    Eosinophils % 3.6 0.0 - 6.0 %    Basophils % 0.6 0.0 - 2.0 %    Neutrophils Absolute 3.87 1.80 - 7.30 E9/L    Immature Granulocytes # 0.02 E9/L    Lymphocytes Absolute 1.82 1.50 - 4.00 E9/L    Monocytes Absolute 0.59 0.10 - 0.95 E9/L    Eosinophils Absolute 0.24 0.05 - 0.50 E9/L    Basophils Absolute 0.04 0.00 - 0.20 E9/L   Comprehensive Metabolic Panel w/ Reflex to MG   Result Value Ref Range    Sodium 138 132 - 146 mmol/L    Potassium reflex Magnesium 3.8 3.5 - 5.0 mmol/L    Chloride 102 98 - 107 mmol/L    CO2 27 22 - 29 mmol/L    Anion Gap 9 7 - 16 mmol/L    Glucose 176 (H) 74 - 99 mg/dL    BUN 20 6 - 23 mg/dL    Creatinine 1.1 0.7 - 1.2 mg/dL    GFR Non-African American >60 >=60 mL/min/1.73    GFR African American >60     Calcium 9.5 8.6 - 10.2 mg/dL    Total Protein 7.3 6.4 - 8.3 g/dL    Albumin 4.6 3.5 - 5.2 g/dL    Total Bilirubin 0.6 0.0 - 1.2 mg/dL    Alkaline Phosphatase 63 40 - 129 U/L    ALT 20 0 - 40 U/L    AST 15 0 - 39 U/L   Troponin   Result Value Ref Range    Troponin, High Sensitivity 6 0 - 11 ng/L   Brain Natriuretic Peptide   Result Value Ref Range    Pro-BNP 38 0 - 125 pg/mL   EKG 12 Lead   Result Value Ref Range    Ventricular Rate 59 BPM    Atrial Rate 59 BPM    P-R Interval 142 ms    QRS Duration 100 ms    Q-T Interval 416 ms    QTc Calculation (Bazett) 411 ms    P Axis 24 degrees    R Axis 20 degrees    T Axis 46 degrees       Radiology:  XR CHEST PORTABLE   Final Result   No pneumonia or pleural effusion.             ------------------------- NURSING NOTES AND VITALS REVIEWED ---------------------------  Date / Time Roomed:  7/31/2022  7:41 PM  ED Bed Assignment:  03/03    The nursing notes within the ED encounter and vital signs as below have been reviewed. /84   Pulse 57   Temp 97.4 °F (36.3 °C) (Infrared)   Resp 20   Ht 5' 10\" (1.778 m)   Wt 215 lb (97.5 kg)   SpO2 95%   BMI 30.85 kg/m²   Oxygen Saturation Interpretation: Normal      ------------------------------------------ PROGRESS NOTES ------------------------------------------  I have spoken with the patient and discussed todays results, in addition to providing specific details for the plan of care and counseling regarding the diagnosis and prognosis. Their questions are answered at this time and they are agreeable with the plan. I discussed at length with them reasons for immediate return here for re evaluation. They will followup with primary care by calling their office tomorrow. --------------------------------- ADDITIONAL PROVIDER NOTES ---------------------------------  At this time the patient is without objective evidence of an acute process requiring hospitalization or inpatient management. They have remained hemodynamically stable throughout their entire ED visit and are stable for discharge with outpatient follow-up. The plan has been discussed in detail and they are aware of the specific conditions for emergent return, as well as the importance of follow-up.       New Prescriptions    BENZONATATE (TESSALON) 100 MG CAPSULE    Take 1 capsule by mouth 2 times daily as needed for Cough       Diagnosis:  1. Influenza B        Disposition:  Patient's disposition: Discharge to home  Patient's condition is stable.          Kirsten Whitmore DO  07/31/22 1359

## 2022-08-06 LAB
BLOOD CULTURE, ROUTINE: NORMAL
CULTURE, BLOOD 2: NORMAL

## 2022-08-25 DIAGNOSIS — E78.2 ELEVATED CHOLESTEROL WITH ELEVATED TRIGLYCERIDES: ICD-10-CM

## 2022-08-25 RX ORDER — PANTOPRAZOLE SODIUM 40 MG/1
40 TABLET, DELAYED RELEASE ORAL DAILY
Qty: 90 TABLET | Refills: 0 | Status: SHIPPED
Start: 2022-08-25 | End: 2022-08-29 | Stop reason: SDUPTHER

## 2022-08-25 RX ORDER — DOXEPIN HYDROCHLORIDE 10 MG/1
10 CAPSULE ORAL NIGHTLY
Qty: 60 CAPSULE | Refills: 0 | Status: SHIPPED
Start: 2022-08-25 | End: 2022-08-29 | Stop reason: SDUPTHER

## 2022-08-25 RX ORDER — LOSARTAN POTASSIUM 50 MG/1
50 TABLET ORAL DAILY
Qty: 90 TABLET | Refills: 0 | Status: SHIPPED
Start: 2022-08-25 | End: 2022-08-29 | Stop reason: SDUPTHER

## 2022-08-25 RX ORDER — ATORVASTATIN CALCIUM 80 MG/1
80 TABLET, FILM COATED ORAL NIGHTLY
Qty: 90 TABLET | Refills: 0 | Status: SHIPPED
Start: 2022-08-25 | End: 2022-08-29 | Stop reason: SDUPTHER

## 2022-08-25 RX ORDER — HYDROCHLOROTHIAZIDE 12.5 MG/1
12.5 CAPSULE, GELATIN COATED ORAL DAILY
Qty: 90 CAPSULE | Refills: 0 | Status: SHIPPED
Start: 2022-08-25 | End: 2022-08-29 | Stop reason: SDUPTHER

## 2022-08-25 NOTE — TELEPHONE ENCOUNTER
Patient called for refills. Patient asked for a refill of Glipizide 10 mg, and I informed this was changed to [place]. Patient stated he called awhile back to inform that his blood sugar was high with [place]. Patient stated he still had Glipizide, which he has been taking and it's been keeping his blood sugar under control. Informed patient that this was not Dr. Isra Bar advisement on 7/8/22. Patient asked if Dr. Justin Lee would send in a short term refill of Glipizide til patient comes in next week.     Last seen 6/17/2022  Next appt 9/2/2022  [facility]/[place]

## 2022-08-29 DIAGNOSIS — E78.2 ELEVATED CHOLESTEROL WITH ELEVATED TRIGLYCERIDES: ICD-10-CM

## 2022-08-29 RX ORDER — DULOXETIN HYDROCHLORIDE 60 MG/1
CAPSULE, DELAYED RELEASE ORAL
Qty: 90 CAPSULE | Refills: 0 | Status: SHIPPED | OUTPATIENT
Start: 2022-08-29

## 2022-08-29 RX ORDER — ATORVASTATIN CALCIUM 80 MG/1
80 TABLET, FILM COATED ORAL NIGHTLY
Qty: 90 TABLET | Refills: 0 | Status: SHIPPED
Start: 2022-08-29 | End: 2022-11-01 | Stop reason: SDUPTHER

## 2022-08-29 RX ORDER — HYDROCHLOROTHIAZIDE 12.5 MG/1
12.5 CAPSULE, GELATIN COATED ORAL DAILY
Qty: 90 CAPSULE | Refills: 0 | Status: SHIPPED
Start: 2022-08-29 | End: 2022-11-01 | Stop reason: SDUPTHER

## 2022-08-29 RX ORDER — DULOXETIN HYDROCHLORIDE 60 MG/1
CAPSULE, DELAYED RELEASE ORAL
COMMUNITY
Start: 2022-07-29 | End: 2022-08-29 | Stop reason: SDUPTHER

## 2022-08-29 RX ORDER — GLIPIZIDE 10 MG/1
10 TABLET ORAL
Qty: 180 TABLET | Refills: 0 | Status: CANCELLED | OUTPATIENT
Start: 2022-08-29

## 2022-08-29 RX ORDER — PANTOPRAZOLE SODIUM 40 MG/1
40 TABLET, DELAYED RELEASE ORAL DAILY
Qty: 90 TABLET | Refills: 0 | Status: SHIPPED
Start: 2022-08-29 | End: 2022-11-01 | Stop reason: SDUPTHER

## 2022-08-29 RX ORDER — LOSARTAN POTASSIUM 50 MG/1
50 TABLET ORAL DAILY
Qty: 90 TABLET | Refills: 0 | Status: SHIPPED
Start: 2022-08-29 | End: 2022-11-01 | Stop reason: SDUPTHER

## 2022-08-29 RX ORDER — FENOFIBRATE 134 MG/1
134 CAPSULE ORAL
Qty: 90 CAPSULE | Refills: 0 | Status: SHIPPED
Start: 2022-08-29 | End: 2022-10-20

## 2022-08-29 RX ORDER — DOXEPIN HYDROCHLORIDE 10 MG/1
10 CAPSULE ORAL NIGHTLY
Qty: 60 CAPSULE | Refills: 0 | Status: SHIPPED
Start: 2022-08-29 | End: 2022-09-27 | Stop reason: SDUPTHER

## 2022-08-29 RX ORDER — ROPINIROLE 1 MG/1
TABLET, FILM COATED ORAL
Qty: 180 TABLET | Refills: 0 | Status: SHIPPED
Start: 2022-08-29 | End: 2022-11-01 | Stop reason: SDUPTHER

## 2022-08-29 RX ORDER — GLIPIZIDE 10 MG/1
10 TABLET ORAL
COMMUNITY
End: 2022-09-02 | Stop reason: DRUGHIGH

## 2022-08-29 RX ORDER — CARVEDILOL 25 MG/1
25 TABLET ORAL 2 TIMES DAILY
Qty: 180 TABLET | Refills: 0 | Status: SHIPPED
Start: 2022-08-29 | End: 2022-11-01 | Stop reason: SDUPTHER

## 2022-08-31 DIAGNOSIS — B35.1 TINEA UNGUIUM: ICD-10-CM

## 2022-08-31 DIAGNOSIS — E78.2 ELEVATED CHOLESTEROL WITH ELEVATED TRIGLYCERIDES: ICD-10-CM

## 2022-08-31 LAB
ALBUMIN SERPL-MCNC: 4.1 G/DL (ref 3.5–5.2)
ALP BLD-CCNC: 56 U/L (ref 40–129)
ALT SERPL-CCNC: 27 U/L (ref 0–40)
AST SERPL-CCNC: 24 U/L (ref 0–39)
BILIRUB SERPL-MCNC: 0.5 MG/DL (ref 0–1.2)
BILIRUBIN DIRECT: <0.2 MG/DL (ref 0–0.3)
BILIRUBIN, INDIRECT: NORMAL MG/DL (ref 0–1)
CHOLESTEROL, TOTAL: 172 MG/DL (ref 0–199)
HDLC SERPL-MCNC: 35 MG/DL
LDL CHOLESTEROL CALCULATED: 83 MG/DL (ref 0–99)
TOTAL PROTEIN: 7 G/DL (ref 6.4–8.3)
TRIGL SERPL-MCNC: 272 MG/DL (ref 0–149)
VLDLC SERPL CALC-MCNC: 54 MG/DL

## 2022-09-01 ENCOUNTER — TELEPHONE (OUTPATIENT)
Dept: FAMILY MEDICINE CLINIC | Age: 67
End: 2022-09-01

## 2022-09-01 NOTE — TELEPHONE ENCOUNTER
[facility] called stating they prepackage patient's medication and since RX for Doxepin has directions   Take 1 capsule by mouth nightly TAKE UP TO 2 CAPSULES BY MOUTH AT BEDTIME   Lee [place] asked if Dr. Venkatesh Oliva would prefer pharmacy not to add to prepackaged meds and just fill in a bottle. Please advise. Call [phone] (OK to leave detailed  msg for pharmacist).     Last seen 6/17/2022  Next appt 9/2/2022

## 2022-09-01 NOTE — TELEPHONE ENCOUNTER
OK TO,LEAVE VOICE MESSAGE WITH THE PHARMACY per Dr Alison Joseph      I left message with the phamacy

## 2022-09-01 NOTE — RESULT ENCOUNTER NOTE
TRIGLYCERIDES STILL HIGH BUT IMPROVING.  LOW SALT, LOW CARB. AND LOW FAT DIET. REGULAR EXERCISE. CONTINUE CURRENT MEDICATIONS. DISCUSS NEXT VISIT.

## 2022-09-02 ENCOUNTER — OFFICE VISIT (OUTPATIENT)
Dept: FAMILY MEDICINE CLINIC | Age: 67
End: 2022-09-02
Payer: MEDICARE

## 2022-09-02 VITALS
DIASTOLIC BLOOD PRESSURE: 80 MMHG | OXYGEN SATURATION: 96 % | BODY MASS INDEX: 30.99 KG/M2 | SYSTOLIC BLOOD PRESSURE: 122 MMHG | HEART RATE: 88 BPM | WEIGHT: 216 LBS

## 2022-09-02 DIAGNOSIS — E78.2 MIXED HYPERCHOLESTEROLEMIA AND HYPERTRIGLYCERIDEMIA: ICD-10-CM

## 2022-09-02 DIAGNOSIS — E11.9 TYPE 2 DIABETES MELLITUS WITHOUT COMPLICATION, WITHOUT LONG-TERM CURRENT USE OF INSULIN (HCC): Primary | ICD-10-CM

## 2022-09-02 DIAGNOSIS — G20 PARKINSON'S DISEASE (HCC): ICD-10-CM

## 2022-09-02 DIAGNOSIS — J44.1 COPD WITH ACUTE EXACERBATION (HCC): ICD-10-CM

## 2022-09-02 DIAGNOSIS — I10 PRIMARY HYPERTENSION: ICD-10-CM

## 2022-09-02 PROCEDURE — 3051F HG A1C>EQUAL 7.0%<8.0%: CPT | Performed by: FAMILY MEDICINE

## 2022-09-02 PROCEDURE — 99214 OFFICE O/P EST MOD 30 MIN: CPT | Performed by: FAMILY MEDICINE

## 2022-09-02 PROCEDURE — 1123F ACP DISCUSS/DSCN MKR DOCD: CPT | Performed by: FAMILY MEDICINE

## 2022-09-02 RX ORDER — GLIPIZIDE 5 MG/1
5 TABLET ORAL DAILY
Qty: 90 TABLET | Refills: 1 | Status: SHIPPED
Start: 2022-09-02 | End: 2022-10-07 | Stop reason: SDUPTHER

## 2022-09-02 RX ORDER — GLUCOSAMINE HCL/CHONDROITIN SU 500-400 MG
CAPSULE ORAL
Qty: 100 STRIP | Refills: 3 | Status: SHIPPED | OUTPATIENT
Start: 2022-09-02

## 2022-09-02 NOTE — PROGRESS NOTES
OFFICE PROGRESS NOTE      SUBJECTIVE:        Patient ID:   Latonya Arizmendi is a 78 y.o. male who presents for   Chief Complaint   Patient presents with    Discuss Medications     Diabetes meds           HPI:     RECHECK BP, CHOLESTEROL AND DIABETES.  BLOOD SUGAR WENT UP + MG. WHEN WENT OFF THE GLUCOTROL. REQUEST TO KEEP HIM ON LOW DOSE TO KEEP SUGAR BETTER CONTROLLED. MEDICATION REFILL. FEELS GOOD. WATCHING DIET GOOD. WALKING SOME FOR EXERCISE. TAKING MEDICATIONS REGULARLY. Prior to Admission medications    Medication Sig Start Date End Date Taking?  Authorizing Provider   blood glucose monitor strips Test 3 times a day & as needed for symptoms of irregular blood glucose 9/2/22  Yes Alicia Bee MD   Lancets 30G MISC 1 each by Does not apply route 3 times daily Dispense what ever brand is covered by insurance 9/2/22  Yes Alicia Bee MD   dapagliflozin (FARXIGA) 10 MG tablet Take 1 tablet by mouth every morning 9/2/22  Yes Alicia Bee MD   glipiZIDE (GLUCOTROL) 5 MG tablet Take 1 tablet by mouth daily 9/2/22  Yes Alicia Bee MD   atorvastatin (LIPITOR) 80 MG tablet Take 1 tablet by mouth at bedtime TAKE 1 TABLET BY MOUTH IN THE EVENING FOR CHOLESTEROL 8/29/22  Yes Alicia Bee MD   carvedilol (COREG) 25 MG tablet Take 1 tablet by mouth 2 times daily 8/29/22  Yes Alicia Bee MD   doxepin (SINEQUAN) 10 MG capsule Take 1 capsule by mouth nightly TAKE UP TO 2 CAPSULES BY MOUTH AT BEDTIME AS DIRECTED FOR INSOMNIA 8/29/22 9/28/22 Yes Alicia Bee MD   DULoxetine (CYMBALTA) 60 MG extended release capsule take 1 capsule by mouth once daily 8/29/22  Yes Alicia Bee MD   fenofibrate micronized (LOFIBRA) 134 MG capsule Take 1 capsule by mouth every morning (before breakfast) take 1 capsule by mouth every morning TAKE BEFORE BEAKFAST 8/29/22  Yes Alicia Bee MD   hydroCHLOROthiazide (MICROZIDE) 12.5 MG capsule Take 1 capsule by mouth daily 22  Yes Blanca Mullen MD   losartan (COZAAR) 50 MG tablet Take 1 tablet by mouth daily 22  Yes Blanca Mullen MD   pantoprazole (PROTONIX) 40 MG tablet Take 1 tablet by mouth daily 22  Yes Blanca Mullen MD   rOPINIRole (REQUIP) 1 MG tablet TAKE 2 TABLET BY MOUTH NIGHTLY 22  Yes Blanca Mullen MD   terbinafine (LAMISIL) 250 MG tablet Take 1 tablet by mouth in the morning. 8/1/22 10/30/22 Yes Travis Hernandez DPM   ezetimibe (ZETIA) 10 MG tablet Take 1 tablet by mouth daily 22  Yes JAIR Driver - CNP   clotrimazole-betamethasone (LOTRISONE) 1-0.05 % cream Apply topically 2 times daily.  22  Yes Blanca Mullen MD   naproxen (NAPROSYN) 500 MG tablet Take 1 tablet by mouth 2 times daily (with meals) for 5 days 22 Yes BENOIT Pedroza   albuterol sulfate HFA (VENTOLIN HFA) 108 (90 Base) MCG/ACT inhaler Inhale 2 puffs into the lungs every 4 hours as needed for Wheezing or Shortness of Breath 22  Yes Blanca Mullen MD   Cholecalciferol (VITAMIN D3) 125 MCG (5000 UT) TABS Take by mouth daily   Yes Historical Provider, MD   colestipol (COLESTID) 1 g tablet Take 2 g by mouth daily   Yes Historical Provider, MD   sildenafil (REVATIO) 20 MG tablet Take 1 tablet by mouth daily as needed (FOR SEXUAL DYSFUNCTION) 21  Yes Blanca Mullen MD   vitamin B-12 (CYANOCOBALAMIN) 1000 MCG tablet Take 1,000 mcg by mouth daily   Yes Historical Provider, MD   Blood Glucose Monitoring Suppl (BLOOD GLUCOSE MONITOR SYSTEM) w/Device KIT 1 each by Does not apply route 3 times daily Dispense what ever brand is covered by insurance 20  Yes Blanca Mullen MD     Social History     Socioeconomic History    Marital status: Legally    Tobacco Use    Smoking status: Former     Packs/day: 1.50     Years: 15.00     Pack years: 22.50     Types: Cigarettes     Start date: 1965     Quit date: 1986     Years since quittin.5    Smokeless tobacco: Never   Vaping Use Vaping Use: Never used   Substance and Sexual Activity    Alcohol use: Yes     Comment: 2-3 beers per week    Drug use: No     Social Determinants of Health     Financial Resource Strain: Low Risk     Difficulty of Paying Living Expenses: Not hard at all   Food Insecurity: No Food Insecurity    Worried About Running Out of Food in the Last Year: Never true    Ran Out of Food in the Last Year: Never true       I have reviewed Jeffery's allergies, medications, problem list, medical, social and family history and have updated as needed in the electronic medical record  Review Of Systems:    Skin: no abnormal pigmentation, rash, scaling, itching, masses, hair or nail changes  Eyes: no blurring, diplopia, or eye pain  Ears/Nose/Throat: no hearing loss, tinnitus, vertigo, nosebleed, nasal congestion, rhinorrhea, sore throat  Respiratory: no cough, pleuritic chest pain, dyspnea, or wheezing  Cardiovascular: no chest pain, angina, dyspnea on exertion, orthopnea, PND, palpitations, or claudication  Gastrointestinal: no nausea, vomiting, heartburn, diarrhea, constipation, bloating,  abdominal pain, or blood per rectum. Appetite is good  Genitourinary: no urinary urgency, frequency, dysuria, nocturia, hesitancy, or incontinence  Musculoskeletal: joint pains off and on. Morning stiffness.  Ambulating well  Neurologic: no paralysis, paresis, paresthesia, seizures, tremors, or headaches  Hematologic/Lymphatic/Immunologic: no anemia, abnormal bleeding/bruising, fever, chills, night sweats, swollen glands, or unexplained weight loss  Endocrine: no heat or cold intolerance and no polyphagia, polydipsia, or polyuria        OBJECTIVE:     VS:  Wt Readings from Last 3 Encounters:   09/02/22 216 lb (98 kg)   08/01/22 215 lb (97.5 kg)   07/31/22 215 lb (97.5 kg)     Temp Readings from Last 3 Encounters:   07/31/22 97.4 °F (36.3 °C) (Infrared)   06/17/22 97.3 °F (36.3 °C)   05/22/22 97 °F (36.1 °C) (Infrared)     BP Readings from Last 3 Encounters:   09/02/22 122/80   07/31/22 138/84   06/17/22 130/82        General appearance: Alert, Awake, Oriented times 3, no distress  Skin: Warm and dry  Head: Normocephalic. No masses, lesions or tenderness noted  Eyes: Conjunctivae appear normal. PERLE  Ears: External ears normal  Nose/Sinuses: Nares normal. Septum midline. Mucosa normal. No drainage  Oropharynx: Oropharynx clear with no exudate seen  Neck: Neck supple. No jugular venous distension, lymphadenopathy or thyromegaly Trachea midline  Chest:  Normal. Movements are Normal and Equal.  Lungs: Lungs clear to auscultation bilaterally. No ronchi, crackles or wheezes  Heart: S1 S2  Regular rate and rhythm. No rub, murmur or gallop  Abdomen: Abdomen soft, non-tender. BS normal. No masses, organomegaly. Back: Grossly Normal and Equal. DTR are Normal. SLR is Normal.  Extremities: Arthritic changes are noted. Movements are limited. Pedal pulses are normal.  Musculoskeletal: Muscular strength appears intact. No joint effusion, tenderness, swelling or warmth  Neuro:  No focal motor or sensory deficits        ASSESSMENT     Patient Active Problem List    Diagnosis Date Noted    Primary hypertension     Mixed hypercholesterolemia and hypertriglyceridemia     COVID 10/22/2021    Cubital tunnel syndrome on left     Parkinson's disease 08/30/2021    Rupture of left biceps tendon 06/18/2021    Major depressive disorder, recurrent, moderate 06/18/2021    Type 2 diabetes mellitus with chronic kidney disease ([facility] [address] 05/17/2021    Difficulty walking 05/17/2021    Onychomycosis 02/15/2021    Tinea pedis of both feet 02/15/2021    Rupture of anterior cruciate ligament of right knee     COPD with acute exacerbation ([facility] [address] 07/15/2020    Type 2 diabetes mellitus with hyperglycemia, without long-term current use of insulin ([facility])     Tinea corporis 02/24/2011        Diagnosis:     ICD-10-CM    1.  Type 2 diabetes mellitus without complication, without long-term current use of

## 2022-09-04 ENCOUNTER — APPOINTMENT (OUTPATIENT)
Dept: GENERAL RADIOLOGY | Age: 67
End: 2022-09-04
Payer: MEDICARE

## 2022-09-04 ENCOUNTER — HOSPITAL ENCOUNTER (EMERGENCY)
Age: 67
Discharge: HOME | End: 2022-09-04
Attending: STUDENT IN AN ORGANIZED HEALTH CARE EDUCATION/TRAINING PROGRAM
Payer: MEDICARE

## 2022-09-04 VITALS
HEIGHT: 70 IN | TEMPERATURE: 97 F | DIASTOLIC BLOOD PRESSURE: 62 MMHG | SYSTOLIC BLOOD PRESSURE: 120 MMHG | HEART RATE: 74 BPM | RESPIRATION RATE: 20 BRPM | WEIGHT: 220 LBS | BODY MASS INDEX: 31.5 KG/M2 | OXYGEN SATURATION: 95 %

## 2022-09-04 DIAGNOSIS — J01.90 ACUTE SINUSITIS, RECURRENCE NOT SPECIFIED, UNSPECIFIED LOCATION: Primary | ICD-10-CM

## 2022-09-04 LAB — SARS-COV-2, NAAT: NOT DETECTED

## 2022-09-04 PROCEDURE — 87635 SARS-COV-2 COVID-19 AMP PRB: CPT

## 2022-09-04 PROCEDURE — 99284 EMERGENCY DEPT VISIT MOD MDM: CPT

## 2022-09-04 PROCEDURE — 71046 X-RAY EXAM CHEST 2 VIEWS: CPT

## 2022-09-04 RX ORDER — AMPICILLIN TRIHYDRATE 250 MG
1000 CAPSULE ORAL DAILY
COMMUNITY

## 2022-09-04 RX ORDER — AMOXICILLIN AND CLAVULANATE POTASSIUM 875; 125 MG/1; MG/1
1 TABLET, FILM COATED ORAL 2 TIMES DAILY
Qty: 20 TABLET | Refills: 0 | Status: SHIPPED | OUTPATIENT
Start: 2022-09-04 | End: 2022-09-14

## 2022-09-04 RX ORDER — FLUTICASONE PROPIONATE 50 MCG
1 SPRAY, SUSPENSION (ML) NASAL DAILY
Qty: 16 G | Refills: 0 | Status: SHIPPED | OUTPATIENT
Start: 2022-09-04

## 2022-09-04 ASSESSMENT — ENCOUNTER SYMPTOMS
SINUS PRESSURE: 1
EYE REDNESS: 0
VOMITING: 0
NAUSEA: 0
DIARRHEA: 0
EYE PAIN: 0
WHEEZING: 0
EYE DISCHARGE: 0
SHORTNESS OF BREATH: 0
SORE THROAT: 1
ABDOMINAL PAIN: 0
BACK PAIN: 0
COUGH: 1

## 2022-09-04 NOTE — DISCHARGE INSTRUCTIONS
Follow-up with your primary care physician. Return to emergency department for any worsening symptoms.

## 2022-09-04 NOTE — ED PROVIDER NOTES
HPI   52-year-old male patient present emergency department for evaluation of cough, congestion nasal drainage. Nasal discharge is green/yellowish in color. He does have past history of COPD, type 2 diabetes. States he has been having symptoms now for the last 3 to 4 days. On July 31 patient was diagnosed with influenza B. He states he was sick for a few weeks with that and then all of his symptoms had improved and he was feeling better until the symptoms started. He denies any fevers but is having some chills and body aches. Denying any nausea, vomiting, diarrhea chest pain or shortness of breath. Review of Systems   Constitutional:  Negative for chills and fever. HENT:  Positive for congestion, sinus pressure and sore throat. Negative for ear pain. Eyes:  Negative for pain, discharge and redness. Respiratory:  Positive for cough. Negative for shortness of breath and wheezing. Cardiovascular:  Negative for chest pain. Gastrointestinal:  Negative for abdominal pain, diarrhea, nausea and vomiting. Genitourinary:  Negative for dysuria and frequency. Musculoskeletal:  Positive for myalgias. Negative for arthralgias and back pain. Skin:  Negative for rash and wound. Neurological:  Negative for weakness and headaches. Hematological:  Negative for adenopathy. All other systems reviewed and are negative. Physical Exam  Vitals and nursing note reviewed. Constitutional:       Appearance: He is well-developed. Comments: Patient speaking in full sentences. HENT:      Head: Normocephalic and atraumatic. Nose: Congestion present. Eyes:      Conjunctiva/sclera: Conjunctivae normal.   Cardiovascular:      Rate and Rhythm: Normal rate and regular rhythm. Heart sounds: Normal heart sounds. No murmur heard. Pulmonary:      Effort: Pulmonary effort is normal. No respiratory distress. Breath sounds: Normal breath sounds. No wheezing or rales.    Abdominal:      General: Bowel sounds are normal.      Palpations: Abdomen is soft. Tenderness: There is no abdominal tenderness. There is no guarding or rebound. Musculoskeletal:         General: No tenderness or deformity. Cervical back: Normal range of motion and neck supple. Skin:     General: Skin is warm and dry. Neurological:      Mental Status: He is alert and oriented to person, place, and time. Cranial Nerves: No cranial nerve deficit. Coordination: Coordination normal.        Procedures     MDM  78year-old here for evaluation of symptoms. COVID swab was negative. Chest x-ray showing no superimposed bacterial pneumonia. Patient is high risk he has a history of diabetes and COPD. We will err on the side of caution and treat sinusitis with Augmentin and Flonase he is to follow-up with primary care physician. He is not hypoxic here his vital signs are stable.              --------------------------------------------- PAST HISTORY ---------------------------------------------  Past Medical History:  has a past medical history of Anesthesia complication, Arthritis, Asthma, Diabetes mellitus ([facility] [address] Fungal infection of foot, GERD (gastroesophageal reflux disease), Hyperlipidemia, Hypertension, and Type 2 diabetes mellitus without complication ([facility] [address] Past Surgical History:  has a past surgical history that includes hernia repair; Neck surgery; Cholecystectomy, laparoscopic (N/A, 05/18/2020); Anterior cruciate ligament repair (Right, 07/13/2020); Colonoscopy; Endoscopy, colon, diagnostic; Shoulder arthroscopy (Left, 6/1/2021); and Arm Surgery (Left, 9/8/2021). Social History:  reports that he quit smoking about 36 years ago. His smoking use included cigarettes. He started smoking about 57 years ago. He has a 22.50 pack-year smoking history. He has never used smokeless tobacco. He reports current alcohol use. He reports that he does not use drugs.     Family History: family history includes Diabetes in his father and maternal grandmother; Heart Attack in his mother; Hypertension in his mother and paternal grandmother. The patients home medications have been reviewed. Allergies: Adhesive tape    -------------------------------------------------- RESULTS -------------------------------------------------  Labs:  Results for orders placed or performed during the hospital encounter of 09/04/22   COVID-19, Rapid    Specimen: Nasopharyngeal Swab   Result Value Ref Range    SARS-CoV-2, NAAT Not Detected Not Detected       Radiology:  XR CHEST (2 VW)   Final Result   No acute cardiopulmonary disease.             ------------------------- NURSING NOTES AND VITALS REVIEWED ---------------------------  Date / Time Roomed:  9/4/2022  2:10 PM  ED Bed Assignment:  02/02    The nursing notes within the ED encounter and vital signs as below have been reviewed. /62   Pulse 74   Temp 97 °F (36.1 °C) (Temporal)   Resp 20   Ht 5' 10\" (1.778 m)   Wt 220 lb (99.8 kg)   SpO2 95%   BMI 31.57 kg/m²   Oxygen Saturation Interpretation: Normal    --------------------------------- ADDITIONAL PROVIDER NOTES ---------------------------------  At this time the patient is without objective evidence of an acute process requiring hospitalization or inpatient management. They have remained hemodynamically stable throughout their entire ED visit and are stable for discharge with outpatient follow-up. The plan has been discussed in detail and they are aware of the specific conditions for emergent return, as well as the importance of follow-up. Discharge Medication List as of 9/4/2022  3:20 PM        START taking these medications    Details   amoxicillin-clavulanate (AUGMENTIN) 875-125 MG per tablet Take 1 tablet by mouth 2 times daily for 10 days, Disp-20 tablet, R-0Print      fluticasone (FLONASE) 50 MCG/ACT nasal spray 1 spray by Each Nostril route daily, Disp-16 g, R-0Print             Diagnosis:  1.  Acute

## 2022-09-15 ENCOUNTER — TELEPHONE (OUTPATIENT)
Dept: FAMILY MEDICINE CLINIC | Age: 67
End: 2022-09-15

## 2022-09-15 NOTE — TELEPHONE ENCOUNTER
----- Message from Garrett Norman sent at 5/09/4893 12:29 PM EDT -----  Subject: Refill Request    QUESTIONS  Name of Medication? carvedilol (COREG) 25 MG tablet Rx sent to pharmacy 8/29/22 1:31pm  Patient-reported dosage and instructions? 2 x tablet   How many days do you have left? 8  Preferred Pharmacy? [id number]  Pharmacy phone number (if available)? [phone]  ---------------------------------------------------------------------------  --------------  CALL BACK INFO  What is the best way for the office to contact you? OK to leave message on   voicemail  Preferred Call Back Phone Number? [phone]  ---------------------------------------------------------------------------  --------------  SCRIPT ANSWERS  Relationship to Patient?  Self

## 2022-09-15 NOTE — TELEPHONE ENCOUNTER
CONTINUE TAKING FARXIGA DAILY. IT WILL TAKE SOME TIME FOR BLOOD SUGAR TO SETTLE DOWN.   MAY SCHEDULE FOR PATIENT TO COME NEXT WEEK IF IT STILL BOTHERS HIM.      Per Dr Viera Public      Left message for patient

## 2022-09-15 NOTE — TELEPHONE ENCOUNTER
Patient states his Blood Sugar this morning at 8am was 360. He took 5mg glipiZIDE it is now 190. Patient also took sarxiga 10mg with the glipiZide. Please advise.      Last seen 9/2/2022  Next appt 11/1/2022     [facility]

## 2022-09-27 RX ORDER — DOXEPIN HYDROCHLORIDE 10 MG/1
10 CAPSULE ORAL NIGHTLY
Qty: 60 CAPSULE | Refills: 0 | Status: SHIPPED
Start: 2022-09-27 | End: 2022-10-27

## 2022-10-04 RX ORDER — DOXEPIN HYDROCHLORIDE 10 MG/1
CAPSULE ORAL
Qty: 60 CAPSULE | Refills: 0 | OUTPATIENT
Start: 2022-10-04

## 2022-10-06 NOTE — TELEPHONE ENCOUNTER
Patient called stating the glipizide 5 mg once daily is not working his BS increase during the day  to the 200 - 300's. He is asking to go back on the glipizide 10 mg twice daily. He stated that helped his sugars level out. Please advise.       Last seen 9/2/2022  Next appt 11/1/2022    YENNIFER/EBONY LEE English

## 2022-10-07 RX ORDER — GLIPIZIDE 5 MG/1
10 TABLET ORAL DAILY
Qty: 90 TABLET | Refills: 1 | Status: SHIPPED
Start: 2022-10-07 | End: 2022-11-01 | Stop reason: SDUPTHER

## 2022-10-07 NOTE — TELEPHONE ENCOUNTER
Per Dr. Kendra Coley GLIPIZIDE 10 MG. DAILY. Patient informed  he stated he has been doubling up on the glipizide 5 mg so he will need new Rx sent to the pharmacy for the 10 mg daily. He will run out of medication before his apt.      RA/PKM RD

## 2022-10-19 LAB — DIABETIC RETINOPATHY: NEGATIVE

## 2022-10-20 RX ORDER — FENOFIBRATE 134 MG/1
CAPSULE ORAL
Qty: 90 CAPSULE | Refills: 0 | Status: SHIPPED | OUTPATIENT
Start: 2022-10-20

## 2022-10-27 RX ORDER — DOXEPIN HYDROCHLORIDE 10 MG/1
CAPSULE ORAL
Qty: 60 CAPSULE | Refills: 0 | Status: SHIPPED
Start: 2022-10-27 | End: 2022-11-01 | Stop reason: SDUPTHER

## 2022-11-01 ENCOUNTER — OFFICE VISIT (OUTPATIENT)
Dept: FAMILY MEDICINE CLINIC | Age: 67
End: 2022-11-01
Payer: MEDICARE

## 2022-11-01 VITALS
DIASTOLIC BLOOD PRESSURE: 70 MMHG | SYSTOLIC BLOOD PRESSURE: 110 MMHG | HEART RATE: 58 BPM | OXYGEN SATURATION: 93 % | BODY MASS INDEX: 30.56 KG/M2 | WEIGHT: 213 LBS | TEMPERATURE: 97 F

## 2022-11-01 DIAGNOSIS — E11.9 TYPE 2 DIABETES MELLITUS WITHOUT COMPLICATION, WITHOUT LONG-TERM CURRENT USE OF INSULIN (HCC): Primary | ICD-10-CM

## 2022-11-01 DIAGNOSIS — I10 PRIMARY HYPERTENSION: ICD-10-CM

## 2022-11-01 DIAGNOSIS — N39.3 STRESS INCONTINENCE OF URINE: ICD-10-CM

## 2022-11-01 DIAGNOSIS — F33.1 MAJOR DEPRESSIVE DISORDER, RECURRENT, MODERATE (HCC): ICD-10-CM

## 2022-11-01 DIAGNOSIS — E78.2 MIXED HYPERCHOLESTEROLEMIA AND HYPERTRIGLYCERIDEMIA: ICD-10-CM

## 2022-11-01 DIAGNOSIS — G20 PARKINSON'S DISEASE (HCC): ICD-10-CM

## 2022-11-01 PROBLEM — B35.3 TINEA PEDIS OF BOTH FEET: Status: RESOLVED | Noted: 2021-02-15 | Resolved: 2022-11-01

## 2022-11-01 LAB — HBA1C MFR BLD: 7.4 %

## 2022-11-01 PROCEDURE — 3074F SYST BP LT 130 MM HG: CPT | Performed by: FAMILY MEDICINE

## 2022-11-01 PROCEDURE — 1123F ACP DISCUSS/DSCN MKR DOCD: CPT | Performed by: FAMILY MEDICINE

## 2022-11-01 PROCEDURE — 3051F HG A1C>EQUAL 7.0%<8.0%: CPT | Performed by: FAMILY MEDICINE

## 2022-11-01 PROCEDURE — 83036 HEMOGLOBIN GLYCOSYLATED A1C: CPT | Performed by: FAMILY MEDICINE

## 2022-11-01 PROCEDURE — 3078F DIAST BP <80 MM HG: CPT | Performed by: FAMILY MEDICINE

## 2022-11-01 PROCEDURE — 99214 OFFICE O/P EST MOD 30 MIN: CPT | Performed by: FAMILY MEDICINE

## 2022-11-01 RX ORDER — GLIPIZIDE 10 MG/1
10 TABLET ORAL DAILY
Qty: 180 TABLET | Refills: 1 | Status: SHIPPED
Start: 2022-11-01 | End: 2022-11-01 | Stop reason: DRUGHIGH

## 2022-11-01 RX ORDER — ROPINIROLE 1 MG/1
TABLET, FILM COATED ORAL
Qty: 180 TABLET | Refills: 0 | Status: SHIPPED
Start: 2022-11-01 | End: 2022-11-01 | Stop reason: DRUGHIGH

## 2022-11-01 RX ORDER — CARVEDILOL 25 MG/1
25 TABLET ORAL 2 TIMES DAILY
Qty: 180 TABLET | Refills: 0 | Status: SHIPPED
Start: 2022-11-01 | End: 2022-11-29

## 2022-11-01 RX ORDER — GLIPIZIDE 10 MG/1
10 TABLET ORAL EVERY 12 HOURS
Qty: 180 TABLET | Refills: 1 | Status: SHIPPED | OUTPATIENT
Start: 2022-11-01

## 2022-11-01 RX ORDER — DOXEPIN HYDROCHLORIDE 10 MG/1
CAPSULE ORAL
Qty: 60 CAPSULE | Refills: 0 | Status: SHIPPED
Start: 2022-11-01 | End: 2022-11-22 | Stop reason: SDUPTHER

## 2022-11-01 RX ORDER — ROPINIROLE 2 MG/1
TABLET, FILM COATED ORAL
Qty: 180 TABLET | Refills: 1 | Status: SHIPPED | OUTPATIENT
Start: 2022-11-01

## 2022-11-01 RX ORDER — HYDROCHLOROTHIAZIDE 12.5 MG/1
12.5 CAPSULE, GELATIN COATED ORAL DAILY
Qty: 90 CAPSULE | Refills: 0 | Status: SHIPPED | OUTPATIENT
Start: 2022-11-01

## 2022-11-01 RX ORDER — PANTOPRAZOLE SODIUM 40 MG/1
40 TABLET, DELAYED RELEASE ORAL DAILY
Qty: 90 TABLET | Refills: 0 | Status: SHIPPED | OUTPATIENT
Start: 2022-11-01

## 2022-11-01 RX ORDER — LOSARTAN POTASSIUM 50 MG/1
50 TABLET ORAL DAILY
Qty: 90 TABLET | Refills: 0 | Status: SHIPPED | OUTPATIENT
Start: 2022-11-01

## 2022-11-01 RX ORDER — GLIPIZIDE 5 MG/1
10 TABLET ORAL DAILY
Qty: 90 TABLET | Refills: 1 | Status: SHIPPED
Start: 2022-11-01 | End: 2022-11-01 | Stop reason: DRUGHIGH

## 2022-11-01 RX ORDER — ATORVASTATIN CALCIUM 80 MG/1
80 TABLET, FILM COATED ORAL NIGHTLY
Qty: 90 TABLET | Refills: 0 | Status: SHIPPED | OUTPATIENT
Start: 2022-11-01

## 2022-11-01 RX ORDER — ALBUTEROL SULFATE 90 UG/1
2 AEROSOL, METERED RESPIRATORY (INHALATION) EVERY 4 HOURS PRN
Qty: 18 G | Refills: 0 | Status: SHIPPED | OUTPATIENT
Start: 2022-11-01

## 2022-11-01 NOTE — PATIENT INSTRUCTIONS
LOW SALT FOR BLOOD PRESSURE CONTROL. LOW CARBOHYDRATE FOR BLOOD SUGAR AND WEIGHT CONTROL. LOW FAT DIET FOR CHOLESTEROL CONTROL. DRINK ENOUGH FLUIDS FOR BETTER KIDNEY FUNCTION. TAKE COREG 25 MG. 2 TIMES A DAY,COZAAR 50 MG. AND  HCTZ 12.5 MG. DAILY FOR BLOOD PRESSURE CONTROL. TAKE FARXIGA 10 MG. DAILY AND GLUCOTROL 10  MG. 2 TIMES A DAY FOR BLOOD SUGAR CONTROL. TAKE LILOFIBRA 134 MG. IN THE MORNING AND LIPITOR  40 MG. AND   NIGHTLY FOR CHOLESTEROL CONTROL. Briannadhiraj Ruth TAKE SINEQUAN, CYMBALTA AS PER PSYCHIATRIST RECOMMENDATION  FOR MOOD CONTROL. TAKE REQUIP 2 MG. 2 TIMES A DAY FOR PAIN RELIEF. TAKE PROTONIX 40 MG. DAILY FOR STOMACH ACID CONTROL. REGULAR WALKING ADVISED. ADVISED WEIGHT REDUCTION. SEE DR. KEREN PEREZ  FOR BLADDER CONTROL PROBLEM. FASTING FOR LAB WORK PRIOR TO NEXT VISIT. KEEP NEXT APPOINTMENT IN 2 MONTHS.

## 2022-11-01 NOTE — PROGRESS NOTES
OFFICE PROGRESS NOTE      SUBJECTIVE:        Patient ID:   Porsche Obrien is a 78 y.o. male who presents for   Chief Complaint   Patient presents with    Urinary Urgency    Diabetes    Discuss Medications     Requip taking 4 times a day           HPI:     RECHECK MOOD CONTROL,BP, CHOLESTEROL AND DIABETES. ALSO HAS PROBLEM WITH BLADDER CONTROL. AND URINE COMES OUT SLOWLY. TAKES LONG TIME TO EMPTY BLADDER. MEDICATION REFILL. FEELS GOOD. WATCHING DIET GOOD. WALKING FOR EXERCISE. TAKING MEDICATIONS REGULARLY. Prior to Admission medications    Medication Sig Start Date End Date Taking? Authorizing Provider   albuterol sulfate HFA (VENTOLIN HFA) 108 (90 Base) MCG/ACT inhaler Inhale 2 puffs into the lungs every 4 hours as needed for Wheezing or Shortness of Breath 11/1/22  Yes Adolfo Ruth MD   atorvastatin (LIPITOR) 80 MG tablet Take 1 tablet by mouth at bedtime TAKE 1 TABLET BY MOUTH IN THE EVENING FOR CHOLESTEROL 11/1/22  Yes Adolfo Ruth MD   carvedilol (COREG) 25 MG tablet Take 1 tablet by mouth 2 times daily 11/1/22  Yes Adolfo Ruth MD   dapagliflozin (FARXIGA) 10 MG tablet Take 1 tablet by mouth every morning 11/1/22  Yes Adolfo Ruth MD   doxepin (SINEQUAN) 10 MG capsule TAKE UP TO 2 CAPSULES BY MOUTH AT BEDTIME AS DIRECTED FOR INSOMNIA (VIAL) 11/1/22  Yes Adolfo Ruth MD   pantoprazole (PROTONIX) 40 MG tablet Take 1 tablet by mouth daily 11/1/22  Yes Adolfo Ruth MD   losartan (COZAAR) 50 MG tablet Take 1 tablet by mouth daily 11/1/22  Yes Adolfo Ruth MD   hydroCHLOROthiazide (MICROZIDE) 12.5 MG capsule Take 1 capsule by mouth daily 11/1/22  Yes Adolfo Ruth MD   glipiZIDE (GLUCOTROL) 10 MG tablet Take 1 tablet by mouth in the morning and 1 tablet in the evening.  11/1/22  Yes Adolfo Ruth MD   rOPINIRole (REQUIP) 2 MG tablet TAKE 1 TABLET BY MOUTH 2 TIMES A DAY 11/1/22  Yes David Maldonado MD   fenofibrate micronized (LOFIBRA) 134 MG capsule take 1 capsule by mouth every morning BEFORE BREAKFAST 10/20/22  Yes David Maldonado MD   Cinnamon 500 MG CAPS Take 1,000 mg by mouth daily   Yes Historical Provider, MD   fluticasone (FLONASE) 50 MCG/ACT nasal spray 1 spray by Each Nostril route daily 9/4/22  Yes Robby Service, DO   blood glucose monitor strips Test 3 times a day & as needed for symptoms of irregular blood glucose 9/2/22  Yes David Maldonado MD   Lancets 30G MISC 1 each by Does not apply route 3 times daily Dispense what ever brand is covered by insurance 9/2/22  Yes David Maldonado MD   DULoxetine (CYMBALTA) 60 MG extended release capsule take 1 capsule by mouth once daily 8/29/22  Yes David Maldonado MD   ezetimibe (ZETIA) 10 MG tablet Take 1 tablet by mouth daily 6/22/22  Yes JAIR Singh - CNP   clotrimazole-betamethasone (LOTRISONE) 1-0.05 % cream Apply topically 2 times daily.  6/17/22  Yes David Maldonado MD   Cholecalciferol (VITAMIN D3) 125 MCG (5000 UT) TABS Take by mouth daily   Yes Historical Provider, MD   colestipol (COLESTID) 1 g tablet Take 2 g by mouth daily   Yes Historical Provider, MD   sildenafil (REVATIO) 20 MG tablet Take 1 tablet by mouth daily as needed (FOR SEXUAL DYSFUNCTION) 4/12/21  Yes David Maldonado MD   vitamin B-12 (CYANOCOBALAMIN) 1000 MCG tablet Take 1,000 mcg by mouth daily   Yes Historical Provider, MD   Blood Glucose Monitoring Suppl (BLOOD GLUCOSE MONITOR SYSTEM) w/Device KIT 1 each by Does not apply route 3 times daily Dispense what ever brand is covered by insurance 11/19/20   David Maldonado MD     Social History     Socioeconomic History    Marital status: Legally    Tobacco Use    Smoking status: Former     Packs/day: 1.50     Years: 15.00     Pack years: 22.50     Types: Cigarettes     Start date: 1965     Quit date: 1986     Years since quittin.7    Smokeless tobacco: Never   Vaping Use    Vaping Use: Never used   Substance and Sexual Activity    Alcohol use: Yes     Comment: 2-3 beers per week    Drug use: No     Social Determinants of Health     Financial Resource Strain: Low Risk     Difficulty of Paying Living Expenses: Not hard at all   Food Insecurity: No Food Insecurity    Worried About Running Out of Food in the Last Year: Never true    Ran Out of Food in the Last Year: Never true       I have reviewed Jeffery's allergies, medications, problem list, medical, social and family history and have updated as needed in the electronic medical record  Review Of Systems:    Skin: no abnormal pigmentation, rash, scaling, itching, masses, hair or nail changes  Eyes: no blurring, diplopia, or eye pain  Ears/Nose/Throat: no hearing loss, tinnitus, vertigo, nosebleed, nasal congestion, rhinorrhea, sore throat  Respiratory: no cough, pleuritic chest pain, dyspnea, or wheezing  Cardiovascular: no chest pain, angina, dyspnea on exertion, orthopnea, PND, palpitations, or claudication  Gastrointestinal: no nausea, vomiting, heartburn, diarrhea, constipation, bloating,  abdominal pain, or blood per rectum. Appetite is good  Genitourinary: no urinary urgency, frequency, dysuria, nocturia, hesitancy, or incontinence  Musculoskeletal: joint pains off and on. Morning stiffness.  Ambulating well  Neurologic: no paralysis, paresis, paresthesia, seizures, tremors, or headaches  Hematologic/Lymphatic/Immunologic: no anemia, abnormal bleeding/bruising, fever, chills, night sweats, swollen glands, or unexplained weight loss  Endocrine: no heat or cold intolerance and no polyphagia, polydipsia, or polyuria        OBJECTIVE:     VS:  Wt Readings from Last 3 Encounters:   22 213 lb (96.6 kg)   22 220 lb (99.8 kg)   22 216 lb (98 kg)     Temp Readings from Last 3 Encounters: 11/01/22 97 °F (36.1 °C)   09/04/22 97 °F (36.1 °C) (Temporal)   07/31/22 97.4 °F (36.3 °C) (Infrared)     BP Readings from Last 3 Encounters:   11/01/22 110/70   09/04/22 120/62   09/02/22 122/80        General appearance: Alert, Awake, Oriented times 3, no distress  Skin: Warm and dry  Head: Normocephalic. No masses, lesions or tenderness noted  Eyes: Conjunctivae appear normal. PERLE  Ears: External ears normal  Nose/Sinuses: Nares normal. Septum midline. Mucosa normal. No drainage  Oropharynx: Oropharynx clear with no exudate seen  Neck: Neck supple. No jugular venous distension, lymphadenopathy or thyromegaly Trachea midline  Chest:  Normal. Movements are Normal and Equal.  Lungs: Lungs clear to auscultation bilaterally. No ronchi, crackles or wheezes  Heart: S1 S2  Regular rate and rhythm. No rub, murmur or gallop  Abdomen: Abdomen soft, non-tender. BS normal. No masses, organomegaly. Back: Grossly Normal and Equal. DTR are Normal. SLR is Normal.  Extremities: Arthritic changes are noted. Movements are limited. Pedal pulses are normal.  Musculoskeletal: Muscular strength appears intact. No joint effusion, tenderness, swelling or warmth  Neuro:  No focal motor or sensory deficits        ASSESSMENT     Patient Active Problem List    Diagnosis Date Noted    Primary hypertension     Mixed hypercholesterolemia and hypertriglyceridemia     COVID 10/22/2021    Cubital tunnel syndrome on left     Parkinson's disease 08/30/2021    Rupture of left biceps tendon 06/18/2021    Major depressive disorder, recurrent, moderate 06/18/2021    Type 2 diabetes mellitus with chronic kidney disease ([address] 05/17/2021    Difficulty walking 05/17/2021    Onychomycosis 02/15/2021    Rupture of anterior cruciate ligament of right knee     COPD with acute exacerbation ([address] 07/15/2020    Type 2 diabetes mellitus with hyperglycemia, without long-term current use of insulin ([address]         Diagnosis:     ICD-10-CM    1.  Type 2 diabetes mellitus without complication, without long-term current use of insulin (HCC)  E11.9 POCT glycosylated hemoglobin (Hb A1C)    FAIR CONTROL      2. Mixed hypercholesterolemia and hypertriglyceridemia  E78.2 Comprehensive Metabolic Panel     Lipid Panel    FAIR CONTROL      3. Major depressive disorder, recurrent, moderate  F33.1     STABLE      4. Primary hypertension  I10     CONTROLLED      5. Parkinson's disease  G20     STABLE      6. Stress incontinence of urine  N39.3 JORGE - Jessee Chand MD, Urology, [place]          PLAN:           Patient Instructions   LOW SALT FOR BLOOD PRESSURE CONTROL. LOW CARBOHYDRATE FOR BLOOD SUGAR AND WEIGHT CONTROL. LOW FAT DIET FOR CHOLESTEROL CONTROL. DRINK ENOUGH FLUIDS FOR BETTER KIDNEY FUNCTION. TAKE COREG 25 MG. 2 TIMES A DAY,COZAAR 50 MG. AND  HCTZ 12.5 MG. DAILY FOR BLOOD PRESSURE CONTROL. TAKE FARXIGA 10 MG. DAILY AND GLUCOTROL 10  MG. 2 TIMES A DAY FOR BLOOD SUGAR CONTROL. TAKE LILOFIBRA 134 MG. IN THE MORNING AND LIPITOR  40 MG. AND   NIGHTLY FOR CHOLESTEROL CONTROL. Siddhartha Ogren TAKE SINEQUAN, CYMBALTA AS PER PSYCHIATRIST RECOMMENDATION  FOR MOOD CONTROL. TAKE REQUIP 2 MG. 2 TIMES A DAY FOR PAIN RELIEF. TAKE PROTONIX 40 MG. DAILY FOR STOMACH ACID CONTROL. REGULAR WALKING ADVISED. ADVISED WEIGHT REDUCTION. SEE DR. KEREN PEREZ  FOR BLADDER CONTROL PROBLEM. FASTING FOR LAB WORK PRIOR TO NEXT VISIT. KEEP NEXT APPOINTMENT IN 2 MONTHS. Return in about 2 months (around 1/1/2023) for FOLLOW UP VISIT. I have reviewed my findings and recommendations with Brandee Giordano.     Electronically signed by Merry Brown MD on 11/1/22 at 9:42 AM EDT

## 2022-11-03 RX ORDER — CARVEDILOL 25 MG/1
TABLET ORAL
Qty: 180 TABLET | Refills: 0 | OUTPATIENT
Start: 2022-11-03

## 2022-11-03 RX ORDER — DULOXETIN HYDROCHLORIDE 60 MG/1
CAPSULE, DELAYED RELEASE ORAL
Qty: 90 CAPSULE | Refills: 0 | OUTPATIENT
Start: 2022-11-03

## 2022-11-07 ENCOUNTER — TELEPHONE (OUTPATIENT)
Dept: FAMILY MEDICINE CLINIC | Age: 67
End: 2022-11-07

## 2022-11-07 NOTE — TELEPHONE ENCOUNTER
Patient called to follow up on RX for Glipizide and Ropinirole, stating the pharmacy has not rec'd RX. I noted each was ordered, but not sent electronically. Informed patient that I will call [facility] and place order. Patient stated Ropinirole should be for take 4 tablets daily. I informed that this is not the RX Dr. Wilile Brown put in. Patient stated this was discussed during his last visit and should have been sent this way. I informed that Dr. Willie Brown is not in the ofc today and I will send [facility] for advisement. Patient he's a  and will be leaving tomorrow. I called [facility] and spoke w/Thuan, pharmacist, placing a verbal order. I called patient and informed.       Last seen 11/1/2022  Next appt 1/6/2023  [facility]/[place]

## 2022-11-08 NOTE — TELEPHONE ENCOUNTER
I called patient and informed him, as noted by Dr. Walterine Babinski. Patient verbalized understanding and was agreeable.

## 2022-11-08 NOTE — TELEPHONE ENCOUNTER
Per Dr. Lenard Lira -     1 DID ORDER Ropinirole AS 2 MG. TABLETS 2 TIMES A DAY INSTEAD OF 1 MG. 4 TIMES A DAY. HENCE I WOULD RECOMMENDED TAKING AS ORDERED. OR ELSE HE CAN CUT THE TABLETS AND TAKE 4 TIMES S DAY.

## 2022-11-22 RX ORDER — DOXEPIN HYDROCHLORIDE 10 MG/1
CAPSULE ORAL
Qty: 60 CAPSULE | Refills: 0 | OUTPATIENT
Start: 2022-11-22

## 2022-11-22 RX ORDER — DOXEPIN HYDROCHLORIDE 10 MG/1
CAPSULE ORAL
Qty: 60 CAPSULE | Refills: 0 | Status: SHIPPED
Start: 2022-11-22 | End: 2022-11-29 | Stop reason: SDUPTHER

## 2022-11-29 RX ORDER — DULOXETIN HYDROCHLORIDE 60 MG/1
CAPSULE, DELAYED RELEASE ORAL
Qty: 90 CAPSULE | Refills: 0 | Status: SHIPPED | OUTPATIENT
Start: 2022-11-29

## 2022-11-29 RX ORDER — CARVEDILOL 25 MG/1
TABLET ORAL
Qty: 180 TABLET | Refills: 0 | Status: SHIPPED | OUTPATIENT
Start: 2022-11-29

## 2022-12-09 RX ORDER — FENOFIBRATE 134 MG/1
CAPSULE ORAL
Qty: 90 CAPSULE | Refills: 0 | Status: SHIPPED | OUTPATIENT
Start: 2022-12-09

## 2022-12-11 ENCOUNTER — HOSPITAL ENCOUNTER (EMERGENCY)
Age: 67
Discharge: HOME | End: 2022-12-11
Attending: EMERGENCY MEDICINE
Payer: MEDICARE

## 2022-12-11 ENCOUNTER — APPOINTMENT (OUTPATIENT)
Dept: GENERAL RADIOLOGY | Age: 67
End: 2022-12-11
Payer: MEDICARE

## 2022-12-11 ENCOUNTER — APPOINTMENT (OUTPATIENT)
Dept: CT IMAGING | Age: 67
End: 2022-12-11
Payer: MEDICARE

## 2022-12-11 VITALS
TEMPERATURE: 98.2 F | RESPIRATION RATE: 19 BRPM | WEIGHT: 215 LBS | HEART RATE: 68 BPM | DIASTOLIC BLOOD PRESSURE: 91 MMHG | SYSTOLIC BLOOD PRESSURE: 143 MMHG | BODY MASS INDEX: 30.85 KG/M2 | OXYGEN SATURATION: 100 %

## 2022-12-11 DIAGNOSIS — U07.1 COVID: Primary | ICD-10-CM

## 2022-12-11 LAB
ALBUMIN SERPL-MCNC: 4.4 G/DL (ref 3.5–5.2)
ALP BLD-CCNC: 59 U/L (ref 40–129)
ALT SERPL-CCNC: 26 U/L (ref 0–40)
ANION GAP SERPL CALCULATED.3IONS-SCNC: 9 MMOL/L (ref 7–16)
AST SERPL-CCNC: 24 U/L (ref 0–39)
BASOPHILS ABSOLUTE: 0.02 E9/L (ref 0–0.2)
BASOPHILS RELATIVE PERCENT: 0.5 % (ref 0–2)
BILIRUB SERPL-MCNC: 0.4 MG/DL (ref 0–1.2)
BUN BLDV-MCNC: 18 MG/DL (ref 6–23)
CALCIUM SERPL-MCNC: 9.7 MG/DL (ref 8.6–10.2)
CHLORIDE BLD-SCNC: 100 MMOL/L (ref 98–107)
CO2: 31 MMOL/L (ref 22–29)
CREAT SERPL-MCNC: 1.3 MG/DL (ref 0.7–1.2)
EKG ATRIAL RATE: 60 BPM
EKG P AXIS: 73 DEGREES
EKG P-R INTERVAL: 186 MS
EKG Q-T INTERVAL: 416 MS
EKG QRS DURATION: 98 MS
EKG QTC CALCULATION (BAZETT): 416 MS
EKG R AXIS: 27 DEGREES
EKG T AXIS: 40 DEGREES
EKG VENTRICULAR RATE: 60 BPM
EOSINOPHILS ABSOLUTE: 0.29 E9/L (ref 0.05–0.5)
EOSINOPHILS RELATIVE PERCENT: 7.7 % (ref 0–6)
GFR SERPL CREATININE-BSD FRML MDRD: >60 ML/MIN/1.73
GLUCOSE BLD-MCNC: 111 MG/DL (ref 74–99)
HCT VFR BLD CALC: 42.3 % (ref 37–54)
HEMOGLOBIN: 14.3 G/DL (ref 12.5–16.5)
IMMATURE GRANULOCYTES #: 0.02 E9/L
IMMATURE GRANULOCYTES %: 0.5 % (ref 0–5)
INFLUENZA A BY PCR: NOT DETECTED
INFLUENZA B BY PCR: NOT DETECTED
LYMPHOCYTES ABSOLUTE: 1.32 E9/L (ref 1.5–4)
LYMPHOCYTES RELATIVE PERCENT: 35.2 % (ref 20–42)
MCH RBC QN AUTO: 30.2 PG (ref 26–35)
MCHC RBC AUTO-ENTMCNC: 33.8 % (ref 32–34.5)
MCV RBC AUTO: 89.4 FL (ref 80–99.9)
MONOCYTES ABSOLUTE: 0.6 E9/L (ref 0.1–0.95)
MONOCYTES RELATIVE PERCENT: 16 % (ref 2–12)
NEUTROPHILS ABSOLUTE: 1.5 E9/L (ref 1.8–7.3)
NEUTROPHILS RELATIVE PERCENT: 40.1 % (ref 43–80)
PDW BLD-RTO: 13 FL (ref 11.5–15)
PLATELET # BLD: 204 E9/L (ref 130–450)
PMV BLD AUTO: 9.6 FL (ref 7–12)
POTASSIUM SERPL-SCNC: 3.7 MMOL/L (ref 3.5–5)
PRO-BNP: 61 PG/ML (ref 0–125)
RBC # BLD: 4.73 E12/L (ref 3.8–5.8)
SARS-COV-2, NAAT: DETECTED
SODIUM BLD-SCNC: 140 MMOL/L (ref 132–146)
TOTAL PROTEIN: 7.4 G/DL (ref 6.4–8.3)
TROPONIN, HIGH SENSITIVITY: 9 NG/L (ref 0–11)
WBC # BLD: 3.8 E9/L (ref 4.5–11.5)

## 2022-12-11 PROCEDURE — 85025 COMPLETE CBC W/AUTO DIFF WBC: CPT

## 2022-12-11 PROCEDURE — 71275 CT ANGIOGRAPHY CHEST: CPT

## 2022-12-11 PROCEDURE — 80053 COMPREHEN METABOLIC PANEL: CPT

## 2022-12-11 PROCEDURE — 93010 ELECTROCARDIOGRAM REPORT: CPT | Performed by: INTERNAL MEDICINE

## 2022-12-11 PROCEDURE — 71046 X-RAY EXAM CHEST 2 VIEWS: CPT

## 2022-12-11 PROCEDURE — 93005 ELECTROCARDIOGRAM TRACING: CPT | Performed by: EMERGENCY MEDICINE

## 2022-12-11 PROCEDURE — 87502 INFLUENZA DNA AMP PROBE: CPT

## 2022-12-11 PROCEDURE — 84484 ASSAY OF TROPONIN QUANT: CPT

## 2022-12-11 PROCEDURE — 83880 ASSAY OF NATRIURETIC PEPTIDE: CPT

## 2022-12-11 PROCEDURE — 99285 EMERGENCY DEPT VISIT HI MDM: CPT

## 2022-12-11 PROCEDURE — 6360000004 HC RX CONTRAST MEDICATION: Performed by: RADIOLOGY

## 2022-12-11 PROCEDURE — 87635 SARS-COV-2 COVID-19 AMP PRB: CPT

## 2022-12-11 RX ADMIN — IOPAMIDOL 75 ML: 755 INJECTION, SOLUTION INTRAVENOUS at 18:49

## 2022-12-11 ASSESSMENT — LIFESTYLE VARIABLES: HOW OFTEN DO YOU HAVE A DRINK CONTAINING ALCOHOL: NEVER

## 2022-12-11 NOTE — ED PROVIDER NOTES
HPI:  12/11/22, Time: 5:05 PM BETTY Macario is a 78 y.o. male presenting to the ED for shortness of breath, beginning 1 day ago. The complaint has been persistent, mild in severity, and worsened by nothing. Patient says he began having cough, congestion, shortness of breath yesterday. He took a home COVID test that was positive. Patient is coming in today because he is feeling worse. He says he is feeling short of breath today. He has a history of COPD. He uses inhalers as needed. He is not on home oxygen. ROS:   Pertinent positives and negatives are stated within HPI, all other systems reviewed and are negative.  --------------------------------------------- PAST HISTORY ---------------------------------------------  Past Medical History:  has a past medical history of Anesthesia complication, Arthritis, Asthma, Diabetes mellitus ([facility] [address] Fungal infection of foot, GERD (gastroesophageal reflux disease), Hyperlipidemia, Hypertension, and Type 2 diabetes mellitus without complication ([facility] [address] Past Surgical History:  has a past surgical history that includes hernia repair; Neck surgery; Cholecystectomy, laparoscopic (N/A, 05/18/2020); Anterior cruciate ligament repair (Right, 07/13/2020); Colonoscopy; Endoscopy, colon, diagnostic; Shoulder arthroscopy (Left, 6/1/2021); and Arm Surgery (Left, 9/8/2021). Social History:  reports that he quit smoking about 36 years ago. His smoking use included cigarettes. He started smoking about 57 years ago. He has a 22.50 pack-year smoking history. He has never used smokeless tobacco. He reports current alcohol use. He reports that he does not use drugs. Family History: family history includes Diabetes in his father and maternal grandmother; Heart Attack in his mother; Hypertension in his mother and paternal grandmother. The patients home medications have been reviewed.     Allergies: Adhesive tape    ---------------------------------------------------PHYSICAL EXAM--------------------------------------    Constitutional/General: Alert and oriented x3, well appearing, non toxic in NAD  Head: Normocephalic and atraumatic  Eyes: PERRL, EOMI  Mouth: Oropharynx clear, handling secretions, no trismus  Neck: Supple, full ROM, non tender to palpation in the midline, no stridor, no crepitus, no meningeal signs  Pulmonary: Lungs clear to auscultation bilaterally, no wheezes, rales, or rhonchi. Not in respiratory distress  Cardiovascular:  Regular rate. Regular rhythm. No murmurs, gallops, or rubs. 2+ distal pulses  Chest: no chest wall tenderness  Abdomen: Soft. Non tender. Non distended. +BS. No rebound, guarding, or rigidity. No pulsatile masses appreciated. Musculoskeletal: Moves all extremities x 4. Warm and well perfused, no clubbing, cyanosis, or edema. Capillary refill <3 seconds  Skin: warm and dry. No rashes. Neurologic: GCS 15, CN 2-12 grossly intact, no focal deficits, symmetric strength 5/5 in the upper and lower extremities bilaterally  Psych: Normal Affect    -------------------------------------------------- RESULTS -------------------------------------------------  I have personally reviewed all laboratory and imaging results for this patient. Results are listed below.      LABS:  Results for orders placed or performed during the hospital encounter of 12/11/22   COVID-19, Rapid    Specimen: Nasopharyngeal Swab   Result Value Ref Range    SARS-CoV-2, NAAT DETECTED (A) Not Detected   RAPID INFLUENZA A/B ANTIGENS    Specimen: Nasopharyngeal   Result Value Ref Range    Influenza A by PCR Not Detected Not Detected    Influenza B by PCR Not Detected Not Detected   CBC with Auto Differential   Result Value Ref Range    WBC 3.8 (L) 4.5 - 11.5 E9/L    RBC 4.73 3.80 - 5.80 E12/L    Hemoglobin 14.3 12.5 - 16.5 g/dL    Hematocrit 42.3 37.0 - 54.0 %    MCV 89.4 80.0 - 99.9 fL    MCH 30.2 26.0 - 35.0 pg MCHC 33.8 32.0 - 34.5 %    RDW 13.0 11.5 - 15.0 fL    Platelets 631 592 - 062 E9/L    MPV 9.6 7.0 - 12.0 fL    Neutrophils % 40.1 (L) 43.0 - 80.0 %    Immature Granulocytes % 0.5 0.0 - 5.0 %    Lymphocytes % 35.2 20.0 - 42.0 %    Monocytes % 16.0 (H) 2.0 - 12.0 %    Eosinophils % 7.7 (H) 0.0 - 6.0 %    Basophils % 0.5 0.0 - 2.0 %    Neutrophils Absolute 1.50 (L) 1.80 - 7.30 E9/L    Immature Granulocytes # 0.02 E9/L    Lymphocytes Absolute 1.32 (L) 1.50 - 4.00 E9/L    Monocytes Absolute 0.60 0.10 - 0.95 E9/L    Eosinophils Absolute 0.29 0.05 - 0.50 E9/L    Basophils Absolute 0.02 0.00 - 0.20 E9/L   CMP   Result Value Ref Range    Sodium 140 132 - 146 mmol/L    Potassium 3.7 3.5 - 5.0 mmol/L    Chloride 100 98 - 107 mmol/L    CO2 31 (H) 22 - 29 mmol/L    Anion Gap 9 7 - 16 mmol/L    Glucose 111 (H) 74 - 99 mg/dL    BUN 18 6 - 23 mg/dL    Creatinine 1.3 (H) 0.7 - 1.2 mg/dL    Est, Glom Filt Rate >60 >=60 mL/min/1.73    Calcium 9.7 8.6 - 10.2 mg/dL    Total Protein 7.4 6.4 - 8.3 g/dL    Albumin 4.4 3.5 - 5.2 g/dL    Total Bilirubin 0.4 0.0 - 1.2 mg/dL    Alkaline Phosphatase 59 40 - 129 U/L    ALT 26 0 - 40 U/L    AST 24 0 - 39 U/L   Troponin   Result Value Ref Range    Troponin, High Sensitivity 9 0 - 11 ng/L   Brain Natriuretic Peptide   Result Value Ref Range    Pro-BNP 61 0 - 125 pg/mL   EKG 12 Lead   Result Value Ref Range    Ventricular Rate 60 BPM    Atrial Rate 60 BPM    P-R Interval 186 ms    QRS Duration 98 ms    Q-T Interval 416 ms    QTc Calculation (Bazett) 416 ms    P Axis 73 degrees    R Axis 27 degrees    T Axis 40 degrees       RADIOLOGY:  Interpreted by Radiologist.  CTA PULMONARY W CONTRAST   Final Result   No evidence of pulmonary embolism or acute pulmonary abnormality. XR CHEST (2 VW)   Final Result   No acute process.                EKG Interpretation  Interpreted by emergency department physician    Rhythm: normal sinus   Rate: normal  Axis: normal  Conduction: normal  ST Segments: no acute change  T Waves: no acute change    Clinical Impression: no acute changes  Comparison to prior EKG: stable as compared to patient's most recent EKG      ------------------------- NURSING NOTES AND VITALS REVIEWED ---------------------------   The nursing notes within the ED encounter and vital signs as below have been reviewed by myself. BP (!) 143/91   Pulse 68   Temp 98.2 °F (36.8 °C)   Resp 19   Wt 215 lb (97.5 kg)   SpO2 100%   BMI 30.85 kg/m²   Oxygen Saturation Interpretation: Normal    The patients available past medical records and past encounters were reviewed. ------------------------------ ED COURSE/MEDICAL DECISION MAKING----------------------  Medications   iopamidol (ISOVUE-370) 76 % injection 75 mL (75 mLs IntraVENous Given 12/11/22 1849)             Medical Decision Making:    Labs and imaging obtained. COVID is positive. Patient complains of shortness of breath. Vital signs are stable. A CTA of the chest was obtained. No evidence of PE or acute pulmonary abnormality. Flu is negative. BNP is within normal limits. Troponin is within normal limits. I discussed the results with the patient. I told him to take over-the-counter medication as needed for symptom relief. I told him to monitor his oxygen saturation. Return precautions were discussed. Patient is agreeable with plan of care. Re-Evaluations:             Re-evaluation. Patients symptoms show no change      Consultations:                 Critical Care: This patient's ED course included: a personal history and physicial examination, re-evaluation prior to disposition, multiple bedside re-evaluations, cardiac monitoring, continuous pulse oximetry, and a personal history and physicial eaxmination    This patient has remained hemodynamically stable during their ED course. Counseling:    The emergency provider has spoken with the patient and discussed todays results, in addition to providing specific details for the plan of care and counseling regarding the diagnosis and prognosis. Questions are answered at this time and they are agreeable with the plan.       --------------------------------- IMPRESSION AND DISPOSITION ---------------------------------    IMPRESSION  1. COVID        DISPOSITION  Disposition: Discharge to home  Patient condition is stable        NOTE: This report was transcribed using voice recognition software.  Every effort was made to ensure accuracy; however, inadvertent computerized transcription errors may be present          Abhishek Adame MD  12/11/22 1958

## 2022-12-13 RX ORDER — DOXEPIN HYDROCHLORIDE 10 MG/1
CAPSULE ORAL
Qty: 60 CAPSULE | Refills: 0 | Status: SHIPPED | OUTPATIENT
Start: 2022-12-13

## 2022-12-20 DIAGNOSIS — E78.2 ELEVATED CHOLESTEROL WITH ELEVATED TRIGLYCERIDES: ICD-10-CM

## 2022-12-20 RX ORDER — EZETIMIBE 10 MG/1
TABLET ORAL
Qty: 90 TABLET | Refills: 1 | Status: SHIPPED | OUTPATIENT
Start: 2022-12-20

## 2023-01-03 RX ORDER — DOXEPIN HYDROCHLORIDE 10 MG/1
CAPSULE ORAL
Qty: 60 CAPSULE | Refills: 0 | Status: SHIPPED | OUTPATIENT
Start: 2023-01-03

## 2023-01-26 ENCOUNTER — OFFICE VISIT (OUTPATIENT)
Dept: FAMILY MEDICINE CLINIC | Age: 68
End: 2023-01-26

## 2023-01-26 VITALS
HEART RATE: 56 BPM | DIASTOLIC BLOOD PRESSURE: 80 MMHG | OXYGEN SATURATION: 95 % | WEIGHT: 212 LBS | HEIGHT: 70 IN | BODY MASS INDEX: 30.35 KG/M2 | SYSTOLIC BLOOD PRESSURE: 130 MMHG

## 2023-01-26 DIAGNOSIS — E11.51 TYPE II DIABETES MELLITUS WITH PERIPHERAL CIRCULATORY DISORDER (HCC): ICD-10-CM

## 2023-01-26 DIAGNOSIS — G20 PARKINSON'S DISEASE (HCC): ICD-10-CM

## 2023-01-26 DIAGNOSIS — N18.31 TYPE 2 DIABETES MELLITUS WITH STAGE 3A CHRONIC KIDNEY DISEASE, WITHOUT LONG-TERM CURRENT USE OF INSULIN (HCC): ICD-10-CM

## 2023-01-26 DIAGNOSIS — Z00.00 MEDICARE ANNUAL WELLNESS VISIT, SUBSEQUENT: Primary | ICD-10-CM

## 2023-01-26 DIAGNOSIS — J44.1 COPD WITH ACUTE EXACERBATION (HCC): ICD-10-CM

## 2023-01-26 DIAGNOSIS — I10 PRIMARY HYPERTENSION: ICD-10-CM

## 2023-01-26 DIAGNOSIS — E11.22 TYPE 2 DIABETES MELLITUS WITH STAGE 3A CHRONIC KIDNEY DISEASE, WITHOUT LONG-TERM CURRENT USE OF INSULIN (HCC): ICD-10-CM

## 2023-01-26 DIAGNOSIS — E78.2 MIXED HYPERCHOLESTEROLEMIA AND HYPERTRIGLYCERIDEMIA: ICD-10-CM

## 2023-01-26 DIAGNOSIS — F33.1 MAJOR DEPRESSIVE DISORDER, RECURRENT, MODERATE (HCC): ICD-10-CM

## 2023-01-26 DIAGNOSIS — E11.40 TYPE 2 DIABETES MELLITUS WITH DIABETIC NEUROPATHY, WITHOUT LONG-TERM CURRENT USE OF INSULIN (HCC): ICD-10-CM

## 2023-01-26 RX ORDER — ROPINIROLE 2 MG/1
TABLET, FILM COATED ORAL
Qty: 180 TABLET | Refills: 1 | Status: SHIPPED | OUTPATIENT
Start: 2023-01-26

## 2023-01-26 RX ORDER — CARVEDILOL 25 MG/1
TABLET ORAL
Qty: 180 TABLET | Refills: 0 | Status: SHIPPED | OUTPATIENT
Start: 2023-01-26

## 2023-01-26 RX ORDER — GABAPENTIN 100 MG/1
100 CAPSULE ORAL NIGHTLY
Qty: 90 CAPSULE | Refills: 1 | Status: SHIPPED
Start: 2023-01-26 | End: 2023-01-26 | Stop reason: DRUGHIGH

## 2023-01-26 RX ORDER — GABAPENTIN 300 MG/1
300 CAPSULE ORAL NIGHTLY
Qty: 90 CAPSULE | Refills: 1 | Status: SHIPPED | OUTPATIENT
Start: 2023-01-26 | End: 2023-07-25

## 2023-01-26 ASSESSMENT — PATIENT HEALTH QUESTIONNAIRE - PHQ9
5. POOR APPETITE OR OVEREATING: 0
SUM OF ALL RESPONSES TO PHQ QUESTIONS 1-9: 0
SUM OF ALL RESPONSES TO PHQ QUESTIONS 1-9: 0
7. TROUBLE CONCENTRATING ON THINGS, SUCH AS READING THE NEWSPAPER OR WATCHING TELEVISION: 0
6. FEELING BAD ABOUT YOURSELF - OR THAT YOU ARE A FAILURE OR HAVE LET YOURSELF OR YOUR FAMILY DOWN: 0
SUM OF ALL RESPONSES TO PHQ9 QUESTIONS 1 & 2: 0
1. LITTLE INTEREST OR PLEASURE IN DOING THINGS: 0
SUM OF ALL RESPONSES TO PHQ QUESTIONS 1-9: 0
4. FEELING TIRED OR HAVING LITTLE ENERGY: 0
9. THOUGHTS THAT YOU WOULD BE BETTER OFF DEAD, OR OF HURTING YOURSELF: 0
SUM OF ALL RESPONSES TO PHQ QUESTIONS 1-9: 0
3. TROUBLE FALLING OR STAYING ASLEEP: 0
10. IF YOU CHECKED OFF ANY PROBLEMS, HOW DIFFICULT HAVE THESE PROBLEMS MADE IT FOR YOU TO DO YOUR WORK, TAKE CARE OF THINGS AT HOME, OR GET ALONG WITH OTHER PEOPLE: 0
2. FEELING DOWN, DEPRESSED OR HOPELESS: 0
8. MOVING OR SPEAKING SO SLOWLY THAT OTHER PEOPLE COULD HAVE NOTICED. OR THE OPPOSITE, BEING SO FIGETY OR RESTLESS THAT YOU HAVE BEEN MOVING AROUND A LOT MORE THAN USUAL: 0

## 2023-01-26 ASSESSMENT — LIFESTYLE VARIABLES
HOW MANY STANDARD DRINKS CONTAINING ALCOHOL DO YOU HAVE ON A TYPICAL DAY: 1 OR 2
HOW OFTEN DO YOU HAVE A DRINK CONTAINING ALCOHOL: 2-3 TIMES A WEEK

## 2023-01-26 NOTE — PATIENT INSTRUCTIONS
LOW SALT FOR BLOOD PRESSURE CONTROL. LOW CARBOHYDRATE FOR BLOOD SUGAR AND WEIGHT CONTROL. LOW FAT DIET FOR CHOLESTEROL CONTROL. DRINK ENOUGH FLUIDS FOR BETTER KIDNEY FUNCTION. TAKE COREG 25 MG. 2 TIMES A DAY,COZAAR 50 MG. AND  HCTZ 12.5 MG. DAILY FOR BLOOD PRESSURE CONTROL. TAKE FARXIGA 10 MG. DAILY AND GLUCOTROL 10  MG. 2 TIMES A DAY FOR BLOOD SUGAR CONTROL. TAKE LILOFIBRA 134 MG. IN THE MORNING AND LIPITOR  40 MG. AND   NIGHTLY FOR CHOLESTEROL CONTROL. Rajan Morales TAKE SINEQUAN, CYMBALTA AS PER PSYCHIATRIST RECOMMENDATION  FOR MOOD CONTROL. TAKE REQUIP 2 MG. 2 TIMES A DAY FOR PAIN RELIEF. TAKE PROTONIX 40 MG. DAILY FOR STOMACH ACID CONTROL. TAKE GABAPENTIN 300 MG. NIGHTLY FOR PAIN RELIEF. REGULAR WALKING ADVISED. ADVISED WEIGHT REDUCTION. SEE DR. KEREN PEREZ  FOR BLADDER CONTROL PROBLEM. FASTING FOR LAB WORK ONE MORNING. KEEP NEXT APPOINTMENT IN 2 MONTHS. Learning About Dental Care for Older Adults  Dental care for older adults: Overview  Dental care for older people is much the same as for younger adults. But older adults do have concerns that younger adults do not. Older adults may have problems with gum disease and decay on the roots of their teeth. They may need missing teeth replaced or broken fillings fixed. Or they may have dentures that need to be cared for. Some older adults may have trouble holding a toothbrush. You can help remind the person you are caring for to brush and floss their teeth or to clean their dentures. In some cases, you may need to do the brushing and other dental care tasks. People who have trouble using their hands or who have dementia may need this extra help. How can you help with dental care? Normal dental care  To keep the teeth and gums healthy:  Brush the teeth with fluoride toothpaste twice a day--in the morning and at night--and floss at least once a day. Plaque can quickly build up on the teeth of older adults. Watch for the signs of gum disease.  These signs include gums that bleed after brushing or after eating hard foods, such as apples. See a dentist regularly. Many experts recommend checkups every 6 months. Keep the dentist up to date on any new medications the person is taking. Encourage a balanced diet that includes whole grains, vegetables, and fruits, and that is low in saturated fat and sodium. Encourage the person you're caring for not to use tobacco products. They can affect dental and general health. Many older adults have a fixed income and feel that they can't afford dental care. But most [facility] and [facility] have programs in which dentists help older adults by lowering fees. Contact your area's public health offices or  for information about dental care in your area. Using a toothbrush  Older adults with arthritis sometimes have trouble brushing their teeth because they can't easily hold the toothbrush. Their hands and fingers may be stiff, painful, or weak. If this is the case, you can: Offer an electric toothbrush. Enlarge the handle of a non-electric toothbrush by wrapping a sponge, an elastic bandage, or adhesive tape around it. Push the toothbrush handle through a ball made of rubber or soft foam.  Make the handle longer and thicker by taping Popsicle sticks or tongue depressors to it. You may also be able to buy special toothbrushes, toothpaste dispensers, and floss holders. Your doctor may recommend a soft-bristle toothbrush if the person you care for bleeds easily. Bleeding can happen because of a health problem or from certain medicines. A toothpaste for sensitive teeth may help if the person you care for has sensitive teeth. How do you brush and floss someone's teeth? If the person you are caring for has a hard time cleaning their teeth on their own, you may need to brush and floss their teeth for them. It may be easiest to have the person sit and face away from you, and to sit or stand behind them.  That way you can steady their head against your arm as you reach around to floss and brush their teeth. Choose a place that has good lighting and is comfortable for both of you. Before you begin, gather your supplies. You will need gloves, floss, a toothbrush, and a container to hold water if you are not near a sink. Wash and dry your hands well and put on gloves. Start by flossing:  Gently work a piece of floss between each of the teeth toward the gums. A plastic flossing tool may make this easier, and they are available at most [facility]. Curve the floss around each tooth into a U-shape and gently slide it under the gum line. Move the floss firmly up and down several times to scrape off the plaque. After you've finished flossing, throw away the used floss and begin brushing:  Wet the brush and apply toothpaste. Place the brush at a 45-degree angle where the teeth meet the gums. Press firmly, and move the brush in small circles over the surface of the teeth. Be careful not to brush too hard. Vigorous brushing can make the gums pull away from the teeth and can scratch the tooth enamel. Brush all surfaces of the teeth, on the tongue side and on the cheek side. Pay special attention to the front teeth and all surfaces of the back teeth. Brush chewing surfaces with short back-and-forth strokes. After you've finished, help the person rinse the remaining toothpaste from their mouth. Where can you learn more? Go to http://www.woods.com/ and enter F944 to learn more about \"Learning About Dental Care for Older Adults. \"  Current as of: June 16, 2022               Content Version: 13.5  © 1581-1502 Healthwise, Incorporated. Care instructions adapted under license by [facility] ([facility]). If you have questions about a medical condition or this instruction, always ask your healthcare professional. [place] [address] any warranty or liability for your use of this information.            Advance Directives: Care Instructions  Overview  An advance directive is a legal way to state your wishes at the end of your life. It tells your family and your doctor what to do if you can't say what you want. There are two main types of advance directives. You can change them any time your wishes change. Living will. This form tells your family and your doctor your wishes about life support and other treatment. The form is also called a declaration. Medical power of . This form lets you name a person to make treatment decisions for you when you can't speak for yourself. This person is called a health care agent (health care proxy, health care surrogate). The form is also called a durable power of  for health care. If you do not have an advance directive, decisions about your medical care may be made by a family member, or by a doctor or a  who doesn't know you. It may help to think of an advance directive as a gift to the people who care for you. If you have one, they won't have to make tough decisions by themselves. For more information, including forms for your state, see the [address] [facility] website (www.caringinfo.org/planning/advance-directives/). Follow-up care is a key part of your treatment and safety. Be sure to make and go to all appointments, and call your doctor if you are having problems. It's also a good idea to know your test results and keep a list of the medicines you take. What should you include in an advance directive? Many states have a unique advance directive form. (It may ask you to address specific issues.) Or you might use a universal form that's approved by many states. If your form doesn't tell you what to address, it may be hard to know what to include in your advance directive. Use the questions below to help you get started. Who do you want to make decisions about your medical care if you are not able to?   What life-support measures do you want if you have a serious illness that gets worse over time or can't be cured? What are you most afraid of that might happen? (Maybe you're afraid of having pain, losing your independence, or being kept alive by machines.)  Where would you prefer to die? (Your home? A hospital? A nursing home?)  Do you want to donate your organs when you die? Do you want certain [employer] practices performed before you die? When should you call for help? Be sure to contact your doctor if you have any questions. Where can you learn more? Go to http://www.garcia.com/ and enter R264 to learn more about \"Advance Directives: Care Instructions. \"  Current as of: June 16, 2022               Content Version: 13.5  © 9274-1673 Healthwise, Incorporated. Care instructions adapted under license by [facility] ([facility]). If you have questions about a medical condition or this instruction, always ask your healthcare professional. [place] [address] any warranty or liability for your use of this information. Personalized Preventive Plan for Amberly Cloud - 1/26/2023  Medicare offers a range of preventive health benefits. Some of the tests and screenings are paid in full while other may be subject to a deductible, co-insurance, and/or copay. Some of these benefits include a comprehensive review of your medical history including lifestyle, illnesses that may run in your family, and various assessments and screenings as appropriate. After reviewing your medical record and screening and assessments performed today your provider may have ordered immunizations, labs, imaging, and/or referrals for you. A list of these orders (if applicable) as well as your Preventive Care list are included within your After Visit Summary for your review. Other Preventive Recommendations:    A preventive eye exam performed by an eye specialist is recommended every 1-2 years to screen for glaucoma; cataracts, macular degeneration, and other eye disorders.   A preventive dental visit is recommended every 6 months. Try to get at least 150 minutes of exercise per week or 10,000 steps per day on a pedometer . Order or download the FREE \"Exercise & Physical Activity: Your Everyday Guide\" from [employer]. Call 5-513.405.4464 or search [employer] online. You need 0092-3976 mg of calcium and 6592-9543 IU of vitamin D per day. It is possible to meet your calcium requirement with diet alone, but a vitamin D supplement is usually necessary to meet this goal.  When exposed to the sun, use a sunscreen that protects against both UVA and UVB radiation with an SPF of 30 or greater. Reapply every 2 to 3 hours or after sweating, drying off with a towel, or swimming. Always wear a seat belt when traveling in a car. Always wear a helmet when riding a bicycle or motorcycle.

## 2023-01-26 NOTE — PROGRESS NOTES
Medicare Annual Wellness Visit    Mikal Carver is here for Medicare AWV    Assessment & Plan   Medicare annual wellness visit, subsequent  Type 2 diabetes mellitus with diabetic neuropathy, without long-term current use of insulin ([address]  -     Ambulatory Referral to Care Management with Device (Remote Patient Monitoring)  Mixed hypercholesterolemia and hypertriglyceridemia  Type II diabetes mellitus with peripheral circulatory disorder ([address]  -     Ambulatory Referral to Care Management with Device (Remote Patient Monitoring)  Type 2 diabetes mellitus with stage 3a chronic kidney disease, without long-term current use of insulin ([address]  Parkinson's disease ([address]  -     Ambulatory Referral to Care Management with Device (Remote Patient Monitoring)  COPD with acute exacerbation ([address]  -     Ambulatory Referral to Care Management with Device (Remote Patient Monitoring)  Major depressive disorder, recurrent, moderate ([address]  Primary hypertension    Recommendations for Preventive Services Due: see orders and patient instructions/AVS.  Recommended screening schedule for the next 5-10 years is provided to the patient in written form: see Patient Instructions/AVS.     Return in 2 months (on 3/26/2023) for FOLLOW UP VISIT AND Medicare Annual Wellness Visit in 1 year. Subjective   The following acute and/or chronic problems were also addressed today:  AS LISTED ABOVE    Patient's complete Health Risk Assessment and screening values have been reviewed and are found in Flowsheets. The following problems were reviewed today and where indicated follow up appointments were made and/or referrals ordered.     Positive Risk Factor Screenings with Interventions:                 Weight and Activity:  Physical Activity: Insufficiently Active    Days of Exercise per Week: 3 days    Minutes of Exercise per Session: 30 min     On average, how many days per week do you engage in moderate to strenuous exercise (like a brisk walk)?: 3 days  Have you lost any weight without trying in the past 3 months?: No  Body mass index: (!) 30.42  Obesity Interventions:  Patient advised to follow-up in this office for further evaluation and treatment          Dentist Screen:  Have you seen the dentist within the past year?: (!) No    Intervention:  Advised to schedule with their dentist        Advanced Directives:  Do you have a Living Will?: (!) No    Intervention:  has NO advanced directive - information provided                       Objective   Vitals:    01/26/23 1318   BP: 130/80   Pulse: 56   SpO2: 95%   Weight: 212 lb (96.2 kg)   Height: 5' 10\" (1.778 m)      Body mass index is 30.42 kg/m². General Appearance: alert and oriented to person, place and time, well developed and well- nourished, in no acute distress  Skin: warm and dry, no rash or erythema  Head: normocephalic and atraumatic  Eyes: pupils equal, round, and reactive to light, extraocular eye movements intact, conjunctivae normal  ENT: tympanic membrane, external ear and ear canal normal bilaterally, nose without deformity, nasal mucosa and turbinates normal without polyps  Neck: supple and non-tender without mass, no thyromegaly or thyroid nodules, no cervical lymphadenopathy  Pulmonary/Chest: clear to auscultation bilaterally- no wheezes, rales or rhonchi, normal air movement, no respiratory distress  Cardiovascular: normal rate, regular rhythm, normal S1 and S2, no murmurs, rubs, clicks, or gallops, distal pulses intact, no carotid bruits  Abdomen: soft, non-tender, non-distended, normal bowel sounds, no masses or organomegaly  Extremities: no cyanosis, clubbing or edema  Musculoskeletal: normal range of motion, no joint swelling, deformity or tenderness  Neurologic: reflexes normal and symmetric, no cranial nerve deficit, gait, coordination and speech normal       Allergies   Allergen Reactions    Adhesive Tape Rash     Prior to Visit Medications    Medication Sig Taking?  Authorizing Provider   rOPINIRole (REQUIP) 2 MG tablet TAKE 1 TABLET BY MOUTH 2 Jessica Mccray MD   carvedilol (COREG) 25 MG tablet TAKE ONE TABLET BY MOUTH TWICE DAILY @ 9am & 5pm Yes Ktay Graff MD   gabapentin (NEURONTIN) 300 MG capsule Take 1 capsule by mouth nightly for 180 days. Intended supply: 90 days Yes Katy Graff MD   doxepin (SINEQUAN) 10 MG capsule TAKE TWO CAPSULES BY MOUTH DAILY AT 9PM AT BEDTIME FOR INSOMNIA Yes Katy Graff MD   ezetimibe (ZETIA) 10 MG tablet take 1 tablet by mouth once daily Yes Katy Graff MD   fenofibrate micronized (LOFIBRA) 134 MG capsule take 1 capsule by mouth every morning Lamberto Hernandez MD   DULoxetine (CYMBALTA) 60 MG extended release capsule TAKE ONE CAPSULE BY MOUTH DAILY AT 9AM Yes Katy Graff MD   albuterol sulfate HFA (VENTOLIN HFA) 108 (90 Base) MCG/ACT inhaler Inhale 2 puffs into the lungs every 4 hours as needed for Wheezing or Shortness of Breath Yes Katy Graff MD   atorvastatin (LIPITOR) 80 MG tablet Take 1 tablet by mouth at bedtime TAKE 1 TABLET BY MOUTH IN THE EVENING FOR CHOLESTEROL Yes Katy Graff MD   dapagliflozin (FARXIGA) 10 MG tablet Take 1 tablet by mouth every morning Yes Katy Graff MD   pantoprazole (PROTONIX) 40 MG tablet Take 1 tablet by mouth daily Yes Katy Graff MD   losartan (COZAAR) 50 MG tablet Take 1 tablet by mouth daily Yes Katy Graff MD   hydroCHLOROthiazide (MICROZIDE) 12.5 MG capsule Take 1 capsule by mouth daily Yes Katy Graff MD   glipiZIDE (GLUCOTROL) 10 MG tablet Take 1 tablet by mouth in the morning and 1 tablet in the evening.  Yes Katy Graff MD   Cinnamon 500 MG CAPS Take 1,000 mg by mouth daily Yes Historical MD Nathan   blood glucose monitor strips Test 3 times a day & as needed for symptoms of irregular blood glucose Yes Katy Graff MD   Lancets 30G MISC 1 each by Does not apply route 3 times daily Dispense what ever brand is covered by insurance Yes Abigail Sinclair Ham Herrera MD   clotrimazole-betamethasone (LOTRISONE) 1-0.05 % cream Apply topically 2 times daily.  Yes Guillermo Layne MD   Cholecalciferol (VITAMIN D3) 125 MCG (5000 UT) TABS Take by mouth daily Yes Historical Provider, MD   colestipol (COLESTID) 1 g tablet Take 2 g by mouth daily Yes Historical Provider, MD   sildenafil (REVATIO) 20 MG tablet Take 1 tablet by mouth daily as needed (FOR SEXUAL DYSFUNCTION) Yes Guillermo Layne MD   vitamin B-12 (CYANOCOBALAMIN) 1000 MCG tablet Take 1,000 mcg by mouth daily Yes Historical Provider, MD   Blood Glucose Monitoring Suppl (BLOOD GLUCOSE MONITOR SYSTEM) w/Device KIT 1 each by Does not apply route 3 times daily Dispense what ever brand is covered by insurance Yes Guillermo Layne MD   fluticasone (FLONASE) 50 MCG/ACT nasal spray 1 spray by Each Nostril route daily  BhupendraDO Dinesh Stephenson (Including outside providers/suppliers regularly involved in providing care):   Patient Care Team:  Guillermo Layne MD as PCP - General (Family Medicine)  Guillermo Layne MD as PCP [facility] Empaneled Provider     Reviewed and updated this visit:  Tobacco  Allergies  Meds  Problems  Med Hx  Surg Hx  Soc Hx  Fam Hx

## 2023-01-27 RX ORDER — LOSARTAN POTASSIUM 50 MG/1
50 TABLET ORAL DAILY
Qty: 90 TABLET | Refills: 0 | Status: SHIPPED | OUTPATIENT
Start: 2023-01-27

## 2023-01-29 ENCOUNTER — CARE COORDINATION (OUTPATIENT)
Dept: CARE COORDINATION | Age: 68
End: 2023-01-29

## 2023-01-29 SDOH — ECONOMIC STABILITY: INCOME INSECURITY: IN THE LAST 12 MONTHS, WAS THERE A TIME WHEN YOU WERE NOT ABLE TO PAY THE MORTGAGE OR RENT ON TIME?: NO

## 2023-01-29 SDOH — ECONOMIC STABILITY: TRANSPORTATION INSECURITY
IN THE PAST 12 MONTHS, HAS THE LACK OF TRANSPORTATION KEPT YOU FROM MEDICAL APPOINTMENTS OR FROM GETTING MEDICATIONS?: NO

## 2023-01-29 SDOH — ECONOMIC STABILITY: HOUSING INSECURITY: IN THE LAST 12 MONTHS, HOW MANY PLACES HAVE YOU LIVED?: 1

## 2023-01-29 ASSESSMENT — SOCIAL DETERMINANTS OF HEALTH (SDOH)
DO YOU BELONG TO ANY CLUBS OR ORGANIZATIONS SUCH AS CHURCH GROUPS UNIONS, FRATERNAL OR ATHLETIC GROUPS, OR SCHOOL GROUPS?: NO
HOW OFTEN DO YOU GET TOGETHER WITH FRIENDS OR RELATIVES?: MORE THAN THREE TIMES A WEEK
HOW OFTEN DO YOU ATTEND CHURCH OR RELIGIOUS SERVICES?: NEVER
HOW OFTEN DO YOU ATTENT MEETINGS OF THE CLUB OR ORGANIZATION YOU BELONG TO?: NEVER
IN A TYPICAL WEEK, HOW MANY TIMES DO YOU TALK ON THE PHONE WITH FAMILY, FRIENDS, OR NEIGHBORS?: MORE THAN THREE TIMES A WEEK

## 2023-01-29 NOTE — CARE COORDINATION
Ambulatory Care Coordination Note  1/29/2023    ACC: Octavia Johnson, RN    Explained the Care Coordination Program to patient. Verbalized understanding and is agreeable to service. DM:  Denies s/s of Hypo/Hyperglycemia. Discussed DM Zone Tool and verbalizes understanding ACM to send via mail  FBS:  175    Pt is requesting prescription for a Continuous Glucose Monitor to be sent to pharmacy. Prefers Dexcom. ACM to send to PCP. COPD:  Denies s/s of COPD Exacerbation. Discussed the Zone Tool and verbalizes understanding ACM to send via Mail    Medications:  Pt states he is compliant with taking them, but would like to know what they are for and the best way to take. Also wonders if any of them interact together. Agrees to referral to [facility] Pharmacist.  Nancy Estevez will refer    Patient agreeable to referral to Advance Care  re:   Living Will  Power of   Discuss what Advanced Directive process entails    Patient's Healthcare Decision Maker confirmed:    Yes  Evangelina Gutiérrez (Girlfriend)   [phone] (Mobile)    Patient's address verified and patient is agreeable to receive mailed information:    Yes    Patient's email verified:    Pt does not use his email address    Offered patient enrollment in the Remote Patient Monitoring (RPM) program for in-home monitoring: Yes, patient enrolled.     FOLLOW-UP PLAN:    Continue Care Coordination and Assess Plan Of Care  Discuss:   -RPM Kit received and installed?  -DM Management/Zone Tool  -COPD Management/Zone Tool  -Exercise Status  -Current Blood Sugar  -Appointment Reminders  -ACP: Referral made  -Material sent this encounter:   via :    Welcome Letter  Diabetic Education  COPD Education  HTN Education  CVA Education  Where to go for Care information  -Did pt receive and read material sent?  -Referral: Pharmacist, FAYE for ACP  -Did pt speak with SW?  -Did pt speak with Pharmacist?    Next Anticipated Outreach by [address] Team: 1 Week      Ambulatory Care Coordination Assessment    Care Coordination Protocol  Referral from Primary Care Provider: No  Week 1 - Initial Assessment     Do you have all of your prescriptions and are they filled?: Yes  Barriers to medication adherence: None  Are you able to afford your medications?: Yes  How often do you have trouble taking your medications the way you have been told to take them?: I always take them as prescribed.     Do you have Home O2 Therapy?: No      Ability to seek help/take action for Emergent Urgent situations i.e. fire, crime, inclement weather or health crisis.: Independent  Ability to ambulate to restroom: Independent  Ability handle personal hygeine needs (bathing/dressing/grooming): Independent  Ability to manage Medications: Independent  Ability to prepare Food Preparation: Independent  Ability to maintain home (clean home, laundry): Independent  Ability to drive and/or has transportation: Independent  Ability to do shopping: Independent  Ability to manage finances: Independent  Is patient able to live independently?: Yes     Current Housing: Private Residence        Per the Fall Risk Screening, did the patient have 2 or more falls or 1 fall with injury in the past year?: No     Frequent urination at night?: No  Do you use rails/bars?: Yes  Do you have a non-slip tub mat?: Yes     Are you experiencing loss of meaning?: No  Are you experiencing loss of hope and peace?: No     Thinking about your patient's physical health needs, are there any symptoms or problems (risk indicators) you are unsure about that require further investigation?: Mild vague physical symptoms or problems; but do not impact on daily life or are not of concern to patient   Are the patient’s physical health problems impacting on their mental well-being?: Mild impact on mental well-being e.g. \"\"feeling fed-up\"\", \"\"reduced enjoyment\"\"   Are there any problems with your patient’s lifestyle behaviors (alcohol, drugs,  diet, exercise) that are impacting on physical or mental well-being?: Some mild concern of potential negative impact on well-being   Do you have any other concerns about your patients mental well-being? How would you rate their severity and impact on the patient?: Mild problems - don't interfere with function   How would you rate their home environment in terms of safety and stability (including domestic violence, insecure housing, neighbor harassment)?: Safe, stable, but with some inconsistency   How do daily activities impact on the patient's well-being? (include current or anticipated unemployment, work, caregiving, access to transportation or other): Some general dissatisfaction but no concern   How would you rate their social network (family, work, friends)?: Adequate participation with social networks   How would you rate their financial resources (including ability to afford all required medical care)?: Financially secure, some resource challenges   How wells does the patient now understand their health and well-being (symptoms, signs or risk factors) and what they need to do to manage their health?: Reasonable to good understanding and already engages in managing health or is willing to undertake better management   How well do you think your patient can engage in healthcare discussions? (Barriers include language, deafness, aphasia, alcohol or drug problems, learning difficulties, concentration): Adequate communication, with or without minor barriers   Do other services need to be involved to help this patient?: Other care/services in place and adequate   Are current services involved with this patient well-coordinated?  (Include coordination with other services you are now recommendation): Required care/services in place and adequately coordinated   Suggested Interventions and Community Resources  Diabetes Education: Declined   Fall Risk Prevention: Declined Medication Assistance Program: Declined   Medi Set or Pill Pack: Declined   Pharmacist: In Process   Registered Dietician: Declined   Transportation Services: Declined   Zone Management Tools: In Process         Set up/Review Goals, Set up/Review an Education Plan, Schedule an appointment with the patient's PCP              Prior to Admission medications    Medication Sig Start Date End Date Taking? Authorizing Provider   losartan (COZAAR) 50 MG tablet Take 1 tablet by mouth daily 1/27/23   Mony Cain MD   fenofibrate micronized (LOFIBRA) 134 MG capsule take 1 capsule by mouth every morning BEFORE BREAKFAST 12/9/22   Mony Cain MD   rOPINIRole (REQUIP) 2 MG tablet TAKE 1 TABLET BY MOUTH 2 TIMES A DAY 1/26/23   Mony Cain MD   carvedilol (COREG) 25 MG tablet TAKE ONE TABLET BY MOUTH TWICE DAILY @ 9am & 5pm 1/26/23   Mony Cain MD   gabapentin (NEURONTIN) 300 MG capsule Take 1 capsule by mouth nightly for 180 days.  Intended supply: 90 days 1/26/23 7/25/23  Mony Cain MD   doxepin (SINEQUAN) 10 MG capsule TAKE TWO CAPSULES BY MOUTH DAILY AT 9PM AT BEDTIME FOR INSOMNIA 1/3/23   Mony Cain MD   ezetimibe (ZETIA) 10 MG tablet take 1 tablet by mouth once daily 12/20/22   Mony Cain MD   DULoxetine (CYMBALTA) 60 MG extended release capsule TAKE ONE CAPSULE BY MOUTH DAILY AT 9AM 11/29/22   Mony Cain MD   albuterol sulfate HFA (VENTOLIN HFA) 108 (90 Base) MCG/ACT inhaler Inhale 2 puffs into the lungs every 4 hours as needed for Wheezing or Shortness of Breath 11/1/22   Mony Cain MD   atorvastatin (LIPITOR) 80 MG tablet Take 1 tablet by mouth at bedtime TAKE 1 TABLET BY MOUTH IN THE EVENING FOR CHOLESTEROL 11/1/22   Mony Cain MD   dapagliflozin (FARXIGA) 10 MG tablet Take 1 tablet by mouth every morning 11/1/22   Mony Cain MD   pantoprazole (PROTONIX) 40 MG tablet Take 1 tablet by mouth daily 11/1/22   Mony Cain MD   hydroCHLOROthiazide (MICROZIDE) 12.5 MG capsule Take 1 capsule by mouth daily 11/1/22   Mony Cain MD glipiZIDE (GLUCOTROL) 10 MG tablet Take 1 tablet by mouth in the morning and 1 tablet in the evening. 11/1/22   Carly Abad MD   Cinnamon 500 MG CAPS Take 1,000 mg by mouth daily    Historical Provider, MD   fluticasone (FLONASE) 50 MCG/ACT nasal spray 1 spray by Each Nostril route daily 9/4/22   Ina Pain, DO   blood glucose monitor strips Test 3 times a day & as needed for symptoms of irregular blood glucose 9/2/22   Carly Abad MD   Lancets 30G MISC 1 each by Does not apply route 3 times daily Dispense what ever brand is covered by insurance 9/2/22   Carly Abad MD   clotrimazole-betamethasone (LOTRISONE) 1-0.05 % cream Apply topically 2 times daily. 6/17/22   Carly Abad MD   Cholecalciferol (VITAMIN D3) 125 MCG (5000 UT) TABS Take by mouth daily    Historical Provider, MD   colestipol (COLESTID) 1 g tablet Take 2 g by mouth daily    Historical Provider, MD   sildenafil (REVATIO) 20 MG tablet Take 1 tablet by mouth daily as needed (FOR SEXUAL DYSFUNCTION) 4/12/21   Carly Abad MD   vitamin B-12 (CYANOCOBALAMIN) 1000 MCG tablet Take 1,000 mcg by mouth daily    Historical Provider, MD   Blood Glucose Monitoring Suppl (BLOOD GLUCOSE MONITOR SYSTEM) w/Device KIT 1 each by Does not apply route 3 times daily Dispense what ever brand is covered by insurance 11/19/20   Carly Abad MD       Future Appointments   Date Time Provider Roxana Daniel   3/31/2023  8:00 AM Carly Abad MD [facility]     ,   Diabetes Assessment    Medic Alert ID: No  Meal Planning: Avoidance of concentrated sweets   How often do you test your blood sugar?: Daily   Do you have barriers with adherence to non-pharmacologic self-management interventions?  (Nutrition/Exercise/Self-Monitoring): No   Have you ever had to go to the ED for symptoms of low blood sugar?: No       No patient-reported symptoms   Do you have hyperglycemia symptoms?: No   Do you have hypoglycemia symptoms?: No   Last Blood Sugar Value: 173 Blood Sugar Monitoring Regimen: Once a Day        , and   COPD Assessment    Does the patient understand envrionmental exposure?: Yes  Is the patient able to verbalize Rescue vs. Long Acting medications?: Yes  Does the patient have a nebulizer?: No  Does the patient use a space with inhaled medications?: No     No patient-reported symptoms         Symptoms:  None: Yes      Symptom course: stable  Breathlessness: none  Increase use of rapid acting/rescue inhaled medications?: No  Change in chronic cough?: No/At Baseline  Change in sputum?: No/At Baseline  Self Monitoring - SaO2: No  Have you had a recent diagnosis of pneumonia either by PCP or at a hospital?: No

## 2023-01-29 NOTE — CARE COORDINATION
Remote Patient Monitoring Enrollment Note      Date/Time:  1/29/2023 1:19 PM    Offered patient enrollment in the [facility] Remote Patient Monitoring (RPM) program for in home monitoring for COPD, Diabetes, and HTN. Patient accepted RPM services. Patient will be monitoring the following daily:  blood pressure reading  glucose reading  pulse ox reading  weight    ACM reviewed the information below with patient:    Emergency Contact (name and contact number): Brii Salcedo (Girlfriend)   [phone] (Mobile)    [x] A member from the care coordination team will reach out to notify the patient once the RPM kit is ordered. [x] Once the kit is delivered, the [facility] team will contact the patient after UPS deliver to assist with set up. 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 All questions about RPM program answered at this time.

## 2023-01-30 ENCOUNTER — TELEPHONE (OUTPATIENT)
Dept: PHARMACY | Facility: CLINIC | Age: 68
End: 2023-01-30

## 2023-01-30 ENCOUNTER — CARE COORDINATION (OUTPATIENT)
Dept: CARE COORDINATION | Age: 68
End: 2023-01-30

## 2023-01-30 DIAGNOSIS — J44.1 COPD WITH ACUTE EXACERBATION (HCC): ICD-10-CM

## 2023-01-30 DIAGNOSIS — E11.51 TYPE II DIABETES MELLITUS WITH PERIPHERAL CIRCULATORY DISORDER (HCC): Primary | ICD-10-CM

## 2023-01-30 DIAGNOSIS — I10 PRIMARY HYPERTENSION: ICD-10-CM

## 2023-01-30 NOTE — CARE COORDINATION
Remote Patient Kit Ordering Note      Date/Time:  1/30/2023 11:18 AM      [x] CCSS confirmed patient shipping address  [x] Patient will receive package over the next 2-4 business days. Someone 21 years or older must be present to sign for UPS delivery. [x] Patient to contact virtual installation-specific phone number listed in the patient instructions. [x] If the patient does not contact HRS within 24 hours, an [facility] will call the patient directly: If the patient does not answer, HRS will follow up with the clinical team notifying them about the unsuccessful attempt to contact the patient. HRS will make three call attempts to the patient. [x] CCSS will contact patient once equipment is active to welcome them to the program.                                                         [x] Hours of RPM monitoring - Monday-Friday 5546-8082                     All questions answered at this time. ACM made aware the RPM kit has been ordered. CCSS notified patient of RPM equipment order.

## 2023-01-30 NOTE — TELEPHONE ENCOUNTER
Received a referral:  from Care Coordinator to review patients medications. Called patient to schedule a time to speak with a pharmacist over the telephone. No answer left VM: Please contact us at  [phone] to schedule this appointment. Pharmacists are available Monday thru Friday 7:30 AM till 5:30 PM.    Osiris Sandoval CP.    [address] [facility] free: [phone]

## 2023-01-30 NOTE — TELEPHONE ENCOUNTER
----- Message from Mariposa Sanches RN sent at 1/29/2023  1:28 PM EST -----  Medications:  Pt states he is compliant with taking them, but would like to know what they are for and the best way to take. Also wonders if any of them interact together.   Agrees to referral to [facility] Pharmacist.  Obdulia Corley will refer

## 2023-01-30 NOTE — PROGRESS NOTES
1/30/23 12:59 PM       Remote Patient Monitoring Treatment Plan    Received request from ACM/CTN Giorgio Green RN to order remote patient monitoring for in home monitoring of COPD, Diabetes, and HTN and order completed. Patient will be monitoring   --blood pressure   --glucose  --pulse ox   --weight Please set alert for ONLY weight gain of 5# in 7 days. Pt has no documented hx of HF.    --survey questions. Patient will engage in Remote Patient Monitoring each day to develop the skills necessary for self management. RPM Care Team Responsibilities:   Alerts will be reviewed daily and addressed within 2-4 hours during operational hours (Monday -Friday 9 am-4 pm)  Alert response and intervention documented in patient medical record  Alert response escalated to PCP per protocol and documented in patient medical record  Patient monitored over approximately  days  Discharge from program based on self-management readiness    See care coordination encounters for additional details.       Hollie Shipman DNP, FNP-C, Remote Patient Monitoring NP, (ph) [phone]

## 2023-01-30 NOTE — LETTER
[place]  [address] [facility] [address] [facility], [address]   [address] [place] [address]           02/02/23     Dear Basil Cuellar,    You are eligible for a complete medication therapy review performed by a [facility]) [facility] licensed clinical pharmacist. This review helps ensure that you are getting the most benefit from the medications you receive and includes the following:  - Review of your medications, including over-the-counter and herbal medications. - Answering questions about your medications and how to get the most benefit from them. - Identifying and helping to prevent potential drug interactions or side effects.  - Possibly identifying less costly alternatives that are equally effective. Under this program, [facility] will work with you and your doctor to manage your drug therapy. Please contact the [address] [place] [address] office to set up a time for your medication review with one of our clinical pharmacists. To contact us call [phone] and select Option 2 . This will be a phone consult and therefore will not require a trip to the medical office. Please note: This is an optional program.  It is a free service provided to help ensure that your medicines are safe, necessary, and effective. Your participation is encouraged, but not required.     Sincerely,  [address] [place] [address] free: [phone]

## 2023-01-30 NOTE — CARE COORDINATION
ACPS received referral for AD's and goals of care for pt. Made call to pt, to review referral. Pt answered reports he picked up the ACP packed at PCP office and has it at home. Pt is currently driving and requested call back this week. ACPS to call pt back this week.     Alva SKELTON, [place]   Care Coordinator  [phone]

## 2023-02-01 ENCOUNTER — CARE COORDINATION (OUTPATIENT)
Dept: CARE COORDINATION | Age: 68
End: 2023-02-01

## 2023-02-01 DIAGNOSIS — E78.2 MIXED HYPERLIPIDEMIA: ICD-10-CM

## 2023-02-01 RX ORDER — DOXEPIN HYDROCHLORIDE 10 MG/1
CAPSULE ORAL
Qty: 60 CAPSULE | Refills: 0 | Status: SHIPPED | OUTPATIENT
Start: 2023-02-01

## 2023-02-01 RX ORDER — HYDROCHLOROTHIAZIDE 12.5 MG/1
CAPSULE, GELATIN COATED ORAL
Qty: 90 CAPSULE | Refills: 0 | Status: SHIPPED | OUTPATIENT
Start: 2023-02-01

## 2023-02-01 RX ORDER — ATORVASTATIN CALCIUM 80 MG/1
TABLET, FILM COATED ORAL
Qty: 90 TABLET | Refills: 0 | Status: SHIPPED | OUTPATIENT
Start: 2023-02-01

## 2023-02-01 RX ORDER — LOSARTAN POTASSIUM 50 MG/1
TABLET ORAL
Qty: 90 TABLET | Refills: 0 | OUTPATIENT
Start: 2023-02-01

## 2023-02-01 RX ORDER — DULOXETIN HYDROCHLORIDE 60 MG/1
CAPSULE, DELAYED RELEASE ORAL
Qty: 90 CAPSULE | Refills: 0 | Status: SHIPPED | OUTPATIENT
Start: 2023-02-01

## 2023-02-01 RX ORDER — ROPINIROLE 1 MG/1
TABLET, FILM COATED ORAL
Qty: 60 TABLET | Refills: 0 | OUTPATIENT
Start: 2023-02-01

## 2023-02-01 NOTE — CARE COORDINATION
ACPS made call to pt to review AD, pt answered reports he has the AD packet at home, he is a , currently working and will be home around 3pm on Friday, 2/3. APS to call pt back on 2/3 to review AD.     Kamran SKELTON, [facility]   Care Coordinator  [phone]

## 2023-02-02 ENCOUNTER — CARE COORDINATION (OUTPATIENT)
Dept: CARE COORDINATION | Age: 68
End: 2023-02-02

## 2023-02-02 ENCOUNTER — TELEPHONE (OUTPATIENT)
Dept: PHARMACY | Facility: CLINIC | Age: 68
End: 2023-02-02

## 2023-02-02 NOTE — TELEPHONE ENCOUNTER
2nd Attempt Documentation:  2nd attempt to contact this patient regarding the previous message  CLINICAL PHARMACY: REFERRAL  Patient unavailable at the time of call. Left following message on home TAD: please call back at toll-free [phone] to retrieve previous message. Letter mailed to patient.      For Pharmacy Admin Tracking Only    Program: [address] in place:  No  Gap Closed?: No   Time Spent (min): 15

## 2023-02-02 NOTE — TELEPHONE ENCOUNTER
CLINICAL PHARMACY NOTE - Medication Review  Patient outreach to review medications - Spoke with patient. SUBJECTIVE/OBJECTIVE:   Alfonzo Ulloa is a 80 y.o. male referred to a clinical pharmacy specialist by care coordination    Patient had questions about what each medication was for, and also side effects of each    Medications:  Current Outpatient Medications   Medication Instructions    albuterol sulfate HFA (VENTOLIN HFA) 108 (90 Base) MCG/ACT inhaler 2 puffs, Inhalation, EVERY 4 HOURS PRN    atorvastatin (LIPITOR) 80 MG tablet TAKE ONE TABLET BY MOUTH DAILY AT 5PM IN EVENING AT BEDTIME FOR CHOLESTEROL    blood glucose monitor strips Test 3 times a day & as needed for symptoms of irregular blood glucose    Blood Glucose Monitoring Suppl (BLOOD GLUCOSE MONITOR SYSTEM) w/Device KIT 1 each, Does not apply, 3 TIMES DAILY, Dispense what ever brand is covered by insurance     carvedilol (COREG) 25 MG tablet TAKE ONE TABLET BY MOUTH TWICE DAILY @ 9am & 5pm    Cholecalciferol (VITAMIN D3) 125 MCG (5000 UT) TABS Oral, DAILY    Cinnamon 1,000 mg, Oral, DAILY    clotrimazole-betamethasone (LOTRISONE) 1-0.05 % cream Apply topically 2 times daily.     dapagliflozin (FARXIGA) 10 mg, Oral, EVERY MORNING    doxepin (SINEQUAN) 10 MG capsule TAKE TWO CAPSULES BY MOUTH DAILY AT 9PM AT BEDTIME FOR INSOMNIA    DULoxetine (CYMBALTA) 60 MG extended release capsule TAKE ONE CAPSULE BY MOUTH DAILY AT 9AM    ezetimibe (ZETIA) 10 MG tablet take 1 tablet by mouth once daily    fenofibrate micronized (LOFIBRA) 134 MG capsule take 1 capsule by mouth every morning BEFORE BREAKFAST    fluticasone (FLONASE) 50 MCG/ACT nasal spray 1 spray, Each Nostril, DAILY    gabapentin (NEURONTIN) 300 mg, Oral, NIGHTLY, Intended supply: 90 days    glipiZIDE (GLUCOTROL) 10 mg, Oral, EVERY 12 HOURS    hydroCHLOROthiazide (MICROZIDE) 12.5 MG capsule TAKE ONE CAPSULE BY MOUTH DAILY AT 9AM    Lancets 30G MISC 1 each, Does not apply, 3 TIMES DAILY, Dispense what ever brand is covered by insurance     losartan (COZAAR) 50 mg, Oral, DAILY    pantoprazole (PROTONIX) 40 mg, Oral, DAILY    rOPINIRole (REQUIP) 2 MG tablet TAKE 1 TABLET BY MOUTH 2 TIMES A DAY    sildenafil (REVATIO) 20 mg, Oral, DAILY PRN    vitamin B-12 (CYANOCOBALAMIN) 1,000 mcg, Oral, DAILY       Additional Medications (including OTC/Herbal Supplements):  Vitamin C and Loratadine added to med list    Allergies: Allergies   Allergen Reactions    Adhesive Tape Rash       Pertinent Labs/Vitals:  BP Readings from Last 3 Encounters:   23 130/80   22 (!) 143/91   22 110/70     No results found for: Victorina Segal [id number]  Lab Results   Component Value Date    LABA1C 7.4 2022    LABA1C 7.7 2022    LABA1C 6.6 2021     Lab Results   Component Value Date    CHOL 172 2022    TRIG 272 (H) 2022    HDL 35 2022    LDLCALC 83 2022     ALT   Date Value Ref Range Status   2022 26 0 - 40 U/L Final     AST   Date Value Ref Range Status   2022 24 0 - 39 U/L Final     The 10-year ASCVD risk score (Ryan DURAN, et al., 2019) is: 33.6%    Values used to calculate the score:      Age: 80 years      Sex: Male      Is Non- : No      Diabetic: Yes      Tobacco smoker: No      Systolic Blood Pressure: 748 mmHg      Is BP treated: Yes      HDL Cholesterol: 35 mg/dL      Total Cholesterol: 172 mg/dL     Lab Results   Component Value Date    CREATININE 1.3 (H) 2022       Estimated Creatinine Clearance: 64 mL/min (A) (based on SCr of 1.3 mg/dL (H)).       Social History:   Social History     Tobacco Use    Smoking status: Former     Packs/day: 1.50     Years: 15.00     Pack years: 22.50     Types: Cigarettes     Start date: 1965     Quit date: 1986     Years since quittin.9    Smokeless tobacco: Never   Substance Use Topics    Alcohol use: Yes     Comment: 2-3 beers per week       Immunizations:   Most Recent Immunizations Administered Date(s) Administered    Influenza, FLUAD, (age 72 y+), Adjuvanted, 0.5mL 11/13/2020         ASSESSMENT/PLAN:   - General Assessment:   Reviewed each medication and what to expect. Discussed instructions, indications, side effects, etc  Adherence: Patient looks to be adherent to current therapy. Reports taking all meds as prescribed. He had everything in front of him and read them to me. Also looks adherent per reconciled dispensed report  Cost: Not an issue  Current pharmacy/pharmacies: In process of changing to mail order  Drug interactions: No clinically significant interactions identified via [employer] Interaction Analysis as category D or higher. Renal dosing: No renal adjustments necessary.        - Upcoming appointments:   Future Appointments   Date Time Provider Roxana Daniel   3/31/2023  8:00 AM Elbert Maldonado MD [facility]       PABLO Guerra, PharmD, [facility]  Department, toll free: [phone]        For Pharmacy Admin Tracking Only    Program: [address] in place:  No  Gap Closed?: Yes   Time Spent (min): 45,

## 2023-02-02 NOTE — LETTER
[address] [facility] [address]  [address] [facility], [address]   [address] [address]           02/06/23     Dear Sully Brown,    Please see attached medication list from our discussion last week. Let me know if you have any questions or concerns    Thank you,  PABLO Badillo, PharmD, [address] [place] [address], toll free: [phone]

## 2023-02-02 NOTE — CARE COORDINATION
Remote Patient Monitoring Note      Date/Time:  2/2/2023 2:33 PM    CCSS reviewed patients reported daily Remote Patient Monitoring metrics. All reported metrics are within alert parameters. Plan/Follow Up:  Will continue to review, monitor and address alerts with follow up based on severity of symptoms and risk factors  Current Patient Metrics ---- Blood Pressure: 136/78, 58bpm Glucose: 262mg/dl Pulseox: 95%, 57bpm Survey: C Weight: 213.4lbs Note Created at: 02/02/2023 02:35 PM ET ---- Time-Spent: 2 minutes 0 seconds

## 2023-02-03 ENCOUNTER — TELEPHONE (OUTPATIENT)
Dept: CARE COORDINATION | Age: 68
End: 2023-02-03

## 2023-02-03 ENCOUNTER — CARE COORDINATION (OUTPATIENT)
Dept: CARE COORDINATION | Age: 68
End: 2023-02-03

## 2023-02-03 ENCOUNTER — TELEPHONE (OUTPATIENT)
Dept: FAMILY MEDICINE CLINIC | Age: 68
End: 2023-02-03

## 2023-02-03 NOTE — TELEPHONE ENCOUNTER
Pharmacy called stating the got Rx on 11. 1.22 for Glipizide 5 mg  and Rx for Glipizide for 10 mg BID. Asking  which one to fill? Please advise.   Last seen 1/26/2023  Next appt 3/31/2023  [facility]

## 2023-02-03 NOTE — CARE COORDINATION
Remote Patient Monitoring Note      Date/Time:  2/3/2023 10:50 AM    CCSS reviewed patients reported daily Remote Patient Monitoring metrics. All reported metrics are within alert parameters. Plan/Follow Up:  Will continue to review, monitor and address alerts with follow up based on severity of symptoms and risk factors Current Patient Metrics ---- Blood Pressure: 157/79, 58bpm Glucose: 268mg/dl Pulseox: 95%, 57bpm Survey: C Weight: 214.4lbs Note Created at: 02/03/2023 10:50 AM ET ---- Time-Spent: 2 minutes 0 seconds

## 2023-02-03 NOTE — TELEPHONE ENCOUNTER
ACPS made outreach call to Wilbert Major, as requested by Wilbert Major, unable to reach, VM was left with ACPS contact info and request for return call. APS will follow up with another outreach attempt.

## 2023-02-03 NOTE — TELEPHONE ENCOUNTER
Patient would like a referral to dermatology. Rash-like presence on bilateral arms. States it was discussed at a previous visit. Last seen 1/26/2023  Next appt 3/31/2023  No preference.

## 2023-02-06 ENCOUNTER — CARE COORDINATION (OUTPATIENT)
Dept: CASE MANAGEMENT | Age: 68
End: 2023-02-06

## 2023-02-06 RX ORDER — LORATADINE 10 MG/1
10 TABLET ORAL DAILY
COMMUNITY

## 2023-02-06 RX ORDER — MULTIVIT WITH MINERALS/LUTEIN
1000 TABLET ORAL DAILY
COMMUNITY

## 2023-02-06 NOTE — CARE COORDINATION
Remote Patient Monitoring Note      Date/Time:  2/6/2023 3:36 PM    LPN reviewed patients reported daily Remote Patient Monitoring metrics. All reported metrics are within alert parameters. Plan/Follow Up:  Will continue to review, monitor and address alerts with follow up based on severity of symptoms and risk factors Current Patient Metrics ---- Blood Pressure: -/-, -bpm Glucose: -mg/dl Pulseox: -%, -bpm Survey: - Weight: -lbs Note Created at: 02/06/2023 03:36 PM ET ---- Time-Spent: 2 minutes 0 seconds

## 2023-02-07 ENCOUNTER — CARE COORDINATION (OUTPATIENT)
Dept: CASE MANAGEMENT | Age: 68
End: 2023-02-07

## 2023-02-07 ENCOUNTER — CARE COORDINATION (OUTPATIENT)
Dept: CARE COORDINATION | Age: 68
End: 2023-02-07

## 2023-02-07 NOTE — CARE COORDINATION
Left HIPAA compliant message for patient to return call to [phone]. Re: Follow up: DM, COPD, Review POC   Also notified pt that I spoke with [id number], and that he should be able to take the RPM equipment with him when he goes over the road as a .

## 2023-02-08 ENCOUNTER — CARE COORDINATION (OUTPATIENT)
Dept: CASE MANAGEMENT | Age: 68
End: 2023-02-08

## 2023-02-08 NOTE — CARE COORDINATION
Remote Patient Monitoring Note      Date/Time:  2023 2:12 PM  LPN} contacted patient by telephone regarding yellow alert received for no metrics for 3 days. Verified patients name and  as identifiers. Background: HTN, COPD, DM  Clinical Interventions:  Called patient and left HIPPA complaint message with call back information      Plan/Follow Up: Will continue to review, monitor and address alerts with follow up based on severity of symptoms and risk factors.  -UPDATE patient did not return call ---- Current Patient Metrics ---- Blood Pressure: -/-, -bpm Glucose: -mg/dl Pulseox: -%, -bpm Survey: - Weight: -lbs Note Created at: 2023 02:13 PM ET ---- Time-Spent: 3 minutes 0 seconds

## 2023-02-09 ENCOUNTER — CARE COORDINATION (OUTPATIENT)
Dept: CARE COORDINATION | Age: 68
End: 2023-02-09

## 2023-02-09 NOTE — CARE COORDINATION
Remote Patient Monitoring Note      Date/Time:  2/9/2023 11:23 AM    CCSS reviewed patients reported daily Remote Patient Monitoring metrics. All reported metrics are within alert parameters. Plan/Follow Up:  Will continue to review, monitor and address alerts with follow up based on severity of symptoms and risk factors Current Patient Metrics ---- Blood Pressure: 155/81, 66bpm Glucose: -mg/dl Pulseox: 96%, 66bpm Survey: C Weight: 122.4lbs Note Created at: 02/09/2023 11:24 AM ET ---- Time-Spent: 2 minutes 0 seconds

## 2023-02-10 ENCOUNTER — TELEPHONE (OUTPATIENT)
Dept: FAMILY MEDICINE CLINIC | Age: 68
End: 2023-02-10

## 2023-02-10 ENCOUNTER — OFFICE VISIT (OUTPATIENT)
Dept: FAMILY MEDICINE CLINIC | Age: 68
End: 2023-02-10

## 2023-02-10 ENCOUNTER — CARE COORDINATION (OUTPATIENT)
Dept: CARE COORDINATION | Age: 68
End: 2023-02-10

## 2023-02-10 VITALS
OXYGEN SATURATION: 91 % | BODY MASS INDEX: 30.85 KG/M2 | WEIGHT: 215 LBS | DIASTOLIC BLOOD PRESSURE: 70 MMHG | HEART RATE: 83 BPM | SYSTOLIC BLOOD PRESSURE: 110 MMHG

## 2023-02-10 DIAGNOSIS — G20 PARKINSON'S DISEASE (HCC): ICD-10-CM

## 2023-02-10 DIAGNOSIS — J44.9 CHRONIC OBSTRUCTIVE PULMONARY DISEASE, UNSPECIFIED COPD TYPE (HCC): ICD-10-CM

## 2023-02-10 DIAGNOSIS — I10 PRIMARY HYPERTENSION: ICD-10-CM

## 2023-02-10 DIAGNOSIS — E11.40 TYPE 2 DIABETES MELLITUS WITH DIABETIC NEUROPATHY, WITHOUT LONG-TERM CURRENT USE OF INSULIN (HCC): Primary | ICD-10-CM

## 2023-02-10 DIAGNOSIS — L57.0 ACTINIC KERATOSIS: ICD-10-CM

## 2023-02-10 DIAGNOSIS — E78.2 MIXED HYPERCHOLESTEROLEMIA AND HYPERTRIGLYCERIDEMIA: ICD-10-CM

## 2023-02-10 PROBLEM — U07.1 COVID: Status: RESOLVED | Noted: 2021-10-22 | Resolved: 2023-02-10

## 2023-02-10 LAB — HBA1C MFR BLD: 8 %

## 2023-02-10 RX ORDER — FLASH GLUCOSE SENSOR
KIT MISCELLANEOUS
Qty: 6 EACH | Refills: 1 | Status: SHIPPED | OUTPATIENT
Start: 2023-02-10

## 2023-02-10 RX ORDER — FLASH GLUCOSE SCANNING READER
EACH MISCELLANEOUS
Qty: 1 EACH | Refills: 0 | Status: SHIPPED | OUTPATIENT
Start: 2023-02-10

## 2023-02-10 NOTE — PATIENT INSTRUCTIONS
LOW SALT FOR BLOOD PRESSURE CONTROL. LOW CARBOHYDRATE FOR BLOOD SUGAR AND WEIGHT CONTROL. LOW FAT DIET FOR CHOLESTEROL CONTROL. DRINK ENOUGH FLUIDS FOR BETTER KIDNEY FUNCTION. TAKE COREG 25 MG. 2 TIMES A DAY,COZAAR 50 MG. AND  HCTZ 12.5 MG. DAILY FOR BLOOD PRESSURE CONTROL. TAKE FARXIGA 10 MG. DAILY AND GLUCOTROL 10  MG. 2 TIMES A DAY FOR BLOOD SUGAR CONTROL. START TAKING RYBELSUS 3 MG. DAILY FOR IMPROVING BLOOD SUGAR CONTROL. TAKE LILOFIBRA 134 MG. IN THE MORNING AND LIPITOR  40 MG. AND   NIGHTLY FOR CHOLESTEROL CONTROL. Katherine Hassan TAKE SINEQUAN, CYMBALTA AS PER PSYCHIATRIST RECOMMENDATION  FOR MOOD CONTROL. TAKE REQUIP 2 MG. 2 TIMES A DAY FOR PAIN RELIEF. TAKE PROTONIX 40 MG. DAILY FOR STOMACH ACID CONTROL. TAKE GABAPENTIN 300 MG. NIGHTLY FOR PAIN RELIEF. REGULAR WALKING ADVISED. ADVISED WEIGHT REDUCTION. SEE DR. KEREN PEREZ  FOR BLADDER CONTROL PROBLEM. SEE DERMATOLOGIST AS SCHEDULED. FASTING FOR LAB WORK ONE MORNING. KEEP NEXT APPOINTMENT IN 2 MONTHS.

## 2023-02-10 NOTE — CARE COORDINATION
Remote Patient Monitoring Note      Date/Time:  2/10/2023 3:44 PM    CCSS reviewed patients reported daily Remote Patient Monitoring metrics. Patient has not updated daily metrics at this time. Plan/Follow Up:  Will continue to review, monitor and address alerts with follow up based on severity of symptoms and risk factors Current Patient Metrics ---- Blood Pressure: -/-, -bpm Glucose: -mg/dl Pulseox: -%, -bpm Survey: - Weight: -lbs Note Created at: 02/10/2023 03:45 PM ET ---- Time-Spent: 2 minutes 0 seconds

## 2023-02-10 NOTE — PROGRESS NOTES
OFFICE PROGRESS NOTE      SUBJECTIVE:        Patient ID:   Cheyenne Melton is a 80 y.o. male who presents for   Chief Complaint   Patient presents with    Rash     arms    Referral - General     dermatology    Fatigue    Other     Patient would like to try to get the Freestyle [employer] glucose monitor           HPI:     RECHECK BP, CHOLESTEROL AND DIABETES. ALSO HAS BUMPS ON BOTH ARMS ON THE OUTER SIDE FOR ABOUT 3 YEARS. HAS BEEN OUT IN THE SON LONG HOURS WHEN YOUNG. NO ITCHING OR OTHER ASSOCIATED SYMPTOMS. MEDICATION REFILL. FEELS GOOD. WATCHING DIET BUT NOT GOOD. NO  EXERCISE. TAKING MEDICATIONS REGULARLY. Prior to Admission medications    Medication Sig Start Date End Date Taking?  Authorizing Provider   Semaglutide 3 MG TABS Take 1 tablet by mouth daily 2/10/23  Yes Danika Campbell MD   Continuous Blood Gluc Sensor (FREESTYLE KASHIF 14 DAY SENSOR) MISC Apply every 14 days 2/10/23  Yes Danika Campbell MD   Continuous Blood Gluc  (FREESTYLE KASHIF 14 DAY READER) FIDEL Use as directed 2/10/23  Yes Danika Campbell MD   Ascorbic Acid (VITAMIN C) 1000 MG tablet Take 1,000 mg by mouth daily   Yes Historical Provider, MD   hydroCHLOROthiazide (MICROZIDE) 12.5 MG capsule TAKE ONE CAPSULE BY MOUTH DAILY AT 9AM 2/1/23  Yes Danika Campbell MD   atorvastatin (LIPITOR) 80 MG tablet TAKE ONE TABLET BY MOUTH DAILY AT 5PM IN EVENING AT BEDTIME FOR CHOLESTEROL 2/1/23  Yes Danika Campbell MD   doxepin (SINEQUAN) 10 MG capsule TAKE TWO CAPSULES BY MOUTH DAILY AT 9PM AT BEDTIME FOR INSOMNIA 2/1/23  Yes Danika Campbell MD   DULoxetine (CYMBALTA) 60 MG extended release capsule TAKE ONE CAPSULE BY MOUTH DAILY AT 9AM 2/1/23  Yes Danika Campbell MD   losartan (COZAAR) 50 MG tablet Take 1 tablet by mouth daily 1/27/23  Yes Danika Campbell MD   rOPINIRole (REQUIP) 2 MG tablet TAKE 1 TABLET BY MOUTH 2 TIMES A DAY 1/26/23  Yes Danika Campbell MD   carvedilol (COREG) 25 MG tablet TAKE ONE TABLET BY MOUTH TWICE DAILY @ 9am & 5pm 1/26/23  Yes Deniz Sams MD   gabapentin (NEURONTIN) 300 MG capsule Take 1 capsule by mouth nightly for 180 days. Intended supply: 90 days  Patient taking differently: Take 100 mg by mouth nightly. Intended supply: 90 days 1/26/23 7/25/23 Yes Deniz Sams MD   ezetimibe (ZETIA) 10 MG tablet take 1 tablet by mouth once daily 12/20/22  Yes Deniz Sams MD   fenofibrate micronized (LOFIBRA) 134 MG capsule take 1 capsule by mouth every morning BEFORE BREAKFAST 12/9/22  Yes Deniz Sams MD   albuterol sulfate HFA (VENTOLIN HFA) 108 (90 Base) MCG/ACT inhaler Inhale 2 puffs into the lungs every 4 hours as needed for Wheezing or Shortness of Breath 11/1/22  Yes Deniz Sams MD   dapagliflozin (FARXIGA) 10 MG tablet Take 1 tablet by mouth every morning 11/1/22  Yes Deniz Sams MD   pantoprazole (PROTONIX) 40 MG tablet Take 1 tablet by mouth daily 11/1/22  Yes Deniz Sams MD   glipiZIDE (GLUCOTROL) 10 MG tablet Take 1 tablet by mouth in the morning and 1 tablet in the evening. 11/1/22  Yes Deniz Sams MD   Cinnamon 500 MG CAPS Take 1,000 mg by mouth daily   Yes Historical Provider, MD   fluticasone (FLONASE) 50 MCG/ACT nasal spray 1 spray by Each Nostril route daily 9/4/22  Yes Veronica Luke,    blood glucose monitor strips Test 3 times a day & as needed for symptoms of irregular blood glucose 9/2/22  Yes Deniz Sams MD   Lancets 30G MISC 1 each by Does not apply route 3 times daily Dispense what ever brand is covered by insurance 9/2/22  Yes Deniz Sams MD   clotrimazole-betamethasone (LOTRISONE) 1-0.05 % cream Apply topically 2 times daily.  6/17/22  Yes Deniz Sams MD   Cholecalciferol (VITAMIN D3) 125 MCG (5000 UT) TABS Take by mouth daily   Yes Historical Provider, MD   sildenafil (REVATIO) 20 MG tablet Take 1 tablet by mouth daily as needed (FOR SEXUAL DYSFUNCTION) 4/12/21  Yes Deniz Sams MD   vitamin B-12 (CYANOCOBALAMIN) 1000 MCG tablet Take 1,000 mcg by mouth daily   Yes Historical Provider, MD   Blood Glucose Monitoring Suppl (BLOOD GLUCOSE MONITOR SYSTEM) w/Device KIT 1 each by Does not apply route 3 times daily Dispense what ever brand is covered by insurance 20  Yes Verner Fredericks, MD   loratadine (CLARITIN) 10 MG tablet Take 10 mg by mouth daily    Historical Provider, MD     Social History     Socioeconomic History    Marital status:     Number of children: 0    Highest education level: High school graduate   Tobacco Use    Smoking status: Former     Packs/day: 1.50     Years: 15.00     Pack years: 22.50     Types: Cigarettes     Start date: 1965     Quit date: 1986     Years since quittin.9    Smokeless tobacco: Never   Vaping Use    Vaping Use: Never used   Substance and Sexual Activity    Alcohol use: Yes     Comment: 2-3 beers per week    Drug use: No    Sexual activity: Yes     Partners: Female     Social Determinants of Health     Financial Resource Strain: Low Risk     Difficulty of Paying Living Expenses: Not hard at all   Food Insecurity: No Food Insecurity    Worried About Running Out of Food in the Last Year: Never true    [address] in the Last Year: Never true   Transportation Needs: No Transportation Needs    Lack of Transportation (Medical): No    Lack of Transportation (Non-Medical): No   Physical Activity: Insufficiently Active    Days of Exercise per Week: 3 days    Minutes of Exercise per Session: 30 min   Stress: Stress Concern Present    Feeling of Stress :  To some extent   Social Connections: Socially Isolated    Frequency of Communication with Friends and Family: More than three times a week    Frequency of Social Gatherings with Friends and Family: More than three times a week    Attends Hindu Services: Never    Active Member of Clubs or Organizations: No    Attends Club or Organization Meetings: Never    Marital Status:    Housing Stability: [address] to Pay for Housing in the Last Year: No    Number of Places Lived in the Last Year: 1    Unstable Housing in the Last Year: No       I have reviewed Jeffery's allergies, medications, problem list, medical, social and family history and have updated as needed in the electronic medical record  Review Of Systems:    Skin: no abnormal pigmentation, rash, scaling, itching, masses, hair or nail changes  Eyes: no blurring, diplopia, or eye pain  Ears/Nose/Throat: no hearing loss, tinnitus, vertigo, nosebleed, nasal congestion, rhinorrhea, sore throat  Respiratory: no cough, pleuritic chest pain, dyspnea, or wheezing  Cardiovascular: no chest pain, angina, dyspnea on exertion, orthopnea, PND, palpitations, or claudication  Gastrointestinal: no nausea, vomiting, heartburn, diarrhea, constipation, bloating,  abdominal pain, or blood per rectum. Appetite is good  Genitourinary: no urinary urgency, frequency, dysuria, nocturia, hesitancy, or incontinence  Musculoskeletal: joint pains off and on. Morning stiffness. Ambulating well  Neurologic: no paralysis, paresis, paresthesia, seizures, tremors, or headaches  Hematologic/Lymphatic/Immunologic: no anemia, abnormal bleeding/bruising, fever, chills, night sweats, swollen glands, or unexplained weight loss  Endocrine: no heat or cold intolerance and no polyphagia, polydipsia, or polyuria        OBJECTIVE:     VS:  Wt Readings from Last 3 Encounters:   02/10/23 215 lb (97.5 kg)   01/26/23 212 lb (96.2 kg)   12/11/22 215 lb (97.5 kg)     Temp Readings from Last 3 Encounters:   12/11/22 98.2 °F (36.8 °C)   11/01/22 97 °F (36.1 °C)   09/04/22 97 °F (36.1 °C) (Temporal)     BP Readings from Last 3 Encounters:   02/10/23 110/70   01/26/23 130/80   12/11/22 (!) 143/91        General appearance: Alert, Awake, Oriented times 3, no distress  Skin: Warm and dry. MULTIPLE SCALY BUMPS NOTED ON THE LATERAL ASPECT OF BOTH ARMS. Head: Normocephalic.  No masses, lesions or tenderness noted  Eyes: Conjunctivae appear normal. PERLE  Ears: External ears normal  Nose/Sinuses: Nares normal. Septum midline. Mucosa normal. No drainage  Oropharynx: Oropharynx clear with no exudate seen  Neck: Neck supple. No jugular venous distension, lymphadenopathy or thyromegaly Trachea midline  Chest:  Normal. Movements are Normal and Equal.  Lungs: Lungs clear to auscultation bilaterally. No ronchi, crackles or wheezes  Heart: S1 S2  Regular rate and rhythm. No rub, murmur or gallop  Abdomen: Abdomen soft, non-tender. BS normal. No masses, organomegaly. Back: Grossly Normal and Equal. DTR are Normal. SLR is Normal.  Extremities: Arthritic changes are noted. Movements are limited. Pedal pulses are normal.  Musculoskeletal: Muscular strength appears intact. No joint effusion, tenderness, swelling or warmth  Neuro:  No focal motor or sensory deficits        ASSESSMENT     Patient Active Problem List    Diagnosis Date Noted    Chronic obstructive pulmonary disease ([facility] 07/15/2020    Primary hypertension     Mixed hypercholesterolemia and hypertriglyceridemia     Actinic keratosis 02/10/2023    Type II diabetes mellitus with peripheral circulatory disorder ([address] 01/26/2023    Type 2 diabetes mellitus with stage 3a chronic kidney disease, without long-term current use of insulin ([facility] 01/26/2023    Cubital tunnel syndrome on left     Parkinson's disease 08/30/2021    Rupture of left biceps tendon 06/18/2021    Major depressive disorder, recurrent, moderate 06/18/2021    Type 2 diabetes mellitus with chronic kidney disease ([address] 05/17/2021    Difficulty walking 05/17/2021    Onychomycosis 02/15/2021    Rupture of anterior cruciate ligament of right knee     Type 2 diabetes mellitus with hyperglycemia, without long-term current use of insulin ([facility])         Diagnosis:     ICD-10-CM    1.  Type 2 diabetes mellitus with diabetic neuropathy, without long-term current use of insulin ([facility])  E11.40 POCT glycosylated hemoglobin (Hb A1C)     Ambulatory Referral to Care Management with Device (Remote Patient Monitoring)     Continuous Blood Gluc Sensor (FREESTYLE CARLA 14 DAY SENSOR) MISC     Continuous Blood Gluc  (FREESTYLE CARLA 14 DAY READER) FIDEL    POOR CONTROL      2. Mixed hypercholesterolemia and hypertriglyceridemia  E78.2 Comprehensive Metabolic Panel     Lipid Panel    ? CONTROL      3. Parkinson's disease ([facility]  [address] Ambulatory Referral to Care Management with Device (Remote Patient Monitoring)    STABLE      4. Primary hypertension  I10     CONTROLLED      5. Chronic obstructive pulmonary disease, unspecified COPD type ([facility]  J44.9 Ambulatory Referral to Care Management with Device (Remote Patient Monitoring)    STABLE      6. Actinic keratosis  L57.0 JORGE - Eddie Louise MD, Dermatology, [facility]    BOTH ARMS          PLAN:           Patient Instructions   LOW SALT FOR BLOOD PRESSURE CONTROL. LOW CARBOHYDRATE FOR BLOOD SUGAR AND WEIGHT CONTROL. LOW FAT DIET FOR CHOLESTEROL CONTROL. DRINK ENOUGH FLUIDS FOR BETTER KIDNEY FUNCTION. TAKE COREG 25 MG. 2 TIMES A DAY,COZAAR 50 MG. AND  HCTZ 12.5 MG. DAILY FOR BLOOD PRESSURE CONTROL. TAKE FARXIGA 10 MG. DAILY AND GLUCOTROL 10  MG. 2 TIMES A DAY FOR BLOOD SUGAR CONTROL. START TAKING RYBELSUS 3 MG. DAILY FOR IMPROVING BLOOD SUGAR CONTROL. TAKE LILOFIBRA 134 MG. IN THE MORNING AND LIPITOR  40 MG. AND   NIGHTLY FOR CHOLESTEROL CONTROL. Giancarlo Melvins TAKE SINEQUAN, CYMBALTA AS PER PSYCHIATRIST RECOMMENDATION  FOR MOOD CONTROL. TAKE REQUIP 2 MG. 2 TIMES A DAY FOR PAIN RELIEF. TAKE PROTONIX 40 MG. DAILY FOR STOMACH ACID CONTROL. TAKE GABAPENTIN 300 MG. NIGHTLY FOR PAIN RELIEF. REGULAR WALKING ADVISED. ADVISED WEIGHT REDUCTION. SEE DR. KEREN PEREZ  FOR BLADDER CONTROL PROBLEM. SEE DERMATOLOGIST AS SCHEDULED. FASTING FOR LAB WORK ONE MORNING. KEEP NEXT APPOINTMENT IN 2 MONTHS. Return in about 2 months (around 4/10/2023) for [facility] VISIT.          I have reviewed my findings and recommendations with Sully Brown.     Electronically signed by Judy Eng MD on 2/10/23 at 9:54 AM EST

## 2023-02-10 NOTE — TELEPHONE ENCOUNTER
Patient called to inform that his pharmacy notified him that the Continuous Blood Glucose Sensor and  needs a Prior Auth.

## 2023-02-13 ENCOUNTER — CARE COORDINATION (OUTPATIENT)
Dept: CASE MANAGEMENT | Age: 68
End: 2023-02-13

## 2023-02-13 ENCOUNTER — TELEPHONE (OUTPATIENT)
Dept: CARE COORDINATION | Age: 68
End: 2023-02-13

## 2023-02-13 NOTE — CARE COORDINATION
Remote Patient Monitoring Note      Date/Time:  2/13/2023 4:05 PM    LPN reviewed patients reported daily Remote Patient Monitoring metrics. Patient has not updated daily metrics at this time. Plan/Follow Up:  Will continue to review, monitor and address alerts with follow up based on severity of symptoms and risk factors Current Patient Metrics ---- Blood Pressure: -/-, -bpm Glucose: -mg/dl Pulseox: -%, -bpm Survey: - Weight: -lbs Note Created at: 02/13/2023 04:05 PM ET ---- Time-Spent: 2 minutes 0 seconds

## 2023-02-13 NOTE — TELEPHONE ENCOUNTER
ACPS made call to pt, regarding ACP referral. 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 ACPS make another outreach attempt 2/17 to review AD's and assist as needed with completion of AD's.

## 2023-02-14 ENCOUNTER — CARE COORDINATION (OUTPATIENT)
Dept: CASE MANAGEMENT | Age: 68
End: 2023-02-14

## 2023-02-14 NOTE — CARE COORDINATION
Remote Patient Monitoring Welcome Note      Date/Time:  2023 2:52 PM  Patient Current Location: [facility]    Verified patients name and  as identifiers. Completed and confirmed the following:    [x] Patient received all RPM equipment (tablet, scale, blood pressure device and cuff, and pulse oximeter)  Cuff Size: Small  []  Regular   [x]  Large  []   Weight Scale: Regular   [x]  Bariatric  []               [x] Instructed patient keep box for use when returning equipment                                                          [x] Reviewed Patient Welcome Letter with patient                         [x] Reviewed expectations for patient and care team  [x] Reviewed RPM consent form         [x] Instructed patient to keep scale on flat surface                                                         [x] Instructed patient to keep tablet plugged in at all times                         [x] Instructed how to contact IT support (number listed on welcome letter)  [x] Provided Remote Patient Monitoring care  information                 All questions answered at this time.    Current Patient Metrics ---- Blood Pressure: -/-, -bpm Glucose: -mg/dl Pulseox: -%, -bpm Survey: - Weight: -lbs Note Created at: 2023 02:53 PM ET ---- Time-Spent: 5 minutes 0 seconds

## 2023-02-15 ENCOUNTER — CARE COORDINATION (OUTPATIENT)
Dept: CASE MANAGEMENT | Age: 68
End: 2023-02-15

## 2023-02-15 NOTE — CARE COORDINATION
Remote Patient Monitoring Note      Date/Time:  2/15/2023 2:12 PM  Patient Current Location: Home: [address] [place] [address] Rd  [address] [address]  LPN contacted patient by telephone regarding yellow alert received for NO METRICS FOR >3 DAYS. Verified patients name and  as identifiers. Background: HTN, copd, DM  Clinical Interventions: called  patient and left HIPPA compliant voice mail with call back information      Plan/Follow Up: Will continue to review, monitor and address alerts with follow up based on severity of symptoms and risk factors.  UPDATE patient did not return call ---- Current Patient Metrics ---- Blood Pressure: -/-, -bpm Glucose: -mg/dl Pulseox: -%, -bpm Survey: - Note Created at: 02/15/2023 02:14 PM ET ---- Time-Spent: 3 minutes 0 seconds

## 2023-02-16 ENCOUNTER — CARE COORDINATION (OUTPATIENT)
Dept: CARE COORDINATION | Age: 68
End: 2023-02-16

## 2023-02-16 NOTE — CARE COORDINATION
Remote Patient Monitoring Note      Date/Time:  2/16/2023 8:55 AM    CCSS reviewed patients reported daily Remote Patient Monitoring metrics. All reported metrics are within alert parameters. Plan/Follow Up:  Will continue to review, monitor and address alerts with follow up based on severity of symptoms and risk factors Current Patient Metrics ---- Blood Pressure: 145/81, 56bpm Glucose: 192mg/dl Pulseox: 96%, 57bpm Survey: C Note Created at: 02/16/2023 08:56 AM ET ---- Time-Spent: 2 minutes 0 seconds

## 2023-02-17 ENCOUNTER — CARE COORDINATION (OUTPATIENT)
Dept: CARE COORDINATION | Age: 68
End: 2023-02-17

## 2023-02-17 ENCOUNTER — TELEPHONE (OUTPATIENT)
Dept: CARE COORDINATION | Age: 68
End: 2023-02-17

## 2023-02-17 NOTE — TELEPHONE ENCOUNTER
ACPS made f/u call to pt to review ACP and assist with completion, as requested by pt, unable to reach or leave message d/t voicemail box being full.

## 2023-02-17 NOTE — CARE COORDINATION
Remote Patient Monitoring Note      Date/Time:  2/17/2023 1:36 PM    CCSS reviewed patients reported daily Remote Patient Monitoring metrics. All reported metrics are within alert parameters. Plan/Follow Up:  Will continue to review, monitor and address alerts with follow up based on severity of symptoms and risk factors Current Patient Metrics ---- Blood Pressure: -/-, -bpm Glucose: -mg/dl Pulseox: -%, -bpm Survey: - Note Created at: 02/17/2023 01:37 PM ET ---- Time-Spent: 2 minutes 0 seconds

## 2023-02-17 NOTE — CARE COORDINATION
Left HIPAA compliant message for patient to return call to [phone].   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

## 2023-02-20 ENCOUNTER — CARE COORDINATION (OUTPATIENT)
Dept: CARE COORDINATION | Age: 68
End: 2023-02-20

## 2023-02-20 NOTE — CARE COORDINATION
Remote Patient Monitoring Note      Date/Time:  2/20/2023 3:09 PM    CCSS reviewed patients reported daily Remote Patient Monitoring metrics. All reported metrics are within alert parameters. Plan/Follow Up:  Will continue to review, monitor and address alerts with follow up based on severity of symptoms and risk factors  Current Patient Metrics ---- Blood Pressure: -/-, -bpm Glucose: -mg/dl Pulseox: -%, -bpm Survey: - Note Created at: 02/20/2023 03:09 PM ET ---- Time-Spent: 2 minutes 0 seconds

## 2023-02-21 ENCOUNTER — CARE COORDINATION (OUTPATIENT)
Dept: CARE COORDINATION | Age: 68
End: 2023-02-21

## 2023-02-21 NOTE — CARE COORDINATION
Remote Patient Monitoring Note      Date/Time:  2/21/2023 3:25 PM    EMTP reviewed patients reported daily Remote Patient Monitoring metrics. Patient has not updated daily metrics at this time. Plan/Follow Up:  Will continue to review, monitor and address alerts with follow up based on severity of symptoms and risk factors--- Current Patient Metrics ---- Blood Pressure: -/-, -bpm Glucose: -mg/dl Pulseox: -%, -bpm Survey: - Note Created at: 02/21/2023 03:25 PM ET ---- Time-Spent: 2 minutes 0 seconds

## 2023-02-22 ENCOUNTER — CARE COORDINATION (OUTPATIENT)
Dept: CASE MANAGEMENT | Age: 68
End: 2023-02-22

## 2023-02-22 NOTE — CARE COORDINATION
Remote Patient Monitoring Note      Date/Time:  2/22/2023 2:34 PM  Patient Current Location: [address] did not   contacted patient by telephone regarding  metrics have not been entered into HRS     Background: RPM for COPD, DM and HTN  Clinical Interventions:  LM asking for return call, contact information provided along with acting ACM at the PCP office. Plan/Follow Up: Will continue to review, monitor and address alerts with follow up based on severity of symptoms and risk factors.        Current Patient Metrics ---- Blood Pressure: -/-, -bpm Glucose: -mg/dl Pulseox: -%, -bpm Survey: - Note Created at: 02/22/2023 02:36 PM ET ---- Time-Spent: 6 minutes 0 seconds

## 2023-02-23 ENCOUNTER — CARE COORDINATION (OUTPATIENT)
Dept: CARE COORDINATION | Age: 68
End: 2023-02-23

## 2023-02-23 NOTE — CARE COORDINATION
Remote Patient Monitoring Note  Date/Time:  2/23/2023 4:09 PM  Patient Current Location: [facility]  LPN attempted to contact patient by telephone regarding yellow alert received for no metrics x 2 days. Background: Pt enrolled in RPM r/t HTN, DM, COPD. Clinical Interventions:  Left HIPAA compliant voice message for pt to update metrics for review. Plan/Follow Up: Will continue to review, monitor and address alerts with follow up based on severity of symptoms and risk factors.    Current Patient Metrics ---- Blood Pressure: -/-, -bpm Glucose: -mg/dl Pulseox: -%, -bpm Survey: - Note Created at: 02/23/2023 04:09 PM ET ---- Time-Spent: 3 minutes 0 seconds

## 2023-02-24 ENCOUNTER — CARE COORDINATION (OUTPATIENT)
Dept: CASE MANAGEMENT | Age: 68
End: 2023-02-24

## 2023-02-24 ENCOUNTER — CARE COORDINATION (OUTPATIENT)
Dept: CARE COORDINATION | Age: 68
End: 2023-02-24

## 2023-02-24 ENCOUNTER — TELEPHONE (OUTPATIENT)
Dept: CARE COORDINATION | Age: 68
End: 2023-02-24

## 2023-02-24 DIAGNOSIS — J44.9 CHRONIC OBSTRUCTIVE PULMONARY DISEASE, UNSPECIFIED COPD TYPE (HCC): ICD-10-CM

## 2023-02-24 DIAGNOSIS — I10 PRIMARY HYPERTENSION: ICD-10-CM

## 2023-02-24 DIAGNOSIS — E11.51 TYPE II DIABETES MELLITUS WITH PERIPHERAL CIRCULATORY DISORDER (HCC): Primary | ICD-10-CM

## 2023-02-24 RX ORDER — LOSARTAN POTASSIUM 50 MG/1
TABLET ORAL
Qty: 90 TABLET | Refills: 0 | OUTPATIENT
Start: 2023-02-24

## 2023-02-24 RX ORDER — ROPINIROLE 1 MG/1
TABLET, FILM COATED ORAL
Qty: 60 TABLET | Refills: 0 | OUTPATIENT
Start: 2023-02-24

## 2023-02-24 ASSESSMENT — ENCOUNTER SYMPTOMS: DYSPNEA ASSOCIATED WITH: EXERTION

## 2023-02-24 NOTE — PROGRESS NOTES
2/24/23 12:53 PM     Remote Patient Order Discontinued    Received request from Madelaine Anton RN to discontinue order for remote patient monitoring of COPD, Diabetes, and HTN and order completed.      Felipa Fregoso DNP, FNP-C, Remote Patient Monitoring NP, (Ph) [phone]

## 2023-02-24 NOTE — CARE COORDINATION
Ambulatory Care Coordination Note  2023    Patient Current Location: [facility]     ACM contacted the patient by telephone. Verified name and  with patient as identifiers. Provided introduction to self, and explanation of the ACM role. Challenges to be reviewed by the provider   Additional needs identified to be addressed with provider: No  none               Method of communication with provider: chart routing. ACM: Neris Palma RN    ACM received a call back from pt. Pt is a  and just got back into [facility]. Pt agrees that due to his driving to different states and being on the road, that RPM should be discontinued at this time. Pt is aware someone from RPM team will contact him with instruction on how to send equipment back. DM:  Denies s/s of Hypo/Hyperglycemia. Discussed DM Zone Tool and verbalizes understanding. FBS:  140    COPD:  Denies s/s of COPD Exacerbation. Discussed the Zone Tool and verbalizes understanding. Pt denies falls/near falls. Appt reminders:  Future Appointments  2023 - 2025         Date Visit Type Department Provider    3/20/2023  9:45 AM [address] [place] [address] Podiatry [place], [place]           3/31/2023  8:00 AM OFFICE VISIT Henna Olsen MD   Appointment Notes:    Return in 2 months (on 3/26/2023) for FOLLOW UP VISIT     Offered patient enrollment in the Remote Patient Monitoring (RPM) program for in-home monitoring:  Pt will be discontinuing RPM due to being an over the road , and out of state more often . FOLLOW-UP PLAN:    Continue Care Coordination and Assess Plan Of Care  Discuss:   -Did pt discuss RPM with SW?  -DM Management/Zone Tool  -COPD Management/Zone Tool  -Exercise Status  -Current Blood Sugar  -Falls/Near Falls?   -Appointment Reminders    Next Anticipated Outreach by [address] Team:  2 Weeks      Lab Results       None            Care Coordination Interventions    Referral from Primary Care Provider: No  Suggested Interventions and Community Resources  Diabetes Education: Declined  Fall Risk Prevention: Declined  Medication Assistance Program: Declined  Medi Set or Pill Pack: Declined  Pharmacist: In Process (Comment: PHP Pharmacist)  Registered Dietician: Declined  Transportation Support: Declined  Zone Management Tools: In Process (Comment: DM, COPD)          Goals Addressed                      This Visit's Progress       Patient Stated      Medication Management (pt-stated)   On track      I will take my medication as directed. I will notify my provider of any problems with medications, like adverse effects or side effects. I will notify my provider/Care Coordinator if I am unable to afford my medications. I will notify my provider for advice before I stop taking any of my medication. Barriers: overwhelmed by complexity of regimen and lack of education  Plan for overcoming my barriers: Open communication. Speak with pharmacist to discuss medications  Confidence: 10/10  Anticipated Goal Completion Date: 4/29/23         Other      Conditions and Symptoms   On track      I will schedule office visits, as directed by my provider. I will keep my appointment or reschedule if I have to cancel. I will notify my provider of any barriers to my plan of care. I will follow my Zone Management tool to seek urgent or emergent care. I will notify my provider of any symptoms that indicate a worsening of my condition. Barriers: overwhelmed by complexity of regimen and lack of education  Plan for overcoming my barriers: Look at infor ACM send via mail.   Open communication with care team.  Ask questions and voice concerns  Confidence: 10/10  Anticipated Goal Completion Date: 4/29/23                Future Appointments   Date Time Provider Roxana Daniel   3/20/2023  9:45 AM Nettie Closs, DPM [facility]   3/31/2023  8:00 AM Xiomara Estrada MD [facility]   ,   Diabetes Assessment    Medic Alert ID: No  Meal Planning: Avoidance of concentrated sweets   How often do you test your blood sugar?: Daily   Do you have barriers with adherence to non-pharmacologic self-management interventions? (Nutrition/Exercise/Self-Monitoring): No   Have you ever had to go to the ED for symptoms of low blood sugar?: No       No patient-reported symptoms   Do you have hyperglycemia symptoms?: No   Do you have hypoglycemia symptoms?: No   Last Blood Sugar Value: 140   Blood Sugar Monitoring Regimen: Morning Fasting        , and   COPD Assessment    Does the patient understand envrionmental exposure?: Yes  Is the patient able to verbalize Rescue vs. Long Acting medications?: Yes  Does the patient have a nebulizer?: No  Does the patient use a space with inhaled medications?: No     No patient-reported symptoms         Symptoms:  None: Yes      Symptom course: stable  Breathlessness: exertion  Increase use of rapid acting/rescue inhaled medications?: No  Change in chronic cough?: No/At Baseline  Change in sputum?: No/At Baseline  Have you had a recent diagnosis of pneumonia either by PCP or at a hospital?: No

## 2023-02-24 NOTE — CARE COORDINATION
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 ACM did let pt know that being unable to regularly provide metrics with RPM while in [place], that we may need to discontinue.

## 2023-02-24 NOTE — CARE COORDINATION
CCSS placed call to patient to arrange RPM kit  through [employer].     Reviewed with patient how to pack equipment in original packing.     Verified patient's availability to schedule [employer]  time.     [employer]  time requested. Anticipated  date range 2 to 4 business days

## 2023-02-24 NOTE — CARE COORDINATION
Remote Patient Monitoring Note      Date/Time:  2023 4:05 PM  [facility] regarding yellow alert received for no metrics for 5 days. Verified patients name and  as identifiers. Background: HTN, COPD, DM  Clinical Interventions:  Called and left HIPPA compliant message for call back to update metrics      Plan/Follow Up: Will continue to review, monitor and address alerts with follow up based on severity of symptoms and risk factors.  Current Patient Metrics ---- Blood Pressure: -/-, -bpm Glucose: -mg/dl Pulseox: -%, -bpm Survey: - Note Created at: 2023 04:07 PM ET ---- Time-Spent: 3 minutes 0 seconds

## 2023-02-24 NOTE — TELEPHONE ENCOUNTER
ACPS made f/u call to pt to review AD's and assist with completion of AD's. 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

## 2023-02-27 ENCOUNTER — CARE COORDINATION (OUTPATIENT)
Dept: CASE MANAGEMENT | Age: 68
End: 2023-02-27

## 2023-02-27 NOTE — CARE COORDINATION
Remote Patient Monitoring Note      Date/Time:  2023 2:14 PM  [facility] yellow alert received for no metrics for over 1 week. Verified patients name and  as identifiers. Background: HTN, COPD, DM  Clinical Interventions:  Called to speak with patient, Left HIPPA compliant message for return call, patient is a  he may be out on the road will attempt to reach again at later time. Plan/Follow Up: Will continue to review, monitor and address alerts with follow up based on severity of symptoms and risk factors.  UPDATE patient did not return call ---- Current Patient Metrics ---- Blood Pressure: -/-, -bpm Glucose: -mg/dl Pulseox: -%, -bpm Survey: - Note Created at: 2023 02:18 PM ET ---- Time-Spent: 3 minutes 0 seconds

## 2023-02-28 ENCOUNTER — TELEPHONE (OUTPATIENT)
Dept: CARE COORDINATION | Age: 68
End: 2023-02-28

## 2023-02-28 ENCOUNTER — CARE COORDINATION (OUTPATIENT)
Dept: CASE MANAGEMENT | Age: 68
End: 2023-02-28

## 2023-02-28 NOTE — TELEPHONE ENCOUNTER
ACPS made f/u call to pt, to review ACP packet and assist with completion of AD's, unable to reach, left message requesting call back. Received call back from pt stating he is still not home, cannot review ACP packet at this time. ACPS encouraged pt to call this ACPS back when he is home and able to review ACP packet. Reviewed he will need 2 non related witness to sign and date packet as well. Pt reports understanding.  ACPS to close referral.

## 2023-02-28 NOTE — CARE COORDINATION
Remote Patient Monitoring Note      Date/Time:  2023 1:44 PM  [facility] yellow alert received for no metrics for over a week. Verified patients name and  as identifiers. Background: HTN, COPD, DM  Clinical Interventions:  Called patient and left HIPPA compliant message with call back information      Plan/Follow Up: Will continue to review, monitor and address alerts with follow up based on severity of symptoms and risk factors.        UPDATE patient did not return call ---- Current Patient Metrics ---- Blood Pressure: -/-, -bpm Glucose: -mg/dl Pulseox: -%, -bpm Survey: - Note Created at: 2023 01:45 PM ET ---- Time-Spent: 3 minutes 0 seconds

## 2023-03-01 ENCOUNTER — CARE COORDINATION (OUTPATIENT)
Dept: CASE MANAGEMENT | Age: 68
End: 2023-03-01

## 2023-03-01 NOTE — CARE COORDINATION
Remote Patient Monitoring Note      Date/Time:  3/1/2023 3:08 PM  Patient Current Location: Home: [address] [place] [address] Rd  [address] [address]  LPN contacted patient by telephone regarding yellow alert received for NO METRICS FOR OVER 1 WEEKS. Verified patients name and  as identifiers. Background: HTN, COPD  Clinical Interventions:  Called and left message for patient with call back information      Plan/Follow Up: Will continue to review, monitor and address alerts with follow up based on severity of symptoms and risk factors.        Current Patient Metrics ---- Blood Pressure: -/-, -bpm Glucose: -mg/dl Pulseox: -%, -bpm Survey: - Note Created at: 2023 03:11 PM ET ---- Time-Spent: 4 minutes 0 seconds

## 2023-03-02 ENCOUNTER — CARE COORDINATION (OUTPATIENT)
Dept: CASE MANAGEMENT | Age: 68
End: 2023-03-02

## 2023-03-02 RX ORDER — CARVEDILOL 25 MG/1
TABLET ORAL
Qty: 180 TABLET | Refills: 0 | Status: SHIPPED | OUTPATIENT
Start: 2023-03-02

## 2023-03-02 RX ORDER — DOXEPIN HYDROCHLORIDE 10 MG/1
CAPSULE ORAL
Qty: 60 CAPSULE | Refills: 0 | Status: SHIPPED | OUTPATIENT
Start: 2023-03-02

## 2023-03-02 RX ORDER — FENOFIBRATE 134 MG/1
CAPSULE ORAL
Qty: 90 CAPSULE | Refills: 0 | Status: SHIPPED | OUTPATIENT
Start: 2023-03-02

## 2023-03-03 ENCOUNTER — CARE COORDINATION (OUTPATIENT)
Dept: CASE MANAGEMENT | Age: 68
End: 2023-03-03

## 2023-03-03 NOTE — CARE COORDINATION
Remote Patient Monitoring Note      Date/Time:  3/3/2023 2:24 PM    LPN reviewed patients reported daily Remote Patient Monitoring metrics. Reached patient he states someone picked up equipment he no longer has he is a  and was hard to do on the road LPN deactivated in system. Plan/Follow Up:  Will continue to review, monitor and address alerts with follow up based on severity of symptoms and risk factors

## 2023-03-09 RX ORDER — LOSARTAN POTASSIUM 50 MG/1
50 TABLET ORAL DAILY
Qty: 90 TABLET | Refills: 1 | Status: SHIPPED | OUTPATIENT
Start: 2023-03-09

## 2023-03-09 RX ORDER — ROPINIROLE 2 MG/1
TABLET, FILM COATED ORAL
Qty: 180 TABLET | Refills: 1 | Status: SHIPPED | OUTPATIENT
Start: 2023-03-09

## 2023-03-10 ENCOUNTER — CARE COORDINATION (OUTPATIENT)
Dept: CARE COORDINATION | Age: 68
End: 2023-03-10

## 2023-03-10 ASSESSMENT — ENCOUNTER SYMPTOMS: DYSPNEA ASSOCIATED WITH: EXERTION

## 2023-03-10 NOTE — CARE COORDINATION
Left HIPAA compliant message for patient/caregiver to return call to [phone].     Re: Follow up:   DM  COPD  Appointment Reminders  Review Plan of Care

## 2023-03-10 NOTE — CARE COORDINATION
Ambulatory Care Coordination Note  3/10/2023    Patient Current Location:  [place]     ACM contacted the patient by telephone. Verified name and  with patient as identifiers. Provided introduction to self, and explanation of the ACM role.     Challenges to be reviewed by the provider   Additional needs identified to be addressed with provider: No  none               Method of communication with provider: chart routing.    ACM: Darlene Romero RN    DM:  Denies s/s of Hypo/Hyperglycemia.  Discussed DM Zone Tool and verbalizes understanding.  FBS:  160    COPD:  Denies s/s of COPD Exacerbation.  Discussed the Zone Tool and verbalizes understanding.    Pt is aware of upcoming appts with Podiatry and PCP      Offered patient enrollment in the Remote Patient Monitoring (RPM) program for in-home monitoring:  Pt is over the road  .    FOLLOW-UP PLAN:    Continue Care Coordination and Assess Plan Of Care  Discuss:   -DM Management/Zone Tool  -COPD Management/Zone Tool  -Current Blood Sugar  -Falls/Near Falls?  -OV AVS Reinforcement  -Appointment Reminders    Next Anticipated Outreach by [facility] Care Team:  2 Weeks      Lab Results       None            Care Coordination Interventions    Referral from Primary Care Provider: No  Suggested Interventions and Community Resources  Diabetes Education: Declined  Fall Risk Prevention: Declined  Medication Assistance Program: Declined  Medi Set or Pill Pack: Declined  Pharmacist: In Process (Comment: PHP Pharmacist)  Registered Dietician: Declined  Transportation Support: Declined  Zone Management Tools: In Process (Comment: DM, COPD)          Goals Addressed                      This Visit's Progress       Patient Stated      Medication Management (pt-stated)   On track      I will take my medication as directed.  I will notify my provider of any problems with medications, like adverse effects or side effects.  I will notify my provider/Care Coordinator if I am unable to afford  my medications. I will notify my provider for advice before I stop taking any of my medication. Barriers: overwhelmed by complexity of regimen and lack of education  Plan for overcoming my barriers: Open communication. Speak with pharmacist to discuss medications  Confidence: 10/10  Anticipated Goal Completion Date: 4/29/23         Other      Conditions and Symptoms   On track      I will schedule office visits, as directed by my provider. I will keep my appointment or reschedule if I have to cancel. I will notify my provider of any barriers to my plan of care. I will follow my Zone Management tool to seek urgent or emergent care. I will notify my provider of any symptoms that indicate a worsening of my condition. Barriers: overwhelmed by complexity of regimen and lack of education  Plan for overcoming my barriers: Look at infor ACM send via mail. Open communication with care team.  Ask questions and voice concerns  Confidence: 10/10  Anticipated Goal Completion Date: 4/29/23                Future Appointments   Date Time Provider Roxana Daniel   3/20/2023  9:45 AM Makeda Roberts DPM [facility]   3/31/2023  8:00 AM Willie Cooney MD [facility]   ,   Diabetes Assessment    Medic Alert ID: No  Meal Planning: Avoidance of concentrated sweets   How often do you test your blood sugar?: Daily   Do you have barriers with adherence to non-pharmacologic self-management interventions?  (Nutrition/Exercise/Self-Monitoring): No   Have you ever had to go to the ED for symptoms of low blood sugar?: No       No patient-reported symptoms   Do you have hyperglycemia symptoms?: No   Do you have hypoglycemia symptoms?: No   Last Blood Sugar Value: 160   Blood Sugar Monitoring Regimen: Morning Fasting   Blood Sugar Trends: Fluctuating        , and   COPD Assessment    Does the patient understand envrionmental exposure?: Yes  Is the patient able to verbalize Rescue vs. Long Acting medications?: Yes  Does the patient have a nebulizer?: No  Does the patient use a space with inhaled medications?: No     No patient-reported symptoms         Symptoms:  None: Yes      Symptom course: stable  Breathlessness: exertion  Increase use of rapid acting/rescue inhaled medications?: No  Change in chronic cough?: No/At Baseline  Change in sputum?: No/At Baseline  Self Monitoring - SaO2: No

## 2023-03-20 ENCOUNTER — PROCEDURE VISIT (OUTPATIENT)
Dept: PODIATRY | Age: 68
End: 2023-03-20
Payer: MEDICARE

## 2023-03-20 VITALS — HEIGHT: 70 IN | WEIGHT: 215 LBS | BODY MASS INDEX: 30.78 KG/M2

## 2023-03-20 DIAGNOSIS — E11.9 TYPE 2 DIABETES MELLITUS WITHOUT COMPLICATION, UNSPECIFIED WHETHER LONG TERM INSULIN USE (HCC): Primary | ICD-10-CM

## 2023-03-20 DIAGNOSIS — B35.1 TINEA UNGUIUM: ICD-10-CM

## 2023-03-20 DIAGNOSIS — G60.8 HEREDITARY SENSORY NEUROPATHY: ICD-10-CM

## 2023-03-20 DIAGNOSIS — L84 CORNS AND CALLOSITIES: ICD-10-CM

## 2023-03-20 PROCEDURE — 11721 DEBRIDE NAIL 6 OR MORE: CPT | Performed by: PODIATRIST

## 2023-03-20 PROCEDURE — 11056 PARNG/CUTG B9 HYPRKR LES 2-4: CPT | Performed by: PODIATRIST

## 2023-03-20 RX ORDER — AMMONIUM LACTATE 12 G/100G
LOTION TOPICAL
Qty: 400 G | Refills: 2 | Status: SHIPPED | OUTPATIENT
Start: 2023-03-20

## 2023-03-20 NOTE — PROGRESS NOTES
90 tablet, Rfl: 0    DULoxetine (CYMBALTA) 60 MG extended release capsule, TAKE ONE CAPSULE BY MOUTH DAILY AT 9AM, Disp: 90 capsule, Rfl: 0    gabapentin (NEURONTIN) 300 MG capsule, Take 1 capsule by mouth nightly for 180 days. Intended supply: 90 days (Patient taking differently: Take 100 mg by mouth nightly. Intended supply: 90 days), Disp: 90 capsule, Rfl: 1    ezetimibe (ZETIA) 10 MG tablet, take 1 tablet by mouth once daily, Disp: 90 tablet, Rfl: 1    albuterol sulfate HFA (VENTOLIN HFA) 108 (90 Base) MCG/ACT inhaler, Inhale 2 puffs into the lungs every 4 hours as needed for Wheezing or Shortness of Breath, Disp: 18 g, Rfl: 0    dapagliflozin (FARXIGA) 10 MG tablet, Take 1 tablet by mouth every morning, Disp: 90 tablet, Rfl: 1    pantoprazole (PROTONIX) 40 MG tablet, Take 1 tablet by mouth daily, Disp: 90 tablet, Rfl: 0    glipiZIDE (GLUCOTROL) 10 MG tablet, Take 1 tablet by mouth in the morning and 1 tablet in the evening., Disp: 180 tablet, Rfl: 1    Cinnamon 500 MG CAPS, Take 1,000 mg by mouth daily, Disp: , Rfl:     fluticasone (FLONASE) 50 MCG/ACT nasal spray, 1 spray by Each Nostril route daily, Disp: 16 g, Rfl: 0    blood glucose monitor strips, Test 3 times a day & as needed for symptoms of irregular blood glucose, Disp: 100 strip, Rfl: 3    Lancets 30G MISC, 1 each by Does not apply route 3 times daily Dispense what ever brand is covered by insurance, Disp: 100 each, Rfl: 3    clotrimazole-betamethasone (LOTRISONE) 1-0.05 % cream, Apply topically 2 times daily. , Disp: 45 g, Rfl: 1    Cholecalciferol (VITAMIN D3) 125 MCG (5000 UT) TABS, Take by mouth daily, Disp: , Rfl:     sildenafil (REVATIO) 20 MG tablet, Take 1 tablet by mouth daily as needed (FOR SEXUAL DYSFUNCTION), Disp: 5 tablet, Rfl: 0    vitamin B-12 (CYANOCOBALAMIN) 1000 MCG tablet, Take 1,000 mcg by mouth daily, Disp: , Rfl:     Blood Glucose Monitoring Suppl (BLOOD GLUCOSE MONITOR SYSTEM) w/Device KIT, 1 each by Does not apply route 3 times

## 2023-03-24 ENCOUNTER — CARE COORDINATION (OUTPATIENT)
Dept: CARE COORDINATION | Age: 68
End: 2023-03-24

## 2023-03-24 NOTE — CARE COORDINATION
Left HIPAA compliant message for patient/caregiver to return call to [phone]. Re: Follow up:   DM  COPD  Review AVS  Appointment Reminders  Falls/near falls?
Regimen: Morning Fasting   Blood Sugar Trends: No Change        , and   COPD Assessment    Does the patient understand envrionmental exposure?: Yes  Is the patient able to verbalize Rescue vs. Long Acting medications?: Yes  Does the patient have a nebulizer?: No  Does the patient use a space with inhaled medications?: No     No patient-reported symptoms         Symptoms:  None: Yes      Symptom course: stable  Breathlessness: none  Increase use of rapid acting/rescue inhaled medications?: No  Change in chronic cough?: No/At Baseline  Change in sputum?: No/At Baseline  Self Monitoring - SaO2: No  Have you had a recent diagnosis of pneumonia either by PCP or at a hospital?: No
Additional Anesthesia Volume In Cc (Will Not Render If 0): 0
Biopsy Type: H and E
Post-Care Instructions: Clean with soap and water.
Suture Removal: 14 days
Render Post-Care Instructions In Note?: no
Path Notes (To The Dermatopathologist): Pink papule, suspect basal cell carcinoma
Punch Size In Mm: 4
Billing Type: Third-Party Bill
Accession #: Corazon
Wound Care: Polysporin ointment
Dressing: bandage
Epidermal Sutures: 5-0 Nylon
Anesthesia Type: 1% lidocaine with 1:200,000 epinephrine
Detail Level: Detailed
Home Suture Removal Text: Patient was provided a home suture removal kit and will remove their sutures at home.  If they have any questions or difficulties they will call the office.
Hemostasis: Pressure
Anesthesia Volume In Cc: 1

## 2023-03-28 RX ORDER — ROPINIROLE 1 MG/1
TABLET, FILM COATED ORAL
Qty: 60 TABLET | Refills: 3 | Status: SHIPPED
Start: 2023-03-28 | End: 2023-03-31 | Stop reason: SDUPTHER

## 2023-03-30 DIAGNOSIS — E78.2 MIXED HYPERCHOLESTEROLEMIA AND HYPERTRIGLYCERIDEMIA: ICD-10-CM

## 2023-03-30 LAB
ALBUMIN SERPL-MCNC: 4.5 G/DL (ref 3.5–5.2)
ALP SERPL-CCNC: 50 U/L (ref 40–129)
ALT SERPL-CCNC: 26 U/L (ref 0–40)
ANION GAP SERPL CALCULATED.3IONS-SCNC: 10 MMOL/L (ref 7–16)
AST SERPL-CCNC: 20 U/L (ref 0–39)
BILIRUB SERPL-MCNC: 0.6 MG/DL (ref 0–1.2)
BUN SERPL-MCNC: 18 MG/DL (ref 6–23)
CALCIUM SERPL-MCNC: 9.5 MG/DL (ref 8.6–10.2)
CHLORIDE SERPL-SCNC: 106 MMOL/L (ref 98–107)
CHOLESTEROL, TOTAL: 160 MG/DL (ref 0–199)
CO2 SERPL-SCNC: 24 MMOL/L (ref 22–29)
CREAT SERPL-MCNC: 1.2 MG/DL (ref 0.7–1.2)
GLUCOSE SERPL-MCNC: 133 MG/DL (ref 74–99)
HDLC SERPL-MCNC: 40 MG/DL
LDLC SERPL CALC-MCNC: 87 MG/DL (ref 0–99)
POTASSIUM SERPL-SCNC: 4.3 MMOL/L (ref 3.5–5)
PROT SERPL-MCNC: 6.9 G/DL (ref 6.4–8.3)
SODIUM SERPL-SCNC: 140 MMOL/L (ref 132–146)
TRIGL SERPL-MCNC: 164 MG/DL (ref 0–149)
VLDLC SERPL CALC-MCNC: 33 MG/DL

## 2023-03-31 ENCOUNTER — TELEPHONE (OUTPATIENT)
Dept: FAMILY MEDICINE CLINIC | Age: 68
End: 2023-03-31

## 2023-03-31 ENCOUNTER — OFFICE VISIT (OUTPATIENT)
Dept: FAMILY MEDICINE CLINIC | Age: 68
End: 2023-03-31

## 2023-03-31 VITALS
WEIGHT: 216 LBS | DIASTOLIC BLOOD PRESSURE: 80 MMHG | HEART RATE: 70 BPM | OXYGEN SATURATION: 93 % | BODY MASS INDEX: 30.99 KG/M2 | SYSTOLIC BLOOD PRESSURE: 124 MMHG

## 2023-03-31 DIAGNOSIS — E78.2 MIXED HYPERCHOLESTEROLEMIA AND HYPERTRIGLYCERIDEMIA: ICD-10-CM

## 2023-03-31 DIAGNOSIS — R09.81 SINUS CONGESTION: ICD-10-CM

## 2023-03-31 DIAGNOSIS — I10 PRIMARY HYPERTENSION: ICD-10-CM

## 2023-03-31 DIAGNOSIS — J44.9 CHRONIC OBSTRUCTIVE PULMONARY DISEASE, UNSPECIFIED COPD TYPE (HCC): ICD-10-CM

## 2023-03-31 DIAGNOSIS — E11.40 TYPE 2 DIABETES MELLITUS WITH DIABETIC NEUROPATHY, WITHOUT LONG-TERM CURRENT USE OF INSULIN (HCC): Primary | ICD-10-CM

## 2023-03-31 DIAGNOSIS — G20 PARKINSON'S DISEASE (HCC): ICD-10-CM

## 2023-03-31 RX ORDER — ATORVASTATIN CALCIUM 80 MG/1
TABLET, FILM COATED ORAL
Qty: 90 TABLET | Refills: 1 | Status: SHIPPED | OUTPATIENT
Start: 2023-03-31

## 2023-03-31 RX ORDER — GABAPENTIN 100 MG/1
100 CAPSULE ORAL NIGHTLY
Qty: 90 CAPSULE | Refills: 1 | Status: SHIPPED | OUTPATIENT
Start: 2023-03-31 | End: 2023-09-27

## 2023-03-31 RX ORDER — LOSARTAN POTASSIUM 50 MG/1
50 TABLET ORAL DAILY
Qty: 90 TABLET | Refills: 1 | Status: SHIPPED | OUTPATIENT
Start: 2023-03-31

## 2023-03-31 RX ORDER — EZETIMIBE 10 MG/1
10 TABLET ORAL DAILY
Qty: 90 TABLET | Refills: 1 | Status: SHIPPED | OUTPATIENT
Start: 2023-03-31

## 2023-03-31 RX ORDER — ROPINIROLE 1 MG/1
TABLET, FILM COATED ORAL
Qty: 60 TABLET | Refills: 1 | Status: SHIPPED | OUTPATIENT
Start: 2023-03-31

## 2023-03-31 RX ORDER — SILDENAFIL CITRATE 20 MG/1
20 TABLET ORAL DAILY PRN
Qty: 5 TABLET | Refills: 0 | Status: SHIPPED | OUTPATIENT
Start: 2023-03-31

## 2023-03-31 RX ORDER — GLIPIZIDE 10 MG/1
10 TABLET ORAL EVERY 12 HOURS
Qty: 180 TABLET | Refills: 1 | Status: SHIPPED | OUTPATIENT
Start: 2023-03-31

## 2023-03-31 RX ORDER — DULOXETIN HYDROCHLORIDE 60 MG/1
CAPSULE, DELAYED RELEASE ORAL
Qty: 90 CAPSULE | Refills: 1 | Status: SHIPPED | OUTPATIENT
Start: 2023-03-31

## 2023-03-31 RX ORDER — CARVEDILOL 25 MG/1
TABLET ORAL
Qty: 180 TABLET | Refills: 1 | Status: SHIPPED | OUTPATIENT
Start: 2023-03-31

## 2023-03-31 RX ORDER — ALBUTEROL SULFATE 90 UG/1
2 AEROSOL, METERED RESPIRATORY (INHALATION) EVERY 4 HOURS PRN
Qty: 18 G | Refills: 0 | Status: SHIPPED | OUTPATIENT
Start: 2023-03-31

## 2023-03-31 RX ORDER — FENOFIBRATE 134 MG/1
CAPSULE ORAL
Qty: 90 CAPSULE | Refills: 1 | Status: SHIPPED | OUTPATIENT
Start: 2023-03-31

## 2023-03-31 RX ORDER — PANTOPRAZOLE SODIUM 40 MG/1
40 TABLET, DELAYED RELEASE ORAL DAILY
Qty: 90 TABLET | Refills: 1 | Status: SHIPPED | OUTPATIENT
Start: 2023-03-31

## 2023-03-31 NOTE — PATIENT INSTRUCTIONS
LOW SALT FOR BLOOD PRESSURE CONTROL. LOW CARBOHYDRATE FOR BLOOD SUGAR AND WEIGHT CONTROL. LOW FAT DIET FOR CHOLESTEROL CONTROL. DRINK ENOUGH FLUIDS FOR BETTER KIDNEY FUNCTION. TAKE COREG 25 MG. 2 TIMES A DAY,COZAAR 50 MG. AND  HCTZ 12.5 MG. DAILY FOR BLOOD PRESSURE CONTROL. TAKE FARXIGA 10 MG. DAILY AND GLUCOTROL 10  MG. 2 TIMES A DAY FOR BLOOD SUGAR CONTROL. START TAKING RYBELSUS 3 MG. DAILY FOR IMPROVING BLOOD SUGAR CONTROL. TAKE LILOFIBRA 134 MG. IN THE MORNING AND LIPITOR  40 MG. AND   NIGHTLY FOR CHOLESTEROL CONTROL. Gloria Murguia TAKE SINEQUAN, CYMBALTA AS PER PSYCHIATRIST RECOMMENDATION  FOR MOOD CONTROL. TAKE REQUIP 2 MG. 2 TIMES A DAY FOR PAIN RELIEF. TAKE PROTONIX 40 MG. DAILY FOR STOMACH ACID CONTROL. TAKE GABAPENTIN 300 MG. NIGHTLY FOR PAIN RELIEF. TAKE CLARITIN 10 MG. DAILY FOR SINUS CONGESTION RELIEF. REGULAR WALKING ADVISED. ADVISED WEIGHT REDUCTION. SEE DR. KEREN PEREZ  FOR BLADDER CONTROL PROBLEM. FOLLOW UP WITH  DERMATOLOGIST AS RECOMMENDED. Gloria Murguia KEEP NEXT APPOINTMENT IN 3 MONTHS.

## 2023-03-31 NOTE — TELEPHONE ENCOUNTER
The pharmacy called to make sure Dr. Barbara Tristan  wants the pt to continue the Doxepin 10 mg  2 pills nightly for insomnia? Please advise.   Last seen 3/31/2023  Next appt 6/30/2023  [facility] [phone]

## 2023-03-31 NOTE — PROGRESS NOTES
POOR CONTROL      2. Mixed hypercholesterolemia and hypertriglyceridemia  E78.2     FAIR CONTROL      3. Parkinson's disease ([facility] [address]  G20     STABLE      4. Primary hypertension  I10     CONTROLLED      5. Chronic obstructive pulmonary disease, unspecified COPD type ([address]  J44.9     STABLE          PLAN:           Patient Instructions   LOW SALT FOR BLOOD PRESSURE CONTROL. LOW CARBOHYDRATE FOR BLOOD SUGAR AND WEIGHT CONTROL. LOW FAT DIET FOR CHOLESTEROL CONTROL. DRINK ENOUGH FLUIDS FOR BETTER KIDNEY FUNCTION. TAKE COREG 25 MG. 2 TIMES A DAY,COZAAR 50 MG. AND  HCTZ 12.5 MG. DAILY FOR BLOOD PRESSURE CONTROL. TAKE FARXIGA 10 MG. DAILY AND GLUCOTROL 10  MG. 2 TIMES A DAY FOR BLOOD SUGAR CONTROL. START TAKING RYBELSUS 3 MG. DAILY FOR IMPROVING BLOOD SUGAR CONTROL. TAKE LILOFIBRA 134 MG. IN THE MORNING AND LIPITOR  40 MG. AND   NIGHTLY FOR CHOLESTEROL CONTROL. Lakeisha Barnes TAKE SINEQUAN, CYMBALTA AS PER PSYCHIATRIST RECOMMENDATION  FOR MOOD CONTROL. TAKE REQUIP 2 MG. 2 TIMES A DAY FOR PAIN RELIEF. TAKE PROTONIX 40 MG. DAILY FOR STOMACH ACID CONTROL. TAKE GABAPENTIN 300 MG. NIGHTLY FOR PAIN RELIEF. REGULAR WALKING ADVISED. ADVISED WEIGHT REDUCTION. SEE DR. KEREN PEREZ  FOR BLADDER CONTROL PROBLEM. FOLLOW UP WITH  DERMATOLOGIST AS RECOMMENDED. Lakeisha Barnes KEEP NEXT APPOINTMENT IN 3 MONTHS. Return in about 3 months (around 6/30/2023) for FOLLOW UP VISIT. I have reviewed my findings and recommendations with Aneta Santiago.     Electronically signed by Gael Roldan MD on 3/31/23 at 8:43 AM EDT

## 2023-03-31 NOTE — TELEPHONE ENCOUNTER
Patient called stating Dr. Suraj Boone sent wrong dosage for Ropinirole to pharmacy. Patient informed he's been taking Ropinirole 2 mg, 2 times daily. RX which was sent to pharmacy today is Ropinirole 1 mg, 2 tablets daily. I noted on today's office notes,   TAKE REQUIP 2 MG. 2 TIMES A DAY FOR PAIN RELIEF. Please send new RX to [facility].      Last seen 3/31/2023  Next appt 6/30/2023  [facility]/[place]

## 2023-04-03 RX ORDER — ROPINIROLE 2 MG/1
2 TABLET, FILM COATED ORAL 2 TIMES DAILY
Qty: 120 TABLET | Refills: 2 | Status: CANCELLED | OUTPATIENT
Start: 2023-04-03

## 2023-04-03 RX ORDER — ROPINIROLE 2 MG/1
2 TABLET, FILM COATED ORAL 2 TIMES DAILY
COMMUNITY
Start: 2023-03-31 | End: 2023-04-05 | Stop reason: SDUPTHER

## 2023-04-04 NOTE — TELEPHONE ENCOUNTER
Per Dr. Awilda Hayes. NEEDS TO CONFIRM WITH HIM/OR HER    Spoke to the pt and pharmacy. Pt stated he is taking Doxepin 10 mg 2 at bed time.  He sated he doesn't see a psychiatrist.

## 2023-04-05 RX ORDER — ROPINIROLE 2 MG/1
4 TABLET, FILM COATED ORAL 2 TIMES DAILY
Qty: 360 TABLET | Refills: 0 | Status: SHIPPED | OUTPATIENT
Start: 2023-04-05

## 2023-04-07 ENCOUNTER — CARE COORDINATION (OUTPATIENT)
Dept: CARE COORDINATION | Age: 68
End: 2023-04-07

## 2023-04-07 RX ORDER — GLIPIZIDE 10 MG/1
TABLET ORAL
Qty: 180 TABLET | Refills: 0 | OUTPATIENT
Start: 2023-04-07

## 2023-04-07 ASSESSMENT — ENCOUNTER SYMPTOMS: DYSPNEA ASSOCIATED WITH: EXERTION

## 2023-04-07 NOTE — CARE COORDINATION
Assessment    Does the patient understand envrionmental exposure?: Yes  Is the patient able to verbalize Rescue vs. Long Acting medications?: Yes  Does the patient have a nebulizer?: No  Does the patient use a space with inhaled medications?: No     No patient-reported symptoms         Symptoms:  None: Yes      Symptom course: stable  Breathlessness: exertion  Increase use of rapid acting/rescue inhaled medications?: No  Change in chronic cough?: No/At Baseline  Change in sputum?: No/At Baseline  Self Monitoring - SaO2: No  Have you had a recent diagnosis of pneumonia either by PCP or at a hospital?: No

## 2023-04-25 ENCOUNTER — HOSPITAL ENCOUNTER (OUTPATIENT)
Age: 68
Discharge: HOME | End: 2023-04-25
Payer: MEDICARE

## 2023-04-25 LAB — PSA SERPL-MCNC: 2.3 NG/ML (ref 0–4)

## 2023-04-25 PROCEDURE — 36415 COLL VENOUS BLD VENIPUNCTURE: CPT

## 2023-04-25 PROCEDURE — G0103 PSA SCREENING: HCPCS

## 2023-04-28 ENCOUNTER — CARE COORDINATION (OUTPATIENT)
Dept: CARE COORDINATION | Age: 68
End: 2023-04-28

## 2023-04-28 ASSESSMENT — ENCOUNTER SYMPTOMS: DYSPNEA ASSOCIATED WITH: EXERTION

## 2023-04-28 NOTE — CARE COORDINATION
medication as directed. I will notify my provider of any problems with medications, like adverse effects or side effects. I will notify my provider/Care Coordinator if I am unable to afford my medications. I will notify my provider for advice before I stop taking any of my medication. Barriers: overwhelmed by complexity of regimen and lack of education  Plan for overcoming my barriers: Open communication. Speak with pharmacist to discuss medications  Confidence: 10/10  Anticipated Goal Completion Date: 4/29/23         Other      Conditions and Symptoms   On track      I will schedule office visits, as directed by my provider. I will keep my appointment or reschedule if I have to cancel. I will notify my provider of any barriers to my plan of care. I will follow my Zone Management tool to seek urgent or emergent care. I will notify my provider of any symptoms that indicate a worsening of my condition. Barriers: overwhelmed by complexity of regimen and lack of education  Plan for overcoming my barriers: Look at infor ACM send via mail. Open communication with care team.  Ask questions and voice concerns  Confidence: 10/10  Anticipated Goal Completion Date: 4/29/23                Future Appointments   Date Time Provider Roxana Daniel   5/22/2023  9:30 AM Mary Jane Covarrubias DPM [facility]   6/30/2023  8:30 AM Jose E King MD [facility]   ,   Diabetes Assessment    Medic Alert ID: No  Meal Planning: Avoidance of concentrated sweets   How often do you test your blood sugar?: Daily   Do you have barriers with adherence to non-pharmacologic self-management interventions?  (Nutrition/Exercise/Self-Monitoring): No   Have you ever had to go to the ED for symptoms of low blood sugar?: No       No patient-reported symptoms   Do you have hyperglycemia symptoms?: No   Do you have hypoglycemia symptoms?: No   Last Blood Sugar Value: 135   Blood Sugar Monitoring Regimen: Morning Fasting, Before Meals

## 2023-05-01 ENCOUNTER — CARE COORDINATION (OUTPATIENT)
Dept: CARE COORDINATION | Age: 68
End: 2023-05-01

## 2023-05-01 RX ORDER — TAMSULOSIN HYDROCHLORIDE 0.4 MG/1
0.4 CAPSULE ORAL 2 TIMES DAILY
COMMUNITY

## 2023-05-01 NOTE — CARE COORDINATION
Pt identified by name and   Calling to update med list.  Pt's urologist started him on Tamulosin 0.4mg, twice daily, per pt report.    ACM to add to med list as historical provider

## 2023-05-19 ENCOUNTER — CARE COORDINATION (OUTPATIENT)
Dept: CARE COORDINATION | Age: 68
End: 2023-05-19

## 2023-05-19 NOTE — CARE COORDINATION
Ambulatory Care Coordination Note  2023    Patient Current Location: [facility]     ACM contacted the patient by telephone. Verified name and  with patient as identifiers. Provided introduction to self, and explanation of the ACM role. Challenges to be reviewed by the provider   Additional needs identified to be addressed with provider: No  none               Method of communication with provider: chart routing. ACM: Nuha Caba RN    DM:  Denies s/s of Hypo/Hyperglycemia. Discussed DM Zone Tool and verbalizes understanding. FBS:  Does not have today's reading. COPD:  Denies s/s of COPD Exacerbation. Discussed the Zone Tool and verbalizes understanding. Discussed Red Flag symptoms for which patient should seek emergent care. Patient verbalizes understanding. Denies falls/near falls    Pt has appt 23  with Podiatry for nail care, and 23 with PCP      Offered patient enrollment in the Remote Patient Monitoring (RPM) program for in-home monitoring: NA.    FOLLOW-UP PLAN:    Continue Care Coordination and Assess Plan Of Care  Discuss:   -DM Management/Zone Tool  -COPD Management/Zone Tool  -Current Blood Sugar  -Falls/Near Falls? -Appointment Reminders    Next Anticipated Outreach by [address] [place] [address] Team (or sooner if needed):  3 Weeks  Pt has ACM's contact information to reach out sooner if needed. Lab Results       None            Care Coordination Interventions    Referral from Primary Care Provider: No  Suggested Interventions and Community Resources  Diabetes Education: Declined  Fall Risk Prevention: Declined  Medication Assistance Program: Declined  Medi Set or Pill Pack: Declined  Pharmacist: In Process (Comment: PHP Pharmacist)  Registered Dietician: Declined  Transportation Support: Declined  Zone Management Tools:  In Process (Comment: DM, COPD)          Goals Addressed                      This Visit's Progress       Patient Stated      Medication Management (pt-stated)

## 2023-05-23 RX ORDER — ALBUTEROL SULFATE 90 UG/1
2 AEROSOL, METERED RESPIRATORY (INHALATION) EVERY 4 HOURS PRN
Qty: 18 G | Refills: 0 | Status: SHIPPED | OUTPATIENT
Start: 2023-05-23

## 2023-05-23 NOTE — TELEPHONE ENCOUNTER
Patient called for refill. States the pharmacy has tried to contact this office with no success.    Last seen 3/31/2023  Next appt 6/30/2023  [address] [facility]

## 2023-06-08 ENCOUNTER — CARE COORDINATION (OUTPATIENT)
Dept: CARE COORDINATION | Age: 68
End: 2023-06-08

## 2023-06-08 NOTE — CARE COORDINATION
Left HIPAA compliant message for patient/caregiver to return call to [phone]. Re: Follow up:   DM  COPD  Falls/near falls?   Appointment Reminders  Review Plan of Care

## 2023-06-27 ENCOUNTER — CARE COORDINATION (OUTPATIENT)
Dept: CARE COORDINATION | Age: 68
End: 2023-06-27

## 2023-06-30 ENCOUNTER — OFFICE VISIT (OUTPATIENT)
Dept: FAMILY MEDICINE CLINIC | Age: 68
End: 2023-06-30

## 2023-06-30 VITALS
SYSTOLIC BLOOD PRESSURE: 130 MMHG | DIASTOLIC BLOOD PRESSURE: 82 MMHG | OXYGEN SATURATION: 94 % | HEART RATE: 61 BPM | BODY MASS INDEX: 31.71 KG/M2 | WEIGHT: 221 LBS

## 2023-06-30 DIAGNOSIS — N52.9 ERECTILE DYSFUNCTION, UNSPECIFIED ERECTILE DYSFUNCTION TYPE: ICD-10-CM

## 2023-06-30 DIAGNOSIS — E11.9 TYPE 2 DIABETES MELLITUS WITHOUT COMPLICATION, WITHOUT LONG-TERM CURRENT USE OF INSULIN (HCC): Primary | ICD-10-CM

## 2023-06-30 DIAGNOSIS — F33.1 MAJOR DEPRESSIVE DISORDER, RECURRENT, MODERATE (HCC): ICD-10-CM

## 2023-06-30 DIAGNOSIS — G20 PARKINSON'S DISEASE (HCC): ICD-10-CM

## 2023-06-30 DIAGNOSIS — J44.1 COPD WITH ACUTE EXACERBATION (HCC): ICD-10-CM

## 2023-06-30 DIAGNOSIS — K21.00 GASTROESOPHAGEAL REFLUX DISEASE WITH ESOPHAGITIS WITHOUT HEMORRHAGE: ICD-10-CM

## 2023-06-30 DIAGNOSIS — N39.3 STRESS INCONTINENCE OF URINE: ICD-10-CM

## 2023-06-30 DIAGNOSIS — R53.83 OTHER FATIGUE: ICD-10-CM

## 2023-06-30 DIAGNOSIS — J44.9 CHRONIC OBSTRUCTIVE PULMONARY DISEASE, UNSPECIFIED COPD TYPE (HCC): ICD-10-CM

## 2023-06-30 DIAGNOSIS — E78.2 MIXED HYPERCHOLESTEROLEMIA AND HYPERTRIGLYCERIDEMIA: ICD-10-CM

## 2023-06-30 DIAGNOSIS — I10 PRIMARY HYPERTENSION: ICD-10-CM

## 2023-06-30 LAB — HBA1C MFR BLD: 7.4 %

## 2023-06-30 RX ORDER — EZETIMIBE 10 MG/1
10 TABLET ORAL DAILY
Qty: 90 TABLET | Refills: 1 | Status: SHIPPED | OUTPATIENT
Start: 2023-06-30

## 2023-06-30 RX ORDER — HYDROCHLOROTHIAZIDE 12.5 MG/1
CAPSULE, GELATIN COATED ORAL
Qty: 90 CAPSULE | Refills: 1 | Status: SHIPPED | OUTPATIENT
Start: 2023-06-30

## 2023-06-30 RX ORDER — ROPINIROLE 1 MG/1
1 TABLET, FILM COATED ORAL NIGHTLY
Qty: 90 TABLET | Refills: 1 | Status: SHIPPED | OUTPATIENT
Start: 2023-06-30

## 2023-06-30 RX ORDER — GABAPENTIN 100 MG/1
100 CAPSULE ORAL NIGHTLY
Qty: 90 CAPSULE | Refills: 1 | Status: SHIPPED | OUTPATIENT
Start: 2023-06-30 | End: 2023-12-27

## 2023-06-30 RX ORDER — ROPINIROLE 1 MG/1
TABLET, FILM COATED ORAL
COMMUNITY
Start: 2023-06-17 | End: 2023-06-30 | Stop reason: SDUPTHER

## 2023-06-30 RX ORDER — TAMSULOSIN HYDROCHLORIDE 0.4 MG/1
0.4 CAPSULE ORAL 2 TIMES DAILY
Qty: 90 CAPSULE | Refills: 1 | Status: SHIPPED | OUTPATIENT
Start: 2023-06-30

## 2023-06-30 RX ORDER — FENOFIBRATE 134 MG/1
CAPSULE ORAL
Qty: 90 CAPSULE | Refills: 1 | Status: SHIPPED | OUTPATIENT
Start: 2023-06-30

## 2023-06-30 RX ORDER — GLIPIZIDE 10 MG/1
10 TABLET ORAL EVERY 12 HOURS
Qty: 180 TABLET | Refills: 1 | Status: SHIPPED | OUTPATIENT
Start: 2023-06-30

## 2023-06-30 RX ORDER — LOSARTAN POTASSIUM 50 MG/1
50 TABLET ORAL DAILY
Qty: 90 TABLET | Refills: 1 | Status: SHIPPED | OUTPATIENT
Start: 2023-06-30

## 2023-06-30 RX ORDER — LORATADINE 10 MG/1
10 TABLET ORAL DAILY
Qty: 90 TABLET | Refills: 1 | Status: SHIPPED | OUTPATIENT
Start: 2023-06-30

## 2023-06-30 RX ORDER — PANTOPRAZOLE SODIUM 40 MG/1
40 TABLET, DELAYED RELEASE ORAL DAILY
Qty: 90 TABLET | Refills: 1 | Status: SHIPPED | OUTPATIENT
Start: 2023-06-30

## 2023-06-30 RX ORDER — DULOXETIN HYDROCHLORIDE 60 MG/1
CAPSULE, DELAYED RELEASE ORAL
Qty: 90 CAPSULE | Refills: 1 | Status: SHIPPED | OUTPATIENT
Start: 2023-06-30

## 2023-06-30 RX ORDER — ALBUTEROL SULFATE 90 UG/1
2 AEROSOL, METERED RESPIRATORY (INHALATION) EVERY 4 HOURS PRN
Qty: 18 G | Refills: 0 | Status: SHIPPED | OUTPATIENT
Start: 2023-06-30

## 2023-06-30 RX ORDER — CARVEDILOL 25 MG/1
TABLET ORAL
Qty: 180 TABLET | Refills: 1 | Status: SHIPPED | OUTPATIENT
Start: 2023-06-30

## 2023-06-30 RX ORDER — ATORVASTATIN CALCIUM 80 MG/1
TABLET, FILM COATED ORAL
Qty: 90 TABLET | Refills: 1 | Status: SHIPPED | OUTPATIENT
Start: 2023-06-30

## 2023-06-30 RX ORDER — ROPINIROLE 2 MG/1
4 TABLET, FILM COATED ORAL 2 TIMES DAILY
Qty: 360 TABLET | Refills: 0 | Status: SHIPPED | OUTPATIENT
Start: 2023-06-30

## 2023-06-30 RX ORDER — SILDENAFIL CITRATE 20 MG/1
20 TABLET ORAL DAILY PRN
Qty: 5 TABLET | Refills: 0 | Status: SHIPPED | OUTPATIENT
Start: 2023-06-30

## 2023-06-30 RX ORDER — DOXEPIN HYDROCHLORIDE 10 MG/1
CAPSULE ORAL
Qty: 60 CAPSULE | Refills: 0 | Status: SHIPPED | OUTPATIENT
Start: 2023-06-30

## 2023-06-30 RX ORDER — GLUCOSAMINE HCL/CHONDROITIN SU 500-400 MG
CAPSULE ORAL
Qty: 100 STRIP | Refills: 3 | Status: SHIPPED | OUTPATIENT
Start: 2023-06-30

## 2023-06-30 SDOH — ECONOMIC STABILITY: FOOD INSECURITY: WITHIN THE PAST 12 MONTHS, THE FOOD YOU BOUGHT JUST DIDN'T LAST AND YOU DIDN'T HAVE MONEY TO GET MORE.: NEVER TRUE

## 2023-06-30 SDOH — ECONOMIC STABILITY: FOOD INSECURITY: WITHIN THE PAST 12 MONTHS, YOU WORRIED THAT YOUR FOOD WOULD RUN OUT BEFORE YOU GOT MONEY TO BUY MORE.: NEVER TRUE

## 2023-06-30 SDOH — ECONOMIC STABILITY: INCOME INSECURITY: HOW HARD IS IT FOR YOU TO PAY FOR THE VERY BASICS LIKE FOOD, HOUSING, MEDICAL CARE, AND HEATING?: NOT HARD AT ALL

## 2023-07-27 NOTE — TELEPHONE ENCOUNTER
CALL PATIENT FOR APPOINTMENT IN 2 WEEKS.
Last Appointment   11/13/2020  Next Appointment  Visit date not found    Pharmacy [facility] on [address]
yes

## 2023-08-16 ENCOUNTER — HOSPITAL ENCOUNTER (OUTPATIENT)
Dept: DATA CONVERSION | Facility: HOSPITAL | Age: 68
Discharge: HOME | End: 2023-08-16

## 2023-08-16 DIAGNOSIS — E11.9 TYPE 2 DIABETES MELLITUS WITHOUT COMPLICATIONS (MULTI): ICD-10-CM

## 2023-08-16 DIAGNOSIS — R32 UNSPECIFIED URINARY INCONTINENCE: ICD-10-CM

## 2023-08-16 DIAGNOSIS — R42 DIZZINESS AND GIDDINESS: ICD-10-CM

## 2023-08-16 DIAGNOSIS — R19.7 DIARRHEA, UNSPECIFIED: ICD-10-CM

## 2023-08-16 DIAGNOSIS — Z20.822 CONTACT WITH AND (SUSPECTED) EXPOSURE TO COVID-19: ICD-10-CM

## 2023-08-16 DIAGNOSIS — R05.9 COUGH, UNSPECIFIED: ICD-10-CM

## 2023-08-16 DIAGNOSIS — R50.9 FEVER, UNSPECIFIED: ICD-10-CM

## 2023-08-16 DIAGNOSIS — R10.9 UNSPECIFIED ABDOMINAL PAIN: ICD-10-CM

## 2023-08-16 DIAGNOSIS — R11.0 NAUSEA: ICD-10-CM

## 2023-08-16 LAB
ALBUMIN SERPL-MCNC: 4 GM/DL (ref 3.5–5)
ALBUMIN/GLOB SERPL: 1.3 RATIO (ref 1.5–3)
ALP BLD-CCNC: 74 U/L (ref 35–125)
ALT SERPL-CCNC: 22 U/L (ref 5–40)
ANION GAP SERPL CALCULATED.3IONS-SCNC: 13 MMOL/L (ref 0–19)
AST SERPL-CCNC: 23 U/L (ref 5–40)
BACTERIA UR QL AUTO: POSITIVE
BASOPHILS # BLD AUTO: 0.03 K/UL (ref 0–0.22)
BASOPHILS NFR BLD AUTO: 0.5 % (ref 0–1)
BILIRUB SERPL-MCNC: 0.4 MG/DL (ref 0.1–1.2)
BILIRUB UR QL STRIP.AUTO: NEGATIVE
BUN SERPL-MCNC: 28 MG/DL (ref 8–25)
BUN/CREAT SERPL: 16.5 RATIO (ref 8–21)
CALCIUM SERPL-MCNC: 9.4 MG/DL (ref 8.5–10.4)
CHLORIDE SERPL-SCNC: 101 MMOL/L (ref 97–107)
CLARITY UR: CLEAR
CO2 SERPL-SCNC: 24 MMOL/L (ref 24–31)
COLOR UR: ABNORMAL
CREAT SERPL-MCNC: 1.7 MG/DL (ref 0.4–1.6)
DEPRECATED RDW RBC AUTO: 41.4 FL (ref 37–54)
DIFFERENTIAL METHOD BLD: ABNORMAL
EOSINOPHIL # BLD AUTO: 0.15 K/UL (ref 0–0.45)
EOSINOPHIL NFR BLD: 2.4 % (ref 0–3)
ERYTHROCYTE [DISTWIDTH] IN BLOOD BY AUTOMATED COUNT: 13.2 % (ref 11.7–15)
EUA DISCLAIMER: NORMAL
FLUAV RNA NPH QL NAA+PROBE: NEGATIVE
FLUBV RNA NPH QL NAA+PROBE: NEGATIVE
GFR SERPL CREATININE-BSD FRML MDRD: 44 ML/MIN/1.73 M2
GLOBULIN SER-MCNC: 3.1 G/DL (ref 1.9–3.7)
GLUCOSE SERPL-MCNC: 240 MG/DL (ref 65–99)
GLUCOSE UR STRIP.AUTO-MCNC: >=1000 MG/DL
HCT VFR BLD AUTO: 37.5 % (ref 41–50)
HGB BLD-MCNC: 13 GM/DL (ref 13.5–16.5)
HGB UR QL STRIP.AUTO: 3 /HPF (ref 0–3)
HGB UR QL: NEGATIVE
HS TROPONIN T DELTA: 2 (ref 0–4)
HS TROPONIN T DELTA: NORMAL (ref 0–4)
HYALINE CASTS UR QL AUTO: 3 /LPF
IMM GRANULOCYTES # BLD AUTO: 0.04 K/UL (ref 0–0.1)
KETONES UR QL STRIP.AUTO: NEGATIVE
LEUKOCYTE ESTERASE UR QL STRIP.AUTO: NEGATIVE
LIPASE SERPL-CCNC: 54 U/L (ref 16–63)
LYMPHOCYTES # BLD AUTO: 1.08 K/UL (ref 1.2–3.2)
LYMPHOCYTES NFR BLD MANUAL: 17 % (ref 20–40)
MAGNESIUM SERPL-MCNC: 2.3 MG/DL (ref 1.6–3.1)
MCH RBC QN AUTO: 30 PG (ref 26–34)
MCHC RBC AUTO-ENTMCNC: 34.7 % (ref 31–37)
MCV RBC AUTO: 86.4 FL (ref 80–100)
MICROSCOPIC (UA): ABNORMAL
MONOCYTES # BLD AUTO: 1.03 K/UL (ref 0–0.8)
MONOCYTES NFR BLD MANUAL: 16.2 % (ref 0–8)
NEUTROPHILS # BLD AUTO: 4.03 K/UL
NEUTROPHILS # BLD AUTO: 4.03 K/UL (ref 1.8–7.7)
NEUTROPHILS.IMMATURE NFR BLD: 0.6 % (ref 0–1)
NEUTS SEG NFR BLD: 63.3 % (ref 50–70)
NITRITE UR QL STRIP.AUTO: POSITIVE
NRBC BLD-RTO: 0 /100 WBC
PH UR STRIP.AUTO: 5.5 [PH] (ref 4.6–8)
PLATELET # BLD AUTO: 214 K/UL (ref 150–450)
PMV BLD AUTO: 9.6 CU (ref 7–12.6)
POTASSIUM SERPL-SCNC: 3.8 MMOL/L (ref 3.4–5.1)
PROT SERPL-MCNC: 7.1 G/DL (ref 5.9–7.9)
PROT UR STRIP.AUTO-MCNC: NEGATIVE MG/DL
RBC # BLD AUTO: 4.34 M/UL (ref 4.5–5.5)
SARS-COV-2 RNA SPEC QL NAA+PROBE: NEGATIVE
SODIUM SERPL-SCNC: 138 MMOL/L (ref 133–145)
SP GR UR STRIP.AUTO: 1.04 (ref 1–1.03)
SQUAMOUS UR QL AUTO: ABNORMAL /HPF
TROPONIN T SERPL-MCNC: 10 NG/L
TROPONIN T SERPL-MCNC: 8 NG/L
URINE CULTURE: ABNORMAL
UROBILINOGEN UR QL STRIP.AUTO: NORMAL MG/DL (ref 0–1)
WBC # BLD AUTO: 6.4 K/UL (ref 4.5–11)
WBC #/AREA URNS AUTO: 10 /HPF (ref 0–3)

## 2023-08-19 ENCOUNTER — HOSPITAL ENCOUNTER (EMERGENCY)
Age: 68
Discharge: HOME | End: 2023-08-20
Attending: STUDENT IN AN ORGANIZED HEALTH CARE EDUCATION/TRAINING PROGRAM
Payer: MEDICARE

## 2023-08-19 ENCOUNTER — APPOINTMENT (OUTPATIENT)
Dept: GENERAL RADIOLOGY | Age: 68
End: 2023-08-19
Payer: MEDICARE

## 2023-08-19 DIAGNOSIS — J40 BRONCHITIS: Primary | ICD-10-CM

## 2023-08-19 LAB
ALBUMIN SERPL-MCNC: 4 G/DL (ref 3.5–5.2)
ALP SERPL-CCNC: 52 U/L (ref 40–129)
ALT SERPL-CCNC: 24 U/L (ref 0–40)
ANION GAP SERPL CALCULATED.3IONS-SCNC: 10 MMOL/L (ref 7–16)
AST SERPL-CCNC: 19 U/L (ref 0–39)
BASOPHILS # BLD: 0.04 K/UL (ref 0–0.2)
BASOPHILS NFR BLD: 1 % (ref 0–2)
BILIRUB SERPL-MCNC: 0.2 MG/DL (ref 0–1.2)
BNP SERPL-MCNC: 281 PG/ML (ref 0–450)
BUN SERPL-MCNC: 22 MG/DL (ref 6–23)
CALCIUM SERPL-MCNC: 9.4 MG/DL (ref 8.6–10.2)
CHLORIDE SERPL-SCNC: 107 MMOL/L (ref 98–107)
CO2 SERPL-SCNC: 24 MMOL/L (ref 22–29)
CREAT SERPL-MCNC: 1.4 MG/DL (ref 0.7–1.2)
D DIMER: <200 NG/ML DDU (ref 0–232)
EOSINOPHIL # BLD: 0.23 K/UL (ref 0.05–0.5)
EOSINOPHILS RELATIVE PERCENT: 4 % (ref 0–6)
ERYTHROCYTE [DISTWIDTH] IN BLOOD BY AUTOMATED COUNT: 12.6 % (ref 11.5–15)
GFR SERPL CREATININE-BSD FRML MDRD: 58 ML/MIN/1.73M2
GLUCOSE SERPL-MCNC: 217 MG/DL (ref 74–99)
HCT VFR BLD AUTO: 38.1 % (ref 37–54)
HGB BLD-MCNC: 12.8 G/DL (ref 12.5–16.5)
IMM GRANULOCYTES # BLD AUTO: 0.15 K/UL (ref 0–0.58)
IMM GRANULOCYTES NFR BLD: 3 % (ref 0–5)
LYMPHOCYTES NFR BLD: 1.81 K/UL (ref 1.5–4)
LYMPHOCYTES RELATIVE PERCENT: 33 % (ref 20–42)
MCH RBC QN AUTO: 30.3 PG (ref 26–35)
MCHC RBC AUTO-ENTMCNC: 33.6 G/DL (ref 32–34.5)
MCV RBC AUTO: 90.1 FL (ref 80–99.9)
MONOCYTES NFR BLD: 0.47 K/UL (ref 0.1–0.95)
MONOCYTES NFR BLD: 9 % (ref 2–12)
NEUTROPHILS NFR BLD: 51 % (ref 43–80)
NEUTS SEG NFR BLD: 2.75 K/UL (ref 1.8–7.3)
PLATELET # BLD AUTO: 278 K/UL (ref 130–450)
PMV BLD AUTO: 9.3 FL (ref 7–12)
POTASSIUM SERPL-SCNC: 4.2 MMOL/L (ref 3.5–5)
PROT SERPL-MCNC: 7 G/DL (ref 6.4–8.3)
RBC # BLD AUTO: 4.23 M/UL (ref 3.8–5.8)
SARS-COV-2 RDRP RESP QL NAA+PROBE: NOT DETECTED
SODIUM SERPL-SCNC: 141 MMOL/L (ref 132–146)
SPECIMEN DESCRIPTION: NORMAL
TROPONIN I SERPL HS-MCNC: <6 NG/L (ref 0–11)
WBC OTHER # BLD: 5.5 K/UL (ref 4.5–11.5)

## 2023-08-19 PROCEDURE — 71045 X-RAY EXAM CHEST 1 VIEW: CPT

## 2023-08-19 PROCEDURE — 94640 AIRWAY INHALATION TREATMENT: CPT

## 2023-08-19 PROCEDURE — 80053 COMPREHEN METABOLIC PANEL: CPT

## 2023-08-19 PROCEDURE — 87635 SARS-COV-2 COVID-19 AMP PRB: CPT

## 2023-08-19 PROCEDURE — 83880 ASSAY OF NATRIURETIC PEPTIDE: CPT

## 2023-08-19 PROCEDURE — 36415 COLL VENOUS BLD VENIPUNCTURE: CPT

## 2023-08-19 PROCEDURE — 84484 ASSAY OF TROPONIN QUANT: CPT

## 2023-08-19 PROCEDURE — 93005 ELECTROCARDIOGRAM TRACING: CPT | Performed by: STUDENT IN AN ORGANIZED HEALTH CARE EDUCATION/TRAINING PROGRAM

## 2023-08-19 PROCEDURE — 6360000002 HC RX W HCPCS: Performed by: STUDENT IN AN ORGANIZED HEALTH CARE EDUCATION/TRAINING PROGRAM

## 2023-08-19 PROCEDURE — 85025 COMPLETE CBC W/AUTO DIFF WBC: CPT

## 2023-08-19 PROCEDURE — 99285 EMERGENCY DEPT VISIT HI MDM: CPT

## 2023-08-19 PROCEDURE — 96374 THER/PROPH/DIAG INJ IV PUSH: CPT

## 2023-08-19 PROCEDURE — 6370000000 HC RX 637 (ALT 250 FOR IP): Performed by: STUDENT IN AN ORGANIZED HEALTH CARE EDUCATION/TRAINING PROGRAM

## 2023-08-19 PROCEDURE — 85379 FIBRIN DEGRADATION QUANT: CPT

## 2023-08-19 RX ORDER — IPRATROPIUM BROMIDE AND ALBUTEROL SULFATE 2.5; .5 MG/3ML; MG/3ML
1 SOLUTION RESPIRATORY (INHALATION) ONCE
Status: COMPLETED | OUTPATIENT
Start: 2023-08-19 | End: 2023-08-19

## 2023-08-19 RX ADMIN — METHYLPREDNISOLONE SODIUM SUCCINATE 60 MG: 125 INJECTION, POWDER, FOR SOLUTION INTRAMUSCULAR; INTRAVENOUS at 23:50

## 2023-08-19 RX ADMIN — IPRATROPIUM BROMIDE AND ALBUTEROL SULFATE 1 DOSE: .5; 2.5 SOLUTION RESPIRATORY (INHALATION) at 20:45

## 2023-08-19 ASSESSMENT — LIFESTYLE VARIABLES: HOW OFTEN DO YOU HAVE A DRINK CONTAINING ALCOHOL: NEVER

## 2023-08-20 VITALS
RESPIRATION RATE: 16 BRPM | SYSTOLIC BLOOD PRESSURE: 142 MMHG | OXYGEN SATURATION: 96 % | WEIGHT: 220 LBS | BODY MASS INDEX: 31.57 KG/M2 | DIASTOLIC BLOOD PRESSURE: 76 MMHG | HEART RATE: 62 BPM | TEMPERATURE: 98 F

## 2023-08-20 LAB
EKG ATRIAL RATE: 56 BPM
EKG P AXIS: 46 DEGREES
EKG P-R INTERVAL: 166 MS
EKG Q-T INTERVAL: 430 MS
EKG QRS DURATION: 98 MS
EKG QTC CALCULATION (BAZETT): 414 MS
EKG R AXIS: 23 DEGREES
EKG T AXIS: 27 DEGREES
EKG VENTRICULAR RATE: 56 BPM
TROPONIN I SERPL HS-MCNC: 8 NG/L (ref 0–11)

## 2023-08-20 PROCEDURE — 93010 ELECTROCARDIOGRAM REPORT: CPT | Performed by: INTERNAL MEDICINE

## 2023-08-20 PROCEDURE — 6370000000 HC RX 637 (ALT 250 FOR IP): Performed by: STUDENT IN AN ORGANIZED HEALTH CARE EDUCATION/TRAINING PROGRAM

## 2023-08-20 RX ORDER — CEFDINIR 300 MG/1
300 CAPSULE ORAL 2 TIMES DAILY
Qty: 14 CAPSULE | Refills: 0 | Status: SHIPPED | OUTPATIENT
Start: 2023-08-20 | End: 2023-08-27

## 2023-08-20 RX ORDER — DOXYCYCLINE HYCLATE 100 MG
100 TABLET ORAL 2 TIMES DAILY
Qty: 14 TABLET | Refills: 0 | Status: SHIPPED | OUTPATIENT
Start: 2023-08-20 | End: 2023-08-27

## 2023-08-20 RX ORDER — DOXYCYCLINE HYCLATE 100 MG/1
100 CAPSULE ORAL ONCE
Status: COMPLETED | OUTPATIENT
Start: 2023-08-20 | End: 2023-08-20

## 2023-08-20 RX ORDER — CEFDINIR 300 MG/1
300 CAPSULE ORAL ONCE
Status: COMPLETED | OUTPATIENT
Start: 2023-08-20 | End: 2023-08-20

## 2023-08-20 RX ADMIN — CEFDINIR 300 MG: 300 CAPSULE ORAL at 00:30

## 2023-08-20 RX ADMIN — DOXYCYCLINE HYCLATE 100 MG: 100 CAPSULE ORAL at 00:30

## 2023-08-20 NOTE — DISCHARGE INSTRUCTIONS
Stop Keflex  Start doxycycline and cefdinir  Take steroids as prescribed  Follow-up with primary care doctor  If you notice any new worrisome symptoms please return to emergency department for evaluation

## 2023-08-20 NOTE — ED PROVIDER NOTES
Reassessment:   Patient is a 31-year-old male past medical history of type 2 diabetes, COPD, hypertension. Patient presents with chief complaint of shortness of breath. Vital signs stable on presentation. On physical exam heart regular rate and rhythm, lungs with coarse breath sounds bilaterally with mild end expiratory wheezing. Abdomen soft nontender no rigidity rebound or guarding. Differential diagnosis includes COPD exacerbation, pneumonia, viral illness, PE, ACS. EKG obtained demonstrate no acute ischemic changes. Laboratory work obtained CBC unremarkable, CMP demonstrated mildly elevated sugar of 217, proBNP 281, troponin was obtained and was less than 6 on repeat 8. COVID test was obtained and was negative, D-dimer less than 200. Chest x-ray obtained demonstrated no acute abnormalities. Findings most consistent with shortness of breath likely secondary to COPD exacerbation. Patient given 1 DuoNeb as well as 60 mg of Solu-Medrol. On repeat evaluation patient notes improvement in symptoms. Patient was ambulated on room air did not become hypoxic no significant shortness of breath. Decision made to discharge patient. Patient does report that he has history of urinary tract infection and is on Keflex we will transition patient to cefdinir patient given first dose of 300 mg p.o. cefdinir emergency department. We will also start patient on doxycycline for COPD exacerbation patient given 100 mg p.o. in the emergency department. Decision made to discharge patient. Patient instructed to follow-up with primary care doctor. Strict return precautions discussed with patient. Patient discharged home in stable condition. Disposition Considerations (Tests not ordered but considered, Shared Decision Making, Pt Expectation of Test or Tx.): Shared decision making discussed with patient. Laboratory work and imaging is reassuring. Patient does note improvement after treatment in the emergency department.
Vaginal

## 2023-08-20 NOTE — ED NOTES
Pt ambulated around unit and pulse ox remained at %. No distress.       Lexy Haddad RN  08/19/23 [id number]

## 2023-09-06 ENCOUNTER — HOSPITAL ENCOUNTER (EMERGENCY)
Age: 68
Discharge: HOME | End: 2023-09-06
Attending: EMERGENCY MEDICINE
Payer: COMMERCIAL

## 2023-09-06 ENCOUNTER — APPOINTMENT (OUTPATIENT)
Dept: GENERAL RADIOLOGY | Age: 68
End: 2023-09-06
Payer: COMMERCIAL

## 2023-09-06 VITALS
BODY MASS INDEX: 30.8 KG/M2 | RESPIRATION RATE: 16 BRPM | HEIGHT: 71 IN | OXYGEN SATURATION: 96 % | DIASTOLIC BLOOD PRESSURE: 62 MMHG | SYSTOLIC BLOOD PRESSURE: 110 MMHG | HEART RATE: 60 BPM | WEIGHT: 220 LBS | TEMPERATURE: 97.3 F

## 2023-09-06 DIAGNOSIS — M25.561 ACUTE PAIN OF RIGHT KNEE: Primary | ICD-10-CM

## 2023-09-06 PROCEDURE — 6360000002 HC RX W HCPCS

## 2023-09-06 PROCEDURE — 99284 EMERGENCY DEPT VISIT MOD MDM: CPT

## 2023-09-06 PROCEDURE — 73562 X-RAY EXAM OF KNEE 3: CPT

## 2023-09-06 PROCEDURE — 96376 TX/PRO/DX INJ SAME DRUG ADON: CPT

## 2023-09-06 PROCEDURE — 96375 TX/PRO/DX INJ NEW DRUG ADDON: CPT

## 2023-09-06 PROCEDURE — 6370000000 HC RX 637 (ALT 250 FOR IP)

## 2023-09-06 PROCEDURE — 96374 THER/PROPH/DIAG INJ IV PUSH: CPT

## 2023-09-06 RX ORDER — KETOROLAC TROMETHAMINE 15 MG/ML
15 INJECTION, SOLUTION INTRAMUSCULAR; INTRAVENOUS ONCE
Status: COMPLETED | OUTPATIENT
Start: 2023-09-06 | End: 2023-09-06

## 2023-09-06 RX ORDER — FENTANYL CITRATE 0.05 MG/ML
50 INJECTION, SOLUTION INTRAMUSCULAR; INTRAVENOUS ONCE
Status: COMPLETED | OUTPATIENT
Start: 2023-09-06 | End: 2023-09-06

## 2023-09-06 RX ORDER — NAPROXEN 500 MG/1
500 TABLET ORAL 2 TIMES DAILY PRN
Qty: 60 TABLET | Refills: 0 | Status: SHIPPED | OUTPATIENT
Start: 2023-09-06

## 2023-09-06 RX ORDER — OXYCODONE HYDROCHLORIDE AND ACETAMINOPHEN 5; 325 MG/1; MG/1
1 TABLET ORAL ONCE
Status: COMPLETED | OUTPATIENT
Start: 2023-09-06 | End: 2023-09-06

## 2023-09-06 RX ADMIN — FENTANYL CITRATE 50 MCG: 0.05 INJECTION, SOLUTION INTRAMUSCULAR; INTRAVENOUS at 13:49

## 2023-09-06 RX ADMIN — KETOROLAC TROMETHAMINE 15 MG: 15 INJECTION, SOLUTION INTRAMUSCULAR; INTRAVENOUS at 13:48

## 2023-09-06 RX ADMIN — FENTANYL CITRATE 50 MCG: 0.05 INJECTION, SOLUTION INTRAMUSCULAR; INTRAVENOUS at 14:53

## 2023-09-06 RX ADMIN — OXYCODONE AND ACETAMINOPHEN 1 TABLET: 325; 5 TABLET ORAL at 14:53

## 2023-09-06 ASSESSMENT — ENCOUNTER SYMPTOMS: COLOR CHANGE: 0

## 2023-09-06 NOTE — DISCHARGE INSTRUCTIONS
DISCHARGE INSTRUCTIONS    Even though you have been discharged from the Emergency Department, there are several things that you should do to ensure that you receive proper care:    1. DO READ your discharge instructions as these contain important information concerning your medical care. 2. If medication has been prescribed for your condition, fill the prescription as soon as possible and follow the directions on the medication. 3. RETURN AT ONCE TO THE EMERGENCY DEPARTMENT if you have any problems or concerns. These include but are not limited to fever, worsening pain(belly, chest, head, etc...), worsening shortness of breath, uncontrollable bleeding, inability to tolerate food and water, or any condition that makes you question your well-being. Also, if your symptoms do not improve in the next 12-24 hours, return to the ER or seek medical care immediately. 4. Be sure to follow up with your regular physician or specialist as instructed at discharge as this is the best way to ensure that you receive the very best of care. If you do not have a primary care physician, please contact a physician group and make an appointment. 5. Please visit [employer] for coupons regarding your prescriptions. It is a free service for you to use and can help reduce the cost of your medication.     We would like to thank you for coming today and our hope is that we served you and your family well during your stay

## 2023-09-13 ENCOUNTER — OFFICE VISIT (OUTPATIENT)
Dept: CARDIOLOGY CLINIC | Age: 68
End: 2023-09-13
Payer: MEDICARE

## 2023-09-13 VITALS
DIASTOLIC BLOOD PRESSURE: 62 MMHG | SYSTOLIC BLOOD PRESSURE: 142 MMHG | WEIGHT: 212 LBS | HEART RATE: 61 BPM | HEIGHT: 70 IN | BODY MASS INDEX: 30.35 KG/M2

## 2023-09-13 DIAGNOSIS — F33.1 MAJOR DEPRESSIVE DISORDER, RECURRENT, MODERATE (HCC): ICD-10-CM

## 2023-09-13 DIAGNOSIS — R06.02 SHORTNESS OF BREATH: ICD-10-CM

## 2023-09-13 DIAGNOSIS — I51.9 HEART PROBLEM: Primary | ICD-10-CM

## 2023-09-13 PROCEDURE — 99204 OFFICE O/P NEW MOD 45 MIN: CPT | Performed by: INTERNAL MEDICINE

## 2023-09-13 PROCEDURE — 93000 ELECTROCARDIOGRAM COMPLETE: CPT | Performed by: INTERNAL MEDICINE

## 2023-09-13 PROCEDURE — G8417 CALC BMI ABV UP PARAM F/U: HCPCS | Performed by: INTERNAL MEDICINE

## 2023-09-13 PROCEDURE — 3077F SYST BP >= 140 MM HG: CPT | Performed by: INTERNAL MEDICINE

## 2023-09-13 PROCEDURE — 3078F DIAST BP <80 MM HG: CPT | Performed by: INTERNAL MEDICINE

## 2023-09-13 PROCEDURE — G8427 DOCREV CUR MEDS BY ELIG CLIN: HCPCS | Performed by: INTERNAL MEDICINE

## 2023-09-13 RX ORDER — DOXEPIN HYDROCHLORIDE 10 MG/1
CAPSULE ORAL
Qty: 60 CAPSULE | Refills: 0 | Status: SHIPPED | OUTPATIENT
Start: 2023-09-13

## 2023-09-13 NOTE — PROGRESS NOTES
to varus and valgus at 0 and 30 degrees of flexion. Negative Lachman's and posterior drawer. Negative patellar grind test and J sign. Compartments soft and compressible throughout leg. Active range of motion 0-120 with pain. Positive Pauly's positive Apley's, gait is antalgic        Imaging:  XR KNEE RIGHT (3 VIEWS)    Result Date: 9/6/2023  EXAMINATION: THREE XRAY VIEWS OF THE RIGHT KNEE 9/6/2023 2:00 pm COMPARISON: None. HISTORY: ORDERING SYSTEM PROVIDED HISTORY: concern for dislocation and/or fracture TECHNOLOGIST PROVIDED HISTORY: Reason for exam:->concern for dislocation and/or fracture FINDINGS: No acute bony abnormalities. Anchoring devices in the distal femur and proximal tibia. Mild osteoarthritis. Soft tissues are within normal limits. No acute bony abnormalities. Shalom Fonseca was seen today for knee pain. Diagnoses and all orders for this visit:    Tear of meniscus of right knee, unspecified meniscus, unspecified tear type, unspecified whether old or current tear  -     MRI KNEE RIGHT WO CONTRAST; Future    Recurrent right knee instability    Exercise counseling    Activity of daily living alteration    Rupture of anterior cruciate ligament of right knee, initial encounter        Patient seen and examined. X-rays reviewed. Patient has little to no arthritis noted on x-rays today. However patient's main complaint is instability of symptomatic knee. Exam and history is consistent with possible meniscus tear. Patient was counseled on weight loss, healthy diet, and physical activity relating to this condition. He was educated with options in detail including nutrition, joining a health club/ weight loss program, and use of cardio equipment such as the Arc Trainer and the importance of use as well as range of motion and HEP exercises for weight loss and general health. MRI recommended for further evaluation management.   Follow-up after MRI        Diamond Salinas DO          30 minutes

## 2023-09-13 NOTE — PROGRESS NOTES
PM    RBC 4.23 08/19/2023 08:26 PM    HGB 12.8 08/19/2023 08:26 PM    HCT 38.1 08/19/2023 08:26 PM    MCV 90.1 08/19/2023 08:26 PM    RDW 12.6 08/19/2023 08:26 PM     08/19/2023 08:26 PM     PT/INR:  No results found for: \"PTINR\"  PT/INR Warfarin:  No components found for: \"PTPATWAR\", \"PTINRWAR\"  PTT:  No results found for: \"APTT\"  PTT Heparin:  No components found for: \"APTTHEP\"  Magnesium:    Lab Results   Component Value Date/Time    MG 1.8 08/29/2021 08:35 PM     TSH:  No results found for: \"TSH\"  TROPONIN:  No components found for: \"TROP\"  BNP:  No results found for: \"BNP\"  FASTING LIPID PANEL:    Lab Results   Component Value Date/Time    CHOL 160 03/30/2023 11:12 AM    HDL 40 03/30/2023 11:12 AM    TRIG 164 03/30/2023 11:12 AM     No orders to display     I have personally reviewed the laboratory, cardiac diagnostic and radiographic testing as outlined above:      IMPRESSION:  1. Dyspnea exertion: Etiology?,  Pulmonary versus cardiac, several risk factors for CAD including hypertension, diabetes mellitus and hyperlipidemia, no recent functional ischemic evaluation, will schedule for Lexiscan stress test for further evaluation. 2.  Hypertension: Controlled  3. Hyperlipidemia: On statin  4. Family history of CAD  5. Type 2 diabetes mellitus    RECOMMENDATIONS:   1. Lexiscan stress test  2. Patient was strongly advised to call 911 if symptoms recur or get worse for any reason  3. Continue current treatment  4. Follow-up with Dr. Ericka Smith as scheduled  5. Follow-up with Dr. Rand Deluna after his test    I have reviewed my findings and recommendations with patient    Thank you for the consult  Electronically signed by Saulo Aceves MD on 9/13/2023 at 2:05 PM      NOTE: This report was transcribed using voice recognition software.  Every effort was made to ensure accuracy; however, inadvertent computerized transcription errors may be present

## 2023-09-14 ENCOUNTER — OFFICE VISIT (OUTPATIENT)
Dept: ORTHOPEDIC SURGERY | Age: 68
End: 2023-09-14
Payer: MEDICARE

## 2023-09-14 VITALS — BODY MASS INDEX: 30.35 KG/M2 | TEMPERATURE: 98 F | WEIGHT: 212 LBS | HEIGHT: 70 IN

## 2023-09-14 DIAGNOSIS — S83.511A RUPTURE OF ANTERIOR CRUCIATE LIGAMENT OF RIGHT KNEE, INITIAL ENCOUNTER: ICD-10-CM

## 2023-09-14 DIAGNOSIS — Z71.82 EXERCISE COUNSELING: ICD-10-CM

## 2023-09-14 DIAGNOSIS — Z78.9 ACTIVITY OF DAILY LIVING ALTERATION: ICD-10-CM

## 2023-09-14 DIAGNOSIS — M23.51 RECURRENT RIGHT KNEE INSTABILITY: ICD-10-CM

## 2023-09-14 DIAGNOSIS — S83.206A TEAR OF MENISCUS OF RIGHT KNEE, UNSPECIFIED MENISCUS, UNSPECIFIED TEAR TYPE, UNSPECIFIED WHETHER OLD OR CURRENT TEAR: Primary | ICD-10-CM

## 2023-09-14 PROCEDURE — 99214 OFFICE O/P EST MOD 30 MIN: CPT | Performed by: ORTHOPAEDIC SURGERY

## 2023-09-14 PROCEDURE — G8427 DOCREV CUR MEDS BY ELIG CLIN: HCPCS | Performed by: ORTHOPAEDIC SURGERY

## 2023-09-14 PROCEDURE — 1123F ACP DISCUSS/DSCN MKR DOCD: CPT | Performed by: ORTHOPAEDIC SURGERY

## 2023-09-14 PROCEDURE — 3017F COLORECTAL CA SCREEN DOC REV: CPT | Performed by: ORTHOPAEDIC SURGERY

## 2023-09-14 PROCEDURE — 1036F TOBACCO NON-USER: CPT | Performed by: ORTHOPAEDIC SURGERY

## 2023-09-14 PROCEDURE — G8417 CALC BMI ABV UP PARAM F/U: HCPCS | Performed by: ORTHOPAEDIC SURGERY

## 2023-09-22 ENCOUNTER — OFFICE VISIT (OUTPATIENT)
Dept: FAMILY MEDICINE CLINIC | Age: 68
End: 2023-09-22

## 2023-09-22 VITALS
SYSTOLIC BLOOD PRESSURE: 110 MMHG | BODY MASS INDEX: 30.71 KG/M2 | DIASTOLIC BLOOD PRESSURE: 70 MMHG | WEIGHT: 214 LBS | HEART RATE: 51 BPM | OXYGEN SATURATION: 97 %

## 2023-09-22 DIAGNOSIS — G20 PARKINSON'S DISEASE (HCC): ICD-10-CM

## 2023-09-22 DIAGNOSIS — I10 PRIMARY HYPERTENSION: ICD-10-CM

## 2023-09-22 DIAGNOSIS — E11.9 TYPE 2 DIABETES MELLITUS WITHOUT COMPLICATION, WITHOUT LONG-TERM CURRENT USE OF INSULIN (HCC): Primary | ICD-10-CM

## 2023-09-22 DIAGNOSIS — E78.2 MIXED HYPERCHOLESTEROLEMIA AND HYPERTRIGLYCERIDEMIA: ICD-10-CM

## 2023-09-22 DIAGNOSIS — F33.1 MAJOR DEPRESSIVE DISORDER, RECURRENT, MODERATE (HCC): ICD-10-CM

## 2023-09-22 DIAGNOSIS — J44.9 CHRONIC OBSTRUCTIVE PULMONARY DISEASE, UNSPECIFIED COPD TYPE (HCC): ICD-10-CM

## 2023-09-22 LAB
EXPIRATORY TIME-POST: NORMAL
EXPIRATORY TIME-POST: NORMAL
EXPIRATORY TIME: NORMAL
EXPIRATORY TIME: NORMAL
FEF 25-75% %CHNG: NORMAL
FEF 25-75% %CHNG: NORMAL
FEF 25-75% %PRED-POST: NORMAL
FEF 25-75% %PRED-POST: NORMAL
FEF 25-75% %PRED-PRE: NORMAL
FEF 25-75% %PRED-PRE: NORMAL
FEF 25-75% PRED: NORMAL
FEF 25-75% PRED: NORMAL
FEF 25-75%-POST: NORMAL
FEF 25-75%-POST: NORMAL
FEF 25-75%-PRE: NORMAL
FEF 25-75%-PRE: NORMAL
FEV1 %PRED-POST: NORMAL
FEV1 %PRED-POST: NORMAL
FEV1 %PRED-PRE: NORMAL
FEV1 %PRED-PRE: NORMAL
FEV1 PRED: NORMAL
FEV1 PRED: NORMAL
FEV1-POST: NORMAL
FEV1-POST: NORMAL
FEV1-PRE: NORMAL
FEV1-PRE: NORMAL
FEV1/FVC %PRED-POST: NORMAL
FEV1/FVC %PRED-POST: NORMAL
FEV1/FVC %PRED-PRE: NORMAL
FEV1/FVC %PRED-PRE: NORMAL
FEV1/FVC PRED: NORMAL
FEV1/FVC PRED: NORMAL
FEV1/FVC-POST: NORMAL
FEV1/FVC-POST: NORMAL
FEV1/FVC-PRE: NORMAL
FEV1/FVC-PRE: NORMAL
FVC %PRED-POST: NORMAL
FVC %PRED-POST: NORMAL
FVC %PRED-PRE: NORMAL
FVC %PRED-PRE: NORMAL
FVC PRED: NORMAL
FVC PRED: NORMAL
FVC-POST: NORMAL
FVC-POST: NORMAL
FVC-PRE: NORMAL
FVC-PRE: NORMAL
PEF %PRED-POST: NORMAL
PEF %PRED-POST: NORMAL
PEF %PRED-PRE: NORMAL
PEF %PRED-PRE: NORMAL
PEF PRED: NORMAL
PEF PRED: NORMAL
PEF%CHNG: NORMAL
PEF%CHNG: NORMAL
PEF-POST: NORMAL
PEF-POST: NORMAL
PEF-PRE: NORMAL
PEF-PRE: NORMAL

## 2023-09-22 RX ORDER — DOXEPIN HYDROCHLORIDE 25 MG/1
CAPSULE ORAL
Qty: 90 CAPSULE | Refills: 1 | Status: SHIPPED | OUTPATIENT
Start: 2023-09-22

## 2023-09-22 NOTE — PROGRESS NOTES
OFFICE PROGRESS NOTE      SUBJECTIVE:        Patient ID:   Darylene Gaskins is a 80 y.o. male who presents for   Chief Complaint   Patient presents with    Cough     X 1 month  Was on 2 different antibiotics           HPI:             Prior to Admission medications    Medication Sig Start Date End Date Taking? Authorizing Provider   doxepin (SINEQUAN) 25 MG capsule TAKE TWO CAPSULES BY MOUTH DAILY AT 9PM AT BEDTIME FOR INSOMNIA 9/22/23  Yes Len Lopez MD   fluticasone-umeclidin-vilant (TRELEGY ELLIPTA) 536-68.8-05 MCG/ACT AEPB inhaler Inhale 1 puff into the lungs daily 9/22/23  Yes Len Lopez MD   rOPINIRole (REQUIP) 2 MG tablet Take 2 tablets by mouth 2 times daily 6/30/23  Yes Len Lopez MD   losartan (COZAAR) 50 MG tablet Take 1 tablet by mouth daily 6/30/23  Yes Len Lopez MD   hydroCHLOROthiazide (MICROZIDE) 12.5 MG capsule TAKE ONE CAPSULE BY MOUTH DAILY AT 9AM 6/30/23  Yes Len Lopez MD   glipiZIDE (GLUCOTROL) 10 MG tablet Take 1 tablet by mouth in the morning and 1 tablet in the evening. 6/30/23  Yes Len Lopez MD   gabapentin (NEURONTIN) 100 MG capsule Take 1 capsule by mouth nightly for 180 days.  Intended supply: 90 days 6/30/23 12/27/23 Yes Len Lopez MD   fenofibrate micronized (LOFIBRA) 134 MG capsule TAKE ONE CAPSULE BY MOUTH DAILY AT 9AM every morning before breakfast 6/30/23  Yes Len Lopez MD   ezetimibe (ZETIA) 10 MG tablet Take 1 tablet by mouth daily 6/30/23  Yes Len Lopez MD   DULoxetine (CYMBALTA) 60 MG extended release capsule TAKE ONE CAPSULE BY MOUTH DAILY AT 9AM 6/30/23  Yes Len Lopez MD   dapagliflozin (FARXIGA) 10 MG tablet Take 1 tablet by mouth every morning 6/30/23  Yes Len Lopez MD   carvedilol (COREG) 25 MG tablet TAKE ONE TABLET BY MOUTH TWICE DAILY @ 9AM & 5PM 6/30/23  Yes Len Lopez MD   atorvastatin (LIPITOR) 80 MG tablet TAKE ONE TABLET BY MOUTH DAILY AT 5PM IN EVENING AT BEDTIME FOR

## 2023-09-22 NOTE — PATIENT INSTRUCTIONS
LOW SALT FOR BLOOD PRESSURE CONTROL. LOW CARBOHYDRATE FOR BLOOD SUGAR AND WEIGHT CONTROL. LOW FAT DIET FOR CHOLESTEROL CONTROL. DRINK ENOUGH FLUIDS FOR BETTER KIDNEY FUNCTION. TAKE COREG 25 MG. 2 TIMES A DAY,COZAAR 50 MG. AND  HCTZ 12.5 MG. DAILY FOR BLOOD PRESSURE CONTROL. TAKE FARXIGA 10 MG. DAILY AND GLUCOTROL 10  MG. 2 TIMES A DAY FOR BLOOD SUGAR CONTROL. START TAKING RYBELSUS 3 MG. DAILY FOR IMPROVING BLOOD SUGAR CONTROL. TAKE LILOFIBRA 134 MG. IN THE MORNING AND LIPITOR  40 MG. NIGHTLY FOR CHOLESTEROL CONTROL. Efren Beatrismaryse TAKE SINEQUAN, CYMBALTA AS PER PSYCHIATRIST RECOMMENDATION  FOR MOOD CONTROL. INCREASE SINEQUAN TO 25 MG. FOR BETTER SLEEP. TAKE REQUIP 2 MG. 2 TIMES A DAY FOR PAIN RELIEF. TAKE PROTONIX 40 MG. DAILY FOR STOMACH ACID CONTROL. TAKE GABAPENTIN 300 MG. NIGHTLY FOR PAIN RELIEF. INHALE TRELEGY 1 PUFF DAILY FOR BREATHING CONTROL. REGULAR WALKING ADVISED. ADVISED WEIGHT REDUCTION. FOLLOW UP  DR. KEREN PEREZ  FOR BLADDER CONTROL PROBLEM. SEE [facility] CARDIOLOGIST AS SCHEDULED. FASTING FOR LAB WORK ONE MORNING. KEEP NEXT APPOINTMENT IN 1 MONTH.

## 2023-09-28 ENCOUNTER — TELEPHONE (OUTPATIENT)
Dept: CARDIOLOGY | Age: 68
End: 2023-09-28

## 2023-09-28 ENCOUNTER — TELEPHONE (OUTPATIENT)
Dept: CARDIOLOGY CLINIC | Age: 68
End: 2023-09-28

## 2023-09-28 NOTE — TELEPHONE ENCOUNTER
Spoke with patient and confirmed Lexiscan stress test appointment on October 3, 2023 at 0930. Instructions for test,medications, hold diabetic medications morning of test, and COVID-19 preprocedure information reviewed with patient, questions answered. Patient verbalized understanding.

## 2023-10-03 ENCOUNTER — HOSPITAL ENCOUNTER (OUTPATIENT)
Dept: CARDIOLOGY | Age: 68
Discharge: HOME | End: 2023-10-03

## 2023-10-10 ENCOUNTER — OFFICE VISIT (OUTPATIENT)
Dept: ORTHOPEDIC SURGERY | Age: 68
End: 2023-10-10

## 2023-10-10 ENCOUNTER — TELEPHONE (OUTPATIENT)
Dept: CARDIOLOGY | Age: 68
End: 2023-10-10

## 2023-10-10 VITALS — TEMPERATURE: 98 F | HEIGHT: 70 IN | BODY MASS INDEX: 30.64 KG/M2 | WEIGHT: 214 LBS

## 2023-10-10 DIAGNOSIS — S83.511A RUPTURE OF ANTERIOR CRUCIATE LIGAMENT OF RIGHT KNEE, INITIAL ENCOUNTER: ICD-10-CM

## 2023-10-10 DIAGNOSIS — Z78.9 ACTIVITY OF DAILY LIVING ALTERATION: ICD-10-CM

## 2023-10-10 DIAGNOSIS — M65.9 SYNOVITIS OF RIGHT KNEE: ICD-10-CM

## 2023-10-10 DIAGNOSIS — Z71.82 EXERCISE COUNSELING: ICD-10-CM

## 2023-10-10 DIAGNOSIS — S83.206A TEAR OF MENISCUS OF RIGHT KNEE, UNSPECIFIED MENISCUS, UNSPECIFIED TEAR TYPE, UNSPECIFIED WHETHER OLD OR CURRENT TEAR: Primary | ICD-10-CM

## 2023-10-10 DIAGNOSIS — M23.51 RECURRENT RIGHT KNEE INSTABILITY: ICD-10-CM

## 2023-10-10 RX ORDER — TRIAMCINOLONE ACETONIDE 40 MG/ML
40 INJECTION, SUSPENSION INTRA-ARTICULAR; INTRAMUSCULAR ONCE
Status: COMPLETED | OUTPATIENT
Start: 2023-10-10 | End: 2023-10-10

## 2023-10-10 RX ADMIN — TRIAMCINOLONE ACETONIDE 40 MG: 40 INJECTION, SUSPENSION INTRA-ARTICULAR; INTRAMUSCULAR at 15:50

## 2023-10-10 NOTE — PROGRESS NOTES
(COREG) 25 MG tablet, TAKE ONE TABLET BY MOUTH TWICE DAILY @ 9AM & 5PM, Disp: 180 tablet, Rfl: 1    atorvastatin (LIPITOR) 80 MG tablet, TAKE ONE TABLET BY MOUTH DAILY AT 5PM IN EVENING AT BEDTIME FOR CHOLESTEROL, Disp: 90 tablet, Rfl: 1    albuterol sulfate HFA (VENTOLIN HFA) 108 (90 Base) MCG/ACT inhaler, Inhale 2 puffs into the lungs every 4 hours as needed for Wheezing or Shortness of Breath, Disp: 18 g, Rfl: 0    tamsulosin (FLOMAX) 0.4 MG capsule, Take 1 capsule by mouth 2 times daily, Disp: 90 capsule, Rfl: 1    loratadine (CLARITIN) 10 MG tablet, Take 1 tablet by mouth daily, Disp: 90 tablet, Rfl: 1    Lancets 30G MISC, 1 each by Does not apply route 3 times daily Dispense what ever brand is covered by insurance, Disp: 100 each, Rfl: 3    blood glucose monitor strips, Test 3 times a day & as needed for symptoms of irregular blood glucose, Disp: 100 strip, Rfl: 3    Semaglutide 3 MG TABS, Take 1 tablet by mouth daily, Disp: 90 tablet, Rfl: 1    ammonium lactate (LAC-HYDRIN) 12 % lotion, Apply topically twice daily, Disp: 400 g, Rfl: 2    Continuous Blood Gluc Sensor (FREESTYLE CARLA 14 DAY SENSOR) MIS, Apply every 14 days, Disp: 6 each, Rfl: 1    Continuous Blood Gluc  (FREESTYLE CARLA 14 DAY READER) FIDEL, Use as directed, Disp: 1 each, Rfl: 0    Ascorbic Acid (VITAMIN C) 1000 MG tablet, Take 1 tablet by mouth daily, Disp: , Rfl:     Cholecalciferol (VITAMIN D3) 125 MCG (5000 UT) TABS, Take by mouth daily, Disp: , Rfl:     vitamin B-12 (CYANOCOBALAMIN) 1000 MCG tablet, Take 1 tablet by mouth daily, Disp: , Rfl:     Blood Glucose Monitoring Suppl (BLOOD GLUCOSE MONITOR SYSTEM) w/Device KIT, 1 each by Does not apply route 3 times daily Dispense what ever brand is covered by insurance, Disp: 1 kit, Rfl: 0  Allergies   Allergen Reactions    Adhesive Tape Rash     Social History     Socioeconomic History    Marital status:      Spouse name: Not on file    Number of children: 0    Years of

## 2023-10-10 NOTE — TELEPHONE ENCOUNTER
Spoke with patient and confirmed Lexiscan stress test appointment on October 12, 2023 at 0830. Instructions for test,medication list, hold diabetic medications morning of test, and COVID-19 preprocedure information reviewed with patient, questions answered. Patient verbalized understanding.

## 2023-10-12 ENCOUNTER — TELEPHONE (OUTPATIENT)
Dept: FAMILY MEDICINE CLINIC | Age: 68
End: 2023-10-12

## 2023-10-12 ENCOUNTER — EVALUATION (OUTPATIENT)
Dept: PHYSICAL THERAPY | Age: 68
End: 2023-10-12

## 2023-10-12 ENCOUNTER — TELEPHONE (OUTPATIENT)
Dept: CARDIOLOGY | Age: 68
End: 2023-10-12

## 2023-10-12 ENCOUNTER — HOSPITAL ENCOUNTER (OUTPATIENT)
Dept: CARDIOLOGY | Age: 68
Discharge: HOME | End: 2023-10-12
Attending: INTERNAL MEDICINE

## 2023-10-12 DIAGNOSIS — S83.511A RUPTURE OF ANTERIOR CRUCIATE LIGAMENT OF RIGHT KNEE, INITIAL ENCOUNTER: ICD-10-CM

## 2023-10-12 DIAGNOSIS — M23.51 RECURRENT RIGHT KNEE INSTABILITY: ICD-10-CM

## 2023-10-12 DIAGNOSIS — S83.206A TEAR OF MENISCUS OF RIGHT KNEE, UNSPECIFIED MENISCUS, UNSPECIFIED TEAR TYPE, UNSPECIFIED WHETHER OLD OR CURRENT TEAR: Primary | ICD-10-CM

## 2023-10-12 NOTE — PROGRESS NOTES
[facility] OUTPATIENT REHABILITATION  PHYSICAL THERAPY INITIAL EVALUATION         Date:  10/12/2023   Patient: Nancy Vazquez  : 1955  MRN: [id number]  Referring Provider: Peggy Giordano DO  [address] [place] [address]     Medical Diagnosis:      Diagnosis Orders   1. Tear of meniscus of right knee, unspecified meniscus, unspecified tear type, unspecified whether old or current tear        2. Recurrent right knee instability        3. Rupture of anterior cruciate ligament of right knee, initial encounter            Physician Order: Eval and Treat      SUBJECTIVE:     History / Mechanism of Injury: Reports 6 month to 1 year history of R knee problems -- described as popping out to the right (laterally). This occurs when sitting. It does not occur under load / when ambulating. He also reports a heavy feeling in the leg. He notes it is not the knee cap that moves; it is his knee joint. History of ACL reconstruction allograft 2020. Interventions for current problem: cortisone injection 10/10/23    Chief complaint: popping out to the right (laterally). This occurs when sitting. It does not occur under load / when ambulating. He also reports a heavy feeling in the leg. Pain:  Pain 0/10      Imaging results:     MRI KNEE RIGHT WO CONTRAST    Result Date: 10/6/2023  EXAMINATION: MRI OF THE RIGHT KNEE WITHOUT CONTRAST, 2023 4:31 pm TECHNIQUE: Multiplanar multisequence MRI of the right knee was performed without the administration of intravenous contrast. COMPARISON: None. HISTORY: ORDERING SYSTEM PROVIDED HISTORY: Tear of meniscus of right knee, unspecified meniscus, unspecified tear type, unspecified whether old or current tear FINDINGS: MENISCI: No evidence of tear of the medial or lateral menisci. CRUCIATE LIGAMENTS: Posterior cruciate ligament is intact. There is evidence of prior ACL graft reconstruction.   The ACL graft is attenuated and

## 2023-10-12 NOTE — TELEPHONE ENCOUNTER
----- Message from Ghanshyam Bills sent at 10/12/2023  2:22 PM EDT -----  Subject: Appointment Request    Reason for Call: Established Patient Appointment needed: Routine Existing   Condition Follow Up    QUESTIONS    Reason for appointment request? Available appointments did not meet   patient need     Additional Information for Provider? Pt would like to switch his 10/19   appt to either 10/17, 10/20 or 11/10 as he has appts in the area and has   to drive an hr away.  Please call.   ---------------------------------------------------------------------------  --------------  Dianna RENEE  [phone]; OK to leave message on voicemail  ---------------------------------------------------------------------------  --------------  SCRIPT ANSWERS

## 2023-10-12 NOTE — PROGRESS NOTES
Physical Therapy Daily Treatment Note    Date: 10/12/2023  Patient Name: Flor Leo  : 1955   MRN: [id number]  DOInjury: 6-12 months   DOSx: --  Referring Provider: Dany Maya DO   [address] [place] [address]     Medical Diagnosis:      Diagnosis Orders   1. Tear of meniscus of right knee, unspecified meniscus, unspecified tear type, unspecified whether old or current tear        2. Recurrent right knee instability        3. 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 He does demonstrate weakness and mild loss of motion. We will use a loading program to try and build knee stability and turn Sascha Ruth into a Coper. The goal will be to start with moderate resistance and work up to heavy resistance. 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 Outcome Measure:   Lower Extremity Functional Scale (LEFS) 50% impairment    X = TO BE PERFORMED NEXT VISIT  > = PROGRESS TO THIS    S: See eval  O:   Time 1464-8244       Visit  Repeat outcome measure at mid point and end.      Pain Pain 0/10       ROM 10 deg difference in knee flexion       Modalities              MO   Stretch             TE   Exercise         Leg Press 1-leg   TE   Hamstring Curl Machine   TE   Knee Extension Machine  (Progress to single leg)    TE   Calf Raises single leg     TE   Single leg dead lift    TE   Alexis split squat            TE                               A:  See eval    P: Continue with rehab plan  Denisse Leger PT    Treatment Charges: Mins Units   Initial Evaluation 45 1   Re-Evaluation     Ther Exercise         TE     Manual Therapy     MT     Ther Activities        TA     Gait Training          GT     Neuro Re-education NR     Modalities     Non-Billable Service Time     Other     Total Time/Units 45 1

## 2023-10-12 NOTE — TELEPHONE ENCOUNTER
Patient arrived for stress test with new insurance, so the stress test will have to be rescheduled when new authorization is obtained,

## 2023-10-17 ENCOUNTER — TREATMENT (OUTPATIENT)
Dept: PHYSICAL THERAPY | Age: 68
End: 2023-10-17

## 2023-10-17 DIAGNOSIS — S83.511A RUPTURE OF ANTERIOR CRUCIATE LIGAMENT OF RIGHT KNEE, INITIAL ENCOUNTER: ICD-10-CM

## 2023-10-17 DIAGNOSIS — M23.51 RECURRENT RIGHT KNEE INSTABILITY: ICD-10-CM

## 2023-10-17 DIAGNOSIS — S83.206A TEAR OF MENISCUS OF RIGHT KNEE, UNSPECIFIED MENISCUS, UNSPECIFIED TEAR TYPE, UNSPECIFIED WHETHER OLD OR CURRENT TEAR: Primary | ICD-10-CM

## 2023-10-17 NOTE — PROGRESS NOTES
Physical Therapy Daily Treatment Note    Date: 10/17/2023  Patient Name: Balaji Staples  : 1955   MRN: [id number]  DOInjury: 6-12 months   DOSx: --  Referring Provider: Diane Neville DO   [address] [place] [address]     Medical Diagnosis:      Diagnosis Orders   1. Tear of meniscus of right knee, unspecified meniscus, unspecified tear type, unspecified whether old or current tear        2. Recurrent right knee instability        3. Rupture of anterior cruciate ligament of right knee, initial encounter            Kelli Farmer has an ACL deficient knee that seems to shift at rest, but not under load. He does demonstrate weakness and mild loss of motion. We will use a loading program to try and build knee stability and turn Kelli Farmer into a Coper. The goal will be to start with moderate resistance and work up to heavy resistance. 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 Outcome Measure:   Lower Extremity Functional Scale (LEFS) 50% impairment    X = TO BE PERFORMED NEXT VISIT  > = PROGRESS TO THIS    S: Pt reports minimal knee pain, unless it rhonda  O:   Time 6799-4323       Visit   ref # [id number]  copay- $30 covered at 100% after copay  pre auth after 12th visits Repeat outcome measure at mid point and end. Pain Pain 0/10       ROM 10 deg difference in knee flexion       Modalities              MO   Stretch             TE   Exercise         Leg Press 1-leg DBL 60# 2 x 20  SL 20# 3 x 10  TE   Hamstring Curl Machine 30# 3 x 10  TE   Knee Extension Machine  (Progress to single leg) 5# 3 x 10   TE   Calf Raises single leg 3 x 10 on floor    TE   Single leg dead lift    TE   Alexis split squat            TE                               A: Tolerated well. Muscle soreness following session.   P: Continue with rehab plan  Cony Segal PTA    Treatment Charges: Mins Units   Initial Evaluation     Re-Evaluation     Ther

## 2023-10-20 ENCOUNTER — TREATMENT (OUTPATIENT)
Dept: PHYSICAL THERAPY | Age: 68
End: 2023-10-20

## 2023-10-20 ENCOUNTER — TELEPHONE (OUTPATIENT)
Dept: CARDIOLOGY | Age: 68
End: 2023-10-20

## 2023-10-20 DIAGNOSIS — S83.206A TEAR OF MENISCUS OF RIGHT KNEE, UNSPECIFIED MENISCUS, UNSPECIFIED TEAR TYPE, UNSPECIFIED WHETHER OLD OR CURRENT TEAR: Primary | ICD-10-CM

## 2023-10-20 DIAGNOSIS — M23.51 RECURRENT RIGHT KNEE INSTABILITY: ICD-10-CM

## 2023-10-20 DIAGNOSIS — S83.511A RUPTURE OF ANTERIOR CRUCIATE LIGAMENT OF RIGHT KNEE, INITIAL ENCOUNTER: ICD-10-CM

## 2023-10-20 NOTE — PROGRESS NOTES
Physical Therapy Daily Treatment Note    Date: 10/20/2023  Patient Name: Kian Herrmann  : 1955   MRN: [id number]  DOInjury: 6-12 months   DOSx: --  Referring Provider: Hue Ruiz DO   [address] [place] [address]     Medical Diagnosis:      Diagnosis Orders   1. Tear of meniscus of right knee, unspecified meniscus, unspecified tear type, unspecified whether old or current tear        2. Recurrent right knee instability        3. Rupture of anterior cruciate ligament of right knee, initial encounter              Anna Coronado has an ACL deficient knee that seems to shift at rest, but not under load. He does demonstrate weakness and mild loss of motion. We will use a loading program to try and build knee stability and turn Anna Coronado into a Coper. The goal will be to start with moderate resistance and work up to heavy resistance. 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 Outcome Measure:   Lower Extremity Functional Scale (LEFS) 50% impairment    X = TO BE PERFORMED NEXT VISIT  > = PROGRESS TO THIS    S: Pt reports muscle soreness lasting about a day following last session. Has restless leg syndrome which is acting up today. O:   Time 5120-8773       Visit 3/12  ref # [id number]  copay- $30 covered at 100% after copay  pre auth after 12th visits Repeat outcome measure at mid point and end.      Pain Pain 0/10       ROM 10 deg difference in knee flexion       Modalities              MO   Stretch             TE   Exercise         Leg Press 1-leg DBL 60# 2 x 20  SL 20# 3 x 10  TE   Hamstring Curl Machine 30# 3 x 10  TE   Knee Extension Machine  (Progress to single leg) 5# 3 x 10   TE   Calf Raises single leg 3 x 10 on floor    TE   Single leg dead lift    TE   Alexis split squat              Ambulation Between exercises  As needed  2 x 150ft           HS curls NEXT   TE   LAQ NEXT     Squats NEXT       Lunges NEXT                 A:

## 2023-10-25 ENCOUNTER — TREATMENT (OUTPATIENT)
Dept: PHYSICAL THERAPY | Age: 68
End: 2023-10-25

## 2023-10-25 DIAGNOSIS — M23.51 RECURRENT RIGHT KNEE INSTABILITY: ICD-10-CM

## 2023-10-25 DIAGNOSIS — S83.511A RUPTURE OF ANTERIOR CRUCIATE LIGAMENT OF RIGHT KNEE, INITIAL ENCOUNTER: ICD-10-CM

## 2023-10-25 DIAGNOSIS — S83.206A TEAR OF MENISCUS OF RIGHT KNEE, UNSPECIFIED MENISCUS, UNSPECIFIED TEAR TYPE, UNSPECIFIED WHETHER OLD OR CURRENT TEAR: Primary | ICD-10-CM

## 2023-10-25 NOTE — PROGRESS NOTES
Physical Therapy Daily Treatment Note    Date: 10/25/2023  Patient Name: Lilliam Pandya  : 1955   MRN: [id number]  DOInjury: 6-12 months   DOSx: --  Referring Provider: Bob Bo DO   [address] [place] [address]     Medical Diagnosis:      Diagnosis Orders   1. Tear of meniscus of right knee, unspecified meniscus, unspecified tear type, unspecified whether old or current tear        2. Recurrent right knee instability        3. Rupture of anterior cruciate ligament of right knee, initial encounter                Ana Pierce has an ACL deficient knee that seems to shift at rest, but not under load. He does demonstrate weakness and mild loss of motion. We will use a loading program to try and build knee stability and turn Ana Pierce into a Coper. The goal will be to start with moderate resistance and work up to heavy resistance. 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 Outcome Measure:   Lower Extremity Functional Scale (LEFS) 50% impairment    X = TO BE PERFORMED NEXT VISIT  > = PROGRESS TO THIS  Access Code: [email or web address]  URL: [email or web address]  Date: 10/25/2023  Prepared by: Yeny Zuniga    Program Notes  Home based program    Exercises  - Seated Long Arc Quad with Ankle Weight  - 3-4 x weekly - 2-3 sets - 10 reps  - Standing Hamstring Curl with Chair Support  - 3-4 x weekly - 2-3 sets - 10 reps  - Squat with Chair Support  - 3-4 x weekly - 2-3 sets - 10 reps  - Standing Heel Raises  - 3-4 x weekly - 2-3 sets - 10 reps  - Mini Lunge  - 3-4 x weekly - 2-3 sets - 10 reps  S: Pt reports muscle soreness lasting about a day following last session. Has restless leg syndrome which is acting up today. O:   Time 9006-2340       Visit   ref # [id number]  copay- $30 covered at 100% after copay  pre auth after 12th visits Repeat outcome measure at mid point and end.      Pain Pain 0/10       ROM 10 deg difference in knee

## 2023-10-26 ENCOUNTER — HOSPITAL ENCOUNTER (EMERGENCY)
Age: 68
Discharge: HOME | End: 2023-10-26
Attending: EMERGENCY MEDICINE
Payer: MEDICARE

## 2023-10-26 VITALS
DIASTOLIC BLOOD PRESSURE: 72 MMHG | RESPIRATION RATE: 16 BRPM | HEART RATE: 60 BPM | TEMPERATURE: 99.7 F | OXYGEN SATURATION: 99 % | SYSTOLIC BLOOD PRESSURE: 126 MMHG

## 2023-10-26 DIAGNOSIS — J01.90 ACUTE SINUSITIS, RECURRENCE NOT SPECIFIED, UNSPECIFIED LOCATION: Primary | ICD-10-CM

## 2023-10-26 LAB
SARS-COV-2 RDRP RESP QL NAA+PROBE: NOT DETECTED
SPECIMEN DESCRIPTION: NORMAL

## 2023-10-26 PROCEDURE — 99283 EMERGENCY DEPT VISIT LOW MDM: CPT

## 2023-10-26 PROCEDURE — 87635 SARS-COV-2 COVID-19 AMP PRB: CPT

## 2023-10-26 RX ORDER — BROMPHENIRAMINE MALEATE, PSEUDOEPHEDRINE HYDROCHLORIDE, AND DEXTROMETHORPHAN HYDROBROMIDE 2; 30; 10 MG/5ML; MG/5ML; MG/5ML
5 SYRUP ORAL 4 TIMES DAILY PRN
Qty: 120 ML | Refills: 0 | Status: SHIPPED | OUTPATIENT
Start: 2023-10-26

## 2023-10-26 RX ORDER — AMOXICILLIN AND CLAVULANATE POTASSIUM 875; 125 MG/1; MG/1
1 TABLET, FILM COATED ORAL 2 TIMES DAILY
Qty: 20 TABLET | Refills: 0 | Status: SHIPPED | OUTPATIENT
Start: 2023-10-26 | End: 2023-11-05

## 2023-10-26 ASSESSMENT — PAIN - FUNCTIONAL ASSESSMENT: PAIN_FUNCTIONAL_ASSESSMENT: 0-10

## 2023-10-26 ASSESSMENT — ENCOUNTER SYMPTOMS
EYE PAIN: 0
DIARRHEA: 0
SHORTNESS OF BREATH: 0
SINUS PRESSURE: 0
NAUSEA: 0
COUGH: 1
BACK PAIN: 0
EYE DISCHARGE: 0
VOMITING: 0
RHINORRHEA: 1
SORE THROAT: 0
EYE REDNESS: 0
WHEEZING: 0
ABDOMINAL PAIN: 0

## 2023-10-26 ASSESSMENT — PAIN DESCRIPTION - LOCATION: LOCATION: THROAT

## 2023-10-26 ASSESSMENT — PAIN SCALES - GENERAL: PAINLEVEL_OUTOF10: 4

## 2023-10-26 ASSESSMENT — PAIN DESCRIPTION - DESCRIPTORS: DESCRIPTORS: BURNING

## 2023-10-26 NOTE — ED PROVIDER NOTES
sounds: Normal heart sounds. No murmur heard. Pulmonary:      Effort: Pulmonary effort is normal. No respiratory distress. Breath sounds: Normal breath sounds. No wheezing or rales. Abdominal:      General: Bowel sounds are normal.      Palpations: Abdomen is soft. Abdomen is not rigid. Tenderness: There is no abdominal tenderness. There is no guarding or rebound. Musculoskeletal:         General: Normal range of motion. Cervical back: Normal range of motion and neck supple. Skin:     General: Skin is warm and dry. Findings: No abrasion or rash. Neurological:      General: No focal deficit present. Mental Status: He is alert and oriented to person, place, and time. GCS: GCS eye subscore is 4. GCS verbal subscore is 5. GCS motor subscore is 6. Cranial Nerves: No cranial nerve deficit. Coordination: Coordination normal.          Procedures     MDM          --------------------------------------------- PAST HISTORY ---------------------------------------------  Past Medical History:  has a past medical history of Anesthesia complication, Arthritis, Asthma, Diabetes mellitus ([address]), Fungal infection of foot, GERD (gastroesophageal reflux disease), Hyperlipidemia, Hypertension, and Type 2 diabetes mellitus without complication ([address]). Past Surgical History:  has a past surgical history that includes hernia repair; Neck surgery; Cholecystectomy, laparoscopic (N/A, 05/18/2020); Anterior cruciate ligament repair (Right, 07/13/2020); Colonoscopy; Endoscopy, colon, diagnostic; Shoulder arthroscopy (Left, 6/1/2021); and Arm Surgery (Left, 9/8/2021). Social History:  reports that he quit smoking about 37 years ago. His smoking use included cigarettes. He started smoking about 58 years ago. He has a 22.50 pack-year smoking history. He has never used smokeless tobacco. He reports current alcohol use. He reports that he does not use drugs.     Family History: family history

## 2023-10-27 ENCOUNTER — TELEPHONE (OUTPATIENT)
Dept: PHYSICAL THERAPY | Age: 68
End: 2023-10-27

## 2023-10-27 NOTE — TELEPHONE ENCOUNTER
Date: 10/27/2023       Patient Name: Pete Lo  : 1955      MRN: [id number]    Patient cancelled appointment today. He is sick.     Adam Adkins PTA

## 2023-10-31 ENCOUNTER — OFFICE VISIT (OUTPATIENT)
Dept: FAMILY MEDICINE CLINIC | Age: 68
End: 2023-10-31
Payer: MEDICARE

## 2023-10-31 ENCOUNTER — TREATMENT (OUTPATIENT)
Dept: PHYSICAL THERAPY | Age: 68
End: 2023-10-31

## 2023-10-31 VITALS
WEIGHT: 212 LBS | OXYGEN SATURATION: 93 % | SYSTOLIC BLOOD PRESSURE: 122 MMHG | DIASTOLIC BLOOD PRESSURE: 70 MMHG | BODY MASS INDEX: 30.42 KG/M2 | HEART RATE: 56 BPM

## 2023-10-31 DIAGNOSIS — E11.51 TYPE II DIABETES MELLITUS WITH PERIPHERAL CIRCULATORY DISORDER (HCC): ICD-10-CM

## 2023-10-31 DIAGNOSIS — S83.511A RUPTURE OF ANTERIOR CRUCIATE LIGAMENT OF RIGHT KNEE, INITIAL ENCOUNTER: ICD-10-CM

## 2023-10-31 DIAGNOSIS — M23.51 RECURRENT RIGHT KNEE INSTABILITY: ICD-10-CM

## 2023-10-31 DIAGNOSIS — E78.2 MIXED HYPERCHOLESTEROLEMIA AND HYPERTRIGLYCERIDEMIA: ICD-10-CM

## 2023-10-31 DIAGNOSIS — I10 PRIMARY HYPERTENSION: ICD-10-CM

## 2023-10-31 DIAGNOSIS — S83.206A TEAR OF MENISCUS OF RIGHT KNEE, UNSPECIFIED MENISCUS, UNSPECIFIED TEAR TYPE, UNSPECIFIED WHETHER OLD OR CURRENT TEAR: Primary | ICD-10-CM

## 2023-10-31 DIAGNOSIS — E11.9 TYPE 2 DIABETES MELLITUS WITHOUT COMPLICATION, WITHOUT LONG-TERM CURRENT USE OF INSULIN (HCC): Primary | ICD-10-CM

## 2023-10-31 DIAGNOSIS — R26.2 DIFFICULTY WALKING: ICD-10-CM

## 2023-10-31 DIAGNOSIS — G20.B1 PARKINSON'S DISEASE WITH DYSKINESIA, UNSPECIFIED WHETHER MANIFESTATIONS FLUCTUATE: ICD-10-CM

## 2023-10-31 DIAGNOSIS — J44.9 CHRONIC OBSTRUCTIVE PULMONARY DISEASE, UNSPECIFIED COPD TYPE (HCC): ICD-10-CM

## 2023-10-31 DIAGNOSIS — F33.1 MAJOR DEPRESSIVE DISORDER, RECURRENT, MODERATE (HCC): ICD-10-CM

## 2023-10-31 LAB — HBA1C MFR BLD: 8 %

## 2023-10-31 PROCEDURE — 3074F SYST BP LT 130 MM HG: CPT | Performed by: FAMILY MEDICINE

## 2023-10-31 PROCEDURE — 99214 OFFICE O/P EST MOD 30 MIN: CPT | Performed by: FAMILY MEDICINE

## 2023-10-31 PROCEDURE — 3052F HG A1C>EQUAL 8.0%<EQUAL 9.0%: CPT | Performed by: FAMILY MEDICINE

## 2023-10-31 PROCEDURE — 83036 HEMOGLOBIN GLYCOSYLATED A1C: CPT | Performed by: FAMILY MEDICINE

## 2023-10-31 PROCEDURE — 1123F ACP DISCUSS/DSCN MKR DOCD: CPT | Performed by: FAMILY MEDICINE

## 2023-10-31 PROCEDURE — 3078F DIAST BP <80 MM HG: CPT | Performed by: FAMILY MEDICINE

## 2023-10-31 RX ORDER — ROPINIROLE 2 MG/1
4 TABLET, FILM COATED ORAL 2 TIMES DAILY
Qty: 360 TABLET | Refills: 1 | Status: SHIPPED
Start: 2023-10-31 | End: 2023-10-31 | Stop reason: DRUGHIGH

## 2023-10-31 RX ORDER — DOXEPIN HYDROCHLORIDE 25 MG/1
CAPSULE ORAL
Qty: 90 CAPSULE | Refills: 1 | Status: SHIPPED | OUTPATIENT
Start: 2023-10-31

## 2023-10-31 RX ORDER — ROPINIROLE 2 MG/1
2 TABLET, FILM COATED ORAL 2 TIMES DAILY
Qty: 180 TABLET | Refills: 1 | Status: SHIPPED | OUTPATIENT
Start: 2023-10-31

## 2023-10-31 RX ORDER — DOXEPIN HYDROCHLORIDE 25 MG/1
CAPSULE ORAL
Qty: 90 CAPSULE | Refills: 1 | Status: SHIPPED
Start: 2023-10-31 | End: 2023-10-31 | Stop reason: SDUPTHER

## 2023-10-31 NOTE — PATIENT INSTRUCTIONS
Patient Instructions   LOW SALT FOR BLOOD PRESSURE CONTROL. LOW CARBOHYDRATE FOR BLOOD SUGAR AND WEIGHT CONTROL. LOW FAT DIET FOR CHOLESTEROL CONTROL. DRINK ENOUGH FLUIDS FOR BETTER KIDNEY FUNCTION. TAKE COREG 25 MG. 2 TIMES A DAY,COZAAR 50 MG. AND  HCTZ 12.5 MG. DAILY FOR BLOOD PRESSURE CONTROL. TAKE FARXIGA 10 MG. DAILY AND GLUCOTROL 10  MG. 2 TIMES A DAY FOR BLOOD SUGAR CONTROL. START TAKING RYBELSUS 3 MG. DAILY FOR IMPROVING BLOOD SUGAR CONTROL. TAKE LILOFIBRA 134 MG. IN THE MORNING AND LIPITOR  40 MG. NIGHTLY FOR CHOLESTEROL CONTROL. Misty Fayeer TAKE SINEQUAN, CYMBALTA AS PER PSYCHIATRIST RECOMMENDATION  FOR MOOD CONTROL. INCREASE SINEQUAN TO 25 MG. FOR BETTER SLEEP. TAKE REQUIP 2 MG. 2 TIMES A DAY FOR PARKINSON'S SYMPTOM RELIEF. TAKE PROTONIX 40 MG. DAILY FOR STOMACH ACID CONTROL. TAKE GABAPENTIN 300 MG. NIGHTLY FOR PAIN RELIEF. INHALE TRELEGY 1 PUFF DAILY FOR BREATHING CONTROL. REGULAR WALKING ADVISED. ADVISED WEIGHT REDUCTION. FOLLOW UP  DR. KEREN PEREZ  FOR BLADDER CONTROL PROBLEM. SEE T[facility]A CARDIOLOGIST AS SCHEDULED. FASTING FOR LAB WORK ONE MORNING.   KEEP NEXT APPOINTMENT IN 3 MONTHS FOR ANNUAL PHYSICAL EXAMINATION

## 2023-10-31 NOTE — PROGRESS NOTES
Physical Therapy Daily Treatment Note    Date: 10/31/2023  Patient Name: Tr Eckert  : 1955   MRN: [id number]  DOInjury: 6-12 months   DOSx: --  Referring Provider: [place] Antonio DO  [address] [place] [address]     Medical Diagnosis:      Diagnosis Orders   1. Tear of meniscus of right knee, unspecified meniscus, unspecified tear type, unspecified whether old or current tear        2. Recurrent right knee instability        3. Rupture of anterior cruciate ligament of right knee, initial encounter                  Mag Mix has an ACL deficient knee that seems to shift at rest, but not under load. He does demonstrate weakness and mild loss of motion. We will use a loading program to try and build knee stability and turn Mag Mix into a Coper. The goal will be to start with moderate resistance and work up to heavy resistance. 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 Outcome Measure:   Lower Extremity Functional Scale (LEFS) 50% impairment    X = TO BE PERFORMED NEXT VISIT  > = PROGRESS TO THIS  Access Code: [email or web address]  URL: [email or web address]  Date: 10/25/2023  Prepared by: Cristal Hubbard    Program Notes  Home based program    Exercises  - Seated Long Arc Quad with Ankle Weight  - 3-4 x weekly - 2-3 sets - 10 reps  - Standing Hamstring Curl with Chair Support  - 3-4 x weekly - 2-3 sets - 10 reps  - Squat with Chair Support  - 3-4 x weekly - 2-3 sets - 10 reps  - Standing Heel Raises  - 3-4 x weekly - 2-3 sets - 10 reps  - Mini Lunge  - 3-4 x weekly - 2-3 sets - 10 reps  S: Pt reports muscle soreness lasting about a day following last session. Has restless leg syndrome which is acting up today. O:   Time [id number]       Visit 512  ref # [id number]  copay- $30 covered at 100% after copay  pre auth after 12th visits Repeat outcome measure at mid point and end.      Pain Pain 0/10       ROM 10 deg difference in knee

## 2023-10-31 NOTE — PROGRESS NOTES
OFFICE PROGRESS NOTE      SUBJECTIVE:        Patient ID:   Nataliya Nguyen is a 80 y.o. male who presents for   Chief Complaint   Patient presents with    Congestion    Discuss Medications     Doxipin and ropinerol             HPI:     RECHECK PARKINSON'S, COPD BP, CHOLESTEROL AND DIABETES. BREATHING MUCH BETTER SINCE STARTING INHALER Akiko Ayon. WAS SEEN AT [facility] ON 10/26/2023 FOR SINUS INFECTION. ON COURSE OF AUGMENTIN. MEDICATION REFILL. WATCHING DIET BUT NOT GOOD. NO EXERCISE. TAKING MEDICATIONS REGULARLY. Prior to Admission medications    Medication Sig Start Date End Date Taking?  Authorizing Provider   fluticasone-umeclidin-vilant (TRELEGY ELLIPTA) 100-62.5-25 MCG/ACT AEPB inhaler Inhale 1 puff into the lungs daily 10/31/23  Yes Fernando Ovalles MD   rOPINIRole (REQUIP) 2 MG tablet Take 1 tablet by mouth 2 times daily 10/31/23  Yes Fernando Ovalles MD   doxepin (SINEQUAN) 25 MG capsule TAKE TWO CAPSULES BY MOUTH DAILY AT 9PM AT BEDTIME FOR INSOMNIA 10/31/23  Yes Fernando Ovalles MD   amoxicillin-clavulanate (AUGMENTIN) 875-125 MG per tablet Take 1 tablet by mouth 2 times daily for 10 days 10/26/23 11/5/23 Yes Lucho Clifton MD   sildenafil (REVATIO) 20 MG tablet Take 1 tablet by mouth daily as needed (FOR SEXUAL DYSFUNCTION) 6/30/23  Yes Fernando Ovalles MD   pantoprazole (PROTONIX) 40 MG tablet Take 1 tablet by mouth daily 6/30/23  Yes Fernando Ovalles MD   atorvastatin (LIPITOR) 80 MG tablet TAKE ONE TABLET BY MOUTH DAILY AT 5PM IN EVENING AT BEDTIME FOR CHOLESTEROL 6/30/23  Yes Fernando Ovalles MD   albuterol sulfate HFA (VENTOLIN HFA) 108 (90 Base) MCG/ACT inhaler Inhale 2 puffs into the lungs every 4 hours as needed for Wheezing or Shortness of Breath 6/30/23  Yes Fernando Ovalles MD   tamsulosin (FLOMAX) 0.4 MG capsule Take 1 capsule by mouth 2 times daily 6/30/23  Yes Fernando Ovalles MD   blood glucose monitor strips Test 3 times a day & as needed

## 2023-11-02 ENCOUNTER — TELEPHONE (OUTPATIENT)
Dept: FAMILY MEDICINE CLINIC | Age: 68
End: 2023-11-02

## 2023-11-02 DIAGNOSIS — E11.9 TYPE 2 DIABETES MELLITUS WITHOUT COMPLICATION, WITHOUT LONG-TERM CURRENT USE OF INSULIN (HCC): Primary | ICD-10-CM

## 2023-11-02 NOTE — TELEPHONE ENCOUNTER
Patient called for a RX for new glucose monitor, test strip and lancets. He lost his.   Last seen 10/31/2023  Next appt 2/2/2024  YENNIFER/EBONY LEE

## 2023-11-03 ENCOUNTER — TREATMENT (OUTPATIENT)
Dept: PHYSICAL THERAPY | Age: 68
End: 2023-11-03

## 2023-11-03 ENCOUNTER — TELEPHONE (OUTPATIENT)
Dept: CARDIOLOGY | Age: 68
End: 2023-11-03

## 2023-11-03 DIAGNOSIS — S83.206A TEAR OF MENISCUS OF RIGHT KNEE, UNSPECIFIED MENISCUS, UNSPECIFIED TEAR TYPE, UNSPECIFIED WHETHER OLD OR CURRENT TEAR: Primary | ICD-10-CM

## 2023-11-03 DIAGNOSIS — M23.51 RECURRENT RIGHT KNEE INSTABILITY: ICD-10-CM

## 2023-11-03 DIAGNOSIS — S83.511A RUPTURE OF ANTERIOR CRUCIATE LIGAMENT OF RIGHT KNEE, INITIAL ENCOUNTER: ICD-10-CM

## 2023-11-03 NOTE — PROGRESS NOTES
Physical Therapy Daily Treatment Note    Date: 11/3/2023  Patient Name: Manisha Oleary  : 1955   MRN: [id number]  DOInjury: 6-12 months   DOSx: --  Referring Provider: Peggy Giordano DO   [address] [place] [address]     Medical Diagnosis:      Diagnosis Orders   1. Tear of meniscus of right knee, unspecified meniscus, unspecified tear type, unspecified whether old or current tear        2. Recurrent right knee instability        3. Rupture of anterior cruciate ligament of right knee, initial encounter                    Laura Bass has an ACL deficient knee that seems to shift at rest, but not under load. He does demonstrate weakness and mild loss of motion. We will use a loading program to try and build knee stability and turn Laura Bass into a Coper. The goal will be to start with moderate resistance and work up to heavy resistance. 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 Outcome Measure:   Lower Extremity Functional Scale (LEFS) 50% impairment    X = TO BE PERFORMED NEXT VISIT  > = PROGRESS TO THIS  Access Code: [email or web address]  URL: [email or web address]  Date: 10/25/2023  Prepared by: Ricky Barajas    Program Notes  Home based program    Exercises  - Seated Long Arc Quad with Ankle Weight  - 3-4 x weekly - 2-3 sets - 10 reps  - Standing Hamstring Curl with Chair Support  - 3-4 x weekly - 2-3 sets - 10 reps  - Squat with Chair Support  - 3-4 x weekly - 2-3 sets - 10 reps  - Standing Heel Raises  - 3-4 x weekly - 2-3 sets - 10 reps  - Mini Lunge  - 3-4 x weekly - 2-3 sets - 10 reps  S: Pt states he was securing a tarp on his truck, stepped up to tighten and felt his knee joint \"separate and return\" . Knee joint has been sore since. Time 3895-8802       Visit   ref # [id number]  copay- $30 covered at 100% after copay  pre auth after 12th visits Repeat outcome measure at mid point and end.      Pain Pain 3/10       ROM 10 deg

## 2023-11-06 ENCOUNTER — HOSPITAL ENCOUNTER (OUTPATIENT)
Dept: CARDIOLOGY | Age: 68
Discharge: HOME | End: 2023-11-08
Attending: INTERNAL MEDICINE
Payer: MEDICARE

## 2023-11-06 VITALS
HEART RATE: 52 BPM | HEIGHT: 70 IN | BODY MASS INDEX: 30.35 KG/M2 | WEIGHT: 212 LBS | SYSTOLIC BLOOD PRESSURE: 132 MMHG | DIASTOLIC BLOOD PRESSURE: 78 MMHG

## 2023-11-06 DIAGNOSIS — R06.02 SOB (SHORTNESS OF BREATH): ICD-10-CM

## 2023-11-06 DIAGNOSIS — R06.02 SHORTNESS OF BREATH: ICD-10-CM

## 2023-11-06 LAB
ECHO BSA: 2.18 M2
EKG ATRIAL RATE: 52 BPM
EKG DIAGNOSIS: NORMAL
EKG P AXIS: 69 DEGREES
EKG P-R INTERVAL: 178 MS
EKG Q-T INTERVAL: 446 MS
EKG QRS DURATION: 100 MS
EKG QTC CALCULATION (BAZETT): 414 MS
EKG R AXIS: 44 DEGREES
EKG T AXIS: 50 DEGREES
EKG VENTRICULAR RATE: 52 BPM
NUC STRESS EJECTION FRACTION: 63 %
STRESS BASELINE DIAS BP: 78 MMHG
STRESS BASELINE HR: 51 BPM
STRESS BASELINE SYS BP: 132 MMHG
STRESS ESTIMATED WORKLOAD: 1 METS
STRESS PEAK DIAS BP: 70 MMHG
STRESS PEAK SYS BP: 140 MMHG
STRESS PERCENT HR ACHIEVED: 48 %
STRESS POST PEAK HR: 74 BPM
STRESS RATE PRESSURE PRODUCT: NORMAL BPM*MMHG
STRESS ST DEPRESSION: 0 MM
STRESS TARGET HR: 153 BPM
TID: 1.04

## 2023-11-06 PROCEDURE — 93018 CV STRESS TEST I&R ONLY: CPT | Performed by: INTERNAL MEDICINE

## 2023-11-06 PROCEDURE — 3430000000 HC RX DIAGNOSTIC RADIOPHARMACEUTICAL: Performed by: INTERNAL MEDICINE

## 2023-11-06 PROCEDURE — 93016 CV STRESS TEST SUPVJ ONLY: CPT | Performed by: INTERNAL MEDICINE

## 2023-11-06 PROCEDURE — 2580000003 HC RX 258: Performed by: INTERNAL MEDICINE

## 2023-11-06 PROCEDURE — 93005 ELECTROCARDIOGRAM TRACING: CPT | Performed by: INTERNAL MEDICINE

## 2023-11-06 PROCEDURE — A9502 TC99M TETROFOSMIN: HCPCS | Performed by: INTERNAL MEDICINE

## 2023-11-06 PROCEDURE — 93010 ELECTROCARDIOGRAM REPORT: CPT | Performed by: INTERNAL MEDICINE

## 2023-11-06 PROCEDURE — 78452 HT MUSCLE IMAGE SPECT MULT: CPT | Performed by: INTERNAL MEDICINE

## 2023-11-06 PROCEDURE — 93017 CV STRESS TEST TRACING ONLY: CPT

## 2023-11-06 PROCEDURE — 6360000002 HC RX W HCPCS: Performed by: INTERNAL MEDICINE

## 2023-11-06 RX ORDER — REGADENOSON 0.08 MG/ML
0.4 INJECTION, SOLUTION INTRAVENOUS
Status: COMPLETED | OUTPATIENT
Start: 2023-11-06 | End: 2023-11-06

## 2023-11-06 RX ORDER — SODIUM CHLORIDE 0.9 % (FLUSH) 0.9 %
10 SYRINGE (ML) INJECTION PRN
Status: DISCONTINUED | OUTPATIENT
Start: 2023-11-06 | End: 2023-11-09 | Stop reason: HOSPADM

## 2023-11-06 RX ADMIN — TETROFOSMIN 9.6 MILLICURIE: 0.23 INJECTION, POWDER, LYOPHILIZED, FOR SOLUTION INTRAVENOUS at 08:09

## 2023-11-06 RX ADMIN — TETROFOSMIN 26.9 MILLICURIE: 0.23 INJECTION, POWDER, LYOPHILIZED, FOR SOLUTION INTRAVENOUS at 11:04

## 2023-11-06 RX ADMIN — Medication 10 ML: at 08:09

## 2023-11-06 RX ADMIN — REGADENOSON 0.4 MG: 0.08 INJECTION, SOLUTION INTRAVENOUS at 11:04

## 2023-11-06 RX ADMIN — Medication 10 ML: at 11:04

## 2023-11-07 ENCOUNTER — TREATMENT (OUTPATIENT)
Dept: PHYSICAL THERAPY | Age: 68
End: 2023-11-07

## 2023-11-07 ENCOUNTER — TELEPHONE (OUTPATIENT)
Dept: CARDIOLOGY CLINIC | Age: 68
End: 2023-11-07

## 2023-11-07 DIAGNOSIS — S83.206A TEAR OF MENISCUS OF RIGHT KNEE, UNSPECIFIED MENISCUS, UNSPECIFIED TEAR TYPE, UNSPECIFIED WHETHER OLD OR CURRENT TEAR: Primary | ICD-10-CM

## 2023-11-07 DIAGNOSIS — S83.511A RUPTURE OF ANTERIOR CRUCIATE LIGAMENT OF RIGHT KNEE, INITIAL ENCOUNTER: ICD-10-CM

## 2023-11-07 DIAGNOSIS — M23.51 RECURRENT RIGHT KNEE INSTABILITY: ICD-10-CM

## 2023-11-07 RX ORDER — LANCETS 30 GAUGE
1 EACH MISCELLANEOUS 3 TIMES DAILY
Qty: 300 EACH | Refills: 3 | Status: SHIPPED | OUTPATIENT
Start: 2023-11-07

## 2023-11-07 RX ORDER — GLUCOSAMINE HCL/CHONDROITIN SU 500-400 MG
CAPSULE ORAL
Qty: 300 STRIP | Refills: 3 | Status: SHIPPED | OUTPATIENT
Start: 2023-11-07

## 2023-11-07 NOTE — PROGRESS NOTES
Physical Therapy Daily Treatment Note    Date: 2023  Patient Name: Fernande Severance  : 1955   MRN: [id number]  DOInjury: 6-12 months   DOSx: --  Referring Provider: Ric De Anda DO   [address]     Medical Diagnosis:      Diagnosis Orders   1. Tear of meniscus of right knee, unspecified meniscus, unspecified tear type, unspecified whether old or current tear        2. Recurrent right knee instability        3. Rupture of anterior cruciate ligament of right knee, initial encounter          China Goldman has an ACL deficient knee that seems to shift at rest, but not under load. He does demonstrate weakness and mild loss of motion. We will use a loading program to try and build knee stability and turn China Goldman into a Coper. The goal will be to start with moderate resistance and work up to heavy resistance. 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 Outcome Measure:   Lower Extremity Functional Scale (LEFS) 50% impairment    X = TO BE PERFORMED NEXT VISIT  > = PROGRESS TO THIS  Access Code: [email or web address]  URL: [email or web address]  Date: 10/25/2023  Prepared by: Valeriano Silveira    Program Notes  Home based program    Exercises  - Seated Long Arc Quad with Ankle Weight  - 3-4 x weekly - 2-3 sets - 10 reps  - Standing Hamstring Curl with Chair Support  - 3-4 x weekly - 2-3 sets - 10 reps  - Squat with Chair Support  - 3-4 x weekly - 2-3 sets - 10 reps  - Standing Heel Raises  - 3-4 x weekly - 2-3 sets - 10 reps  - Mini Lunge  - 3-4 x weekly - 2-3 sets - 10 reps  S: Pt states he was sitting on the ground, went to get up and his knee \"popped\" out of place and back in. States knee is sore for awhile after. Today 3/10 at rest and 10 with activity  Time 6818-7987       Visit   ref # [id number]  copay- $30 covered at 100% after copay  pre auth after 12th visits Repeat outcome measure at mid point and end.      Pain Pain

## 2023-11-09 NOTE — PROGRESS NOTES
Chief Complaint   Patient presents with    Knee Pain     Right Knee F/U, still in PT         HPI:    Patient is 80 y.o. male complaining chronic, atraumatic, insidious onset right knee pain for about a year now. Previous ACL r surgery on knee. Believes knee is popping out of joint and causing pain. Happening about 2 times a week. Patient wearing brace at this time but claims brace is not helping. He admits to stiffness, deep, aching pain, swelling, difficulty with stairs and ambulating far distances. He admits to gross instability specifically in his right knee. Previous treatments include rest, ice, and anti-inflammatory medication and HEP without much relief. Follows up after cortisone injection and states he has improvement along with physical therapy. ROS:    Skin: (-) rash,(-) psoriasis,(-) eczema, (-)skin cancer. Neurologic: (-)numbness, (-)tingling, (-)headaches, (-) LOC. Cardiovascular: (-) Chest pain, (-) swelling in legs/feet, (-) SOB, (-) cramping in legs/feet with walking. All other review of systems negative except stated above or in HPI      Past Medical History:   Diagnosis Date    Anesthesia complication     states has difficulty breathing after surgery  usually needs nebulizer treatment    Arthritis     Asthma     since childhood    Diabetes mellitus ([address])     Fungal infection of foot     GERD (gastroesophageal reflux disease)     Hyperlipidemia     Hypertension     Type 2 diabetes mellitus without complication ([address])      Past Surgical History:   Procedure Laterality Date    ANTERIOR CRUCIATE LIGAMENT REPAIR Right 07/13/2020    RIGHT KNEE ARTHROSCOPY, (ANTERIOR CRUCIATE LIGAMENT) ACL RECONSTRUCTION. ALLOGRAFT.  MEDIAL MENISCECTOMY AND DEBRIDEMENT. ([address]) performed by Hue Ruiz DO at [facility] Left 9/8/2021    LEFT ULNAR NEUROPLASTY performed by Jeyson Leonard MD at [address] [facility], LAPAROSCOPIC N/A 05/18/2020    CHOLECYSTECTOMY LAPAROSCOPIC WITH IOC

## 2023-11-10 ENCOUNTER — OFFICE VISIT (OUTPATIENT)
Dept: ORTHOPEDIC SURGERY | Age: 68
End: 2023-11-10
Payer: MEDICARE

## 2023-11-10 ENCOUNTER — TREATMENT (OUTPATIENT)
Dept: PHYSICAL THERAPY | Age: 68
End: 2023-11-10

## 2023-11-10 VITALS — BODY MASS INDEX: 30.35 KG/M2 | WEIGHT: 212 LBS | HEIGHT: 70 IN | TEMPERATURE: 98 F

## 2023-11-10 DIAGNOSIS — S83.206A TEAR OF MENISCUS OF RIGHT KNEE, UNSPECIFIED MENISCUS, UNSPECIFIED TEAR TYPE, UNSPECIFIED WHETHER OLD OR CURRENT TEAR: Primary | ICD-10-CM

## 2023-11-10 DIAGNOSIS — Z71.82 EXERCISE COUNSELING: ICD-10-CM

## 2023-11-10 DIAGNOSIS — Z78.9 ACTIVITY OF DAILY LIVING ALTERATION: ICD-10-CM

## 2023-11-10 DIAGNOSIS — M23.51 RECURRENT RIGHT KNEE INSTABILITY: ICD-10-CM

## 2023-11-10 DIAGNOSIS — M65.9 SYNOVITIS OF RIGHT KNEE: ICD-10-CM

## 2023-11-10 DIAGNOSIS — S83.511A RUPTURE OF ANTERIOR CRUCIATE LIGAMENT OF RIGHT KNEE, INITIAL ENCOUNTER: ICD-10-CM

## 2023-11-10 PROCEDURE — 1123F ACP DISCUSS/DSCN MKR DOCD: CPT | Performed by: ORTHOPAEDIC SURGERY

## 2023-11-10 PROCEDURE — 99214 OFFICE O/P EST MOD 30 MIN: CPT | Performed by: ORTHOPAEDIC SURGERY

## 2023-11-10 NOTE — PROGRESS NOTES
Physical Therapy Daily Treatment Note    Date: 11/10/2023  Patient Name: Oleg Alcaraz  : 1955   MRN: [id number]  DOInjury: 6-12 months   DOSx: --  Referring Provider: Seema Haywood DO   [address] [place] [address]     Medical Diagnosis:      Diagnosis Orders   1. Tear of meniscus of right knee, unspecified meniscus, unspecified tear type, unspecified whether old or current tear        2. Recurrent right knee instability        3. Rupture of anterior cruciate ligament of right knee, initial encounter          Rip Bills has an ACL deficient knee that seems to shift at rest, but not under load. He does demonstrate weakness and mild loss of motion. We will use a loading program to try and build knee stability and turn Rip Bills into a Coper. The goal will be to start with moderate resistance and work up to heavy resistance. 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 Outcome Measure:   Lower Extremity Functional Scale (LEFS) 50% impairment    X = TO BE PERFORMED NEXT VISIT  > = PROGRESS TO THIS  Access Code: [email or web address]  URL: [email or web address]  Date: 10/25/2023  Prepared by: Dina Yu    Program Notes  Home based program    Exercises  - Seated Long Arc Quad with Ankle Weight  - 3-4 x weekly - 2-3 sets - 10 reps  - Standing Hamstring Curl with Chair Support  - 3-4 x weekly - 2-3 sets - 10 reps  - Squat with Chair Support  - 3-4 x weekly - 2-3 sets - 10 reps  - Standing Heel Raises  - 3-4 x weekly - 2-3 sets - 10 reps  - Mini Lunge  - 3-4 x weekly - 2-3 sets - 10 reps  S: Pt reports feeling ok until first couple of ex's in PT this am then soreness/pain returned . Time 5555-4551 am       Visit   ref # [id number]  copay- $30 covered at 100% after copay  pre auth after 12th visits Repeat outcome measure at mid point and end.      Pain Pain 7/10       ROM 10 deg difference in knee flexion       Modalities              MO

## 2023-11-30 ENCOUNTER — TELEPHONE (OUTPATIENT)
Dept: ORTHOPEDIC SURGERY | Age: 68
End: 2023-11-30

## 2023-11-30 NOTE — TELEPHONE ENCOUNTER
Pt called in he said he needs an appt to get fitted for a knee brace. Should pt be scheduled with Dr. Haile or is that a separate department? Please, advise. Thank you. Jeffery can be reached at [phone]

## 2024-01-25 DIAGNOSIS — N52.9 ERECTILE DYSFUNCTION, UNSPECIFIED ERECTILE DYSFUNCTION TYPE: ICD-10-CM

## 2024-01-25 DIAGNOSIS — E11.9 TYPE 2 DIABETES MELLITUS WITHOUT COMPLICATION, WITHOUT LONG-TERM CURRENT USE OF INSULIN (HCC): ICD-10-CM

## 2024-01-25 DIAGNOSIS — K21.00 GASTROESOPHAGEAL REFLUX DISEASE WITH ESOPHAGITIS WITHOUT HEMORRHAGE: ICD-10-CM

## 2024-01-25 DIAGNOSIS — F33.1 MAJOR DEPRESSIVE DISORDER, RECURRENT, MODERATE (HCC): ICD-10-CM

## 2024-01-25 DIAGNOSIS — G20.A1 PARKINSON'S DISEASE: ICD-10-CM

## 2024-01-25 DIAGNOSIS — N39.3 STRESS INCONTINENCE OF URINE: ICD-10-CM

## 2024-01-25 DIAGNOSIS — E78.2 MIXED HYPERCHOLESTEROLEMIA AND HYPERTRIGLYCERIDEMIA: ICD-10-CM

## 2024-01-25 DIAGNOSIS — E11.40 TYPE 2 DIABETES MELLITUS WITH DIABETIC NEUROPATHY, WITHOUT LONG-TERM CURRENT USE OF INSULIN (HCC): ICD-10-CM

## 2024-01-25 DIAGNOSIS — I10 PRIMARY HYPERTENSION: ICD-10-CM

## 2024-01-25 DIAGNOSIS — J44.1 COPD WITH ACUTE EXACERBATION (HCC): ICD-10-CM

## 2024-01-25 RX ORDER — ATORVASTATIN CALCIUM 80 MG/1
TABLET, FILM COATED ORAL
Qty: 90 TABLET | Refills: 1 | Status: CANCELLED | OUTPATIENT
Start: 2024-01-25

## 2024-01-25 RX ORDER — CARVEDILOL 25 MG/1
TABLET ORAL
Qty: 180 TABLET | Refills: 1 | Status: CANCELLED | OUTPATIENT
Start: 2024-01-25

## 2024-01-25 RX ORDER — LANCETS 30 GAUGE
1 EACH MISCELLANEOUS 3 TIMES DAILY
Qty: 300 EACH | Refills: 3 | Status: CANCELLED | OUTPATIENT
Start: 2024-01-25

## 2024-01-25 RX ORDER — FENOFIBRATE 134 MG/1
CAPSULE ORAL
Qty: 90 CAPSULE | Refills: 1 | Status: CANCELLED | OUTPATIENT
Start: 2024-01-25

## 2024-01-25 RX ORDER — DULOXETIN HYDROCHLORIDE 60 MG/1
CAPSULE, DELAYED RELEASE ORAL
Qty: 90 CAPSULE | Refills: 1 | Status: CANCELLED | OUTPATIENT
Start: 2024-01-25

## 2024-01-25 RX ORDER — GLIPIZIDE 10 MG/1
10 TABLET ORAL EVERY 12 HOURS
Qty: 180 TABLET | Refills: 1 | Status: CANCELLED | OUTPATIENT
Start: 2024-01-25

## 2024-01-25 RX ORDER — HYDROCHLOROTHIAZIDE 12.5 MG/1
CAPSULE, GELATIN COATED ORAL
Qty: 90 CAPSULE | Refills: 1 | Status: CANCELLED | OUTPATIENT
Start: 2024-01-25

## 2024-01-25 RX ORDER — ROPINIROLE 2 MG/1
2 TABLET, FILM COATED ORAL 2 TIMES DAILY
Qty: 180 TABLET | Refills: 1 | Status: CANCELLED | OUTPATIENT
Start: 2024-01-25

## 2024-01-25 RX ORDER — GABAPENTIN 100 MG/1
100 CAPSULE ORAL NIGHTLY
Qty: 90 CAPSULE | Refills: 1 | Status: CANCELLED | OUTPATIENT
Start: 2024-01-25 | End: 2024-07-23

## 2024-01-25 RX ORDER — LORATADINE 10 MG/1
10 TABLET ORAL DAILY
Qty: 90 TABLET | Refills: 1 | Status: CANCELLED | OUTPATIENT
Start: 2024-01-25

## 2024-01-25 RX ORDER — ALBUTEROL SULFATE 90 UG/1
2 AEROSOL, METERED RESPIRATORY (INHALATION) EVERY 4 HOURS PRN
Qty: 18 G | Refills: 1 | Status: CANCELLED | OUTPATIENT
Start: 2024-01-25

## 2024-01-25 RX ORDER — PANTOPRAZOLE SODIUM 40 MG/1
40 TABLET, DELAYED RELEASE ORAL DAILY
Qty: 90 TABLET | Refills: 1 | Status: CANCELLED | OUTPATIENT
Start: 2024-01-25

## 2024-01-25 RX ORDER — EZETIMIBE 10 MG/1
10 TABLET ORAL DAILY
Qty: 90 TABLET | Refills: 1 | Status: CANCELLED | OUTPATIENT
Start: 2024-01-25

## 2024-01-25 RX ORDER — LOSARTAN POTASSIUM 50 MG/1
50 TABLET ORAL DAILY
Qty: 90 TABLET | Refills: 1 | Status: CANCELLED | OUTPATIENT
Start: 2024-01-25

## 2024-01-25 RX ORDER — DOXEPIN HYDROCHLORIDE 25 MG/1
CAPSULE ORAL
Qty: 90 CAPSULE | Refills: 1 | Status: CANCELLED | OUTPATIENT
Start: 2024-01-25

## 2024-01-25 RX ORDER — SILDENAFIL CITRATE 20 MG/1
20 TABLET ORAL DAILY PRN
Qty: 5 TABLET | Refills: 0 | Status: CANCELLED | OUTPATIENT
Start: 2024-01-25

## 2024-01-25 RX ORDER — ALBUTEROL SULFATE 90 UG/1
2 AEROSOL, METERED RESPIRATORY (INHALATION) EVERY 4 HOURS PRN
Qty: 18 G | Refills: 0 | Status: CANCELLED | OUTPATIENT
Start: 2024-01-25

## 2024-01-25 RX ORDER — GLUCOSAMINE HCL/CHONDROITIN SU 500-400 MG
CAPSULE ORAL
Qty: 300 STRIP | Refills: 3 | Status: CANCELLED | OUTPATIENT
Start: 2024-01-25

## 2024-01-25 NOTE — PROGRESS NOTES
Pt states he is in need of refills and the pharmacy has been attempting to fax and call several times; he states he needs all his medications asap including glucose strips please' would like notified when they are sent

## 2024-01-26 RX ORDER — CARVEDILOL 25 MG/1
TABLET ORAL
Qty: 180 TABLET | Refills: 1 | Status: SHIPPED | OUTPATIENT
Start: 2024-01-26

## 2024-01-26 RX ORDER — EZETIMIBE 10 MG/1
10 TABLET ORAL DAILY
Qty: 90 TABLET | Refills: 1 | Status: SHIPPED | OUTPATIENT
Start: 2024-01-26

## 2024-01-26 RX ORDER — HYDROCHLOROTHIAZIDE 12.5 MG/1
CAPSULE, GELATIN COATED ORAL
Qty: 90 CAPSULE | Refills: 1 | Status: SHIPPED | OUTPATIENT
Start: 2024-01-26

## 2024-01-26 RX ORDER — PANTOPRAZOLE SODIUM 40 MG/1
40 TABLET, DELAYED RELEASE ORAL DAILY
Qty: 90 TABLET | Refills: 1 | Status: SHIPPED | OUTPATIENT
Start: 2024-01-26

## 2024-01-26 RX ORDER — ATORVASTATIN CALCIUM 80 MG/1
TABLET, FILM COATED ORAL
Qty: 90 TABLET | Refills: 1 | Status: SHIPPED | OUTPATIENT
Start: 2024-01-26

## 2024-01-26 RX ORDER — GLUCOSAMINE HCL/CHONDROITIN SU 500-400 MG
CAPSULE ORAL
Qty: 300 STRIP | Refills: 3 | Status: SHIPPED | OUTPATIENT
Start: 2024-01-26

## 2024-01-26 RX ORDER — LOSARTAN POTASSIUM 50 MG/1
50 TABLET ORAL DAILY
Qty: 90 TABLET | Refills: 1 | Status: SHIPPED | OUTPATIENT
Start: 2024-01-26

## 2024-02-02 ENCOUNTER — OFFICE VISIT (OUTPATIENT)
Dept: FAMILY MEDICINE CLINIC | Age: 69
End: 2024-02-02

## 2024-02-02 VITALS
DIASTOLIC BLOOD PRESSURE: 70 MMHG | HEIGHT: 70 IN | WEIGHT: 212 LBS | HEART RATE: 54 BPM | SYSTOLIC BLOOD PRESSURE: 120 MMHG | BODY MASS INDEX: 30.35 KG/M2 | OXYGEN SATURATION: 96 %

## 2024-02-02 DIAGNOSIS — E11.51 TYPE II DIABETES MELLITUS WITH PERIPHERAL CIRCULATORY DISORDER (HCC): ICD-10-CM

## 2024-02-02 DIAGNOSIS — E78.2 MIXED HYPERCHOLESTEROLEMIA AND HYPERTRIGLYCERIDEMIA: ICD-10-CM

## 2024-02-02 DIAGNOSIS — Z00.00 MEDICARE ANNUAL WELLNESS VISIT, SUBSEQUENT: Primary | ICD-10-CM

## 2024-02-02 DIAGNOSIS — F33.1 MAJOR DEPRESSIVE DISORDER, RECURRENT, MODERATE (HCC): ICD-10-CM

## 2024-02-02 DIAGNOSIS — I10 PRIMARY HYPERTENSION: ICD-10-CM

## 2024-02-02 DIAGNOSIS — E11.9 TYPE 2 DIABETES MELLITUS WITHOUT COMPLICATION, WITHOUT LONG-TERM CURRENT USE OF INSULIN (HCC): ICD-10-CM

## 2024-02-02 DIAGNOSIS — J44.9 CHRONIC OBSTRUCTIVE PULMONARY DISEASE, UNSPECIFIED COPD TYPE (HCC): ICD-10-CM

## 2024-02-02 PROBLEM — K57.90 DIVERTICULOSIS: Status: ACTIVE | Noted: 2024-02-02

## 2024-02-02 LAB
ALBUMIN SERPL-MCNC: 4.2 G/DL (ref 3.5–5.2)
ALP BLD-CCNC: 54 U/L (ref 40–129)
ALT SERPL-CCNC: 20 U/L (ref 0–40)
ANION GAP SERPL CALCULATED.3IONS-SCNC: 12 MMOL/L (ref 7–16)
AST SERPL-CCNC: 17 U/L (ref 0–39)
BILIRUB SERPL-MCNC: 0.4 MG/DL (ref 0–1.2)
BUN BLDV-MCNC: 22 MG/DL (ref 6–23)
CALCIUM SERPL-MCNC: 9.4 MG/DL (ref 8.6–10.2)
CHLORIDE BLD-SCNC: 104 MMOL/L (ref 98–107)
CHOLESTEROL: 151 MG/DL
CO2: 25 MMOL/L (ref 22–29)
CREAT SERPL-MCNC: 1.4 MG/DL (ref 0.7–1.2)
GFR SERPL CREATININE-BSD FRML MDRD: 56 ML/MIN/1.73M2
GLUCOSE BLD-MCNC: 167 MG/DL (ref 74–99)
HBA1C MFR BLD: 8 %
HDLC SERPL-MCNC: 37 MG/DL
LDL CHOLESTEROL: 85 MG/DL
POTASSIUM SERPL-SCNC: 3.9 MMOL/L (ref 3.5–5)
SODIUM BLD-SCNC: 141 MMOL/L (ref 132–146)
TOTAL PROTEIN: 6.9 G/DL (ref 6.4–8.3)
TRIGL SERPL-MCNC: 145 MG/DL
VLDLC SERPL CALC-MCNC: 29 MG/DL

## 2024-02-02 ASSESSMENT — PATIENT HEALTH QUESTIONNAIRE - PHQ9
4. FEELING TIRED OR HAVING LITTLE ENERGY: 0
8. MOVING OR SPEAKING SO SLOWLY THAT OTHER PEOPLE COULD HAVE NOTICED. OR THE OPPOSITE, BEING SO FIGETY OR RESTLESS THAT YOU HAVE BEEN MOVING AROUND A LOT MORE THAN USUAL: 0
6. FEELING BAD ABOUT YOURSELF - OR THAT YOU ARE A FAILURE OR HAVE LET YOURSELF OR YOUR FAMILY DOWN: 0
3. TROUBLE FALLING OR STAYING ASLEEP: 0
SUM OF ALL RESPONSES TO PHQ QUESTIONS 1-9: 0
10. IF YOU CHECKED OFF ANY PROBLEMS, HOW DIFFICULT HAVE THESE PROBLEMS MADE IT FOR YOU TO DO YOUR WORK, TAKE CARE OF THINGS AT HOME, OR GET ALONG WITH OTHER PEOPLE: 0
1. LITTLE INTEREST OR PLEASURE IN DOING THINGS: 0
5. POOR APPETITE OR OVEREATING: 0
SUM OF ALL RESPONSES TO PHQ QUESTIONS 1-9: 0
7. TROUBLE CONCENTRATING ON THINGS, SUCH AS READING THE NEWSPAPER OR WATCHING TELEVISION: 0
SUM OF ALL RESPONSES TO PHQ QUESTIONS 1-9: 0
2. FEELING DOWN, DEPRESSED OR HOPELESS: 0
9. THOUGHTS THAT YOU WOULD BE BETTER OFF DEAD, OR OF HURTING YOURSELF: 0
SUM OF ALL RESPONSES TO PHQ9 QUESTIONS 1 & 2: 0
SUM OF ALL RESPONSES TO PHQ QUESTIONS 1-9: 0

## 2024-02-02 ASSESSMENT — LIFESTYLE VARIABLES
HOW MANY STANDARD DRINKS CONTAINING ALCOHOL DO YOU HAVE ON A TYPICAL DAY: 1 OR 2
HOW OFTEN DO YOU HAVE A DRINK CONTAINING ALCOHOL: 2-4 TIMES A MONTH

## 2024-02-02 NOTE — PATIENT INSTRUCTIONS
good idea to know your test results and keep a list of the medicines you take.  How can you care for yourself at home?  Diet    Use less salt when you cook and eat. This helps lower your blood pressure. Taste food before salting. Add only a little salt when you think you need it. With time, your taste buds will adjust to less salt.     Eat fewer snack items, fast foods, canned soups, and other high-salt, high-fat, processed foods.     Read food labels and try to avoid saturated and trans fats. They increase your risk of heart disease by raising cholesterol levels.     Limit the amount of solid fat-butter, margarine, and shortening-you eat. Use olive, peanut, or canola oil when you cook. Bake, broil, and steam foods instead of frying them.     Eat a variety of fruit and vegetables every day. Dark green, deep orange, red, or yellow fruits and vegetables are especially good for you. Examples include spinach, carrots, peaches, and berries.     Foods high in fiber can reduce your cholesterol and provide important vitamins and minerals. High-fiber foods include whole-grain cereals and breads, oatmeal, beans, brown rice, citrus fruits, and apples.     Eat lean proteins. Heart-healthy proteins include seafood, lean meats and poultry, eggs, beans, peas, nuts, seeds, and soy products.     Limit drinks and foods with added sugar. These include candy, desserts, and soda pop.   Lifestyle changes    If your doctor recommends it, get more exercise. Walking is a good choice. Bit by bit, increase the amount you walk every day. Try for at least 30 minutes on most days of the week. You also may want to swim, bike, or do other activities.     Do not smoke. If you need help quitting, talk to your doctor about stop-smoking programs and medicines. These can increase your chances of quitting for good. Quitting smoking may be the most important step you can take to protect your heart. It is never too late to quit.     Limit alcohol to 2

## 2024-02-02 NOTE — PROGRESS NOTES
Medicare Annual Wellness Visit    Jeffery Hopkins is here for Medicare AWV    Assessment & Plan   Medicare annual wellness visit, subsequent  Type 2 diabetes mellitus without complication, without long-term current use of insulin (HCC)  -     POCT glycosylated hemoglobin (Hb A1C)  Primary hypertension  Chronic obstructive pulmonary disease, unspecified COPD type (HCC)  Major depressive disorder, recurrent, moderate (HCC)  Type II diabetes mellitus with peripheral circulatory disorder (HCC)    Recommendations for Preventive Services Due: see orders and patient instructions/AVS.  Recommended screening schedule for the next 5-10 years is provided to the patient in written form: see Patient Instructions/AVS.     Return in 3 months (on 5/2/2024) for FOLLOW UP VISIT AND Medicare Annual Wellness Visit in 1 year.     Subjective   The following acute and/or chronic problems were also addressed today:  AS LISTED ABOVE    Patient's complete Health Risk Assessment and screening values have been reviewed and are found in Flowsheets. The following problems were reviewed today and where indicated follow up appointments were made and/or referrals ordered.    Positive Risk Factor Screenings with Interventions:                Activity, Diet, and Weight:  On average, how many days per week do you engage in moderate to strenuous exercise (like a brisk walk)?: 3 days  On average, how many minutes do you engage in exercise at this level?: 30 min    Do you eat balanced/healthy meals regularly?: (!) No    Body mass index is 30.42 kg/m². 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 DIFFICULT TO MAINTAIN BALANCED DIET.  Obesity Interventions:  Patient comments: PROBLEM CONTROLLING DIET AS MENTIONED ABOVE                 Advanced Directives:  Do you have a Living Will?: (!) No    Intervention:  has NO advanced directive - information provided

## 2024-02-06 NOTE — RESULT ENCOUNTER NOTE
KIDNEY FUNCTION BORDERLINE LOW. WILL MONITOR.  BLOOD SUGAR LEVEL ARE HIGH.  RECOMMEND:  LOW SALT, LOW CARB. AND LOW FAT DIET.  REGULAR EXERCISE.  CONTINUE CURRENT MEDICATIONS.   DISCUSS NEXT VISIT.

## 2024-03-18 ENCOUNTER — TELEPHONE (OUTPATIENT)
Dept: FAMILY MEDICINE CLINIC | Age: 69
End: 2024-03-18

## 2024-03-18 NOTE — TELEPHONE ENCOUNTER
Pt called in to request alternative doxepin, he reports a $25 copay with that medication. Pt has not tried any other medication for insomnia that he can recall and would like a call when it has been sent.    Last seen 2/2/2024  Next appt 5/3/2024

## 2024-03-19 RX ORDER — TRAZODONE HYDROCHLORIDE 50 MG/1
50 TABLET ORAL NIGHTLY
Qty: 90 TABLET | Refills: 1 | Status: SHIPPED | OUTPATIENT
Start: 2024-03-19

## 2024-04-21 ENCOUNTER — APPOINTMENT (OUTPATIENT)
Dept: GENERAL RADIOLOGY | Age: 69
End: 2024-04-21
Payer: MEDICARE

## 2024-04-21 ENCOUNTER — HOSPITAL ENCOUNTER (EMERGENCY)
Age: 69
Discharge: HOME | End: 2024-04-21
Attending: STUDENT IN AN ORGANIZED HEALTH CARE EDUCATION/TRAINING PROGRAM
Payer: MEDICARE

## 2024-04-21 VITALS
RESPIRATION RATE: 20 BRPM | HEART RATE: 66 BPM | BODY MASS INDEX: 30.13 KG/M2 | TEMPERATURE: 97.8 F | WEIGHT: 210 LBS | SYSTOLIC BLOOD PRESSURE: 127 MMHG | OXYGEN SATURATION: 95 % | DIASTOLIC BLOOD PRESSURE: 72 MMHG

## 2024-04-21 DIAGNOSIS — M25.561 ACUTE PAIN OF RIGHT KNEE: Primary | ICD-10-CM

## 2024-04-21 PROCEDURE — 73562 X-RAY EXAM OF KNEE 3: CPT

## 2024-04-21 PROCEDURE — 6370000000 HC RX 637 (ALT 250 FOR IP): Performed by: STUDENT IN AN ORGANIZED HEALTH CARE EDUCATION/TRAINING PROGRAM

## 2024-04-21 PROCEDURE — 99283 EMERGENCY DEPT VISIT LOW MDM: CPT

## 2024-04-21 RX ORDER — LIDOCAINE 50 MG/G
1 PATCH TOPICAL DAILY
Qty: 10 PATCH | Refills: 0 | Status: SHIPPED | OUTPATIENT
Start: 2024-04-21 | End: 2024-05-01

## 2024-04-21 RX ORDER — HYDROCODONE BITARTRATE AND ACETAMINOPHEN 5; 325 MG/1; MG/1
1 TABLET ORAL ONCE
Status: COMPLETED | OUTPATIENT
Start: 2024-04-21 | End: 2024-04-21

## 2024-04-21 RX ORDER — HYDROCODONE BITARTRATE AND ACETAMINOPHEN 5; 325 MG/1; MG/1
1 TABLET ORAL EVERY 6 HOURS PRN
Qty: 12 TABLET | Refills: 0 | Status: SHIPPED | OUTPATIENT
Start: 2024-04-21 | End: 2024-04-24

## 2024-04-21 RX ADMIN — HYDROCODONE BITARTRATE AND ACETAMINOPHEN 1 TABLET: 5; 325 TABLET ORAL at 02:36

## 2024-04-21 ASSESSMENT — PAIN DESCRIPTION - LOCATION
LOCATION: KNEE
LOCATION: KNEE

## 2024-04-21 ASSESSMENT — PAIN SCALES - GENERAL
PAINLEVEL_OUTOF10: 10
PAINLEVEL_OUTOF10: 10

## 2024-04-21 ASSESSMENT — PAIN - FUNCTIONAL ASSESSMENT: PAIN_FUNCTIONAL_ASSESSMENT: 0-10

## 2024-04-21 NOTE — ED PROVIDER NOTES
as needed for Pain for up to 3 days. Intended supply: 3 days. Take lowest dose possible to manage pain Max Daily Amount: 4 tablets, Disp-12 tablet, R-0Normal      lidocaine (LIDODERM) 5 % Place 1 patch onto the skin daily for 10 days 12 hours on, 12 hours off., Disp-10 patch, R-0Normal             Diagnosis:  1. Acute pain of right knee        Disposition:  Patient's disposition: Discharge to home  Patient's condition is stable.                                             Bob Eller MD  04/21/24 0414

## 2024-04-23 ENCOUNTER — OFFICE VISIT (OUTPATIENT)
Dept: ORTHOPEDIC SURGERY | Age: 69
End: 2024-04-23
Payer: MEDICARE

## 2024-04-23 VITALS — WEIGHT: 210 LBS | BODY MASS INDEX: 30.06 KG/M2 | HEIGHT: 70 IN

## 2024-04-23 DIAGNOSIS — G89.29 CHRONIC PAIN OF RIGHT KNEE: ICD-10-CM

## 2024-04-23 DIAGNOSIS — M17.11 PRIMARY OSTEOARTHRITIS OF RIGHT KNEE: Primary | ICD-10-CM

## 2024-04-23 DIAGNOSIS — M25.561 CHRONIC PAIN OF RIGHT KNEE: ICD-10-CM

## 2024-04-23 DIAGNOSIS — M25.561 RIGHT KNEE PAIN, UNSPECIFIED CHRONICITY: Primary | ICD-10-CM

## 2024-04-23 DIAGNOSIS — S83.511A RUPTURE OF ANTERIOR CRUCIATE LIGAMENT OF RIGHT KNEE, INITIAL ENCOUNTER: ICD-10-CM

## 2024-04-23 DIAGNOSIS — M23.51 RECURRENT RIGHT KNEE INSTABILITY: ICD-10-CM

## 2024-04-23 PROCEDURE — 1123F ACP DISCUSS/DSCN MKR DOCD: CPT | Performed by: ORTHOPAEDIC SURGERY

## 2024-04-23 PROCEDURE — 99214 OFFICE O/P EST MOD 30 MIN: CPT | Performed by: ORTHOPAEDIC SURGERY

## 2024-04-23 ASSESSMENT — ENCOUNTER SYMPTOMS
SHORTNESS OF BREATH: 0
EYE DISCHARGE: 0
ABDOMINAL DISTENTION: 0
ALLERGIC/IMMUNOLOGIC NEGATIVE: 1

## 2024-04-23 NOTE — PROGRESS NOTES
Diabetes mellitus (HCC)     Fungal infection of foot     GERD (gastroesophageal reflux disease)     Hyperlipidemia     Hypertension     Type 2 diabetes mellitus without complication (HCC)      Past Surgical History:   Procedure Laterality Date    ANTERIOR CRUCIATE LIGAMENT REPAIR Right 2020    RIGHT KNEE ARTHROSCOPY, (ANTERIOR CRUCIATE LIGAMENT) ACL RECONSTRUCTION. ALLOGRAFT. MEDIAL MENISCECTOMY AND DEBRIDEMENT. (ARTHREX) performed by Jonathan Haile DO at [facility] OR    ARM SURGERY Left 2021    LEFT ULNAR NEUROPLASTY performed by Nabil Lopez MD at [facility] OR    CHOLECYSTECTOMY, LAPAROSCOPIC N/A 2020    CHOLECYSTECTOMY LAPAROSCOPIC WITH IOC performed by Leobardo Hooper MD at [facility] OR    COLONOSCOPY      ENDOSCOPY, COLON, DIAGNOSTIC      HERNIA REPAIR      NECK SURGERY      cervical spine surgery    SHOULDER ARTHROSCOPY Left 2021    LEFT SHOULDER ARTHROSCOPY, SUBACROMIAL DECOMPRESSION, DEBRIDEMENT (ARTHREX) performed by Jonathan Haile DO at [facility] OR      Family History   Problem Relation Age of Onset    Hypertension Mother     Heart Attack Mother     Diabetes Father     Diabetes Maternal Grandmother     Hypertension Paternal Grandmother       Social History     Tobacco Use    Smoking status: Former     Current packs/day: 0.00     Average packs/day: 1.5 packs/day for 21.1 years (31.7 ttl pk-yrs)     Types: Cigarettes     Start date: 1965     Quit date: 1986     Years since quittin.1    Smokeless tobacco: Never   Vaping Use    Vaping Use: Never used   Substance Use Topics    Alcohol use: Yes     Comment: 2-3 beers per week    Drug use: No        Review of Systems   Constitutional:  Positive for activity change.   HENT:  Negative for congestion.    Eyes:  Negative for discharge.   Respiratory:  Negative for shortness of breath.    Cardiovascular:  Negative for chest pain.   Gastrointestinal:  Negative for abdominal distention.   Endocrine: Negative.    Genitourinary: Negative.

## 2024-04-24 ENCOUNTER — TELEPHONE (OUTPATIENT)
Dept: ORTHOPEDIC SURGERY | Age: 69
End: 2024-04-24

## 2024-04-24 NOTE — TELEPHONE ENCOUNTER
Patient called back and stated he doesn't need a Prior Auth, but it needs a new RX.    Last seen 2/2/2024  Next appt 5/3/2024  [facility]

## 2024-04-24 NOTE — TELEPHONE ENCOUNTER
Patient called requesting prior authorization for Lidocaine patches that were prescribed in ED. Prior Auth submitted via [employer] 4/24. KEY: [id number]

## 2024-04-24 NOTE — TELEPHONE ENCOUNTER
Insurance is not covering Rybelsus, pt requesting prior auth be completed.     Last seen 2/2/2024  Next appt 5/3/2024

## 2024-05-01 DIAGNOSIS — E11.9 TYPE 2 DIABETES MELLITUS WITHOUT COMPLICATION, WITHOUT LONG-TERM CURRENT USE OF INSULIN (HCC): ICD-10-CM

## 2024-05-01 RX ORDER — GLIPIZIDE 10 MG/1
10 TABLET ORAL EVERY 12 HOURS
Qty: 180 TABLET | Refills: 1 | Status: SHIPPED | OUTPATIENT
Start: 2024-05-01

## 2024-05-01 NOTE — TELEPHONE ENCOUNTER
Mckenna/Dana called on behalf of patient who is requesting a refill.    Last seen 2/2/2024  Next appt 5/3/2024  [facility]

## 2024-05-21 ENCOUNTER — OFFICE VISIT (OUTPATIENT)
Dept: FAMILY MEDICINE CLINIC | Age: 69
End: 2024-05-21

## 2024-05-21 VITALS
WEIGHT: 209 LBS | BODY MASS INDEX: 29.92 KG/M2 | OXYGEN SATURATION: 96 % | HEIGHT: 70 IN | TEMPERATURE: 98.3 F | RESPIRATION RATE: 18 BRPM | HEART RATE: 61 BPM | DIASTOLIC BLOOD PRESSURE: 74 MMHG | SYSTOLIC BLOOD PRESSURE: 126 MMHG

## 2024-05-21 DIAGNOSIS — K21.00 GASTROESOPHAGEAL REFLUX DISEASE WITH ESOPHAGITIS WITHOUT HEMORRHAGE: ICD-10-CM

## 2024-05-21 DIAGNOSIS — N39.3 STRESS INCONTINENCE OF URINE: ICD-10-CM

## 2024-05-21 DIAGNOSIS — N18.31 TYPE 2 DIABETES MELLITUS WITH STAGE 3A CHRONIC KIDNEY DISEASE, WITHOUT LONG-TERM CURRENT USE OF INSULIN (HCC): ICD-10-CM

## 2024-05-21 DIAGNOSIS — N52.9 ERECTILE DYSFUNCTION, UNSPECIFIED ERECTILE DYSFUNCTION TYPE: ICD-10-CM

## 2024-05-21 DIAGNOSIS — E55.9 HYPOVITAMINOSIS D: ICD-10-CM

## 2024-05-21 DIAGNOSIS — I10 PRIMARY HYPERTENSION: ICD-10-CM

## 2024-05-21 DIAGNOSIS — F33.1 MAJOR DEPRESSIVE DISORDER, RECURRENT, MODERATE (HCC): ICD-10-CM

## 2024-05-21 DIAGNOSIS — E11.22 TYPE 2 DIABETES MELLITUS WITH STAGE 3A CHRONIC KIDNEY DISEASE, WITHOUT LONG-TERM CURRENT USE OF INSULIN (HCC): ICD-10-CM

## 2024-05-21 DIAGNOSIS — J44.1 COPD WITH ACUTE EXACERBATION (HCC): ICD-10-CM

## 2024-05-21 DIAGNOSIS — E78.2 MIXED HYPERCHOLESTEROLEMIA AND HYPERTRIGLYCERIDEMIA: ICD-10-CM

## 2024-05-21 DIAGNOSIS — E11.9 TYPE 2 DIABETES MELLITUS WITHOUT COMPLICATION, WITHOUT LONG-TERM CURRENT USE OF INSULIN (HCC): Primary | ICD-10-CM

## 2024-05-21 LAB — HBA1C MFR BLD: 8.2 %

## 2024-05-21 RX ORDER — ALBUTEROL SULFATE 90 UG/1
2 AEROSOL, METERED RESPIRATORY (INHALATION) EVERY 4 HOURS PRN
Qty: 18 G | Refills: 0 | Status: SHIPPED | OUTPATIENT
Start: 2024-05-21

## 2024-05-21 RX ORDER — SILDENAFIL CITRATE 20 MG/1
20 TABLET ORAL DAILY PRN
Qty: 5 TABLET | Refills: 0 | Status: SHIPPED | OUTPATIENT
Start: 2024-05-21

## 2024-05-21 RX ORDER — FENOFIBRATE 134 MG/1
CAPSULE ORAL
Qty: 90 CAPSULE | Refills: 1 | Status: SHIPPED | OUTPATIENT
Start: 2024-05-21

## 2024-05-21 RX ORDER — PANTOPRAZOLE SODIUM 40 MG/1
40 TABLET, DELAYED RELEASE ORAL DAILY
Qty: 90 TABLET | Refills: 1 | Status: SHIPPED | OUTPATIENT
Start: 2024-05-21

## 2024-05-21 RX ORDER — DAPAGLIFLOZIN 10 MG/1
10 TABLET, FILM COATED ORAL EVERY MORNING
Qty: 90 TABLET | Refills: 1 | Status: SHIPPED | OUTPATIENT
Start: 2024-05-21

## 2024-05-21 RX ORDER — LORATADINE 10 MG/1
10 TABLET ORAL DAILY
Qty: 90 TABLET | Refills: 1 | Status: SHIPPED | OUTPATIENT
Start: 2024-05-21

## 2024-05-21 RX ORDER — HYDROCHLOROTHIAZIDE 12.5 MG/1
CAPSULE, GELATIN COATED ORAL
Qty: 90 CAPSULE | Refills: 1 | Status: SHIPPED | OUTPATIENT
Start: 2024-05-21

## 2024-05-21 RX ORDER — EZETIMIBE 10 MG/1
10 TABLET ORAL DAILY
Qty: 90 TABLET | Refills: 1 | Status: SHIPPED | OUTPATIENT
Start: 2024-05-21

## 2024-05-21 RX ORDER — ATORVASTATIN CALCIUM 80 MG/1
TABLET, FILM COATED ORAL
Qty: 90 TABLET | Refills: 1 | Status: SHIPPED | OUTPATIENT
Start: 2024-05-21

## 2024-05-21 RX ORDER — CARVEDILOL 25 MG/1
TABLET ORAL
Qty: 180 TABLET | Refills: 1 | Status: SHIPPED | OUTPATIENT
Start: 2024-05-21

## 2024-05-21 RX ORDER — LOSARTAN POTASSIUM 50 MG/1
50 TABLET ORAL DAILY
Qty: 90 TABLET | Refills: 1 | Status: SHIPPED | OUTPATIENT
Start: 2024-05-21

## 2024-05-21 RX ORDER — DULOXETIN HYDROCHLORIDE 60 MG/1
CAPSULE, DELAYED RELEASE ORAL
Qty: 90 CAPSULE | Refills: 1 | Status: SHIPPED | OUTPATIENT
Start: 2024-05-21

## 2024-05-21 RX ORDER — TAMSULOSIN HYDROCHLORIDE 0.4 MG/1
0.4 CAPSULE ORAL 2 TIMES DAILY
Qty: 90 CAPSULE | Refills: 1 | Status: SHIPPED | OUTPATIENT
Start: 2024-05-21

## 2024-05-21 RX ORDER — ROPINIROLE 2 MG/1
2 TABLET, FILM COATED ORAL 2 TIMES DAILY
Qty: 180 TABLET | Refills: 1 | Status: SHIPPED | OUTPATIENT
Start: 2024-05-21

## 2024-05-21 NOTE — PATIENT INSTRUCTIONS
LOW SALT FOR BLOOD PRESSURE CONTROL.  LOW CARBOHYDRATE FOR BLOOD SUGAR AND WEIGHT CONTROL.  LOW FAT DIET FOR CHOLESTEROL CONTROL.  DRINK ENOUGH FLUIDS FOR BETTER KIDNEY FUNCTION.  TAKE COREG 25 MG. 2 TIMES A DAY,COZAAR 50 MG. AND  HCTZ 12.5 MG. DAILY FOR BLOOD PRESSURE CONTROL.  TAKE FARXIGA 10 MG. DAILY AND GLUCOTROL 10  MG. 2 TIMES A DAY FOR BLOOD SUGAR CONTROL. START TAKING RYBELSUS 3 MG. DAILY FOR IMPROVING BLOOD SUGAR CONTROL.  TAKE LILOFIBRA 134 MG.IN THE MORNING AND LIPITOR  40 MG.  NIGHTLY FOR CHOLESTEROL CONTROL..  TAKE SINEQUAN, CYMBALTA AS PER PSYCHIATRIST RECOMMENDATION  FOR MOOD CONTROL.INCREASE SINEQUAN TO 25 MG. FOR BETTER SLEEP.  TAKE REQUIP 2 MG. 2 TIMES A DAY FOR PARKINSON'S SYMPTOM RELIEF.  TAKE PROTONIX 40 MG. DAILY FOR STOMACH ACID CONTROL.  TAKE GABAPENTIN 300 MG. NIGHTLY FOR PAIN RELIEF.  INHALE TRELEGY 1 PUFF DAILY FOR BREATHING CONTROL.  REGULAR WALKING ADVISED.  ADVISED WEIGHT REDUCTION.  FOLLOW UP  DR. KEREN PEREZ  FOR BLADDER CONTROL PROBLEM.  FOLLOW UP WITH  [facility] CARDIOLOGIST AS RECOMMENDED.  FASTING FOR LAB WORK PRIOR TO NEXT VISIT.  KEEP NEXT APPOINTMENT IN 3 MONTHS

## 2024-05-21 NOTE — PROGRESS NOTES
OFFICE PROGRESS NOTE      SUBJECTIVE:        Patient ID:   Jeffery Hopkins is a 69 y.o. male who presents for   Chief Complaint   Patient presents with    Diabetes     Pt asked about getting CGM           HPI:     RECHECK BP, COPD, MOOD CONTROL, STRESS INCONTINENCE, CHOLESTEROL AND DIABETES.  MOOD WELL CONTROLLED WITH THE MEDICATION.  USING TRELEGY INHALER FOR COPD. WELL CONTROLLED.  MEDICATION REFILL.  FEELS GOOD.   WATCHING DIET BUT NOT GOOD.  PHYSICALLY ACTIVE JOB  FOR EXERCISE.  TAKING MEDICATIONS REGULARLY.         Prior to Admission medications    Medication Sig Start Date End Date Taking? Authorizing Provider   albuterol sulfate HFA (VENTOLIN HFA) 108 (90 Base) MCG/ACT inhaler Inhale 2 puffs into the lungs every 4 hours as needed for Wheezing or Shortness of Breath 5/21/24  Yes Lester Hernadez MD   atorvastatin (LIPITOR) 80 MG tablet TAKE ONE TABLET BY MOUTH DAILY AT 5PM IN EVENING AT BEDTIME FOR CHOLESTEROL 5/21/24  Yes Lester Hernadez MD   carvedilol (COREG) 25 MG tablet TAKE ONE TABLET BY MOUTH TWICE DAILY @ 9AM & 5PM 5/21/24  Yes Lester Hernadez MD   dapagliflozin (FARXIGA) 10 MG tablet Take 1 tablet by mouth every morning 5/21/24  Yes Lester Hernadez MD   DULoxetine (CYMBALTA) 60 MG extended release capsule TAKE ONE CAPSULE BY MOUTH DAILY AT 9AM 5/21/24  Yes Lester Hernadez MD   ezetimibe (ZETIA) 10 MG tablet Take 1 tablet by mouth daily 5/21/24  Yes Lester Hernadez MD   fenofibrate micronized (LOFIBRA) 134 MG capsule TAKE ONE CAPSULE BY MOUTH DAILY AT 9AM every morning before breakfast 5/21/24  Yes Lester Hernadez MD   fluticasone-umeclidin-vilant (TRELEGY ELLIPTA) 100-62.5-25 MCG/ACT AEPB inhaler Inhale 1 puff into the lungs daily 5/21/24  Yes Lester Hernadez MD   hydroCHLOROthiazide 12.5 MG capsule TAKE ONE CAPSULE BY MOUTH DAILY AT 9AM 5/21/24  Yes Lester Hernadez MD   loratadine (CLARITIN) 10 MG tablet Take 1 tablet by mouth daily 5/21/24  Yes Lester Hernadez

## 2024-06-19 ENCOUNTER — TRANSCRIBE ORDERS (OUTPATIENT)
Dept: ADMINISTRATIVE | Age: 69
End: 2024-06-19

## 2024-06-19 DIAGNOSIS — N40.0 BENIGN PROSTATIC HYPERPLASIA, UNSPECIFIED WHETHER LOWER URINARY TRACT SYMPTOMS PRESENT: Primary | ICD-10-CM

## 2024-08-05 ENCOUNTER — HOSPITAL ENCOUNTER (EMERGENCY)
Age: 69
Discharge: HOME | End: 2024-08-05
Attending: EMERGENCY MEDICINE
Payer: MEDICARE

## 2024-08-05 ENCOUNTER — APPOINTMENT (OUTPATIENT)
Dept: GENERAL RADIOLOGY | Age: 69
End: 2024-08-05
Payer: MEDICARE

## 2024-08-05 VITALS
BODY MASS INDEX: 30.13 KG/M2 | OXYGEN SATURATION: 98 % | DIASTOLIC BLOOD PRESSURE: 82 MMHG | WEIGHT: 210 LBS | RESPIRATION RATE: 22 BRPM | HEART RATE: 64 BPM | SYSTOLIC BLOOD PRESSURE: 164 MMHG | TEMPERATURE: 98.1 F

## 2024-08-05 DIAGNOSIS — J44.1 COPD EXACERBATION (HCC): Primary | ICD-10-CM

## 2024-08-05 LAB
ANION GAP SERPL CALCULATED.3IONS-SCNC: 15 MMOL/L (ref 7–16)
BASOPHILS # BLD: 0.04 K/UL (ref 0–0.2)
BASOPHILS NFR BLD: 1 % (ref 0–2)
BUN SERPL-MCNC: 15 MG/DL (ref 6–23)
CALCIUM SERPL-MCNC: 9 MG/DL (ref 8.6–10.2)
CHLORIDE SERPL-SCNC: 105 MMOL/L (ref 98–107)
CO2 SERPL-SCNC: 22 MMOL/L (ref 22–29)
CREAT SERPL-MCNC: 1.2 MG/DL (ref 0.7–1.2)
EOSINOPHIL # BLD: 0.16 K/UL (ref 0.05–0.5)
EOSINOPHILS RELATIVE PERCENT: 3 % (ref 0–6)
ERYTHROCYTE [DISTWIDTH] IN BLOOD BY AUTOMATED COUNT: 12.9 % (ref 11.5–15)
GFR, ESTIMATED: 64 ML/MIN/1.73M2
GLUCOSE SERPL-MCNC: 254 MG/DL (ref 74–99)
HCT VFR BLD AUTO: 38.5 % (ref 37–54)
HGB BLD-MCNC: 13.4 G/DL (ref 12.5–16.5)
IMM GRANULOCYTES # BLD AUTO: <0.03 K/UL (ref 0–0.58)
IMM GRANULOCYTES NFR BLD: 0 % (ref 0–5)
LYMPHOCYTES NFR BLD: 1.6 K/UL (ref 1.5–4)
LYMPHOCYTES RELATIVE PERCENT: 33 % (ref 20–42)
MCH RBC QN AUTO: 30.6 PG (ref 26–35)
MCHC RBC AUTO-ENTMCNC: 34.8 G/DL (ref 32–34.5)
MCV RBC AUTO: 87.9 FL (ref 80–99.9)
MONOCYTES NFR BLD: 0.39 K/UL (ref 0.1–0.95)
MONOCYTES NFR BLD: 8 % (ref 2–12)
NEUTROPHILS NFR BLD: 55 % (ref 43–80)
NEUTS SEG NFR BLD: 2.7 K/UL (ref 1.8–7.3)
PLATELET # BLD AUTO: 172 K/UL (ref 130–450)
PMV BLD AUTO: 9.6 FL (ref 7–12)
POTASSIUM SERPL-SCNC: 3.9 MMOL/L (ref 3.5–5)
RBC # BLD AUTO: 4.38 M/UL (ref 3.8–5.8)
SODIUM SERPL-SCNC: 142 MMOL/L (ref 132–146)
TROPONIN I SERPL HS-MCNC: <6 NG/L (ref 0–11)
WBC OTHER # BLD: 4.9 K/UL (ref 4.5–11.5)

## 2024-08-05 PROCEDURE — 84484 ASSAY OF TROPONIN QUANT: CPT

## 2024-08-05 PROCEDURE — 6360000002 HC RX W HCPCS

## 2024-08-05 PROCEDURE — 99285 EMERGENCY DEPT VISIT HI MDM: CPT

## 2024-08-05 PROCEDURE — 80048 BASIC METABOLIC PNL TOTAL CA: CPT

## 2024-08-05 PROCEDURE — 85025 COMPLETE CBC W/AUTO DIFF WBC: CPT

## 2024-08-05 PROCEDURE — 71046 X-RAY EXAM CHEST 2 VIEWS: CPT

## 2024-08-05 PROCEDURE — 2580000003 HC RX 258

## 2024-08-05 PROCEDURE — 93005 ELECTROCARDIOGRAM TRACING: CPT

## 2024-08-05 PROCEDURE — 96374 THER/PROPH/DIAG INJ IV PUSH: CPT

## 2024-08-05 PROCEDURE — 94640 AIRWAY INHALATION TREATMENT: CPT

## 2024-08-05 PROCEDURE — 6370000000 HC RX 637 (ALT 250 FOR IP)

## 2024-08-05 RX ORDER — PREDNISONE 50 MG/1
50 TABLET ORAL DAILY
Qty: 5 TABLET | Refills: 0 | Status: SHIPPED | OUTPATIENT
Start: 2024-08-05 | End: 2024-08-10

## 2024-08-05 RX ORDER — IPRATROPIUM BROMIDE AND ALBUTEROL SULFATE 2.5; .5 MG/3ML; MG/3ML
1 SOLUTION RESPIRATORY (INHALATION)
Status: DISCONTINUED | OUTPATIENT
Start: 2024-08-05 | End: 2024-08-06 | Stop reason: HOSPADM

## 2024-08-05 RX ADMIN — METHYLPREDNISOLONE SODIUM SUCCINATE 60 MG: 125 INJECTION, POWDER, LYOPHILIZED, FOR SOLUTION INTRAMUSCULAR; INTRAVENOUS at 20:26

## 2024-08-05 RX ADMIN — IPRATROPIUM BROMIDE AND ALBUTEROL SULFATE 1 DOSE: .5; 2.5 SOLUTION RESPIRATORY (INHALATION) at 21:35

## 2024-08-05 ASSESSMENT — LIFESTYLE VARIABLES: HOW OFTEN DO YOU HAVE A DRINK CONTAINING ALCOHOL: 2-4 TIMES A MONTH

## 2024-08-05 ASSESSMENT — PAIN - FUNCTIONAL ASSESSMENT: PAIN_FUNCTIONAL_ASSESSMENT: NONE - DENIES PAIN

## 2024-08-06 LAB
EKG ATRIAL RATE: 61 BPM
EKG P AXIS: 50 DEGREES
EKG P-R INTERVAL: 160 MS
EKG Q-T INTERVAL: 408 MS
EKG QRS DURATION: 92 MS
EKG QTC CALCULATION (BAZETT): 410 MS
EKG R AXIS: 23 DEGREES
EKG T AXIS: 44 DEGREES
EKG VENTRICULAR RATE: 61 BPM

## 2024-08-06 PROCEDURE — 93010 ELECTROCARDIOGRAM REPORT: CPT | Performed by: INTERNAL MEDICINE

## 2024-08-06 NOTE — DISCHARGE INSTRUCTIONS
Call and schedule an appointment with your PCP over the next week.  Return to the ER should your shortness of breath worsen.

## 2024-08-06 NOTE — DISCHARGE INSTR - COC
Continuity of Care Form    Patient Name: Jeffery Hopkins   :  1955  MRN:  [id number]    Admit date:  2024  Discharge date:  ***    Code Status Order: Prior   Advance Directives:     Admitting Physician:  No admitting provider for patient encounter.  PCP: Lester Hernadez MD    Discharging Nurse: ***  Discharging Hospital Unit/Room#: Internal Waiting A/IntWait A  Discharging Unit Phone Number: ***    Emergency Contact:   Extended Emergency Contact Information  Primary Emergency Contact: Sravanthi Odell  Mobile Phone: [phone]  Relation: Girlfriend  Preferred language: English   needed? No  Secondary Emergency Contact: Sergio Hopkins  [place] Phone: [phone]  Mobile Phone: [phone]  Relation: Brother/Sister  Preferred language: English   needed? No    Past Surgical History:  Past Surgical History:   Procedure Laterality Date    ANTERIOR CRUCIATE LIGAMENT REPAIR Right 2020    RIGHT KNEE ARTHROSCOPY, (ANTERIOR CRUCIATE LIGAMENT) ACL RECONSTRUCTION. ALLOGRAFT. MEDIAL MENISCECTOMY AND DEBRIDEMENT. (ARTHREX) performed by Jonathan Haile DO at [facility] OR    ARM SURGERY Left 2021    LEFT ULNAR NEUROPLASTY performed by Nabil Lopez MD at [facility] OR    CHOLECYSTECTOMY, LAPAROSCOPIC N/A 2020    CHOLECYSTECTOMY LAPAROSCOPIC WITH IOC performed by Leobardo Hooper MD at [facility] OR    COLONOSCOPY      ENDOSCOPY, COLON, DIAGNOSTIC      HERNIA REPAIR      NECK SURGERY      cervical spine surgery    SHOULDER ARTHROSCOPY Left 2021    LEFT SHOULDER ARTHROSCOPY, SUBACROMIAL DECOMPRESSION, DEBRIDEMENT (ARTHREX) performed by Jonathan Haile DO at [facility] OR       Immunization History:   Immunization History   Administered Date(s) Administered    Influenza, FLUAD, (age 65 y+), Adjuvanted, 0.5mL 2020       Active Problems:  Patient Active Problem List   Diagnosis Code    Chronic obstructive pulmonary disease (HCC) J44.9    Primary hypertension I10    Type 2 diabetes mellitus with

## 2024-08-06 NOTE — ED PROVIDER NOTES
Hypertension Mother     Heart Attack Mother     Diabetes Father     Diabetes Maternal Grandmother     Hypertension Paternal Grandmother         SOCIAL HISTORY       Social History     Tobacco Use    Smoking status: Former     Current packs/day: 0.00     Average packs/day: 1.5 packs/day for 21.1 years (31.7 ttl pk-yrs)     Types: Cigarettes     Start date: 1965     Quit date: 1986     Years since quittin.4    Smokeless tobacco: Never   Vaping Use    Vaping Use: Never used   Substance Use Topics    Alcohol use: Yes     Comment: 2-3 beers per week    Drug use: No       SCREENINGS        Zeke Coma Scale  Eye Opening: Spontaneous  Best Verbal Response: Oriented  Best Motor Response: Obeys commands  Zeke Coma Scale Score: 15                CIWA Assessment  BP: (!) 164/82  Pulse: 64           PHYSICAL EXAM  1 or more Elements     ED Triage Vitals   BP Temp Temp src Pulse Respirations SpO2 Height Weight - Scale   24 -- 24 -- 24   (!) 134/91 98.1 °F (36.7 °C)  64 (!) 2 98 %  95.3 kg (210 lb)     Constitutional/General: Alert and oriented x3  Head: Normocephalic and atraumatic  Eyes: PERRL, EOMI, conjunctiva normal, sclera non icteric  ENT:  Oropharynx clear, handling secretions, no trismus, no asymmetry of the posterior oropharynx or uvular edema  Neck: Supple, full ROM, no stridor, no meningeal signs  Respiratory: Mild wheezing bilaterally with cough. No rales, or rhonchi. No respiratory distress  Cardiovascular:  Regular rate. Regular rhythm. Equal extremity pulses.   GI:  Abdomen Soft, Non tender, Non distended. No rebound, guarding, or rigidity.   Musculoskeletal: Moves all extremities x 4. Warm and well perfused, no clubbing, no cyanosis, no edema. Capillary refill <3 seconds  Integument: skin warm and dry. No rashes.   Neurologic: no focal deficits, CN II-XII grossly intact. Symmetric strength 5/5 in the upper and lower

## 2024-08-08 ENCOUNTER — APPOINTMENT (OUTPATIENT)
Dept: GENERAL RADIOLOGY | Age: 69
End: 2024-08-08
Payer: MEDICARE

## 2024-08-08 ENCOUNTER — HOSPITAL ENCOUNTER (EMERGENCY)
Age: 69
Discharge: HOME | End: 2024-08-08
Payer: MEDICARE

## 2024-08-08 VITALS
TEMPERATURE: 98.3 F | RESPIRATION RATE: 20 BRPM | SYSTOLIC BLOOD PRESSURE: 171 MMHG | OXYGEN SATURATION: 97 % | HEART RATE: 77 BPM | DIASTOLIC BLOOD PRESSURE: 82 MMHG
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
## 2024-08-08 PROCEDURE — 99283 EMERGENCY DEPT VISIT LOW MDM: CPT

## 2024-08-08 PROCEDURE — 73130 X-RAY EXAM OF HAND: CPT

## 2024-08-08 PROCEDURE — 6370000000 HC RX 637 (ALT 250 FOR IP): Performed by: PHYSICIAN ASSISTANT

## 2024-08-08 PROCEDURE — 73060 X-RAY EXAM OF HUMERUS: CPT

## 2024-08-08 RX ORDER — AMOXICILLIN AND CLAVULANATE POTASSIUM 875; 125 MG/1; MG/1
1 TABLET, FILM COATED ORAL 2 TIMES DAILY
Qty: 20 TABLET | Refills: 0 | Status: SHIPPED | OUTPATIENT
Start: 2024-08-08 | End: 2024-08-18

## 2024-08-08 RX ORDER — AMOXICILLIN AND CLAVULANATE POTASSIUM 875; 125 MG/1; MG/1
1 TABLET, FILM COATED ORAL ONCE
Status: COMPLETED | OUTPATIENT
Start: 2024-08-08 | End: 2024-08-08

## 2024-08-08 RX ORDER — ACETAMINOPHEN 325 MG/1
650 TABLET ORAL ONCE
Status: COMPLETED | OUTPATIENT
Start: 2024-08-08 | End: 2024-08-08

## 2024-08-08 RX ADMIN — ACETAMINOPHEN 650 MG: 325 TABLET ORAL at 16:43

## 2024-08-08 RX ADMIN — AMOXICILLIN AND CLAVULANATE POTASSIUM 1 TABLET: 875; 125 TABLET, FILM COATED ORAL at 16:42

## 2024-08-08 ASSESSMENT — LIFESTYLE VARIABLES
HOW OFTEN DO YOU HAVE A DRINK CONTAINING ALCOHOL: 2-3 TIMES A WEEK
HOW MANY STANDARD DRINKS CONTAINING ALCOHOL DO YOU HAVE ON A TYPICAL DAY: 3 OR 4

## 2024-08-08 ASSESSMENT — PAIN - FUNCTIONAL ASSESSMENT: PAIN_FUNCTIONAL_ASSESSMENT: 0-10

## 2024-08-08 ASSESSMENT — PAIN DESCRIPTION - ORIENTATION: ORIENTATION: RIGHT;LEFT

## 2024-08-08 ASSESSMENT — PAIN SCALES - GENERAL: PAINLEVEL_OUTOF10: 8

## 2024-08-08 ASSESSMENT — PAIN DESCRIPTION - LOCATION: LOCATION: ARM;HAND

## 2024-08-08 NOTE — ED PROVIDER NOTES
Independent RINKU Visit.      HPI:  8/8/24, Time: 1:14 PM EDT         Jeffery Hopkins is a 69 y.o. male presenting to the ED for dog bite , beginning 2 days ago .  The complaint has been persistent, moderate in severity, and worsened by nothing.    Patient comes in with complaint of dog bite to the left hand and right upper arm that occurred 2 days ago.  Patient's tetanus is up-to-date.  Dog's immunization is unknown but being checked on at this time.    Review of Systems:   A complete review of systems was performed and pertinent positives and negatives are stated within HPI, all other systems reviewed and are negative.          --------------------------------------------- PAST HISTORY ---------------------------------------------  Past Medical History:  has a past medical history of Anesthesia complication, Arthritis, Asthma, Diabetes mellitus (HCC), Fungal infection of foot, GERD (gastroesophageal reflux disease), Hyperlipidemia, Hypertension, and Type 2 diabetes mellitus without complication (HCC).    Past Surgical History:  has a past surgical history that includes hernia repair; Neck surgery; Cholecystectomy, laparoscopic (N/A, 05/18/2020); Anterior cruciate ligament repair (Right, 07/13/2020); Colonoscopy; Endoscopy, colon, diagnostic; Shoulder arthroscopy (Left, 6/1/2021); and Arm Surgery (Left, 9/8/2021).    Social History:  reports that he quit smoking about 38 years ago. His smoking use included cigarettes. He started smoking about 59 years ago. He has a 31.7 pack-year smoking history. He has never used smokeless tobacco. He reports current alcohol use. He reports that he does not use drugs.    Family History: family history includes Diabetes in his father and maternal grandmother; Heart Attack in his mother; Hypertension in his mother and paternal grandmother.     The patient’s home medications have been reviewed.    Allergies: Adhesive tape    -------------------------------------------------- RESULTS

## 2024-11-03 DIAGNOSIS — I10 PRIMARY HYPERTENSION: ICD-10-CM

## 2024-11-04 RX ORDER — TRAZODONE HYDROCHLORIDE 50 MG/1
50 TABLET, FILM COATED ORAL NIGHTLY
Qty: 90 TABLET | Refills: 1 | OUTPATIENT
Start: 2024-11-04

## 2024-11-04 RX ORDER — CARVEDILOL 25 MG/1
TABLET ORAL
Qty: 180 TABLET | Refills: 1 | OUTPATIENT
Start: 2024-11-04

## 2024-11-15 ENCOUNTER — HOSPITAL ENCOUNTER (EMERGENCY)
Age: 69
Discharge: HOME | End: 2024-11-16
Payer: MEDICARE

## 2024-11-15 VITALS
SYSTOLIC BLOOD PRESSURE: 137 MMHG | OXYGEN SATURATION: 98 % | WEIGHT: 215 LBS | DIASTOLIC BLOOD PRESSURE: 71 MMHG | TEMPERATURE: 97.7 F | HEART RATE: 82 BPM | BODY MASS INDEX: 30.85 KG/M2 | RESPIRATION RATE: 16 BRPM

## 2024-11-15 DIAGNOSIS — S46.211A RUPTURE OF RIGHT BICEPS TENDON, INITIAL ENCOUNTER: Primary | ICD-10-CM

## 2024-11-15 PROCEDURE — 99284 EMERGENCY DEPT VISIT MOD MDM: CPT

## 2024-11-15 ASSESSMENT — PAIN DESCRIPTION - LOCATION: LOCATION: ARM

## 2024-11-15 ASSESSMENT — PAIN SCALES - GENERAL: PAINLEVEL_OUTOF10: 10

## 2024-11-15 ASSESSMENT — PAIN - FUNCTIONAL ASSESSMENT: PAIN_FUNCTIONAL_ASSESSMENT: 0-10

## 2024-11-15 ASSESSMENT — PAIN DESCRIPTION - ORIENTATION: ORIENTATION: RIGHT

## 2024-11-16 ENCOUNTER — APPOINTMENT (OUTPATIENT)
Dept: GENERAL RADIOLOGY | Age: 69
End: 2024-11-16
Payer: MEDICARE

## 2024-11-16 PROCEDURE — 73030 X-RAY EXAM OF SHOULDER: CPT

## 2024-11-16 PROCEDURE — 6360000002 HC RX W HCPCS: Performed by: PHYSICIAN ASSISTANT

## 2024-11-16 PROCEDURE — 96372 THER/PROPH/DIAG INJ SC/IM: CPT

## 2024-11-16 RX ORDER — KETOROLAC TROMETHAMINE 30 MG/ML
30 INJECTION, SOLUTION INTRAMUSCULAR; INTRAVENOUS ONCE
Status: COMPLETED | OUTPATIENT
Start: 2024-11-16 | End: 2024-11-16

## 2024-11-16 RX ORDER — METHOCARBAMOL 750 MG/1
750 TABLET, FILM COATED ORAL 4 TIMES DAILY
Qty: 40 TABLET | Refills: 0 | Status: SHIPPED | OUTPATIENT
Start: 2024-11-16 | End: 2024-11-26

## 2024-11-16 RX ADMIN — KETOROLAC TROMETHAMINE 30 MG: 30 INJECTION, SOLUTION INTRAMUSCULAR at 00:38

## 2024-11-16 ASSESSMENT — PAIN SCALES - GENERAL: PAINLEVEL_OUTOF10: 10

## 2024-11-16 NOTE — DISCHARGE INSTRUCTIONS
Please return to the ED with new or worsening symptoms. Follow up with PCP and orthopaedics for recheck.

## 2024-11-16 NOTE — ED PROVIDER NOTES
Independent RINKU Visit.        HPI:  11/16/24, Time: 12:12 AM BETTY Hopkins is a 69 y.o. male presenting to the ED for right shoulder pain, beginning several hours ago while trying to lift something.  The complaint has been persistent, moderate in severity, and worsened by movement of right shoulder.  Patient provides history in the emergency department today.  Reports he was trying to lift something and he felt a pop in his right shoulder and upper arm area.  Reports deformity to the area.  No numbness or tingling to the right upper extremity.  No chest pain or shortness of breath.  Afebrile that recent travel or sick contacts.  Patient denies all other symptoms and injuries at this time.    Review of Systems:   A complete review of systems was performed and pertinent positives and negatives are stated within HPI, all other systems reviewed and are negative.          --------------------------------------------- PAST HISTORY ---------------------------------------------  Past Medical History:  has a past medical history of Anesthesia complication, Arthritis, Asthma, Diabetes mellitus (HCC), Fungal infection of foot, GERD (gastroesophageal reflux disease), Hyperlipidemia, Hypertension, and Type 2 diabetes mellitus without complication (HCC).    Past Surgical History:  has a past surgical history that includes hernia repair; Neck surgery; Cholecystectomy, laparoscopic (N/A, 05/18/2020); Anterior cruciate ligament repair (Right, 07/13/2020); Colonoscopy; Endoscopy, colon, diagnostic; Shoulder arthroscopy (Left, 6/1/2021); and Arm Surgery (Left, 9/8/2021).    Social History:  reports that he quit smoking about 38 years ago. His smoking use included cigarettes. He started smoking about 59 years ago. He has a 31.7 pack-year smoking history. He has never used smokeless tobacco. He reports current alcohol use. He reports that he does not use drugs.    Family History: family history includes Diabetes in his  COURSE/MEDICAL DECISION MAKING----------------------  Medications   ketorolac (TORADOL) injection 30 mg (30 mg IntraMUSCular Given 11/16/24 0038)         ED COURSE:       Medical Decision Making:    Patient is a 69-year-old male presenting to the emergency department with right shoulder and upper arm pain.  Physical exam findings do reveal Aubrey deformity.  Concern for proximal biceps tendon rupture.  Overall patient is nontoxic-appearing, afebrile, no acute distress.  Neurovascularly intact with stable vitals.  At this time we will plan outpatient symptom management close follow-up with PCP and orthopedics for recheck.  Advise return to the emergency department any new or worsening symptoms.  Patient voiced understanding and is agreeable to the above treatment plan.     Counseling:   The emergency provider has spoken with the patient and discussed today’s results, in addition to providing specific details for the plan of care and counseling regarding the diagnosis and prognosis.  Questions are answered at this time and they are agreeable with the plan.      --------------------------------- IMPRESSION AND DISPOSITION ---------------------------------    IMPRESSION  1. Rupture of right biceps tendon, initial encounter        DISPOSITION  Disposition: Discharge to home  Patient condition is stable      NOTE: This report was transcribed using voice recognition software. Every effort was made to ensure accuracy; however, inadvertent computerized transcription errors may be present        Desirae Donnelly PA  11/16/24 0339    ATTENDING PROVIDER ATTESTATION:     Supervising Physician, on-site, available for consultation, non-participatory in the evaluation or care of this patient.         Gavin Uriarte DO  11/16/24 0514

## 2024-11-26 ENCOUNTER — OFFICE VISIT (OUTPATIENT)
Dept: ORTHOPEDIC SURGERY | Age: 69
End: 2024-11-26
Payer: MEDICARE

## 2024-11-26 VITALS
RESPIRATION RATE: 14 BRPM | HEIGHT: 70 IN | WEIGHT: 215 LBS | TEMPERATURE: 97.6 F | OXYGEN SATURATION: 96 % | HEART RATE: 73 BPM | SYSTOLIC BLOOD PRESSURE: 138 MMHG | BODY MASS INDEX: 30.78 KG/M2 | DIASTOLIC BLOOD PRESSURE: 76 MMHG

## 2024-11-26 DIAGNOSIS — S46.111A LABRAL TEAR OF LONG HEAD OF RIGHT BICEPS TENDON, INITIAL ENCOUNTER: Primary | ICD-10-CM

## 2024-11-26 PROCEDURE — 3075F SYST BP GE 130 - 139MM HG: CPT | Performed by: ORTHOPAEDIC SURGERY

## 2024-11-26 PROCEDURE — 1125F AMNT PAIN NOTED PAIN PRSNT: CPT | Performed by: ORTHOPAEDIC SURGERY

## 2024-11-26 PROCEDURE — 1159F MED LIST DOCD IN RCRD: CPT | Performed by: ORTHOPAEDIC SURGERY

## 2024-11-26 PROCEDURE — 1123F ACP DISCUSS/DSCN MKR DOCD: CPT | Performed by: ORTHOPAEDIC SURGERY

## 2024-11-26 PROCEDURE — 3078F DIAST BP <80 MM HG: CPT | Performed by: ORTHOPAEDIC SURGERY

## 2024-11-26 PROCEDURE — 99213 OFFICE O/P EST LOW 20 MIN: CPT | Performed by: ORTHOPAEDIC SURGERY

## 2024-11-26 NOTE — PROGRESS NOTES
There is not a previous scar over the affected area.There is not any cellulitis, lymphedema or cutaneous lesions noted in the lower extremities.   Upon palpation there is no induration noted.      Neurologic:  Motor exam of the upper extremities show: The reflexes in biceps/triceps/brachioradialis are equal and symmetric.  Sensory exam C5-T1 are normal bilaterally.    Cardiovascular:  The vascular exam is normal and is well perfused to distal extremities.There are 2+ radial pulses bilaterally, and motor and sensation is intact to median, ulnar, and radial, musclocutaneus, and axillary nerve distribution and grossly symmetric bilaterally.  There is cap refill noted less than two seconds in all digits. There is not edema of the bilateral upper extremities.  There is not varicosities noted in the distal extremities.      Lymph:  Upon palpation,  there is no lymphadenopathy noted in bilateral upper extremities.      Musculoskeletal:  Gait: normal; examination of the nails and digits reveal no cyanosis or clubbing.      Cervical Exam:  On physical exam, Jeffery Hopkins is well-developed, well-nourished, oriented to person, place and time.  his gait is normal.  On evaluation of hiscervical spine, He has full range of motion of the cervical spine without pain.  There is no cervical tenderness to palpation.     Shoulder Exam:  On evaluation of his bilaterally upper extremities, his right shoulder has no deformity.  There is tenderness upon palpation of the anterior shoulder and bicep muscle.  There is not evidence of scapular dyskinesis.  There is not muscle atrophy in shoulder girdle. The range of motion for the Right Shoulder is 120/40/BL and for the Left shoulder is 100/30/BL.  Right shoulder Motor strength is 5/5 in the supraspinatus, 5/5 internal rotation and 5/5 in external rotation, and Left shoulder motor strength 5/5 in supraspinatus, 5/5 in internal rotation, 5/5 in external rotation.        Right shoulder:  negative

## 2025-01-03 DIAGNOSIS — M25.512 LEFT SHOULDER PAIN, UNSPECIFIED CHRONICITY: Primary | ICD-10-CM

## 2025-02-03 ENCOUNTER — OFFICE VISIT (OUTPATIENT)
Dept: ORTHOPEDIC SURGERY | Age: 70
End: 2025-02-03

## 2025-02-03 VITALS — BODY MASS INDEX: 30.78 KG/M2 | WEIGHT: 215 LBS | OXYGEN SATURATION: 98 % | HEIGHT: 70 IN

## 2025-02-03 DIAGNOSIS — M19.012 GLENOHUMERAL ARTHRITIS, LEFT: Primary | ICD-10-CM

## 2025-02-03 RX ORDER — TRIAMCINOLONE ACETONIDE 40 MG/ML
40 INJECTION, SUSPENSION INTRA-ARTICULAR; INTRAMUSCULAR ONCE
Status: COMPLETED | OUTPATIENT
Start: 2025-02-03 | End: 2025-02-03

## 2025-02-03 RX ADMIN — TRIAMCINOLONE ACETONIDE 40 MG: 40 INJECTION, SUSPENSION INTRA-ARTICULAR; INTRAMUSCULAR at 09:39

## 2025-02-03 NOTE — PROGRESS NOTES
Chief Complaint   Patient presents with    Follow-up     Patient presents for a follow up on his bilateral shoulders. Patient states the shoulder pain is worse since the last visit. He states that last night he was tarping his load on his truck and felt a pop in the left shoulder.        Jeffery Hopkins is a 70 y.o. year old   male who is seen today  for evaluation of left shoulder pain.  He reports the pain has been ongoing for the past few years.  He does not recall a specific injury which started the pain.  He had prior shoulder arthroscopic surgery in 2021.   He reports the pain is worse with movement, better with rest.  The patient does not have mechanical symptoms.  Hedoes have night pain.  He denies a feeling of instability.  The prior treatments have been Tylenol.  The patient   has not responded to the treatment. The patient is right hand dominant. The patient is working. The patients occupation is .       Chief Complaint   Patient presents with    Follow-up     Patient presents for a follow up on his bilateral shoulders. Patient states the shoulder pain is worse since the last visit. He states that last night he was tarping his load on his truck and felt a pop in the left shoulder.     Past Medical History:   Diagnosis Date    Anesthesia complication     states has difficulty breathing after surgery  usually needs nebulizer treatment    Arthritis     Asthma     since childhood    Diabetes mellitus (HCC)     Fungal infection of foot     GERD (gastroesophageal reflux disease)     Hyperlipidemia     Hypertension     Type 2 diabetes mellitus without complication (HCC)      Past Surgical History:   Procedure Laterality Date    ANTERIOR CRUCIATE LIGAMENT REPAIR Right 07/13/2020    RIGHT KNEE ARTHROSCOPY, (ANTERIOR CRUCIATE LIGAMENT) ACL RECONSTRUCTION. ALLOGRAFT. MEDIAL MENISCECTOMY AND DEBRIDEMENT. (ARTHREX) performed by Jonathan Haile DO at [facility] OR    ARM SURGERY Left 9/8/2021    LEFT ULNAR

## 2025-03-10 ENCOUNTER — HOSPITAL ENCOUNTER (EMERGENCY)
Age: 70
Discharge: HOME | End: 2025-03-10
Attending: EMERGENCY MEDICINE
Payer: MEDICARE

## 2025-03-10 VITALS
SYSTOLIC BLOOD PRESSURE: 120 MMHG | RESPIRATION RATE: 16 BRPM | TEMPERATURE: 97.7 F | HEART RATE: 64 BPM | BODY MASS INDEX: 31.28 KG/M2 | OXYGEN SATURATION: 96 % | DIASTOLIC BLOOD PRESSURE: 72 MMHG | WEIGHT: 218 LBS

## 2025-03-10 DIAGNOSIS — J06.9 ACUTE UPPER RESPIRATORY INFECTION: Primary | ICD-10-CM

## 2025-03-10 LAB
FLUAV RNA RESP QL NAA+PROBE: NOT DETECTED
FLUBV RNA RESP QL NAA+PROBE: NOT DETECTED
SARS-COV-2 RNA RESP QL NAA+PROBE: NOT DETECTED
SOURCE: NORMAL
SPECIMEN DESCRIPTION: NORMAL

## 2025-03-10 PROCEDURE — 87636 SARSCOV2 & INF A&B AMP PRB: CPT

## 2025-03-10 PROCEDURE — 99283 EMERGENCY DEPT VISIT LOW MDM: CPT

## 2025-03-10 RX ORDER — BROMPHENIRAMINE MALEATE, PSEUDOEPHEDRINE HYDROCHLORIDE, AND DEXTROMETHORPHAN HYDROBROMIDE 2; 30; 10 MG/5ML; MG/5ML; MG/5ML
5 SYRUP ORAL 4 TIMES DAILY PRN
Qty: 120 ML | Refills: 0 | Status: SHIPPED | OUTPATIENT
Start: 2025-03-10

## 2025-03-10 ASSESSMENT — ENCOUNTER SYMPTOMS
COUGH: 1
RHINORRHEA: 1
NAUSEA: 0
EYE REDNESS: 0
SHORTNESS OF BREATH: 0
EYE DISCHARGE: 0
DIARRHEA: 0
BACK PAIN: 0
ABDOMINAL PAIN: 0
EYE PAIN: 0
SORE THROAT: 0
WHEEZING: 0
SINUS PRESSURE: 0
VOMITING: 0

## 2025-03-10 ASSESSMENT — PAIN - FUNCTIONAL ASSESSMENT: PAIN_FUNCTIONAL_ASSESSMENT: NONE - DENIES PAIN

## 2025-03-10 NOTE — ED PROVIDER NOTES
The history is provided by the patient.   Cold Symptoms  Presenting symptoms: congestion, cough, fatigue and rhinorrhea    Presenting symptoms: no ear pain, no fever and no sore throat    Severity:  Moderate  Onset quality:  Sudden  Duration:  3 days  Chronicity:  New  Associated symptoms: no arthralgias, no headaches and no wheezing         Review of Systems   Constitutional:  Positive for fatigue. Negative for chills and fever.   HENT:  Positive for congestion and rhinorrhea. Negative for ear pain, sinus pressure and sore throat.    Eyes:  Negative for pain, discharge and redness.   Respiratory:  Positive for cough. Negative for shortness of breath and wheezing.    Cardiovascular:  Negative for chest pain.   Gastrointestinal:  Negative for abdominal pain, diarrhea, nausea and vomiting.   Genitourinary:  Negative for dysuria and frequency.   Musculoskeletal:  Negative for arthralgias and back pain.   Skin:  Negative for rash and wound.   Neurological:  Negative for weakness and headaches.   Hematological:  Negative for adenopathy.   All other systems reviewed and are negative.       Physical Exam  Vitals and nursing note reviewed.   Constitutional:       Appearance: He is well-developed.   HENT:      Head: Normocephalic and atraumatic.      Jaw: No trismus.      Right Ear: Hearing, ear canal and external ear normal. Tympanic membrane is retracted.      Left Ear: Hearing, ear canal and external ear normal. Tympanic membrane is retracted.      Nose: Mucosal edema and congestion present.      Right Sinus: No maxillary sinus tenderness or frontal sinus tenderness.      Left Sinus: No maxillary sinus tenderness or frontal sinus tenderness.      Mouth/Throat:      Pharynx: Uvula midline. No uvula swelling.   Eyes:      General: Lids are normal.      Conjunctiva/sclera: Conjunctivae normal.      Pupils: Pupils are equal, round, and reactive to light.   Cardiovascular:      Rate and Rhythm: Normal rate and regular

## 2025-03-27 ENCOUNTER — HOSPITAL ENCOUNTER (OUTPATIENT)
Age: 70
Discharge: HOME | End: 2025-03-29
Payer: MEDICARE

## 2025-03-27 ENCOUNTER — HOSPITAL ENCOUNTER (OUTPATIENT)
Dept: GENERAL RADIOLOGY | Age: 70
Discharge: HOME | End: 2025-03-29
Payer: MEDICARE

## 2025-03-27 DIAGNOSIS — M79.672 LEFT FOOT PAIN: ICD-10-CM

## 2025-03-27 PROCEDURE — 73630 X-RAY EXAM OF FOOT: CPT

## 2025-04-08 ENCOUNTER — HOSPITAL ENCOUNTER (EMERGENCY)
Age: 70
Discharge: HOME | End: 2025-04-08
Attending: EMERGENCY MEDICINE
Payer: MEDICARE

## 2025-04-08 ENCOUNTER — APPOINTMENT (OUTPATIENT)
Dept: GENERAL RADIOLOGY | Age: 70
End: 2025-04-08
Payer: MEDICARE

## 2025-04-08 VITALS
DIASTOLIC BLOOD PRESSURE: 83 MMHG | WEIGHT: 220 LBS | BODY MASS INDEX: 31.5 KG/M2 | OXYGEN SATURATION: 98 % | TEMPERATURE: 97.9 F | HEIGHT: 70 IN | RESPIRATION RATE: 20 BRPM | SYSTOLIC BLOOD PRESSURE: 145 MMHG | HEART RATE: 60 BPM

## 2025-04-08 DIAGNOSIS — L03.032 CELLULITIS OF TOE OF LEFT FOOT: Primary | ICD-10-CM

## 2025-04-08 LAB
ANION GAP SERPL CALCULATED.3IONS-SCNC: 9 MMOL/L (ref 7–16)
BASOPHILS # BLD: 0.04 K/UL (ref 0–0.2)
BASOPHILS NFR BLD: 1 % (ref 0–2)
BUN SERPL-MCNC: 14 MG/DL (ref 6–23)
CALCIUM SERPL-MCNC: 9.3 MG/DL (ref 8.6–10.2)
CHLORIDE SERPL-SCNC: 102 MMOL/L (ref 98–107)
CO2 SERPL-SCNC: 27 MMOL/L (ref 22–29)
CREAT SERPL-MCNC: 1.1 MG/DL (ref 0.7–1.2)
EOSINOPHIL # BLD: 0.19 K/UL (ref 0.05–0.5)
EOSINOPHILS RELATIVE PERCENT: 3 % (ref 0–6)
ERYTHROCYTE [DISTWIDTH] IN BLOOD BY AUTOMATED COUNT: 13.3 % (ref 11.5–15)
GFR, ESTIMATED: 75 ML/MIN/1.73M2
GLUCOSE SERPL-MCNC: 187 MG/DL (ref 74–99)
HCT VFR BLD AUTO: 39.9 % (ref 37–54)
HGB BLD-MCNC: 14.1 G/DL (ref 12.5–16.5)
IMM GRANULOCYTES # BLD AUTO: 0.03 K/UL (ref 0–0.58)
IMM GRANULOCYTES NFR BLD: 1 % (ref 0–5)
LYMPHOCYTES NFR BLD: 1.35 K/UL (ref 1.5–4)
LYMPHOCYTES RELATIVE PERCENT: 24 % (ref 20–42)
MCH RBC QN AUTO: 30.9 PG (ref 26–35)
MCHC RBC AUTO-ENTMCNC: 35.3 G/DL (ref 32–34.5)
MCV RBC AUTO: 87.5 FL (ref 80–99.9)
MONOCYTES NFR BLD: 0.46 K/UL (ref 0.1–0.95)
MONOCYTES NFR BLD: 8 % (ref 2–12)
NEUTROPHILS NFR BLD: 64 % (ref 43–80)
NEUTS SEG NFR BLD: 3.68 K/UL (ref 1.8–7.3)
PLATELET # BLD AUTO: 184 K/UL (ref 130–450)
PMV BLD AUTO: 9.4 FL (ref 7–12)
POTASSIUM SERPL-SCNC: 4.4 MMOL/L (ref 3.5–5)
RBC # BLD AUTO: 4.56 M/UL (ref 3.8–5.8)
SODIUM SERPL-SCNC: 138 MMOL/L (ref 132–146)
WBC OTHER # BLD: 5.8 K/UL (ref 4.5–11.5)

## 2025-04-08 PROCEDURE — 6370000000 HC RX 637 (ALT 250 FOR IP): Performed by: EMERGENCY MEDICINE

## 2025-04-08 PROCEDURE — 85025 COMPLETE CBC W/AUTO DIFF WBC: CPT

## 2025-04-08 PROCEDURE — 80048 BASIC METABOLIC PNL TOTAL CA: CPT

## 2025-04-08 PROCEDURE — 99284 EMERGENCY DEPT VISIT MOD MDM: CPT

## 2025-04-08 PROCEDURE — 73630 X-RAY EXAM OF FOOT: CPT

## 2025-04-08 RX ORDER — DOXYCYCLINE HYCLATE 100 MG
100 TABLET ORAL 2 TIMES DAILY
Qty: 20 TABLET | Refills: 0 | Status: SHIPPED | OUTPATIENT
Start: 2025-04-08 | End: 2025-04-18

## 2025-04-08 RX ORDER — DOXYCYCLINE 100 MG/1
100 CAPSULE ORAL ONCE
Status: COMPLETED | OUTPATIENT
Start: 2025-04-08 | End: 2025-04-08

## 2025-04-08 RX ADMIN — DOXYCYCLINE HYCLATE 100 MG: 100 CAPSULE ORAL at 13:23

## 2025-04-08 RX ADMIN — AMOXICILLIN AND CLAVULANATE POTASSIUM 1 TABLET: 875; 125 TABLET, FILM COATED ORAL at 13:23

## 2025-04-08 ASSESSMENT — PAIN - FUNCTIONAL ASSESSMENT: PAIN_FUNCTIONAL_ASSESSMENT: 0-10

## 2025-04-08 ASSESSMENT — PAIN SCALES - GENERAL: PAINLEVEL_OUTOF10: 8

## 2025-04-08 ASSESSMENT — PAIN DESCRIPTION - ORIENTATION: ORIENTATION: LEFT

## 2025-04-08 ASSESSMENT — PAIN DESCRIPTION - LOCATION: LOCATION: TOE (COMMENT WHICH ONE)

## 2025-04-08 ASSESSMENT — PAIN DESCRIPTION - DESCRIPTORS: DESCRIPTORS: STABBING

## 2025-04-08 NOTE — DISCHARGE INSTR - COC
{Esignature:[id number]}    PHYSICIAN SECTION    Prognosis: {Prognosis:1705735706}    Condition at Discharge: { Patient Condition:651686781}    Rehab Potential (if transferring to Rehab): {Prognosis:0311271474}    Recommended Labs or Other Treatments After Discharge: ***    Physician Certification: I certify the above information and transfer of Jeffery Hopkins  is necessary for the continuing treatment of the diagnosis listed and that he requires {Admit to Appropriate Level of Care:86095} for {GREATER/LESS:191420926} 30 days.     Update Admission H&P: {CHP DME Changes in HandP:938694801}    PHYSICIAN SIGNATURE:  {Esignature:772712375}

## 2025-04-08 NOTE — ED PROVIDER NOTES
HPI:  4/8/25,   Time: 11:23 AM EDT         Jeffery Hopkins is a 70 y.o. male presenting to the ED for left second toe wound.  The history is obtained from patient as well as patient's medical record.  The patient states that for the last few weeks he has developed a wound present on the medial aspect of his left second toe.  He states he did follow-up with the podiatrist Dr. Zuniga he was given a separator secondary to his toes rubbing together.  He states he has had worsening pain and erythema at the site.  Symptoms are mild in severity.  Nothing makes it better.  Nothing  makes it worse.  No treatment prior to arrival.  ROS:   Pertinent positives and negatives are stated within HPI, all other systems reviewed and are negative.  --------------------------------------------- PAST HISTORY ---------------------------------------------  Past Medical History:  has a past medical history of Anesthesia complication, Arthritis, Asthma, Diabetes mellitus (HCC), Fungal infection of foot, GERD (gastroesophageal reflux disease), Hyperlipidemia, Hypertension, and Type 2 diabetes mellitus without complication.    Past Surgical History:  has a past surgical history that includes hernia repair; Neck surgery; Cholecystectomy, laparoscopic (N/A, 05/18/2020); Anterior cruciate ligament repair (Right, 07/13/2020); Colonoscopy; Endoscopy, colon, diagnostic; Shoulder arthroscopy (Left, 6/1/2021); and Arm Surgery (Left, 9/8/2021).    Social History:  reports that he quit smoking about 39 years ago. His smoking use included cigarettes. He started smoking about 60 years ago. He has a 31.7 pack-year smoking history. He has never used smokeless tobacco. He reports current alcohol use. He reports that he does not use drugs.    Family History: family history includes Diabetes in his father and maternal grandmother; Heart Attack in his mother; Hypertension in his mother and paternal grandmother.     The patient’s home medications have been

## 2025-06-29 ENCOUNTER — HOSPITAL ENCOUNTER (EMERGENCY)
Age: 70
Discharge: HOME | End: 2025-06-29
Attending: FAMILY MEDICINE
Payer: MEDICARE

## 2025-06-29 VITALS
DIASTOLIC BLOOD PRESSURE: 66 MMHG | SYSTOLIC BLOOD PRESSURE: 128 MMHG | TEMPERATURE: 97.3 F | OXYGEN SATURATION: 94 % | HEART RATE: 58 BPM | RESPIRATION RATE: 20 BRPM

## 2025-06-29 DIAGNOSIS — J06.9 UPPER RESPIRATORY TRACT INFECTION, UNSPECIFIED TYPE: Primary | ICD-10-CM

## 2025-06-29 PROCEDURE — 99283 EMERGENCY DEPT VISIT LOW MDM: CPT

## 2025-06-29 RX ORDER — AZITHROMYCIN 250 MG/1
TABLET, FILM COATED ORAL
Qty: 6 TABLET | Refills: 0 | Status: SHIPPED | OUTPATIENT
Start: 2025-06-29

## 2025-06-29 ASSESSMENT — PAIN - FUNCTIONAL ASSESSMENT: PAIN_FUNCTIONAL_ASSESSMENT: NONE - DENIES PAIN

## 2025-06-29 NOTE — ED PROVIDER NOTES
5PM    CHOLECALCIFEROL (VITAMIN D3) 125 MCG (5000 UT) TABS    Take by mouth daily    CONTINUOUS GLUCOSE MONITOR SUP KIT    1 kit by Does not apply route once for 1 dose Please fill whatever insurance covers    CONTINUOUS GLUCOSE MONITOR SUP MISC    Apply 1 sensor as needed    DAPAGLIFLOZIN (FARXIGA) 10 MG TABLET    Take 1 tablet by mouth every morning    DULOXETINE (CYMBALTA) 60 MG EXTENDED RELEASE CAPSULE    TAKE ONE CAPSULE BY MOUTH DAILY AT 9AM    EZETIMIBE (ZETIA) 10 MG TABLET    Take 1 tablet by mouth daily    FENOFIBRATE MICRONIZED (LOFIBRA) 134 MG CAPSULE    TAKE ONE CAPSULE BY MOUTH DAILY AT 9AM every morning before breakfast    FLUTICASONE-UMECLIDIN-VILANT (TRELEGY ELLIPTA) 100-62.5-25 MCG/ACT AEPB INHALER    Inhale 1 puff into the lungs daily    GLIPIZIDE (GLUCOTROL) 10 MG TABLET    Take 1 tablet by mouth in the morning and 1 tablet in the evening.    HYDROCHLOROTHIAZIDE 12.5 MG CAPSULE    TAKE ONE CAPSULE BY MOUTH DAILY AT 9AM    LANCETS 30G MISC    1 each by Does not apply route 3 times daily Dispense what ever brand is covered by insurance    LANCETS MISC    1 each by Does not apply route 3 times daily Test 3 times daily   Provide with brand covered by insurance.    LORATADINE (CLARITIN) 10 MG TABLET    Take 1 tablet by mouth daily    LOSARTAN (COZAAR) 50 MG TABLET    Take 1 tablet by mouth daily    PANTOPRAZOLE (PROTONIX) 40 MG TABLET    Take 1 tablet by mouth daily    ROPINIROLE (REQUIP) 2 MG TABLET    Take 1 tablet by mouth 2 times daily    SEMAGLUTIDE 3 MG TABS    Take 1 tablet by mouth daily    SILDENAFIL (REVATIO) 20 MG TABLET    Take 1 tablet by mouth daily as needed (FOR SEXUAL DYSFUNCTION)    TAMSULOSIN (FLOMAX) 0.4 MG CAPSULE    Take 1 capsule by mouth 2 times daily    TRAZODONE (DESYREL) 50 MG TABLET    Take 1 tablet by mouth nightly    VITAMIN B-12 (CYANOCOBALAMIN) 1000 MCG TABLET    Take 1 tablet by mouth daily     Allergies   Allergen Reactions    Adhesive Tape Rash        Physical Exam

## 2025-07-13 ENCOUNTER — APPOINTMENT (OUTPATIENT)
Dept: CT IMAGING | Age: 70
End: 2025-07-13
Payer: MEDICARE

## 2025-07-13 ENCOUNTER — HOSPITAL ENCOUNTER (EMERGENCY)
Age: 70
Discharge: HOME | End: 2025-07-13
Payer: MEDICARE

## 2025-07-13 VITALS
HEART RATE: 96 BPM | RESPIRATION RATE: 20 BRPM | SYSTOLIC BLOOD PRESSURE: 150 MMHG | OXYGEN SATURATION: 96 % | TEMPERATURE: 98 F | DIASTOLIC BLOOD PRESSURE: 98 MMHG

## 2025-07-13 DIAGNOSIS — J44.1 COPD EXACERBATION (HCC): Primary | ICD-10-CM

## 2025-07-13 LAB
ALBUMIN SERPL-MCNC: 4 G/DL (ref 3.5–5.2)
ALP SERPL-CCNC: 58 U/L (ref 40–129)
ALT SERPL-CCNC: 29 U/L (ref 0–50)
ANION GAP SERPL CALCULATED.3IONS-SCNC: 12 MMOL/L (ref 7–16)
AST SERPL-CCNC: 25 U/L (ref 0–50)
BASOPHILS # BLD: 0.04 K/UL (ref 0–0.2)
BASOPHILS NFR BLD: 1 % (ref 0–2)
BILIRUB SERPL-MCNC: 0.4 MG/DL (ref 0–1.2)
BUN SERPL-MCNC: 18 MG/DL (ref 8–23)
CALCIUM SERPL-MCNC: 9.2 MG/DL (ref 8.8–10.2)
CHLORIDE SERPL-SCNC: 107 MMOL/L (ref 98–107)
CO2 SERPL-SCNC: 23 MMOL/L (ref 22–29)
CREAT SERPL-MCNC: 1 MG/DL (ref 0.7–1.2)
EOSINOPHIL # BLD: 0.27 K/UL (ref 0.05–0.5)
EOSINOPHILS RELATIVE PERCENT: 4 % (ref 0–6)
ERYTHROCYTE [DISTWIDTH] IN BLOOD BY AUTOMATED COUNT: 13 % (ref 11.5–15)
GFR, ESTIMATED: 78 ML/MIN/1.73M2
GLUCOSE SERPL-MCNC: 149 MG/DL (ref 74–99)
HCT VFR BLD AUTO: 38.5 % (ref 37–54)
HGB BLD-MCNC: 13.8 G/DL (ref 12.5–16.5)
IMM GRANULOCYTES # BLD AUTO: 0.05 K/UL (ref 0–0.58)
IMM GRANULOCYTES NFR BLD: 1 % (ref 0–5)
LYMPHOCYTES NFR BLD: 1.48 K/UL (ref 1.5–4)
LYMPHOCYTES RELATIVE PERCENT: 24 % (ref 20–42)
MCH RBC QN AUTO: 30.8 PG (ref 26–35)
MCHC RBC AUTO-ENTMCNC: 35.8 G/DL (ref 32–34.5)
MCV RBC AUTO: 85.9 FL (ref 80–99.9)
MONOCYTES NFR BLD: 0.54 K/UL (ref 0.1–0.95)
MONOCYTES NFR BLD: 9 % (ref 2–12)
NEUTROPHILS NFR BLD: 62 % (ref 43–80)
NEUTS SEG NFR BLD: 3.81 K/UL (ref 1.8–7.3)
PLATELET # BLD AUTO: 195 K/UL (ref 130–450)
PMV BLD AUTO: 9.5 FL (ref 7–12)
POTASSIUM SERPL-SCNC: 3.9 MMOL/L (ref 3.5–5.1)
PROT SERPL-MCNC: 6.7 G/DL (ref 6.4–8.3)
RBC # BLD AUTO: 4.48 M/UL (ref 3.8–5.8)
SODIUM SERPL-SCNC: 142 MMOL/L (ref 136–145)
TROPONIN I SERPL HS-MCNC: 7 NG/L (ref 0–22)
WBC OTHER # BLD: 6.2 K/UL (ref 4.5–11.5)

## 2025-07-13 PROCEDURE — 6360000002 HC RX W HCPCS: Performed by: PHYSICIAN ASSISTANT

## 2025-07-13 PROCEDURE — 93005 ELECTROCARDIOGRAM TRACING: CPT | Performed by: PHYSICIAN ASSISTANT

## 2025-07-13 PROCEDURE — 80053 COMPREHEN METABOLIC PANEL: CPT

## 2025-07-13 PROCEDURE — 85025 COMPLETE CBC W/AUTO DIFF WBC: CPT

## 2025-07-13 PROCEDURE — 6360000004 HC RX CONTRAST MEDICATION: Performed by: RADIOLOGY

## 2025-07-13 PROCEDURE — 99285 EMERGENCY DEPT VISIT HI MDM: CPT

## 2025-07-13 PROCEDURE — 96374 THER/PROPH/DIAG INJ IV PUSH: CPT

## 2025-07-13 PROCEDURE — 2500000003 HC RX 250 WO HCPCS: Performed by: PHYSICIAN ASSISTANT

## 2025-07-13 PROCEDURE — 71275 CT ANGIOGRAPHY CHEST: CPT

## 2025-07-13 PROCEDURE — 6370000000 HC RX 637 (ALT 250 FOR IP): Performed by: PHYSICIAN ASSISTANT

## 2025-07-13 PROCEDURE — 84484 ASSAY OF TROPONIN QUANT: CPT

## 2025-07-13 PROCEDURE — 94640 AIRWAY INHALATION TREATMENT: CPT

## 2025-07-13 RX ORDER — IPRATROPIUM BROMIDE AND ALBUTEROL SULFATE 2.5; .5 MG/3ML; MG/3ML
1 SOLUTION RESPIRATORY (INHALATION) ONCE
Status: COMPLETED | OUTPATIENT
Start: 2025-07-13 | End: 2025-07-13

## 2025-07-13 RX ORDER — DOXYCYCLINE HYCLATE 100 MG
100 TABLET ORAL 2 TIMES DAILY
Qty: 20 TABLET | Refills: 0 | Status: SHIPPED | OUTPATIENT
Start: 2025-07-13 | End: 2025-07-23

## 2025-07-13 RX ORDER — PREDNISONE 10 MG/1
TABLET ORAL
Qty: 30 TABLET | Refills: 0 | Status: SHIPPED | OUTPATIENT
Start: 2025-07-13 | End: 2025-07-23

## 2025-07-13 RX ORDER — IOPAMIDOL 755 MG/ML
75 INJECTION, SOLUTION INTRAVASCULAR
Status: COMPLETED | OUTPATIENT
Start: 2025-07-13 | End: 2025-07-13

## 2025-07-13 RX ADMIN — IPRATROPIUM BROMIDE AND ALBUTEROL SULFATE 1 DOSE: .5; 2.5 SOLUTION RESPIRATORY (INHALATION) at 17:50

## 2025-07-13 RX ADMIN — IOPAMIDOL 75 ML: 755 INJECTION, SOLUTION INTRAVENOUS at 18:46

## 2025-07-13 RX ADMIN — WATER 125 MG: 1 INJECTION INTRAMUSCULAR; INTRAVENOUS; SUBCUTANEOUS at 17:45

## 2025-07-13 NOTE — ED PROVIDER NOTES
widening  Comparison: stable as compared to patient's most recent EKG      ED COURSE:       Medical Decision Making:    Patient is a 70-year-old male presenting to the emergency department with shortness of breath over the last several weeks.  No evidence of leukocytosis or anemia on CBC.  Electrolytes within normal limits.  Renal function within normal limits.  No elevation in LFTs.  Troponin is 7.  EKG is stable without ischemic changes.  CTA of the chest does not reveal any evidence of pneumonia, pneumothorax, pulmonary embolism.  Symptoms did significantly improve after 125 mg of IV Solu-Medrol and a DuoNeb breathing treatment.  Overall he is nontoxic-appearing, afebrile, no acute distress.  Neurovascularly intact with stable vitals.  At this time we will plan on outpatient management with doxycycline and prednisone taper.  Advised to follow very closely with PCP for recheck and return to the emergency department with any new or worsening symptoms.  Low clinical suspicion for ACS.  Patient voiced understanding and is agreeable to the above treatment plan.    Differential diagnosis including but not limited to pneumonia, pneumothorax, pulmonary embolism, ACS    Counseling:   The emergency provider has spoken with the patient and discussed today’s results, in addition to providing specific details for the plan of care and counseling regarding the diagnosis and prognosis.  Questions are answered at this time and they are agreeable with the plan.      --------------------------------- IMPRESSION AND DISPOSITION ---------------------------------    IMPRESSION  1. COPD exacerbation (HCC)        DISPOSITION  Disposition: Discharge to home  Patient condition is stable      NOTE: This report was transcribed using voice recognition software. Every effort was made to ensure accuracy; however, inadvertent computerized transcription errors may be present        Desirae Donnelly PA  07/13/25 [id number]

## 2025-07-14 LAB
EKG ATRIAL RATE: 62 BPM
EKG P AXIS: 69 DEGREES
EKG P-R INTERVAL: 154 MS
EKG Q-T INTERVAL: 414 MS
EKG QRS DURATION: 104 MS
EKG QTC CALCULATION (BAZETT): 420 MS
EKG R AXIS: 34 DEGREES
EKG T AXIS: 57 DEGREES
EKG VENTRICULAR RATE: 62 BPM

## 2025-07-14 PROCEDURE — 93010 ELECTROCARDIOGRAM REPORT: CPT | Performed by: INTERNAL MEDICINE

## (undated) DEVICE — GARMENT,MEDLINE,DVT,INT,CALF,MED, GEN2: Brand: MEDLINE

## (undated) DEVICE — GLOVE ORANGE PI 7   MSG9070

## (undated) DEVICE — CAMERA STRYKER 1488 HD GEN

## (undated) DEVICE — PACK,UNIVERSAL,NO GOWNS: Brand: MEDLINE

## (undated) DEVICE — COVER,LIGHT HANDLE,FLX,1/PK: Brand: MEDLINE INDUSTRIES, INC.

## (undated) DEVICE — BANDAGE,GAUZE,4.5"X4.1YD,STERILE,LF: Brand: MEDLINE

## (undated) DEVICE — INTENDED FOR TISSUE SEPARATION, AND OTHER PROCEDURES THAT REQUIRE A SHARP SURGICAL BLADE TO PUNCTURE OR CUT.: Brand: BARD-PARKER ® STAINLESS STEEL BLADES

## (undated) DEVICE — SPONGE LAP W18XL18IN WHT COT 4 PLY FLD STRUNG RADPQ DISP ST

## (undated) DEVICE — TOWEL OR BLUEE 16X26IN ST 8 PACK ORB08 16X26ORTWL

## (undated) DEVICE — [HIGH FLOW INSUFFLATOR,  DO NOT USE IF PACKAGE IS DAMAGED,  KEEP DRY,  KEEP AWAY FROM SUNLIGHT,  PROTECT FROM HEAT AND RADIOACTIVE SOURCES.]: Brand: PNEUMOSURE

## (undated) DEVICE — SUTURE PROL SZ 2-0 L30IN NONABSORBABLE BLU L26MM SH 1/2 CIR 8833H

## (undated) DEVICE — SUPER TURBOVAC

## (undated) DEVICE — CANNULA ARTHSCP L3CM DIA12MM DBL DAM 1 PC MOLD LO PROF FLNG

## (undated) DEVICE — Z INACTIVE USE 2660664 SOLUTION IRRIG 3000ML 0.9% SOD CHL USP UROMATIC PLAS CONT

## (undated) DEVICE — Z CONVERTED USE 2275207 CLOTH PREP W7.5XL7.5IN 2% CHG SKIN ALC AND RNS FREE

## (undated) DEVICE — NEEDLE SPNL L3.5IN PNK HUB S STL REG WALL FIT STYL W/ QNCKE

## (undated) DEVICE — NEEDLE HYPO 25GA L1.5IN BLU POLYPR HUB S STL REG BVL STR

## (undated) DEVICE — BASIC PACK: Brand: CONVERTORS

## (undated) DEVICE — ELECTRODE PT RET AD L9FT HI MOIST COND ADH HYDRGEL CORDED

## (undated) DEVICE — DECANTER BAG 9": Brand: MEDLINE INDUSTRIES, INC.

## (undated) DEVICE — CLOTH SURG PREP PREOPERATIVE CHLORHEXIDINE GLUC 2% READYPREP

## (undated) DEVICE — HYPODERMIC SAFETY NEEDLE: Brand: MAGELLAN

## (undated) DEVICE — SET MAJOR INSTR ORTHO

## (undated) DEVICE — PEN: MARKING STD 100/CS: Brand: MEDICAL ACTION INDUSTRIES

## (undated) DEVICE — COVER,TABLE,60X90,STERILE: Brand: MEDLINE

## (undated) DEVICE — TROCAR: Brand: KII FIOS FIRST ENTRY

## (undated) DEVICE — PMI PTFE COATED LAPAROSCOPIC WIRE L-HOOK 33 CM: Brand: PMI

## (undated) DEVICE — PADDING,UNDERCAST,COTTON, 4"X4YD STERILE: Brand: MEDLINE

## (undated) DEVICE — BANDAGE,GAUZE,CONFORMING,4"X75",STRL,LF: Brand: MEDLINE

## (undated) DEVICE — PADDING CAST W6INXL4YD COT LO LINTING WYTEX

## (undated) DEVICE — STRIP,CLOSURE,WOUND,MEDI-STRIP,1/4X3: Brand: MEDLINE

## (undated) DEVICE — GARMENT COMPR STD FOR 17IN CALF UNIF THER FLOTRN

## (undated) DEVICE — 20 ML SYRINGE REGULAR TIP: Brand: MONOJECT

## (undated) DEVICE — PROBE ABLAT 90DEG ASPIR MULTIPORT BPLR RF 1 PC ELECTRD ERGO

## (undated) DEVICE — MEDIA CONTRAST ISOVUE GLASS VIALS 250 50ML

## (undated) DEVICE — SHEET,DRAPE,40X58,STERILE: Brand: MEDLINE

## (undated) DEVICE — GLOVE ORANGE PI 7 1/2   MSG9075

## (undated) DEVICE — SPONGE,LAP,12"X12",XR,ST,5/PK,40PK/CS: Brand: MEDLINE

## (undated) DEVICE — DRILL STRYKER REM-B

## (undated) DEVICE — MARKER,SKIN,WI/RULER AND LABELS: Brand: MEDLINE

## (undated) DEVICE — SUTURE MCRYL SZ 3-0 L18IN ABSRB UD L19MM PS-2 3/8 CIR PRIM Y497G

## (undated) DEVICE — 3M™ IOBAN™ 2 ANTIMICROBIAL INCISE DRAPE 6640EZ: Brand: IOBAN™ 2

## (undated) DEVICE — SURGICAL PROCEDURE PACK BASIC

## (undated) DEVICE — GAUZE,SPONGE,4"X4",16PLY,XRAY,STRL,LF: Brand: MEDLINE

## (undated) DEVICE — COVER,MAYO STAND,STERILE: Brand: MEDLINE

## (undated) DEVICE — Device

## (undated) DEVICE — DRILL SURG FLIPCUTTER III

## (undated) DEVICE — TOWEL,OR,DSP,ST,BLUE,STD,6/PK,12PK/CS: Brand: MEDLINE

## (undated) DEVICE — TUBING PMP L16FT MAIN DISP FOR AR-6400 AR-6475

## (undated) DEVICE — GAUZE,SPONGE,4"X4",16PLY,STRL,LF,10/TRAY: Brand: MEDLINE

## (undated) DEVICE — MEDI-VAC YANKAUER SUCTION HANDLE W/BULBOUS TIP: Brand: CARDINAL HEALTH

## (undated) DEVICE — APPLICATOR MEDICATED 26 CC SOLUTION HI LT ORNG CHLORAPREP

## (undated) DEVICE — SUTURE ETHLN SZ 3-0 L18IN NONABSORBABLE BLK PS-2 L19MM 3/8 1669H

## (undated) DEVICE — APPLICATOR PREP 26ML 0.7% IOD POVACRYLEX 74% ISO ALC ST

## (undated) DEVICE — SET SURG BASIN MAYO REUSABLE

## (undated) DEVICE — SKIN AFFIX SURG ADHESIVE 72/CS 0.55ML: Brand: MEDLINE

## (undated) DEVICE — LAPAROSCOPIC SCISSORS: Brand: EPIX LAPAROSCOPIC SCISSORS

## (undated) DEVICE — TROCAR: Brand: KII SLEEVE

## (undated) DEVICE — 3M™ MEDIPORE™ SOFT CLOTH TAPE, 4 INCH X 10 YARDS, 12 ROLLS/CASE, 2964: Brand: 3M™ MEDIPORE™

## (undated) DEVICE — 3M™ STERI-DRAPE™ U-DRAPE, LONG 1019: Brand: STERI-DRAPE™

## (undated) DEVICE — REAMER SURG DIA8.5MM PEEK FORKED TIP PILOTED HD W/ BLT IN

## (undated) DEVICE — NDL CNTR 40CT FM MAG: Brand: MEDLINE INDUSTRIES, INC.

## (undated) DEVICE — SYRINGE,EAR/ULCER, 3 OZ, STERILE: Brand: MEDLINE

## (undated) DEVICE — PMI PTFE COATED LAPAROSCOPIC WIRE L-HOOK 44 CM: Brand: PMI

## (undated) DEVICE — GOWN,SIRUS,NONRNF,SETINSLV,XL,20/CS: Brand: MEDLINE

## (undated) DEVICE — 1200CC GUARDIAN II: Brand: GUARDIAN

## (undated) DEVICE — MINOR PROCEDURE DRAPE: Brand: CONVERTORS

## (undated) DEVICE — BNDG,ELSTC,MATRIX,STRL,3"X5YD,LF,HOOK&LP: Brand: MEDLINE

## (undated) DEVICE — SUTURE FIBERWIRE FIBERSTICK SZ 2-0 L50/12IN NONABSORBABLE AR7209

## (undated) DEVICE — CORD,CAUTERY,BIPOLAR,STERILE: Brand: MEDLINE

## (undated) DEVICE — DOUBLE BASIN SET: Brand: MEDLINE INDUSTRIES, INC.

## (undated) DEVICE — SOLUTION IV IRRIG POUR BRL 0.9% SODIUM CHL 2F7124

## (undated) DEVICE — BLADE SHV L13CM DIA4MM DBL CUT COOLCUT

## (undated) DEVICE — PACK,EXTREMITY,ORTHOMAX,5/CS: Brand: MEDLINE

## (undated) DEVICE — BUR SHAVER 5 MMX13 CM 8 FLUT OVL FOR AGGRESSIVE BNE COOLCUT

## (undated) DEVICE — PAD,ABDOMINAL,8"X10",ST,LF: Brand: MEDLINE

## (undated) DEVICE — PENCIL ES L3M BTTN SWCH HOLSTER W/ BLDE ELECTRD EDGE

## (undated) DEVICE — DRESSING GZ XRFRM 4X4(25/BX 6BX/CS)

## (undated) DEVICE — GLOVE SURG SZ 65 THK91MIL LTX FREE SYN POLYISOPRENE

## (undated) DEVICE — CANNULA ARTHSCP L7CM DIA7MM TRNSLUC THRD FLX W/ NO SQUIRT

## (undated) DEVICE — SYRINGE MED 10ML LUERLOCK TIP W/O SFTY DISP

## (undated) DEVICE — TUBING, SUCTION, 1/4" X 10', STRAIGHT: Brand: MEDLINE

## (undated) DEVICE — Z DISCONTINUED USE 2516375 APPLICATOR MEDICATED 3 CC CLR STRL CHLORAPREP

## (undated) DEVICE — NEEDLE SUT PASS FOR ROT CUF LABRAL REP MULTFI SCORPION

## (undated) DEVICE — STANDARD HYPODERMIC NEEDLE,POLYPROPYLENE HUB: Brand: MONOJECT

## (undated) DEVICE — 1810 FOAM BLOCK NEEDLE COUNTER: Brand: DEVON

## (undated) DEVICE — SOLUTION IRRIG 1000ML 09% SOD CHL USP PIC PLAS CONTAINER

## (undated) DEVICE — STRIP,CLOSURE,WOUND,MEDI-STRIP,1/2X4: Brand: MEDLINE

## (undated) DEVICE — DRAPE SURG W88XL116IN SMS BODY SPL ORTH N FEN REINF FLD PCH

## (undated) DEVICE — SET ENDO INSTR RED YEL LAPAROSCOPIC

## (undated) DEVICE — GOWN,SIRUS,POLYRNF,BRTHSLV,XLN/XL,20/CS: Brand: MEDLINE

## (undated) DEVICE — Z DISCONTINUED USE 2275686 GLOVE SURG SZ 8 L12IN FNGR THK13MIL WHT ISOLEX POLYISOPRENE

## (undated) DEVICE — PAD,NON-ADHERENT,3X8,STERILE,LF,1/PK: Brand: MEDLINE

## (undated) DEVICE — MEDI-VAC NON-CONDUCTIVE SUCTION TUBING: Brand: CARDINAL HEALTH

## (undated) DEVICE — Z DISCONTINUED GLOVE SURG SZ 7.5 L12IN FNGR THK13MIL WHT ISOLEX

## (undated) DEVICE — PRECISION THIN (9.0 X 0.38 X 18.5MM)

## (undated) DEVICE — SLEEVE TRAC SPANDEX LAT W/ 4IN COBAN SUPERFICIAL RAD NRV PD

## (undated) DEVICE — DRAPE,HAND,STERILE: Brand: MEDLINE

## (undated) DEVICE — 5-IN-1 BARBED CONNECTOR POLYPROPYLENE 3/16 - 9/16 IN. (5 - 14.3 MM): Brand: ARGYLE

## (undated) DEVICE — SET ENDO INSTR LAPAROSCOPIC INCISIONAL

## (undated) DEVICE — GOWN,SIRUS,FABRNF,L,20/CS: Brand: MEDLINE

## (undated) DEVICE — DRESSING GZ W1XL8IN COT XRFRM N ADH OVERWRAP CURAD

## (undated) DEVICE — 3M™ STERI-DRAPE™ U-DRAPE 1015: Brand: STERI-DRAPE™

## (undated) DEVICE — INSUFFLATION NEEDLE TO ESTABLISH PNEUMOPERITONEUM.: Brand: INSUFFLATION NEEDLE

## (undated) DEVICE — BANDAGE COMPR W6INXL12FT SMOOTH FOR LIMB EXSANG ESMARCH

## (undated) DEVICE — SET SPINAL NEURO STNEU1

## (undated) DEVICE — DRAPE 64X41IN RADIOLOGY C ARM EQUIP STER

## (undated) DEVICE — SOLUTION SURG PREP ANTIMICROBIAL 4 OZ SKIN WND EXIDINE

## (undated) DEVICE — Z DISCONTINUED NO SUB IDED BAG SPEC RETRV M C240ML MOUTH 7.3MM L17CM SHFT 10MM NYL EZEE

## (undated) DEVICE — SYRINGE MED 10ML TRNSLUC BRL PLUNG BLK MRK POLYPR CTRL

## (undated) DEVICE — COOK ENDOSCOPIC CHOLANGIOGRAPHY SET: Brand: COOK

## (undated) DEVICE — 1.5L THIN WALL CAN: Brand: CRD

## (undated) DEVICE — NEEDLE HYPO 18GA L1.5IN PNK POLYPR HUB S STL THN WALL FILL

## (undated) DEVICE — SYRINGE IRRIG 60ML SFT PLIABLE BLB EZ TO GRP 1 HND USE W/

## (undated) DEVICE — CANNULA ARTHSCP L5CM DIA8.25MM TRNSLUC THRD FLX W/ NO

## (undated) DEVICE — BANDAGE COMPR W6INXL5YD SELF ADH COHESIVE CO FLX

## (undated) DEVICE — GOWN SURG XL LNG LEN SPUNBOND REINF VELC TIE LEV 4 IMPERV

## (undated) DEVICE — APPLIER CLP M L L11.4IN DIA10MM ENDOSCP ROT MULT FOR LIG

## (undated) DEVICE — YANKAUER,BULB TIP,W/O VENT,RIGID,STERILE: Brand: MEDLINE

## (undated) DEVICE — SLING ARM 9 1/2X20IN L MULTIPAK

## (undated) DEVICE — 1010 S-DRAPE TOWEL DRAPE 10/BX: Brand: STERI-DRAPE™

## (undated) DEVICE — CANNULA ARTHSCP L7CM ID8.25MM TRNSLUC THRD FLX W/ NO SQUIRT

## (undated) DEVICE — CHLORAPREP 26ML ORANGE

## (undated) DEVICE — DRAPE,SHOULDER,ORTHOMAX,W/POUCH,5/CS: Brand: MEDLINE

## (undated) DEVICE — SUPER TURBOVAC 90 INTEGRATED CABLE WAND ICW: Brand: COBLATION

## (undated) DEVICE — PLUMEPORT LAPAROSCOPIC SMOKE FILTRATION DEVICE: Brand: PLUMEPORT ACTIV

## (undated) DEVICE — OSTEOTOME PODI SM

## (undated) DEVICE — PACK SURG LAP CHOLE CUSTOM